# Patient Record
Sex: FEMALE | Race: WHITE | NOT HISPANIC OR LATINO | Employment: OTHER | URBAN - METROPOLITAN AREA
[De-identification: names, ages, dates, MRNs, and addresses within clinical notes are randomized per-mention and may not be internally consistent; named-entity substitution may affect disease eponyms.]

---

## 2017-08-24 ENCOUNTER — OFFICE VISIT (OUTPATIENT)
Dept: URGENT CARE | Facility: CLINIC | Age: 57
End: 2017-08-24
Payer: COMMERCIAL

## 2017-08-24 VITALS
HEIGHT: 62 IN | TEMPERATURE: 98 F | HEART RATE: 84 BPM | RESPIRATION RATE: 18 BRPM | SYSTOLIC BLOOD PRESSURE: 120 MMHG | BODY MASS INDEX: 25.76 KG/M2 | OXYGEN SATURATION: 100 % | WEIGHT: 140 LBS | DIASTOLIC BLOOD PRESSURE: 80 MMHG

## 2017-08-24 DIAGNOSIS — R10.31 RLQ ABDOMINAL PAIN: Primary | ICD-10-CM

## 2017-08-24 PROBLEM — R10.9 ABDOMINAL PAIN: Status: ACTIVE | Noted: 2017-08-24

## 2017-08-24 LAB
BILIRUB UR QL STRIP: NEGATIVE
GLUCOSE UR QL STRIP: NEGATIVE
KETONES UR QL STRIP: NEGATIVE
LEUKOCYTE ESTERASE UR QL STRIP: NEGATIVE
PH, POC UA: 6 (ref 5–8)
POC BLOOD, URINE: NEGATIVE
POC NITRATES, URINE: NEGATIVE
PROT UR QL STRIP: NEGATIVE
SP GR UR STRIP: 1.03 (ref 1–1.03)
UROBILINOGEN UR STRIP-ACNC: 8 (ref 0.1–1.1)

## 2017-08-24 PROCEDURE — 81003 URINALYSIS AUTO W/O SCOPE: CPT | Mod: QW,S$GLB,, | Performed by: FAMILY MEDICINE

## 2017-08-24 PROCEDURE — 3008F BODY MASS INDEX DOCD: CPT | Mod: S$GLB,,, | Performed by: FAMILY MEDICINE

## 2017-08-24 PROCEDURE — 99203 OFFICE O/P NEW LOW 30 MIN: CPT | Mod: 25,S$GLB,, | Performed by: FAMILY MEDICINE

## 2017-08-24 RX ORDER — HYDROCODONE BITARTRATE AND ACETAMINOPHEN 5; 325 MG/1; MG/1
1 TABLET ORAL NIGHTLY PRN
Qty: 5 TABLET | Refills: 0 | Status: SHIPPED | OUTPATIENT
Start: 2017-08-24 | End: 2017-08-29

## 2017-08-24 NOTE — PATIENT INSTRUCTIONS
Likely pulled muscle.  Other possible etiology discussed with patient.  Instructed patient to take ibuprofen.  Groin pain should start getting better in 2-3 days.  If pain worsens or fever develops, go to ER of choice.

## 2017-08-24 NOTE — PROGRESS NOTES
"Subjective:       Patient ID: Suly Jaime is a 57 y.o. female.    Vitals:  height is 5' 2" (1.575 m) and weight is 63.5 kg (140 lb). Her temperature is 98 °F (36.7 °C). Her blood pressure is 120/80 and her pulse is 84. Her respiration is 18 and oxygen saturation is 100%.     Chief Complaint: Abdominal Pain    Abdominal Pain   The current episode started in the past 7 days. The onset quality is gradual. The problem occurs constantly. The problem has been unchanged. The pain is located in the RLQ. The pain is at a severity of 8/10. The pain is moderate. The quality of the pain is sharp. The abdominal pain radiates to the back. Pertinent negatives include no constipation, diarrhea, dysuria, fever, hematochezia, melena, nausea or vomiting. Nothing aggravates the pain. The pain is relieved by nothing.     Review of Systems   Constitution: Negative for chills and fever.   Cardiovascular: Negative for chest pain.   Respiratory: Negative for shortness of breath.    Musculoskeletal: Negative for back pain.   Gastrointestinal: Positive for abdominal pain. Negative for constipation, diarrhea, hematochezia, melena, nausea and vomiting.   Genitourinary: Negative for dysuria.       Objective:      Physical Exam   Constitutional: She is oriented to person, place, and time. She appears well-developed and well-nourished.   HENT:   Head: Normocephalic and atraumatic.   Right Ear: External ear normal.   Left Ear: External ear normal.   Nose: Nose normal.   Mouth/Throat: Mucous membranes are normal.   Eyes: Conjunctivae and lids are normal.   Neck: Trachea normal and full passive range of motion without pain. Neck supple.   Cardiovascular: Normal rate, regular rhythm and normal heart sounds.    Pulmonary/Chest: Effort normal and breath sounds normal. No respiratory distress.   Abdominal: Soft. Normal appearance and bowel sounds are normal. She exhibits no distension, no abdominal bruit, no pulsatile midline mass and no mass. There is " no tenderness.   Musculoskeletal: Normal range of motion. She exhibits no edema.        Legs:  Right inguinal area: no scar, no rash, slight direct tenderness, no rebound tenderness, no palpable hernia. Pain upon right leg extension.   Neurological: She is alert and oriented to person, place, and time. She has normal strength.   Skin: Skin is warm, dry and intact. She is not diaphoretic. No pallor.   Psychiatric: She has a normal mood and affect. Her speech is normal and behavior is normal. Judgment and thought content normal. Cognition and memory are normal.   Nursing note and vitals reviewed.      Assessment:       1. RLQ abdominal pain        Plan:         RLQ abdominal pain  -     POCT Urinalysis, Dipstick, Automated, W/O Scope    Other orders  -     hydrocodone-acetaminophen 5-325mg (NORCO) 5-325 mg per tablet; Take 1 tablet by mouth nightly as needed for Pain.  Dispense: 5 tablet; Refill: 0      Follow up with PCP/Specialist if not any better as discussed.   To ER of CHOICE for any worsening of symptoms.  Patient/Guardian voiced understanding and agreement.

## 2017-08-24 NOTE — LETTER
August 24, 2017      [facility] [address]  [place] [address]  Phone: [phone]  Fax: [phone]       Patient: Suly Jaime   YOB: 1960  Date of Visit: 08/24/2017    To Whom It May Concern:    Brianna Jaime  was at [facility] on 08/24/2017. She may return to work/school on 08/25/2017 with no restrictions. If you have any questions or concerns, or if I can be of further assistance, please do not hesitate to contact me.    Sincerely,    Shruthi Hinson MA

## 2017-08-28 ENCOUNTER — TELEPHONE (OUTPATIENT)
Dept: URGENT CARE | Facility: CLINIC | Age: 57
End: 2017-08-28

## 2017-08-28 NOTE — TELEPHONE ENCOUNTER
COURTESY CALL WENT UNANSWERED. A MESSAGE WAS LEFT FOR CALL BACK.     Onesimo Smiley, RT   1:19 PM 8/28/2017

## 2018-11-05 ENCOUNTER — OFFICE VISIT (OUTPATIENT)
Dept: INTERNAL MEDICINE | Facility: CLINIC | Age: 58
End: 2018-11-05
Payer: COMMERCIAL

## 2018-11-05 ENCOUNTER — HOSPITAL ENCOUNTER (OUTPATIENT)
Dept: RADIOLOGY | Facility: HOSPITAL | Age: 58
Discharge: HOME | End: 2018-11-05
Attending: INTERNAL MEDICINE
Payer: COMMERCIAL

## 2018-11-05 VITALS
WEIGHT: 149.06 LBS | BODY MASS INDEX: 27.43 KG/M2 | SYSTOLIC BLOOD PRESSURE: 138 MMHG | HEART RATE: 102 BPM | OXYGEN SATURATION: 98 % | HEIGHT: 62 IN | DIASTOLIC BLOOD PRESSURE: 92 MMHG

## 2018-11-05 DIAGNOSIS — R05.3 CHRONIC COUGH: Primary | ICD-10-CM

## 2018-11-05 DIAGNOSIS — Z72.0 TOBACCO USE: ICD-10-CM

## 2018-11-05 DIAGNOSIS — R03.0 ELEVATED BLOOD PRESSURE READING: ICD-10-CM

## 2018-11-05 DIAGNOSIS — J32.9 CHRONIC SINUSITIS, UNSPECIFIED LOCATION: ICD-10-CM

## 2018-11-05 DIAGNOSIS — R05.3 CHRONIC COUGH: ICD-10-CM

## 2018-11-05 DIAGNOSIS — Z76.89 ENCOUNTER TO ESTABLISH CARE: ICD-10-CM

## 2018-11-05 PROCEDURE — 99999 PR PBB SHADOW E&M-EST. PATIENT-LVL V: CPT | Mod: PBBFAC,,, | Performed by: INTERNAL MEDICINE

## 2018-11-05 PROCEDURE — 99203 OFFICE O/P NEW LOW 30 MIN: CPT | Mod: S$GLB,,, | Performed by: INTERNAL MEDICINE

## 2018-11-05 PROCEDURE — 71046 X-RAY EXAM CHEST 2 VIEWS: CPT | Mod: TC,FY,PO

## 2018-11-05 PROCEDURE — 3008F BODY MASS INDEX DOCD: CPT | Mod: CPTII,S$GLB,, | Performed by: INTERNAL MEDICINE

## 2018-11-05 PROCEDURE — 71046 X-RAY EXAM CHEST 2 VIEWS: CPT | Mod: 26,,, | Performed by: RADIOLOGY

## 2018-11-05 RX ORDER — IBUPROFEN 200 MG
1 TABLET ORAL DAILY
Qty: 21 PATCH | Refills: 1 | Status: SHIPPED | OUTPATIENT
Start: 2018-11-05 | End: 2019-07-03

## 2018-11-05 RX ORDER — FLUTICASONE PROPIONATE 50 MCG
2 SPRAY, SUSPENSION (ML) NASAL DAILY
Qty: 16 G | Refills: 1 | Status: SHIPPED | OUTPATIENT
Start: 2018-11-05 | End: 2018-12-05

## 2018-11-05 RX ORDER — MONTELUKAST SODIUM 10 MG/1
10 TABLET ORAL NIGHTLY
Qty: 30 TABLET | Refills: 1 | Status: SHIPPED | OUTPATIENT
Start: 2018-11-05 | End: 2018-12-07 | Stop reason: SDUPTHER

## 2018-11-05 RX ORDER — BUPROPION HYDROCHLORIDE 150 MG/1
TABLET ORAL
Qty: 60 TABLET | Refills: 1 | Status: SHIPPED | OUTPATIENT
Start: 2018-11-05 | End: 2021-11-02

## 2018-11-05 RX ORDER — AMOXICILLIN AND CLAVULANATE POTASSIUM 875; 125 MG/1; MG/1
1 TABLET, FILM COATED ORAL EVERY 12 HOURS
Qty: 14 TABLET | Refills: 0 | Status: SHIPPED | OUTPATIENT
Start: 2018-11-05 | End: 2018-11-12

## 2018-11-05 NOTE — PATIENT INSTRUCTIONS
Montelukast oral tablets  What is this medicine?  MONTELUKAST (mon viraj LOO kast) is used to prevent and treat the symptoms of asthma. It is also used to treat allergies. Do not use for an acute asthma attack.  How should I use this medicine?  This medicine should be given by mouth. Follow the directions on the prescription label. Take this medicine at the same time every day. You may take this medicine with or without meals. Do not chew the tablets. Do not stop taking your medicine unless your doctor tells you to.  Talk to your pediatrician regarding the use of this medicine in children. Special care may be needed. While this drug may be prescribed for children as young as 15 years of age for selected conditions, precautions do apply.  What side effects may I notice from receiving this medicine?  Side effects that you should report to your doctor or health care professional as soon as possible:  · allergic reactions like skin rash or hives, or swelling of the face, lips, or tongue  · breathing problems  · confusion  · dark urine  · fever or infection  · flu-like symptoms  · hallucinations  · painful lumps under the skin  · pain, tingling, numbness in the hands or feet  · sinus pain or swelling  · suicidal thoughts or other mood changes  · trouble sleeping  · unusual bleeding or bruising  · yellowing of the eyes or skin  Side effects that usually do not require medical attention (report to your doctor or health care professional if they continue or are bothersome):  · cough  · dizziness  · drowsiness  · headache  · nightmares  · stomach upset  · stuffy nose  What may interact with this medicine?  · anti-infectives like rifampin and rifabutin  · medicines for diabetes like rosiglitazone and repaglinide  · medicines for seizures like phenytoin, phenobarbital, and carbamazepine  · paclitaxel  What if I miss a dose?  If you miss a dose, take it as soon as you can. If it is almost time for your next dose, take only that  dose. Do not take double or extra doses.  Where should I keep my medicine?  Keep out of the reach of children.  Store at room temperature between 15 and 30 degrees C (59 and 86 degrees F). Protect from light and moisture. Keep this medicine in the original bottle. Throw away any unused medicine after the expiration date.  What should I tell my health care provider before I take this medicine?  They need to know if you have any of these conditions:  · liver disease  · an unusual or allergic reaction to montelukast, other medicines, foods, dyes, or preservatives  · pregnant or trying to get pregnant  · breast-feeding  What should I watch for while using this medicine?  Visit your doctor or health care professional for regular checks on your progress. Tell your doctor or health care professional if your allergy or asthma symptoms do not improve. Take your medicine even when you do not have symptoms. Do not stop taking any of your medicine(s) unless your doctor tells you to.  If you have asthma, talk to your doctor about what to do in an acute asthma attack. Always have your inhaled rescue medicine for asthma attacks with you.  Patients and their families should watch for new or worsening thoughts of suicide or depression. Also watch for sudden changes in feelings such as feeling anxious, agitated, panicky, irritable, hostile, aggressive, impulsive, severely restless, overly excited and hyperactive, or not being able to sleep. Any worsening of mood or thoughts of suicide or dying should be reported to your health care professional right away.  NOTE:This sheet is a summary. It may not cover all possible information. If you have questions about this medicine, talk to your doctor, pharmacist, or health care provider. Copyright© 2017 [employer]        Fluticasone nasal spray  What is this medicine?  FLUTICASONE (floo TIK a sone) is a corticosteroid. This medicine is used to treat the symptoms of allergies like sneezing,  itchy red eyes, and itchy, runny, or stuffy nose.  How should I use this medicine?  This medicine is for use in the nose. Follow the directions on your product or prescription label. This medicine works best if used at regular intervals. Do not use more often than directed. Make sure that you are using your nasal spray correctly. After 6 months of daily use without a prescription, talk to your doctor or health care professional before using it for a longer time. Ask your doctor or health care professional if you have any questions.  Talk to your pediatrician regarding the use of this medicine in children. Special care may be needed. This medicine has been used in children as young as 2 years. After two months of daily use without a prescription in a child, talk to your pediatrician before using it for a longer time.  What side effects may I notice from receiving this medicine?  Side effects that you should report to your doctor or health care professional as soon as possible:  · allergic reactions like skin rash, itching or hives, swelling of the face, lips, or tongue  · changes in vision  · flu-like symptoms  · white patches or sores in the mouth or nose  Side effects that usually do not require medical attention (report to your doctor or health care professional if they continue or are bothersome):  · burning or irritation inside the nose or throat  · cough  · headache  · nosebleed  · unusual taste or smell  What may interact with this medicine?  · ketoconazole  · metyrapone  · some medicines for HIV  · vaccines  What if I miss a dose?  If you miss a dose, use it as soon as you remember. If it is almost time for your next dose, use only that dose and continue with your regular schedule. Do not use double or extra doses.  Where should I keep my medicine?  Keep out of the reach of children.  Store at room temperature between 15 and 30 degrees C (59 and 86 degrees F). Throw away any unused medicine after the  expiration date.  What should I tell my health care provider before I take this medicine?  They need to know if you have any of these conditions:  · infection, like tuberculosis, herpes, or fungal infection  · recent surgery on nose or sinuses  · taking corticosteroid by mouth  · an unusual or allergic reaction to fluticasone, steroids, other medicines, foods, dyes, or preservatives  · pregnant or trying to get pregnant  · breast-feeding  What should I watch for while using this medicine?  Visit your doctor or health care professional for regular checks on your progress. Some symptoms may improve within 12 hours after starting use. Check with your doctor or health care professional if there is no improvement in your condition after 3 weeks of use.  Do not come in contact with people who have chickenpox or the measles while you are taking this medicine. If you do, call your doctor right away.  NOTE:This sheet is a summary. It may not cover all possible information. If you have questions about this medicine, talk to your doctor, pharmacist, or health care provider. Copyright© 2017 [employer]        Amoxicillin; Clavulanic Acid chewable tablets  What is this medicine?  AMOXICILLIN; CLAVULANIC ACID (a mox i JALEN in; PAULINE heath ic AS id) is a penicillin antibiotic. It is used to treat certain kinds of bacterial infections. It It will not work for colds, flu, or other viral infections.  How should I use this medicine?  Take this medicine by mouth. Chew it completely before swallowing. Follow the directions on the prescription label. Take this medicine at the start of a meal or snack. Take your medicine at regular intervals. Do not take your medicine more often than directed. Take all of your medicine as directed even if you think you are better. Do not skip doses or stop your medicine early.  Talk to your pediatrician regarding the use of this medicine in children. While this drug may be prescribed for selected  conditions, precautions do apply.  What side effects may I notice from receiving this medicine?  Side effects that you should report to your doctor or health care professional as soon as possible:  · allergic reactions like skin rash, itching or hives, swelling of the face, lips, or tongue  · breathing problems  · dark urine  · fever or chills, sore throat  · redness, blistering, peeling or loosening of the skin, including inside the mouth  · seizures  · trouble passing urine or change in the amount of urine  · unusual bleeding, bruising  · unusually weak or tired  · white patches or sores in the mouth or throat  Side effects that usually do not require medical attention (report to your doctor or health care professional if they continue or are bothersome):  · diarrhea  · dizziness  · headache  · nausea, vomiting  · stomach upset  · vaginal or anal irritation  What may interact with this medicine?  · allopurinol  · anticoagulants  · birth control pills  · methotrexate  · probenecid  What if I miss a dose?  If you miss a dose, take it as soon as you can. If it is almost time for your next dose, take only that dose. Do not take double or extra doses.  Where should I keep my medicine?  Keep out of the reach of children.  Store at room temperature below 25 degrees C (77 degrees F). Keep container tightly closed. Throw away any unused medicine after the expiration date.  What should I tell my health care provider before I take this medicine?  They need to know if you have any of these conditions:  · bowel disease, like colitis  · kidney disease  · liver disease  · mononucleosis  · phenylketonuria  · an unusual or allergic reaction to amoxicillin, penicillin, cephalosporin, other antibiotics, clavulanic acid, other medicines, foods, dyes, or preservatives  · pregnant or trying to get pregnant  · breast-feeding  What should I watch for while using this medicine?  Tell your doctor or health care professional if your  symptoms do not improve.  Do not treat diarrhea with over the counter products. Contact your doctor if you have diarrhea that lasts more than 2 days or if it is severe and watery.  If you have diabetes, you may get a false-positive result for sugar in your urine. Check with your doctor or health care professional.  Birth control pills may not work properly while you are taking this medicine. Talk to your doctor about using an extra method of birth control.  NOTE:This sheet is a summary. It may not cover all possible information. If you have questions about this medicine, talk to your doctor, pharmacist, or health care provider. Copyright© 2017 [employer] Standard

## 2018-11-05 NOTE — PROGRESS NOTES
Subjective:       Patient ID: Suly Jaime is a 58 y.o. female.    Chief Complaint: [facility]    HPI Mrs. Jaime is a 58-year-old female with no significant past medical history who presents to establish [facility] with a chief complaint of chronic cough.  Patient states she has had a constant cough for 8 years.  She has had a chest x-ray for follow-up of chronic cough. Cough is constant. This was performed probably about 5 years ago.  She is coughing up clear mucus.  She has had nasal or sinus x-rays which were negative. She has never had CT sinuses or seen an ENT doctor.  She has associated symptoms of nasal drip, runny nose, shortness of breath. Denies any ear pain, wheezing. She has recently had enlarged LNs in the neck, no fevers. No longer has LAD. She has never been prescribed inhalers.  She has been smoking for about 15 years in total.  During that time, she has smoked less than 1 pack per day.  She is ready to quit smoking.    Review of Systems   HENT: Positive for congestion, postnasal drip and rhinorrhea. Negative for ear pain.    Respiratory: Positive for cough and shortness of breath. Negative for wheezing.    Cardiovascular: Negative for chest pain.       Objective:      Physical Exam   Constitutional: She is oriented to person, place, and time. She appears well-developed and well-nourished. No distress.   HENT:   Head: Normocephalic and atraumatic.   Right Ear: External ear normal.   Left Ear: External ear normal.   Nose: Mucosal edema and rhinorrhea present.   Mouth/Throat: Oropharynx is clear and moist. No oropharyngeal exudate.   Eyes: Conjunctivae and EOM are normal.   Neck: Neck supple. No tracheal deviation present. No thyromegaly present.   Cardiovascular: Normal rate, regular rhythm, normal heart sounds and intact distal pulses. Exam reveals no gallop and no friction rub.   No murmur heard.  Pulmonary/Chest: Effort normal and breath sounds normal. No stridor. No respiratory distress. She has no  wheezes. She has no rales.   Lymphadenopathy:     She has no cervical adenopathy.   Neurological: She is alert and oriented to person, place, and time.   Skin: Skin is warm and dry. She is not diaphoretic.   Psychiatric: She has a normal mood and affect. Her behavior is normal. Judgment and thought content normal.   Vitals reviewed.      Assessment:       1. Chronic cough    2. Chronic sinusitis, unspecified location    3. Encounter to establish care    4. Tobacco use    5. Elevated blood pressure reading        Plan:     1.  Chronic cough  Likely due to chronic sinusitis  Chest x-ray being obtained due to duration of cough    2.  Chronic sinusitis  Obtaining CT of the sinuses  Referral placed to ENT  Singulair 10 mg p.o. daily  Use Flonase nasal spray daily  Augmentin 875/125 mg p.o. b.i.d. x7 days    3.  Tobacco use  Counseled on the importance of cessation  Wellbutrin and Nicoderm patches prescribed  Referral placed to smoking cessation    4.  Elevated blood pressure reading  No prior blood pressure readings for comparison  Will recheck on return visit in 1 month.  If still elevated at that time, will start antihypertensive medication.    Return to clinic in 1 month

## 2018-11-06 ENCOUNTER — TELEPHONE (OUTPATIENT)
Dept: INTERNAL MEDICINE | Facility: CLINIC | Age: 58
End: 2018-11-06

## 2018-11-06 NOTE — TELEPHONE ENCOUNTER
Called and spoke w/ patient. Per Dr. Lopez, chest x-ray came back normal. Patient verbally understood.        ----- Message from Irene Lopez MD sent at 11/6/2018 12:39 PM CST -----  Please call the patient regarding her normal chest x-ray result.

## 2018-11-12 ENCOUNTER — TELEPHONE (OUTPATIENT)
Dept: OTOLARYNGOLOGY | Facility: CLINIC | Age: 58
End: 2018-11-12

## 2018-12-07 DIAGNOSIS — R05.3 CHRONIC COUGH: ICD-10-CM

## 2018-12-07 DIAGNOSIS — J32.9 CHRONIC SINUSITIS, UNSPECIFIED LOCATION: ICD-10-CM

## 2018-12-07 RX ORDER — MONTELUKAST SODIUM 10 MG/1
TABLET ORAL
Qty: 30 TABLET | Refills: 1 | Status: SHIPPED | OUTPATIENT
Start: 2018-12-07 | End: 2021-09-30 | Stop reason: SDUPTHER

## 2019-01-15 ENCOUNTER — OFFICE VISIT (OUTPATIENT)
Dept: OTOLARYNGOLOGY | Facility: CLINIC | Age: 59
End: 2019-01-15
Payer: COMMERCIAL

## 2019-01-15 VITALS
BODY MASS INDEX: 27.52 KG/M2 | SYSTOLIC BLOOD PRESSURE: 122 MMHG | WEIGHT: 149.56 LBS | HEIGHT: 62 IN | DIASTOLIC BLOOD PRESSURE: 80 MMHG | HEART RATE: 104 BPM

## 2019-01-15 DIAGNOSIS — J32.8 OTHER CHRONIC SINUSITIS: Primary | ICD-10-CM

## 2019-01-15 DIAGNOSIS — J39.2 OROPHARYNGEAL LESION: ICD-10-CM

## 2019-01-15 DIAGNOSIS — K21.9 LARYNGOPHARYNGEAL REFLUX (LPR): ICD-10-CM

## 2019-01-15 PROCEDURE — 99243 OFF/OP CNSLTJ NEW/EST LOW 30: CPT | Mod: 25,S$GLB,, | Performed by: OTOLARYNGOLOGY

## 2019-01-15 PROCEDURE — 31575 PR LARYNGOSCOPY, FLEXIBLE; DIAGNOSTIC: ICD-10-PCS | Mod: S$GLB,,, | Performed by: OTOLARYNGOLOGY

## 2019-01-15 PROCEDURE — 31575 DIAGNOSTIC LARYNGOSCOPY: CPT | Mod: S$GLB,,, | Performed by: OTOLARYNGOLOGY

## 2019-01-15 PROCEDURE — 99243 PR OFFICE CONSULTATION,LEVEL III: ICD-10-PCS | Mod: 25,S$GLB,, | Performed by: OTOLARYNGOLOGY

## 2019-01-15 PROCEDURE — 99999 PR PBB SHADOW E&M-EST. PATIENT-LVL III: ICD-10-PCS | Mod: PBBFAC,,, | Performed by: OTOLARYNGOLOGY

## 2019-01-15 PROCEDURE — 99999 PR PBB SHADOW E&M-EST. PATIENT-LVL III: CPT | Mod: PBBFAC,,, | Performed by: OTOLARYNGOLOGY

## 2019-01-15 RX ORDER — AMOXICILLIN AND CLAVULANATE POTASSIUM 875; 125 MG/1; MG/1
1 TABLET, FILM COATED ORAL 2 TIMES DAILY
Qty: 42 TABLET | Refills: 0 | Status: SHIPPED | OUTPATIENT
Start: 2019-01-15 | End: 2019-02-05

## 2019-01-15 RX ORDER — PANTOPRAZOLE SODIUM 40 MG/1
40 TABLET, DELAYED RELEASE ORAL DAILY
Qty: 30 TABLET | Refills: 11 | Status: SHIPPED | OUTPATIENT
Start: 2019-01-15 | End: 2019-06-27 | Stop reason: SDUPTHER

## 2019-01-15 RX ORDER — LEVOCETIRIZINE DIHYDROCHLORIDE 5 MG/1
5 TABLET, FILM COATED ORAL NIGHTLY
Qty: 30 TABLET | Refills: 11 | Status: SHIPPED | OUTPATIENT
Start: 2019-01-15 | End: 2020-01-28 | Stop reason: SDUPTHER

## 2019-01-15 NOTE — PROGRESS NOTES
Chief Complaint   Patient presents with    Cough     chronic cough, referred by Dr Lopez    Nasal Congestion     post nasal drip   .     HPI:Suly Jaime is a 59 y.o. female who has been referred by Dr. Irene Lopez for a several year history of chronic cough.  She relays that it is mostly dry but occasionally productive.  She feels this is gradually worsening. She notes that she has had nasal congestion and post-nasal drip. She notes the cough is worse at night. She  6 month history of hoarseness. Her voice is not progressively worsening over this time. There are pitch breaks or cracks. There is not vocal fatigue. She denies dysphagia, odynophagia, throat pain, and otalgia.  There is no hemoptysis or hematemesis. She is breathing well. She is currently taking Singulair with limited benefit. She is using Flonase with a very slight relief.     She denies throat clearing and cough. She admits to heartburn and reflux.        No past medical history on file.  Social History     Socioeconomic History    Marital status:      Spouse name: Not on file    Number of children: Not on file    Years of education: Not on file    Highest education level: Not on file   Social Needs    Financial resource strain: Not on file    Food insecurity - worry: Not on file    Food insecurity - inability: Not on file    Transportation needs - medical: Not on file    Transportation needs - non-medical: Not on file   Occupational History    Not on file   Tobacco Use    Smoking status: Current Every Day Smoker    Smokeless tobacco: Never Used   Substance and Sexual Activity    Alcohol use: No    Drug use: No    Sexual activity: Not on file   Other Topics Concern    Not on file   Social History Narrative    Not on file     Past Surgical History:   Procedure Laterality Date    HYSTERECTOMY  2003     Family History   Problem Relation Age of Onset    Diabetes Mother     Diabetes Maternal Grandmother     Heart  disease Maternal Grandmother            Review of Systems  General: negative for chills, fever or weight loss  Psychological: negative for mood changes or depression  Ophthalmic: negative for blurry vision, photophobia or eye pain  ENT: see HPI  Respiratory: no cough, shortness of breath, or wheezing  Cardiovascular: no chest pain or dyspnea on exertion  Gastrointestinal: no abdominal pain, change in bowel habits, or black/ bloody stools  Musculoskeletal: negative for gait disturbance or muscular weakness  Neurological: no syncope or seizures; no ataxia  Dermatological: negative for puritis,  rash and jaundice  Hematologic/lymphatic: no easy bruising, no new lumps or bumps      Physical Exam:    Vitals:    01/15/19 1531   BP: 122/80   Pulse: 104       Constitutional: Well appearing / communicating without difficutly.  NAD.  Eyes: EOM I Bilaterally  Head/Face: Normocephalic.  Negative paranasal sinus pressure/tenderness.  Salivary glands WNL.  House Brackmann I Bilaterally.    Right Ear: Auricle normal appearance. External Auditory Canal within normal limits no lesions or masses,TM w/o masses/lesions/perforations. TM mobility noted.   Left Ear: Auricle normal appearance. External Auditory Canal within normal limits no lesions or masses,TM w/o masses/lesions/perforations. TM mobility noted.  Nose: No gross nasal septal deviation. Inferior Turbinates 3+ bilaterally. No septal perforation. No masses/lesions. External nasal skin appears normal without masses/lesions.  Oral Cavity: Gingiva/lips within normal limits.  Dentition/gingiva healthy appearing. Mucus membranes moist. Floor of mouth soft, no masses palpated. Oral Tongue mobile. Hard Palate appears normal.    Oropharynx: Base of tongue appears normal. No masses/lesions noted. Tonsillar fossa/pharyngeal wall without lesions. Posterior oropharynx WNL.  Soft palate without masses. Midline uvula.   Neck/Lymphatic: No LAD I-VI bilaterally.  No thyromegaly.  No masses  noted on exam.    Mirror laryngoscopy/nasopharyngoscopy: Active gag reflex.  Unable to perform.    Neuro/Psychiatric: AOx3.  Normal mood and affect.   Cardiovascular: Normal carotid pulses bilaterally, no increasing jugular venous distention noted at cervical region bilaterally.    Respiratory: Normal respiratory effort, no stridor, no retractions noted.    See separate procedure note for FFL.    Chest xray 11/18/2018: no acute abnormality      Assessment:    ICD-10-CM ICD-9-CM    1. Other chronic sinusitis J32.8 473.8 CT Medtronic Sinuses without   2. Laryngopharyngeal reflux (LPR) K21.9 478.79    3. Oropharyngeal lesion J39.2 478.20      The primary encounter diagnosis was Other chronic sinusitis. Diagnoses of Laryngopharyngeal reflux (LPR) and Oropharyngeal lesion were also pertinent to this visit.      Plan:  Orders Placed This Encounter   Procedures    CT Medtronic Sinuses without       Continue Flonase, Singulair. Add Xyzal. Start Augmentin 875mg PO BID for 21 days. Obtain post-treatment CT scan of the sinuses to assess for resolution.      I recommended that the patient start taking protonix 40mg PO BID.* and provided a prescription. I also recommended that the patient refrain from eating within 3 hours of going to bed at night and that the patient place a 2 x 4 underneath the head board of the bed to elevate the head of bed very subtly and optimize the impact of gravity on the potential reflux.  I recommended that the patient avoid alcohol, caffeine, tobacco, tomato sauce, spicy foods, fried food, and chocolate.     Will need excisional biopsy of papillomatous lesion on the posterior wall of oropharynx.       Follow up in 6 weeks to reassess progress with treatment regimen.    Thank you kindly for allowing me to participate in the patient's care.        Deidre Calderon MD

## 2019-01-15 NOTE — LETTER
January 19, 2019      Irene Lopez MD  [address] [facility]  [place] [address]           [facility] Otorhinolaryngology  [address] [place] [address], [address]  [place] [address]  Phone: [phone]  Fax: [phone]          Patient: Suly Jaime   MR Number: [id number]   YOB: 1960   Date of Visit: 1/15/2019       Dear Dr. Irene Lopez:    Thank you for referring Suly Jaime to me for evaluation. Attached you will find relevant portions of my assessment and plan of care.    If you have questions, please do not hesitate to call me. I look forward to following Suly Jaime along with you.    Sincerely,    Deidre Calderon MD    Enclosure  CC:  No Recipients    If you would like to receive this communication electronically, please contact [email or web address] or [phone] to request more information on [employer] Link access.    For providers and/or their staff who would like to refer a patient to [facility], please contact us through our one-stop-shop provider referral line, [facility], at [phone].    If you feel you have received this communication in error or would no longer like to receive these types of communications, please e-mail [email or web address]

## 2019-01-16 ENCOUNTER — TELEPHONE (OUTPATIENT)
Dept: OTOLARYNGOLOGY | Facility: CLINIC | Age: 59
End: 2019-01-16

## 2019-01-16 DIAGNOSIS — J39.2 OROPHARYNGEAL LESION: Primary | ICD-10-CM

## 2019-01-18 DIAGNOSIS — Z12.39 BREAST CANCER SCREENING: ICD-10-CM

## 2019-01-21 ENCOUNTER — HOSPITAL ENCOUNTER (OUTPATIENT)
Dept: RADIOLOGY | Facility: HOSPITAL | Age: 59
Discharge: HOME | End: 2019-01-21
Attending: OTOLARYNGOLOGY
Payer: COMMERCIAL

## 2019-01-21 DIAGNOSIS — J32.8 OTHER CHRONIC SINUSITIS: ICD-10-CM

## 2019-01-21 PROCEDURE — 70486 CT MEDTRONIC SINUSES WITHOUT: ICD-10-PCS | Mod: 26,,, | Performed by: RADIOLOGY

## 2019-01-21 PROCEDURE — 70486 CT MAXILLOFACIAL W/O DYE: CPT | Mod: TC

## 2019-01-21 PROCEDURE — 70486 CT MAXILLOFACIAL W/O DYE: CPT | Mod: 26,,, | Performed by: RADIOLOGY

## 2019-01-28 NOTE — PROCEDURES
Procedures     Due to indication in patient's history, presentation or risk factors,  a fiber optic exam was performed.    SEPARATE PROCEDURE NOTE:    ANESTHESIA:  Topical xylocaine with joaquina-synephrine    FINDINGS: Papillomatous lesion on the posterior wall of the oropharynx.  Moderate inaterarytenoid erythema and edema    PROCEDURE:  After verbal consent was obtained, the flexible scope was passed through the patient's nasal cavity without difficulty.  The nasopharynx (adenoid pad) and eustachian tube orifices were first visualized and were found to be normal, without masses or irregularity.  The posterior pharyngeal wall shows papillomatous leison. The base of tongue was then examined and no mass or irregular tissue was seen.  The scope was then advanced to the larynx, and the epiglottis, valleculae, and piriform sinuses were normal, without masses or mucosal irregularity.  The false vocal folds and true vocal folds were then examined and were found to have normal mobility (full abduction and adduction) and no masses or mucosal irregularity was seen.  The interartyenoid area had moderate  edema and erythema consistent with reflux.

## 2019-01-29 ENCOUNTER — OFFICE VISIT (OUTPATIENT)
Dept: OTOLARYNGOLOGY | Facility: CLINIC | Age: 59
End: 2019-01-29
Payer: COMMERCIAL

## 2019-01-29 ENCOUNTER — HOSPITAL ENCOUNTER (OUTPATIENT)
Dept: PREADMISSION TESTING | Facility: HOSPITAL | Age: 59
Discharge: HOME | End: 2019-01-29
Attending: OTOLARYNGOLOGY
Payer: COMMERCIAL

## 2019-01-29 ENCOUNTER — ANESTHESIA EVENT (OUTPATIENT)
Dept: SURGERY | Facility: HOSPITAL | Age: 59
End: 2019-01-29
Payer: COMMERCIAL

## 2019-01-29 ENCOUNTER — CLINICAL SUPPORT (OUTPATIENT)
Dept: LAB | Facility: HOSPITAL | Age: 59
End: 2019-01-29
Attending: OTOLARYNGOLOGY
Payer: COMMERCIAL

## 2019-01-29 VITALS
DIASTOLIC BLOOD PRESSURE: 91 MMHG | HEIGHT: 62 IN | HEART RATE: 123 BPM | BODY MASS INDEX: 27.51 KG/M2 | WEIGHT: 149.5 LBS | SYSTOLIC BLOOD PRESSURE: 131 MMHG

## 2019-01-29 DIAGNOSIS — K21.9 LARYNGOPHARYNGEAL REFLUX (LPR): ICD-10-CM

## 2019-01-29 DIAGNOSIS — Z01.818 PREOP EXAMINATION: Primary | ICD-10-CM

## 2019-01-29 DIAGNOSIS — J31.0 CHRONIC RHINITIS: ICD-10-CM

## 2019-01-29 DIAGNOSIS — J39.2 OROPHARYNGEAL LESION: Primary | ICD-10-CM

## 2019-01-29 DIAGNOSIS — Z01.818 PREOP EXAMINATION: ICD-10-CM

## 2019-01-29 PROCEDURE — 99999 PR PBB SHADOW E&M-EST. PATIENT-LVL III: ICD-10-PCS | Mod: PBBFAC,,, | Performed by: OTOLARYNGOLOGY

## 2019-01-29 PROCEDURE — 93010 EKG 12-LEAD: ICD-10-PCS | Mod: ,,, | Performed by: STUDENT IN AN ORGANIZED HEALTH CARE EDUCATION/TRAINING PROGRAM

## 2019-01-29 PROCEDURE — 99214 OFFICE O/P EST MOD 30 MIN: CPT | Mod: 25,S$GLB,, | Performed by: OTOLARYNGOLOGY

## 2019-01-29 PROCEDURE — 3008F PR BODY MASS INDEX (BMI) DOCUMENTED: ICD-10-PCS | Mod: CPTII,S$GLB,, | Performed by: OTOLARYNGOLOGY

## 2019-01-29 PROCEDURE — 3008F BODY MASS INDEX DOCD: CPT | Mod: CPTII,S$GLB,, | Performed by: OTOLARYNGOLOGY

## 2019-01-29 PROCEDURE — 99999 PR PBB SHADOW E&M-EST. PATIENT-LVL III: CPT | Mod: PBBFAC,,, | Performed by: OTOLARYNGOLOGY

## 2019-01-29 PROCEDURE — 99214 PR OFFICE/OUTPT VISIT, EST, LEVL IV, 30-39 MIN: ICD-10-PCS | Mod: 25,S$GLB,, | Performed by: OTOLARYNGOLOGY

## 2019-01-29 PROCEDURE — 93005 ELECTROCARDIOGRAM TRACING: CPT

## 2019-01-29 PROCEDURE — 93010 ELECTROCARDIOGRAM REPORT: CPT | Mod: ,,, | Performed by: STUDENT IN AN ORGANIZED HEALTH CARE EDUCATION/TRAINING PROGRAM

## 2019-01-29 RX ORDER — LIDOCAINE HYDROCHLORIDE 10 MG/ML
1 INJECTION, SOLUTION EPIDURAL; INFILTRATION; INTRACAUDAL; PERINEURAL ONCE
Status: CANCELLED | OUTPATIENT
Start: 2019-01-29 | End: 2019-01-29

## 2019-01-29 NOTE — DISCHARGE INSTRUCTIONS
Your surgery is scheduled for 2/18/19.    Please report to Outpatient Surgery Intake Office on the 2nd FLOOR at 5:30a.m.          INSTRUCTIONS IMPORTANT!!!  ¨ Do not eat or drink after 12 midnight-including water. OK to brush teeth, no   gum, candy or mints!    ¨ Take only these medicines with a small swallow of water-morning of surgery: Pantoprazole (Protonix)          ____  Do not wear makeup, including mascara.  ____  No powder, lotions or creams to surgical area.  ____  Please remove all jewelry, including piercings and leave at home.  ____  No money or valuables needed. Please leave at home.  ____  Please bring any documents given by your doctor.  ____  If going home the same day, arrange for a ride home. You will not be able to             drive if Anesthesia was used.  ____  Wear loose fitting clothing. Allow for dressings, bandages.  ____  Stop Aspirin, Ibuprofen, Motrin and Aleve at least 3-5 days before surgery, unless otherwise instructed by your doctor, or the nurse.   You MAY use Tylenol/acetaminophen until day of surgery.  ____  If you take diabetic medication, do not take am of surgery unless instructed by Doctor.  ____  Call MD for temperature above 101 degrees or any other signs of infection such as Urinary (bladder) infection, Upper respiratory infection, skin boils, etc.  ____ Stop taking any Fish Oil supplement or any Vitamins that contain Vitamin E at least 5 days prior to surgery.  ____ Do Not wear your contact lenses the day of your procedure.  You may wear your glasses.      ____Do not shave for 3 days prior to surgery.  ____ Practice Good hand washing before, during, and after procedure.      I have read or had read and explained to me, and understand the above information.  Additional comments or instructions:  For additional questions call [phone]        Direct Laryngoscopy  Direct laryngoscopy is a procedure to look at the vocal cords. A laryngoscope is a rigid, hollow tube with a light  attached. Using this tool, your healthcare provider can look behind your tongue and down your throat to your vocal cords. A tissue sample (biopsy) can be taken for study in a lab. Or a growth can be removed. Your healthcare provider can tell you more about your procedure depending on why its being done. This sheet gives you general information about what to expect.    Getting ready for the procedure  Prepare for the surgery as you have been instructed. Be sure to tell your healthcare provider about all medicines you take. This includes over-the-counter medicines. It also includes herbs and other supplements. You may need to stop taking some or all of them before surgery. Your healthcare provider will let you know. Also follow any directions youre given for not eating or drinking before surgery.  The day of the procedure  The procedure takes 30 to 60 minutes. You will likely go home the same day.  Before the procedure  Here is what to expect before the procedure begins:  · An IV line is put into a vein in your arm or hand. This line delivers fluids and medicines.  · You will be given medicine (anesthesia) to keep you pain free during surgery. This may be general anesthesia, which puts you in a state like deep sleep. Or you may have sedation, which makes you relaxed and drowsy. Local anesthesia may also be injected or sprayed into your throat to numb it.  During the procedure  Here is what to expect during the procedure:  · You will lie on your back on a table. The scope is put into your mouth and passed down into the throat.  · Your healthcare provider examines the larynx and surrounding areas with the scope. If needed, a biopsy is done using small tools put through the scope.  · If a growth is found, tools can be put through the scope to remove it.  After the procedure  You will be taken to a room to wake up from the anesthesia. At first, your throat may be sore and scratchy. Your voice may be hoarse, making  talking difficult. And it may be hard to swallow. You will receive medicine to control pain. When you are released to go home, have an adult family member or friend ready to drive you.  Recovering at home  Once home, follow any instructions you have been given. Be sure to:  · Take pain medicine as directed.  · Drink plenty of water as soon as you can swallow normally.  · Use throat lozenges as advised by your healthcare provider to help ease throat soreness.  · Rest your voice as instructed by your healthcare provider.  When to call your healthcare provider  After you get home, call the healthcare provider if you have any of the following:  · Chest pain or trouble breathing (call 911)  · Fever of 100.4°F (38°C) or higher, or as directed by your healthcare provider  · Problems swallowing that prevent you from eating or drinking  · Pain that does not go away even after taking pain medicine  · Severe nausea or vomiting  · Bloody vomit  · Cough that brings up blood   Follow-up  Within a few weeks, you will see your healthcare provider for a follow-up visit. During this visit, your provider will discuss the results of the procedure. Depending on what was found, you may need further evaluation and treatment.  Risks and possible complications   The risks of this procedure include:  · Bleeding  · Infection  · Gagging  · Vomiting  · Cuts in the mouth or throat  · Injury to the teeth  · Vocal cord injury  · Breathing problems  · Risks of anesthesia   Date Last Reviewed: 6/11/2015  © 4452-8822 [employer]. [address] [place] [address], [place] [address]. All rights reserved. This information is not intended as a substitute for professional medical care. Always follow your healthcare professional's instructions.

## 2019-01-29 NOTE — H&P (VIEW-ONLY)
Chief Complaint   Patient presents with    Follow-up     discuss surgery   .     HPI:Suly Jaime is a 59 y.o. female who has been referred by Dr. Irene Lopez for a several year history of chronic cough.  She relays that it is mostly dry but occasionally productive.  She feels this is gradually worsening. She notes that she has had nasal congestion and post-nasal drip. She notes the cough is worse at night. She  6 month history of hoarseness. Her voice is not progressively worsening over this time. There are pitch breaks or cracks. There is not vocal fatigue. She denies dysphagia, odynophagia, throat pain, and otalgia.  There is no hemoptysis or hematemesis. She is breathing well. She is currently taking Singulair with limited benefit. She is using Flonase with a very slight relief.     She denies throat clearing and cough. She admits to heartburn and reflux.    Interval HPI 1/29/2019:  Mrs. Storey follows up today for LPR and chronic rhinitis.  She reports that her symptoms remain about the same as compared to last visit.  She feels that cough may be slightly improved. She still notes nasal congestion and post-nasal drip. She is also here today to discuss CT sinus results and to further discuss surgery to biopsy lesion of the oropharynx.     No past medical history on file.  Social History     Socioeconomic History    Marital status:      Spouse name: Not on file    Number of children: Not on file    Years of education: Not on file    Highest education level: Not on file   Social Needs    Financial resource strain: Not on file    Food insecurity - worry: Not on file    Food insecurity - inability: Not on file    Transportation needs - medical: Not on file    Transportation needs - non-medical: Not on file   Occupational History    Not on file   Tobacco Use    Smoking status: Current Every Day Smoker     Packs/day: 0.25    Smokeless tobacco: Never Used   Substance and Sexual Activity     Alcohol use: No    Drug use: No    Sexual activity: Not on file   Other Topics Concern    Not on file   Social History Narrative    Not on file     Past Surgical History:   Procedure Laterality Date    HYSTERECTOMY  2003    KNEE ARTHROSCOPY       Family History   Problem Relation Age of Onset    Diabetes Mother     Diabetes Maternal Grandmother     Heart disease Maternal Grandmother            Review of Systems  General: negative for chills, fever or weight loss  Psychological: negative for mood changes or depression  Ophthalmic: negative for blurry vision, photophobia or eye pain  ENT: see HPI  Respiratory: no cough, shortness of breath, or wheezing  Cardiovascular: no chest pain or dyspnea on exertion  Gastrointestinal: no abdominal pain, change in bowel habits, or black/ bloody stools  Musculoskeletal: negative for gait disturbance or muscular weakness  Neurological: no syncope or seizures; no ataxia  Dermatological: negative for puritis,  rash and jaundice  Hematologic/lymphatic: no easy bruising, no new lumps or bumps      Physical Exam:    Vitals:    01/29/19 1509   BP: (!) 131/91   Pulse: (!) 123       Constitutional: Well appearing / communicating without difficutly.  NAD.  Eyes: EOM I Bilaterally  Head/Face: Normocephalic.  Negative paranasal sinus pressure/tenderness.  Salivary glands WNL.  House Brackmann I Bilaterally.    Right Ear: Auricle normal appearance. External Auditory Canal within normal limits no lesions or masses,TM w/o masses/lesions/perforations. TM mobility noted.   Left Ear: Auricle normal appearance. External Auditory Canal within normal limits no lesions or masses,TM w/o masses/lesions/perforations. TM mobility noted.  Nose: No gross nasal septal deviation. Inferior Turbinates 3+ bilaterally. No septal perforation. No masses/lesions. External nasal skin appears normal without masses/lesions.  Oral Cavity: Gingiva/lips within normal limits.  Dentition/gingiva healthy appearing.  Mucus membranes moist. Floor of mouth soft, no masses palpated. Oral Tongue mobile. Hard Palate appears normal.    Oropharynx: Base of tongue appears normal. No masses/lesions noted. Tonsillar fossa/pharyngeal wall without lesions. Posterior oropharynx WNL.  Soft palate without masses. Midline uvula.   Neck/Lymphatic: No LAD I-VI bilaterally.  No thyromegaly.  No masses noted on exam.    Mirror laryngoscopy/nasopharyngoscopy: Active gag reflex.  Unable to perform.    Neuro/Psychiatric: AOx3.  Normal mood and affect.   Cardiovascular: Normal carotid pulses bilaterally, no increasing jugular venous distention noted at cervical region bilaterally.    Respiratory: Normal respiratory effort, no stridor, no retractions noted.      Chest xray 11/18/2018: no acute abnormality    CT sinus 1/21/2019: No evidence of opacification of the paranasal sinuses. Bilateral OMC are patent.     Assessment:    ICD-10-CM ICD-9-CM    1. Oropharyngeal lesion J39.2 478.20    2. Chronic rhinitis J31.0 472.0    3. Laryngopharyngeal reflux (LPR) K21.9 478.79      The primary encounter diagnosis was Oropharyngeal lesion. Diagnoses of Chronic rhinitis and Laryngopharyngeal reflux (LPR) were also pertinent to this visit.      Plan:  No orders of the defined types were placed in this encounter.    Continue Xyzal and Singulair.  Continue Protonix 40mg PO daily.     Excisional biopsy of papillomatous lesion on the posterior wall of oropharynx is scheduled.  The patient was counseled toward the risks/benefits and alternatives to the procedure.  She was given informed consent form to sign after all questions were answered to her satisfaction.     This visit was 25 minutes in duration, with over 50% of the time spent in direct face-to-face counseling regarding the issues outlined above    Deidre Calderon MD

## 2019-01-29 NOTE — PROGRESS NOTES
Chief Complaint   Patient presents with    Follow-up     discuss surgery   .     HPI:Suly Jaiem is a 59 y.o. female who has been referred by Dr. Irene Lopez for a several year history of chronic cough.  She relays that it is mostly dry but occasionally productive.  She feels this is gradually worsening. She notes that she has had nasal congestion and post-nasal drip. She notes the cough is worse at night. She  6 month history of hoarseness. Her voice is not progressively worsening over this time. There are pitch breaks or cracks. There is not vocal fatigue. She denies dysphagia, odynophagia, throat pain, and otalgia.  There is no hemoptysis or hematemesis. She is breathing well. She is currently taking Singulair with limited benefit. She is using Flonase with a very slight relief.     She denies throat clearing and cough. She admits to heartburn and reflux.    Interval HPI 1/29/2019:  Mrs. Storey follows up today for LPR and chronic rhinitis.  She reports that her symptoms remain about the same as compared to last visit.  She feels that cough may be slightly improved. She still notes nasal congestion and post-nasal drip. She is also here today to discuss CT sinus results and to further discuss surgery to biopsy lesion of the oropharynx.     No past medical history on file.  Social History     Socioeconomic History    Marital status:      Spouse name: Not on file    Number of children: Not on file    Years of education: Not on file    Highest education level: Not on file   Social Needs    Financial resource strain: Not on file    Food insecurity - worry: Not on file    Food insecurity - inability: Not on file    Transportation needs - medical: Not on file    Transportation needs - non-medical: Not on file   Occupational History    Not on file   Tobacco Use    Smoking status: Current Every Day Smoker     Packs/day: 0.25    Smokeless tobacco: Never Used   Substance and Sexual Activity     Alcohol use: No    Drug use: No    Sexual activity: Not on file   Other Topics Concern    Not on file   Social History Narrative    Not on file     Past Surgical History:   Procedure Laterality Date    HYSTERECTOMY  2003    KNEE ARTHROSCOPY       Family History   Problem Relation Age of Onset    Diabetes Mother     Diabetes Maternal Grandmother     Heart disease Maternal Grandmother            Review of Systems  General: negative for chills, fever or weight loss  Psychological: negative for mood changes or depression  Ophthalmic: negative for blurry vision, photophobia or eye pain  ENT: see HPI  Respiratory: no cough, shortness of breath, or wheezing  Cardiovascular: no chest pain or dyspnea on exertion  Gastrointestinal: no abdominal pain, change in bowel habits, or black/ bloody stools  Musculoskeletal: negative for gait disturbance or muscular weakness  Neurological: no syncope or seizures; no ataxia  Dermatological: negative for puritis,  rash and jaundice  Hematologic/lymphatic: no easy bruising, no new lumps or bumps      Physical Exam:    Vitals:    01/29/19 1509   BP: (!) 131/91   Pulse: (!) 123       Constitutional: Well appearing / communicating without difficutly.  NAD.  Eyes: EOM I Bilaterally  Head/Face: Normocephalic.  Negative paranasal sinus pressure/tenderness.  Salivary glands WNL.  House Brackmann I Bilaterally.    Right Ear: Auricle normal appearance. External Auditory Canal within normal limits no lesions or masses,TM w/o masses/lesions/perforations. TM mobility noted.   Left Ear: Auricle normal appearance. External Auditory Canal within normal limits no lesions or masses,TM w/o masses/lesions/perforations. TM mobility noted.  Nose: No gross nasal septal deviation. Inferior Turbinates 3+ bilaterally. No septal perforation. No masses/lesions. External nasal skin appears normal without masses/lesions.  Oral Cavity: Gingiva/lips within normal limits.  Dentition/gingiva healthy appearing.  Mucus membranes moist. Floor of mouth soft, no masses palpated. Oral Tongue mobile. Hard Palate appears normal.    Oropharynx: Base of tongue appears normal. No masses/lesions noted. Tonsillar fossa/pharyngeal wall without lesions. Posterior oropharynx WNL.  Soft palate without masses. Midline uvula.   Neck/Lymphatic: No LAD I-VI bilaterally.  No thyromegaly.  No masses noted on exam.    Mirror laryngoscopy/nasopharyngoscopy: Active gag reflex.  Unable to perform.    Neuro/Psychiatric: AOx3.  Normal mood and affect.   Cardiovascular: Normal carotid pulses bilaterally, no increasing jugular venous distention noted at cervical region bilaterally.    Respiratory: Normal respiratory effort, no stridor, no retractions noted.      Chest xray 11/18/2018: no acute abnormality    CT sinus 1/21/2019: No evidence of opacification of the paranasal sinuses. Bilateral OMC are patent.     Assessment:    ICD-10-CM ICD-9-CM    1. Oropharyngeal lesion J39.2 478.20    2. Chronic rhinitis J31.0 472.0    3. Laryngopharyngeal reflux (LPR) K21.9 478.79      The primary encounter diagnosis was Oropharyngeal lesion. Diagnoses of Chronic rhinitis and Laryngopharyngeal reflux (LPR) were also pertinent to this visit.      Plan:  No orders of the defined types were placed in this encounter.    Continue Xyzal and Singulair.  Continue Protonix 40mg PO daily.     Excisional biopsy of papillomatous lesion on the posterior wall of oropharynx is scheduled.  The patient was counseled toward the risks/benefits and alternatives to the procedure.  She was given informed consent form to sign after all questions were answered to her satisfaction.     This visit was 25 minutes in duration, with over 50% of the time spent in direct face-to-face counseling regarding the issues outlined above    Deidre Calderon MD

## 2019-01-29 NOTE — ANESTHESIA PREPROCEDURE EVALUATION
01/29/2019  Suly Jaime is a 59 y.o., female scheduled for laryngoscopy with biopsy on 2/18/19.    No past medical history on file.     Past Surgical History:   Procedure Laterality Date    HYSTERECTOMY  2003       Anesthesia Evaluation    I have reviewed the Patient Summary Reports.    I have reviewed the Nursing Notes.   I have reviewed the Medications.     Review of Systems  Anesthesia Hx:  No problems with previous Anesthesia    Social:  Smoker, No Alcohol Use    Hematology/Oncology:  Hematology Normal        Cardiovascular:  Cardiovascular Normal Exercise tolerance: good   Denies Angina.        Pulmonary:  Pulmonary Normal    Renal/:  Renal/ Normal     Hepatic/GI:  Hepatic/GI Normal Denies Liver Disease.    Neurological:  Neurology Normal Denies Seizures.    Endocrine:  Endocrine Normal        Physical Exam  General:  Well nourished    Airway/Jaw/Neck:  Airway Findings: Mouth Opening: Small, but > 3cm Tongue: Large  General Airway Assessment: Adult  Mallampati: IV      Dental:  Dental Findings: Upper Dentures   Chest/Lungs:  Chest/Lungs Findings: Clear to auscultation, Normal Respiratory Rate     Heart/Vascular:  Heart Findings: Rate: Normal  Rhythm: Regular Rhythm  Sounds: Normal  Heart murmur: negative       Mental Status:  Mental Status Findings:  Cooperative, Alert and Oriented         Anesthesia Plan  Type of Anesthesia, risks & benefits discussed:  Anesthesia Type:  general  Patient's Preference:   Intra-op Monitoring Plan:   Intra-op Monitoring Plan Comments:   Post Op Pain Control Plan:   Post Op Pain Control Plan Comments:   Induction:   IV  Beta Blocker:  Patient is not currently on a Beta-Blocker (No further documentation required).       Informed Consent: Patient understands risks and agrees with Anesthesia plan.  Questions answered.   ASA Score: 3     Day of Surgery Review of History &  Physical:        Anesthesia Plan Notes: Anesthesia consent to be signed prior to procedure on 2/18/19          Ready For Surgery From Anesthesia Perspective.

## 2019-02-08 DIAGNOSIS — Z12.11 COLON CANCER SCREENING: ICD-10-CM

## 2019-02-08 DIAGNOSIS — Z11.59 NEED FOR HEPATITIS C SCREENING TEST: ICD-10-CM

## 2019-02-13 ENCOUNTER — TELEPHONE (OUTPATIENT)
Dept: OTOLARYNGOLOGY | Facility: CLINIC | Age: 59
End: 2019-02-13

## 2019-02-13 NOTE — TELEPHONE ENCOUNTER
----- Message from Amirah Casillas sent at 2/13/2019  8:06 AM CST -----  Contact: [phone]/self  Patient is requesting a callback about her FMLA paperwork. Please advise.

## 2019-02-14 ENCOUNTER — TELEPHONE (OUTPATIENT)
Dept: OTOLARYNGOLOGY | Facility: CLINIC | Age: 59
End: 2019-02-14

## 2019-02-14 NOTE — TELEPHONE ENCOUNTER
Spoke with patient she was notified we did receive the paper work from [facility] and will fill form out today and have Dr Sorensen sign. Will fax over when complete. Patient verbalized understanding

## 2019-02-14 NOTE — TELEPHONE ENCOUNTER
----- Message from Frank Juares sent at 2/14/2019  1:12 PM CST -----  Contact: [phone]/self  Patient would like to know if you received her FMLA paperwork   Please call and advise

## 2019-02-18 ENCOUNTER — HOSPITAL ENCOUNTER (OUTPATIENT)
Facility: HOSPITAL | Age: 59
Discharge: HOME | End: 2019-02-18
Attending: OTOLARYNGOLOGY | Admitting: OTOLARYNGOLOGY
Payer: COMMERCIAL

## 2019-02-18 ENCOUNTER — ANESTHESIA (OUTPATIENT)
Dept: SURGERY | Facility: HOSPITAL | Age: 59
End: 2019-02-18
Payer: COMMERCIAL

## 2019-02-18 VITALS
HEIGHT: 62 IN | TEMPERATURE: 98 F | RESPIRATION RATE: 18 BRPM | BODY MASS INDEX: 26.68 KG/M2 | DIASTOLIC BLOOD PRESSURE: 73 MMHG | HEART RATE: 97 BPM | OXYGEN SATURATION: 94 % | WEIGHT: 145 LBS | SYSTOLIC BLOOD PRESSURE: 126 MMHG

## 2019-02-18 DIAGNOSIS — J39.2 OROPHARYNGEAL LESION: Primary | ICD-10-CM

## 2019-02-18 PROCEDURE — 37000009 HC ANESTHESIA EA ADD 15 MINS: Performed by: OTOLARYNGOLOGY

## 2019-02-18 PROCEDURE — 25000003 PHARM REV CODE 250: Performed by: OTOLARYNGOLOGY

## 2019-02-18 PROCEDURE — 63600175 PHARM REV CODE 636 W HCPCS: Performed by: NURSE ANESTHETIST, CERTIFIED REGISTERED

## 2019-02-18 PROCEDURE — 88305 TISSUE EXAM BY PATHOLOGIST: CPT | Mod: 26,,, | Performed by: PATHOLOGY

## 2019-02-18 PROCEDURE — 37000008 HC ANESTHESIA 1ST 15 MINUTES: Performed by: OTOLARYNGOLOGY

## 2019-02-18 PROCEDURE — 88364 INSITU HYBRIDIZATION (FISH): CPT | Performed by: PATHOLOGY

## 2019-02-18 PROCEDURE — 31536 PR LARYNGOSCOPY,DIRCT,OP SCOPE,BIOPSY: ICD-10-PCS | Mod: ,,, | Performed by: OTOLARYNGOLOGY

## 2019-02-18 PROCEDURE — 25000003 PHARM REV CODE 250: Performed by: NURSE ANESTHETIST, CERTIFIED REGISTERED

## 2019-02-18 PROCEDURE — 31536 LARYNGOSCOPY W/BX & OP SCOPE: CPT | Mod: ,,, | Performed by: OTOLARYNGOLOGY

## 2019-02-18 PROCEDURE — 36000708 HC OR TIME LEV III 1ST 15 MIN: Performed by: OTOLARYNGOLOGY

## 2019-02-18 PROCEDURE — 88305 TISSUE EXAM BY PATHOLOGIST: CPT | Performed by: PATHOLOGY

## 2019-02-18 PROCEDURE — 63600175 PHARM REV CODE 636 W HCPCS

## 2019-02-18 PROCEDURE — 25000242 PHARM REV CODE 250 ALT 637 W/ HCPCS

## 2019-02-18 PROCEDURE — 71000033 HC RECOVERY, INTIAL HOUR: Performed by: OTOLARYNGOLOGY

## 2019-02-18 PROCEDURE — 71000016 HC POSTOP RECOV ADDL HR: Performed by: OTOLARYNGOLOGY

## 2019-02-18 PROCEDURE — 94640 AIRWAY INHALATION TREATMENT: CPT

## 2019-02-18 PROCEDURE — 88305 TISSUE SPECIMEN TO PATHOLOGY - SURGERY: ICD-10-PCS | Mod: 26,,, | Performed by: PATHOLOGY

## 2019-02-18 PROCEDURE — 71000015 HC POSTOP RECOV 1ST HR: Performed by: OTOLARYNGOLOGY

## 2019-02-18 PROCEDURE — 36000709 HC OR TIME LEV III EA ADD 15 MIN: Performed by: OTOLARYNGOLOGY

## 2019-02-18 RX ORDER — MEPERIDINE HYDROCHLORIDE 50 MG/ML
12.5 INJECTION INTRAMUSCULAR; INTRAVENOUS; SUBCUTANEOUS ONCE AS NEEDED
Status: DISCONTINUED | OUTPATIENT
Start: 2019-02-18 | End: 2019-02-18 | Stop reason: HOSPADM

## 2019-02-18 RX ORDER — ONDANSETRON HYDROCHLORIDE 2 MG/ML
INJECTION, SOLUTION INTRAMUSCULAR; INTRAVENOUS
Status: DISCONTINUED | OUTPATIENT
Start: 2019-02-18 | End: 2019-02-18

## 2019-02-18 RX ORDER — HYDROCODONE BITARTRATE AND ACETAMINOPHEN 5; 325 MG/1; MG/1
1 TABLET ORAL EVERY 4 HOURS PRN
Status: DISCONTINUED | OUTPATIENT
Start: 2019-02-18 | End: 2019-02-18 | Stop reason: HOSPADM

## 2019-02-18 RX ORDER — PHENYLEPHRINE HYDROCHLORIDE 10 MG/ML
INJECTION INTRAVENOUS
Status: DISCONTINUED | OUTPATIENT
Start: 2019-02-18 | End: 2019-02-18

## 2019-02-18 RX ORDER — HYDROCODONE BITARTRATE AND ACETAMINOPHEN 7.5; 325 MG/1; MG/1
1 TABLET ORAL EVERY 6 HOURS PRN
Qty: 15 TABLET | Refills: 0 | Status: SHIPPED | OUTPATIENT
Start: 2019-02-18 | End: 2019-02-22

## 2019-02-18 RX ORDER — HYDROMORPHONE HYDROCHLORIDE 2 MG/ML
0.5 INJECTION, SOLUTION INTRAMUSCULAR; INTRAVENOUS; SUBCUTANEOUS EVERY 5 MIN PRN
Status: ACTIVE | OUTPATIENT
Start: 2019-02-18 | End: 2019-02-18

## 2019-02-18 RX ORDER — PROMETHAZINE HYDROCHLORIDE 25 MG/ML
6.25 INJECTION, SOLUTION INTRAMUSCULAR; INTRAVENOUS ONCE
Status: COMPLETED | OUTPATIENT
Start: 2019-02-18 | End: 2019-02-18

## 2019-02-18 RX ORDER — GLYCOPYRROLATE 0.2 MG/ML
INJECTION INTRAMUSCULAR; INTRAVENOUS
Status: DISCONTINUED | OUTPATIENT
Start: 2019-02-18 | End: 2019-02-18

## 2019-02-18 RX ORDER — PROPOFOL 10 MG/ML
VIAL (ML) INTRAVENOUS
Status: DISCONTINUED | OUTPATIENT
Start: 2019-02-18 | End: 2019-02-18

## 2019-02-18 RX ORDER — FENTANYL CITRATE 50 UG/ML
INJECTION, SOLUTION INTRAMUSCULAR; INTRAVENOUS
Status: DISCONTINUED | OUTPATIENT
Start: 2019-02-18 | End: 2019-02-18

## 2019-02-18 RX ORDER — PROMETHAZINE HYDROCHLORIDE 25 MG/ML
INJECTION, SOLUTION INTRAMUSCULAR; INTRAVENOUS
Status: COMPLETED
Start: 2019-02-18 | End: 2019-02-18

## 2019-02-18 RX ORDER — ALBUTEROL SULFATE 2.5 MG/.5ML
2.5 SOLUTION RESPIRATORY (INHALATION) ONCE
Status: COMPLETED | OUTPATIENT
Start: 2019-02-18 | End: 2019-02-18

## 2019-02-18 RX ORDER — ROCURONIUM BROMIDE 10 MG/ML
INJECTION, SOLUTION INTRAVENOUS
Status: DISCONTINUED | OUTPATIENT
Start: 2019-02-18 | End: 2019-02-18

## 2019-02-18 RX ORDER — ALBUTEROL SULFATE 2.5 MG/.5ML
SOLUTION RESPIRATORY (INHALATION)
Status: COMPLETED
Start: 2019-02-18 | End: 2019-02-18

## 2019-02-18 RX ORDER — MIDAZOLAM HYDROCHLORIDE 1 MG/ML
INJECTION INTRAMUSCULAR; INTRAVENOUS
Status: DISCONTINUED | OUTPATIENT
Start: 2019-02-18 | End: 2019-02-18

## 2019-02-18 RX ORDER — LIDOCAINE HYDROCHLORIDE 10 MG/ML
1 INJECTION, SOLUTION EPIDURAL; INFILTRATION; INTRACAUDAL; PERINEURAL ONCE
Status: DISCONTINUED | OUTPATIENT
Start: 2019-02-18 | End: 2019-02-18 | Stop reason: HOSPADM

## 2019-02-18 RX ORDER — LIDOCAINE HYDROCHLORIDE 10 MG/ML
1 INJECTION, SOLUTION EPIDURAL; INFILTRATION; INTRACAUDAL; PERINEURAL ONCE
Status: DISCONTINUED | OUTPATIENT
Start: 2019-02-18 | End: 2022-12-12

## 2019-02-18 RX ORDER — AMOXICILLIN 875 MG/1
875 TABLET, FILM COATED ORAL 2 TIMES DAILY
Qty: 14 TABLET | Refills: 0 | Status: SHIPPED | OUTPATIENT
Start: 2019-02-18 | End: 2019-02-25

## 2019-02-18 RX ORDER — SODIUM CHLORIDE 0.9 % (FLUSH) 0.9 %
5 SYRINGE (ML) INJECTION
Status: DISCONTINUED | OUTPATIENT
Start: 2019-02-18 | End: 2022-12-12

## 2019-02-18 RX ORDER — ACETAMINOPHEN 10 MG/ML
INJECTION, SOLUTION INTRAVENOUS
Status: DISCONTINUED | OUTPATIENT
Start: 2019-02-18 | End: 2019-02-18

## 2019-02-18 RX ORDER — ONDANSETRON 2 MG/ML
4 INJECTION INTRAMUSCULAR; INTRAVENOUS DAILY PRN
Status: DISCONTINUED | OUTPATIENT
Start: 2019-02-18 | End: 2019-02-18 | Stop reason: HOSPADM

## 2019-02-18 RX ORDER — SUCCINYLCHOLINE CHLORIDE 20 MG/ML
INJECTION INTRAMUSCULAR; INTRAVENOUS
Status: DISCONTINUED | OUTPATIENT
Start: 2019-02-18 | End: 2019-02-18

## 2019-02-18 RX ORDER — LIDOCAINE HCL/PF 100 MG/5ML
SYRINGE (ML) INTRAVENOUS
Status: DISCONTINUED | OUTPATIENT
Start: 2019-02-18 | End: 2019-02-18

## 2019-02-18 RX ORDER — SODIUM CHLORIDE, SODIUM LACTATE, POTASSIUM CHLORIDE, CALCIUM CHLORIDE 600; 310; 30; 20 MG/100ML; MG/100ML; MG/100ML; MG/100ML
INJECTION, SOLUTION INTRAVENOUS CONTINUOUS
Status: DISCONTINUED | OUTPATIENT
Start: 2019-02-18 | End: 2019-02-18 | Stop reason: HOSPADM

## 2019-02-18 RX ADMIN — METHYLPREDNISOLONE SODIUM SUCCINATE 40 MG: 40 INJECTION, POWDER, FOR SOLUTION INTRAMUSCULAR; INTRAVENOUS at 11:02

## 2019-02-18 RX ADMIN — PHENYLEPHRINE HYDROCHLORIDE 100 MCG: 10 INJECTION INTRAVENOUS at 11:02

## 2019-02-18 RX ADMIN — SODIUM CHLORIDE, SODIUM LACTATE, POTASSIUM CHLORIDE, AND CALCIUM CHLORIDE: .6; .31; .03; .02 INJECTION, SOLUTION INTRAVENOUS at 08:02

## 2019-02-18 RX ADMIN — ALBUTEROL SULFATE 2.5 MG: 2.5 SOLUTION RESPIRATORY (INHALATION) at 12:02

## 2019-02-18 RX ADMIN — PROMETHAZINE HYDROCHLORIDE 6.25 MG: 25 INJECTION, SOLUTION INTRAMUSCULAR; INTRAVENOUS at 12:02

## 2019-02-18 RX ADMIN — GLYCOPYRROLATE 0.2 MG: 0.2 INJECTION, SOLUTION INTRAMUSCULAR; INTRAVENOUS at 11:02

## 2019-02-18 RX ADMIN — SUCCINYLCHOLINE CHLORIDE 100 MG: 20 INJECTION, SOLUTION INTRAMUSCULAR; INTRAVENOUS at 11:02

## 2019-02-18 RX ADMIN — FENTANYL CITRATE 100 MCG: 50 INJECTION, SOLUTION INTRAMUSCULAR; INTRAVENOUS at 11:02

## 2019-02-18 RX ADMIN — ROCURONIUM BROMIDE 5 MG: 10 INJECTION, SOLUTION INTRAVENOUS at 11:02

## 2019-02-18 RX ADMIN — ACETAMINOPHEN 1000 MG: 10 INJECTION, SOLUTION INTRAVENOUS at 11:02

## 2019-02-18 RX ADMIN — ONDANSETRON 8 MG: 2 INJECTION, SOLUTION INTRAMUSCULAR; INTRAVENOUS at 11:02

## 2019-02-18 RX ADMIN — LIDOCAINE HYDROCHLORIDE 70 MG: 20 INJECTION, SOLUTION INTRAVENOUS at 11:02

## 2019-02-18 RX ADMIN — MIDAZOLAM HYDROCHLORIDE 2 MG: 1 INJECTION, SOLUTION INTRAMUSCULAR; INTRAVENOUS at 10:02

## 2019-02-18 RX ADMIN — PROMETHAZINE HYDROCHLORIDE 6.25 MG: 25 INJECTION INTRAMUSCULAR; INTRAVENOUS at 12:02

## 2019-02-18 RX ADMIN — PROPOFOL 150 MG: 10 INJECTION, EMULSION INTRAVENOUS at 11:02

## 2019-02-18 NOTE — INTERVAL H&P NOTE
The patient has been examined and the H&P has been reviewed:    I concur with the findings and no changes have occurred since H&P was written.    Anesthesia/Surgery risks, benefits and alternative options discussed and understood by patient/family.          Active Hospital Problems    Diagnosis  POA    Oropharyngeal lesion [J39.2]  Yes      Resolved Hospital Problems   No resolved problems to display.

## 2019-02-18 NOTE — OP NOTE
Operative Note       Surgery Date: 2/18/2019     Surgeon: Deidre Kendrick MD    Pre-op Diagnosis:  Oropharyngeal lesion    Post-op Diagnosis: same    Assistants:  None    Implants:  None    Anesthesia: GETA    Technical Procedures Used: Direct microsuspension laryngoscopy with biopsy    Findings: left posterior pharyngeal wall lesion just superior to the level of the left pyriform sinus appearing as an exophytic, fleshy, 4mm lesion. Clinically appearing as papilloma    Complications: No    Estimated Blood Loss: * No values recorded between 2/18/2019 11:24 AM and 2/18/2019 11:50 AM *           Specimens (From admission, onward)    Start     Ordered    02/18/19 1154  Specimen to Pathology - Surgery  Once     Start Status   02/18/19 1154 Collected (02/18/19 1154)       02/18/19 1154    02/18/19 1151  Specimen to Pathology - Surgery  Once     Start Status   02/18/19 1151 Collected (02/18/19 1151)       02/18/19 1150        Procedure in Detail:      The patient was placed supine after induction of a general anesthetic.  The eyes were protected.  A gauze was placed on the upper ginigiva as the patient was edentulous.  The laryngoscope was placed into the patients mouth.  The oropharynx, supraglottis, glottis and hypopharynx were inspected.  The telescope or microscope was used to assist in visualization.   The patient was placed into suspension.  The lesion was localized to  left posterior pharyngeal wall lesion just superior to the level of the left pyriform sinus appearing as an exophytic, fleshy, 4mm lesion. Clinically appearing as papilloma. BIopsy was taken with cups forceps.     Neosynephrine soaked neuro patties were placed into the throat until there was no further bleeding.  The area was inspected and found to be hemostatic.  The laryngoscope and tooth guard were removed.               Disposition: PACU - hemodynamically stable.           Condition: Good    Attestation:  I performed the procedure.

## 2019-02-18 NOTE — DISCHARGE INSTRUCTIONS
Direct Laryngoscopy  Direct laryngoscopy is a procedure to look at the vocal cords. A laryngoscope is a rigid, hollow tube with a light attached. Using this tool, your healthcare provider can look behind your tongue and down your throat to your vocal cords. A tissue sample (biopsy) can be taken for study in a lab. Or a growth can be removed. Your healthcare provider can tell you more about your procedure depending on why its being done. This sheet gives you general information about what to expect.    Getting ready for the procedure  Prepare for the surgery as you have been instructed. Be sure to tell your healthcare provider about all medicines you take. This includes over-the-counter medicines. It also includes herbs and other supplements. You may need to stop taking some or all of them before surgery. Your healthcare provider will let you know. Also follow any directions youre given for not eating or drinking before surgery.  The day of the procedure  The procedure takes 30 to 60 minutes. You will likely go home the same day.  Before the procedure  Here is what to expect before the procedure begins:  · An IV line is put into a vein in your arm or hand. This line delivers fluids and medicines.  · You will be given medicine (anesthesia) to keep you pain free during surgery. This may be general anesthesia, which puts you in a state like deep sleep. Or you may have sedation, which makes you relaxed and drowsy. Local anesthesia may also be injected or sprayed into your throat to numb it.  During the procedure  Here is what to expect during the procedure:  · You will lie on your back on a table. The scope is put into your mouth and passed down into the throat.  · Your healthcare provider examines the larynx and surrounding areas with the scope. If needed, a biopsy is done using small tools put through the scope.  · If a growth is found, tools can be put through the scope to remove it.  After the procedure  You will  be taken to a room to wake up from the anesthesia. At first, your throat may be sore and scratchy. Your voice may be hoarse, making talking difficult. And it may be hard to swallow. You will receive medicine to control pain. When you are released to go home, have an adult family member or friend ready to drive you.  Recovering at home  Once home, follow any instructions you have been given. Be sure to:  · Take pain medicine as directed.  · Drink plenty of water as soon as you can swallow normally.  · Use throat lozenges as advised by your healthcare provider to help ease throat soreness.  · Rest your voice as instructed by your healthcare provider.  When to call your healthcare provider  After you get home, call the healthcare provider if you have any of the following:  · Chest pain or trouble breathing (call 911)  · Fever of 100.4°F (38°C) or higher, or as directed by your healthcare provider  · Problems swallowing that prevent you from eating or drinking  · Pain that does not go away even after taking pain medicine  · Severe nausea or vomiting  · Bloody vomit  · Cough that brings up blood   Follow-up  Within a few weeks, you will see your healthcare provider for a follow-up visit. During this visit, your provider will discuss the results of the procedure. Depending on what was found, you may need further evaluation and treatment.  Risks and possible complications   The risks of this procedure include:  · Bleeding  · Infection  · Gagging  · Vomiting  · Cuts in the mouth or throat  · Injury to the teeth  · Vocal cord injury  · Breathing problems  · Risks of anesthesia   Date Last Reviewed: 6/11/2015  © 4825-9657 [employer]. [address] [place] [address], [place] [address]. All rights reserved. This information is not intended as a substitute for professional medical care. Always follow your healthcare professional's instructions.          ANESTHESIA  -For the first 24 hours after surgery:  Do not drive, use  heavy equipment, make important decisions, or drink alcohol  -It is normal to feel sleepy for several hours.  Rest until you are more awake.  -Have someone stay with you, if needed.  They can watch for problems and help keep you safe.  -Some possible post anesthesia side effects include: nausea and vomiting, sore throat and hoarseness, sleepiness, and dizziness.    PAIN  -If you have pain after surgery, pain medicine will help you feel better.  Take it as directed, before pain becomes severe.  Most pain relievers taken by mouth need at least 20-30 minutes to start working.  -Do not drive or drink alcohol while taking pain medicine.  -Pain medication can upset your stomach.  Taking them with a little food may help.  -Other ways to help control pain: elevation, ice, and relaxation  -Call your surgeon if still having unmanageable pain an hour after taking pain medicine.  -Pain medicine can cause constipation.  Taking an over-the counter stool softener while on prescription pain medicine and drinking plenty of fluids can prevent this side effect.  -Call your surgeon if you have severe side effects like: breathing problems, trouble waking up, dizziness, confusion, or severe constipation.    NAUSEA  -Some people have nausea after surgery.  This is often because of anesthesia, pain, pain medicine, or the stress of surgery.  -Do not push yourself to eat.  Start off with clear liquids and soup.  Slowly move to solid foods.  Don't eat fatty, rich, spicy foods at first.  Eat smaller amounts.  -If you develop persistent nausea and vomiting please notify your surgeon immediately.    BLEEDING  -Different types of surgery require different types of care and dressing changes.  It is important to follow all instructions and advice from your surgeon.  Change dressing as directed.  Call your surgeon for any concerns regarding postop bleeding.    SIGNS OF INFECTION  -Signs of infection include: fever, swelling, drainage, and  redness  -Notify your surgeon if you have a fever of 100.4 F (38.0 C) or higher.  -Notify your surgeon if you notice redness, swelling, increased pain, pus, or a foul smell at the incision site.

## 2019-02-18 NOTE — ANESTHESIA POSTPROCEDURE EVALUATION
"Anesthesia Post Evaluation    Patient: Suly Jaime    Procedure(s) Performed: Procedure(s) (LRB):  DIRECT MICRO LARYNGOSCOPY WITH BIOPSY (N/A)    Final Anesthesia Type: general  Patient location during evaluation: PACU  Patient participation: Yes- Able to Participate  Level of consciousness: awake and alert  Post-procedure vital signs: reviewed and stable  Pain management: adequate  Airway patency: patent  PONV status at discharge: No PONV  Anesthetic complications: no      Cardiovascular status: blood pressure returned to baseline  Respiratory status: unassisted  Hydration status: euvolemic  Follow-up not needed.        Visit Vitals  /62   Pulse 106   Temp 37.1 °C (98.8 °F) (Skin)   Resp 19   Ht 5' 2" (1.575 m)   Wt 65.8 kg (145 lb)   SpO2 (!) 92%   Breastfeeding? No   BMI 26.52 kg/m²       Pain/Zeke Score: Zeke Score: 10 (2/18/2019 12:43 PM)        "

## 2019-02-18 NOTE — PLAN OF CARE
Dr. Hernandez at bedside.  Patient will remove upper partial denture before going to OR.  Friend at bedside.  No distress.

## 2019-02-18 NOTE — PLAN OF CARE
Pt meets OPS discharge criteria, has been up in room and dressed without diff, no problems noted, Denies complaints. VSS. Dr. Kendrick vs. Discharge instructions given

## 2019-02-18 NOTE — PLAN OF CARE
rec'd pt to OPS from PACU, pt awake, drowsy, responds appropriately, denies pain, no cough. Given clear liquids. Family at bedside VSS

## 2019-02-18 NOTE — DISCHARGE SUMMARY
Discharge Note    SUMMARY     Admit Date: 2/18/2019    Discharge Date and Time: 2/18/2019    Attending Physician: Deidre Calderon,*     Discharge Provider: Deidre Calderon    Final Diagnosis: Post-Op Diagnosis Codes:     * Oropharyngeal lesion [J39.2]    Disposition: Home or Self Care    Follow Up/Patient Instructions: Dr. Kendrick 1 week    Medications:  Reconciled Home Medications:   Current Discharge Medication List      START taking these medications    Details   amoxicillin (AMOXIL) 875 MG tablet Take 1 tablet (875 mg total) by mouth 2 (two) times daily. for 7 days  Qty: 14 tablet, Refills: 0      HYDROcodone-acetaminophen (NORCO) 7.5-325 mg per tablet Take 1 tablet by mouth every 6 (six) hours as needed for Pain.  Qty: 15 tablet, Refills: 0         CONTINUE these medications which have NOT CHANGED    Details   buPROPion (WELLBUTRIN XL) 150 MG TB24 tablet Begin therapy while still smoking and set target quit date within 2 weeks.  Continue treatment for up to 12 weeks.  Take 1 tablet once daily for 3 days in the morning.  Then increase to 1 tablet twice daily.  Qty: 60 tablet, Refills: 1    Associated Diagnoses: Tobacco use      montelukast (SINGULAIR) 10 mg tablet TAKE 1 TABLET BY MOUTH EVERY DAY IN THE EVENING  Qty: 30 tablet, Refills: 1    Associated Diagnoses: Chronic cough; Chronic sinusitis, unspecified location      pantoprazole (PROTONIX) 40 MG tablet Take 1 tablet (40 mg total) by mouth once daily.  Qty: 30 tablet, Refills: 11      levocetirizine (XYZAL) 5 MG tablet Take 1 tablet (5 mg total) by mouth every evening.  Qty: 30 tablet, Refills: 11      nicotine (NICODERM CQ) 14 mg/24 hr Place 1 patch onto the skin once daily.  Qty: 21 patch, Refills: 1    Associated Diagnoses: Tobacco use           Discharge Procedure Orders   Diet general     Call MD for:  difficulty breathing, headache or visual disturbances     Call MD for:  redness, tenderness, or signs of infection (pain, swelling,  redness, odor or green/yellow discharge around incision site)     No dressing needed

## 2019-02-18 NOTE — TRANSFER OF CARE
"Anesthesia Transfer of Care Note    Patient: Suly Jaime    Procedure(s) Performed: Procedure(s) (LRB):  DIRECT MICRO LARYNGOSCOPY WITH BIOPSY (N/A)    Patient location: PACU    Anesthesia Type: general    Transport from OR: Transported from OR on 6-10 L/min O2 by face mask with adequate spontaneous ventilation    Post pain: adequate analgesia    Post assessment: no apparent anesthetic complications    Post vital signs: stable    Level of consciousness: awake and oriented    Nausea/Vomiting: no nausea/vomiting    Complications: none    Transfer of care protocol was followed      Last vitals:   Visit Vitals  /85   Pulse 96   Temp 36.8 °C (98.2 °F) (Oral)   Resp 18   Ht 5' 2" (1.575 m)   Wt 65.8 kg (145 lb)   SpO2 96%   Breastfeeding? No   BMI 26.52 kg/m²     "

## 2019-02-25 ENCOUNTER — HOSPITAL ENCOUNTER (OUTPATIENT)
Dept: RADIOLOGY | Facility: HOSPITAL | Age: 59
Discharge: HOME | End: 2019-02-25
Attending: INTERNAL MEDICINE
Payer: COMMERCIAL

## 2019-02-25 DIAGNOSIS — N63.0 BREAST LUMP IN FEMALE: ICD-10-CM

## 2019-02-25 DIAGNOSIS — Z12.39 BREAST CANCER SCREENING: ICD-10-CM

## 2019-02-25 PROCEDURE — 77066 DX MAMMO INCL CAD BI: CPT | Mod: 26,,, | Performed by: RADIOLOGY

## 2019-02-25 PROCEDURE — 76642 ULTRASOUND BREAST LIMITED: CPT | Mod: 26,RT,, | Performed by: RADIOLOGY

## 2019-02-25 PROCEDURE — 77066 MAMMO DIGITAL DIAGNOSTIC BILAT WITH TOMOSYNTHESIS_CAD: ICD-10-PCS | Mod: 26,,, | Performed by: RADIOLOGY

## 2019-02-25 PROCEDURE — 76642 US BREAST RIGHT LIMITED: ICD-10-PCS | Mod: 26,RT,, | Performed by: RADIOLOGY

## 2019-02-25 PROCEDURE — 76642 ULTRASOUND BREAST LIMITED: CPT | Mod: TC,RT

## 2019-02-25 PROCEDURE — 77062 BREAST TOMOSYNTHESIS BI: CPT | Mod: TC

## 2019-02-25 PROCEDURE — 77062 BREAST TOMOSYNTHESIS BI: CPT | Mod: 26,,, | Performed by: RADIOLOGY

## 2019-02-25 PROCEDURE — 77062 MAMMO DIGITAL DIAGNOSTIC BILAT WITH TOMOSYNTHESIS_CAD: ICD-10-PCS | Mod: 26,,, | Performed by: RADIOLOGY

## 2019-02-27 ENCOUNTER — OFFICE VISIT (OUTPATIENT)
Dept: OTOLARYNGOLOGY | Facility: CLINIC | Age: 59
End: 2019-02-27
Payer: COMMERCIAL

## 2019-02-27 VITALS
HEART RATE: 116 BPM | WEIGHT: 152 LBS | TEMPERATURE: 97 F | BODY MASS INDEX: 27.8 KG/M2 | SYSTOLIC BLOOD PRESSURE: 138 MMHG | DIASTOLIC BLOOD PRESSURE: 91 MMHG

## 2019-02-27 DIAGNOSIS — Z72.0 TOBACCO ABUSE: ICD-10-CM

## 2019-02-27 DIAGNOSIS — J39.2 OROPHARYNGEAL LESION: Primary | ICD-10-CM

## 2019-02-27 PROCEDURE — 31575 PR LARYNGOSCOPY, FLEXIBLE; DIAGNOSTIC: ICD-10-PCS | Mod: S$GLB,,, | Performed by: OTOLARYNGOLOGY

## 2019-02-27 PROCEDURE — 99213 OFFICE O/P EST LOW 20 MIN: CPT | Mod: 25,S$GLB,, | Performed by: OTOLARYNGOLOGY

## 2019-02-27 PROCEDURE — 99999 PR PBB SHADOW E&M-EST. PATIENT-LVL III: CPT | Mod: PBBFAC,,, | Performed by: OTOLARYNGOLOGY

## 2019-02-27 PROCEDURE — 3008F BODY MASS INDEX DOCD: CPT | Mod: CPTII,S$GLB,, | Performed by: OTOLARYNGOLOGY

## 2019-02-27 PROCEDURE — 3008F PR BODY MASS INDEX (BMI) DOCUMENTED: ICD-10-PCS | Mod: CPTII,S$GLB,, | Performed by: OTOLARYNGOLOGY

## 2019-02-27 PROCEDURE — 31575 DIAGNOSTIC LARYNGOSCOPY: CPT | Mod: S$GLB,,, | Performed by: OTOLARYNGOLOGY

## 2019-02-27 PROCEDURE — 99999 PR PBB SHADOW E&M-EST. PATIENT-LVL III: ICD-10-PCS | Mod: PBBFAC,,, | Performed by: OTOLARYNGOLOGY

## 2019-02-27 PROCEDURE — 99213 PR OFFICE/OUTPT VISIT, EST, LEVL III, 20-29 MIN: ICD-10-PCS | Mod: 25,S$GLB,, | Performed by: OTOLARYNGOLOGY

## 2019-02-27 RX ORDER — VARENICLINE TARTRATE 0.5 (11)-1
KIT ORAL
Qty: 1 PACKAGE | Refills: 0 | Status: SHIPPED | OUTPATIENT
Start: 2019-02-27 | End: 2019-07-03

## 2019-02-27 NOTE — PROCEDURES
Procedures     Due to indication in patient's history, presentation or risk factors,  a fiber optic exam was performed.    SEPARATE PROCEDURE NOTE:    ANESTHESIA:  Topical xylocaine with joaquina-synephrine    FINDINGS:  Biopsy site well healed      PROCEDURE:  After verbal consent was obtained, the flexible scope was passed through the patient's nasal cavity without difficulty.  The nasopharynx (adenoid pad) and eustachian tube orifices were first visualized and were found to be normal, without masses or irregularity.  The posterior pharyngeal wall and base of tongue were then examined and no mass or irregular tissue was seen.  Biopsy site well healed. The scope was then advanced to the larynx, and the epiglottis, valleculae, and piriform sinuses were normal, without masses or mucosal irregularity.  The false vocal folds and true vocal folds were then examined and were found to have normal mobility (full abduction and adduction) and no masses or mucosal irregularity was seen.  The arytenoids and the interarytenoid area were normal. The patient tolerated the procedure well without complications.

## 2019-02-27 NOTE — PROGRESS NOTES
Chief Complaint   Patient presents with    Post-op Evaluation     oropharyngeal lesion   .     HPI:Suly Jaime is a 59 y.o. female who has been referred by Dr. Irene Lopez for a several year history of chronic cough.  She relays that it is mostly dry but occasionally productive.  She feels this is gradually worsening. She notes that she has had nasal congestion and post-nasal drip. She notes the cough is worse at night. She  6 month history of hoarseness. Her voice is not progressively worsening over this time. There are pitch breaks or cracks. There is not vocal fatigue. She denies dysphagia, odynophagia, throat pain, and otalgia.  There is no hemoptysis or hematemesis. She is breathing well. She is currently taking Singulair with limited benefit. She is using Flonase with a very slight relief.     She denies throat clearing and cough. She admits to heartburn and reflux.    Interval HPI 2/27/2019:   Follow up biopsy of oropharyngeal lesion.  Mrs. Jaime reports that she is doing well. She notes minimal throat pain.  She notes decreased cough since biopsy. She is still having intermittent dry cough.  She has been able to refrain from cigarette smoking over the last 1.5 weeks. She feels the Nicoderm patches are helping her.       History reviewed. No pertinent past medical history.  Social History     Socioeconomic History    Marital status:      Spouse name: Not on file    Number of children: Not on file    Years of education: Not on file    Highest education level: Not on file   Social Needs    Financial resource strain: Not on file    Food insecurity - worry: Not on file    Food insecurity - inability: Not on file    Transportation needs - medical: Not on file    Transportation needs - non-medical: Not on file   Occupational History    Not on file   Tobacco Use    Smoking status: Current Every Day Smoker     Packs/day: 0.50    Smokeless tobacco: Never Used   Substance and Sexual Activity     Alcohol use: No    Drug use: No    Sexual activity: Not on file   Other Topics Concern    Not on file   Social History Narrative    Not on file     Past Surgical History:   Procedure Laterality Date    DIRECT MICRO LARYNGOSCOPY WITH BIOPSY N/A 2/18/2019    Performed [facility] at [facility]    HYSTERECTOMY  2003    full    KNEE ARTHROSCOPY      OOPHORECTOMY       Family History   Problem Relation Age of Onset    Diabetes Mother     Diabetes Maternal Grandmother     Heart disease Maternal Grandmother            Review of Systems  General: negative for chills, fever or weight loss  Psychological: negative for mood changes or depression  Ophthalmic: negative for blurry vision, photophobia or eye pain  ENT: see HPI  Respiratory: no cough, shortness of breath, or wheezing  Cardiovascular: no chest pain or dyspnea on exertion  Gastrointestinal: no abdominal pain, change in bowel habits, or black/ bloody stools  Musculoskeletal: negative for gait disturbance or muscular weakness  Neurological: no syncope or seizures; no ataxia  Dermatological: negative for puritis,  rash and jaundice  Hematologic/lymphatic: no easy bruising, no new lumps or bumps      Physical Exam:    Vitals:    02/27/19 1121   BP: (!) 138/91   Pulse: (!) 116   Temp: 97.1 °F (36.2 °C)       Constitutional: Well appearing / communicating without difficutly.  NAD.  Eyes: EOM I Bilaterally  Head/Face: Normocephalic.  Negative paranasal sinus pressure/tenderness.  Salivary glands WNL.  House Brackmann I Bilaterally.    Right Ear: Auricle normal appearance. External Auditory Canal within normal limits no lesions or masses,TM w/o masses/lesions/perforations. TM mobility noted.   Left Ear: Auricle normal appearance. External Auditory Canal within normal limits no lesions or masses,TM w/o masses/lesions/perforations. TM mobility noted.  Nose: No gross nasal septal deviation. Inferior Turbinates 3+ bilaterally. No septal  perforation. No masses/lesions. External nasal skin appears normal without masses/lesions.  Oral Cavity: Gingiva/lips within normal limits.  Dentition/gingiva healthy appearing. Mucus membranes moist. Floor of mouth soft, no masses palpated. Oral Tongue mobile. Hard Palate appears normal.    Oropharynx: Base of tongue appears normal. No masses/lesions noted. Tonsillar fossa/pharyngeal wall without lesions. Posterior oropharynx WNL.  Soft palate without masses. Midline uvula.   Neck/Lymphatic: No LAD I-VI bilaterally.  No thyromegaly.  No masses noted on exam.    Mirror laryngoscopy/nasopharyngoscopy: Active gag reflex.  Unable to perform.    Neuro/Psychiatric: AOx3.  Normal mood and affect.   Cardiovascular: Normal carotid pulses bilaterally, no increasing jugular venous distention noted at cervical region bilaterally.    Respiratory: Normal respiratory effort, no stridor, no retractions noted.      Chest xray 11/18/2018: no acute abnormality    CT sinus 1/21/2019: No evidence of opacification of the paranasal sinuses. Bilateral OMC are patent.     Assessment:    ICD-10-CM ICD-9-CM    1. Oropharyngeal lesion J39.2 478.20    2. Tobacco abuse Z72.0 305.1 Ambulatory referral to Smoking Cessation Program     The primary encounter diagnosis was Oropharyngeal lesion. A diagnosis of Tobacco abuse was also pertinent to this visit.      Plan:  Orders Placed This Encounter   Procedures    Ambulatory referral to Smoking Cessation Program     Continue Xyzal and Singulair.  Continue Protonix 40mg PO daily.     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 HPV testing is pending. We discussed that these lesions can be recurrent in the aerodigestive tract and there is an association between HPV and oropharyngeal head and neck cancer. I have recommended that we continue to monitor for any recurrent papillomas in the future.    Patient is interested in smoking cessation.  I have given her a prescription for Chantix and  referred to the tobacco cessation program.       Deidre Calderon MD

## 2019-03-07 ENCOUNTER — TELEPHONE (OUTPATIENT)
Dept: OTOLARYNGOLOGY | Facility: CLINIC | Age: 59
End: 2019-03-07

## 2019-03-07 NOTE — TELEPHONE ENCOUNTER
----- Message from Arelis Reyes sent at 3/7/2019  3:06 PM CST -----  Contact: self / [phone]  Needs a return to work form for February 28 th , 2019. Please advise       [phone] Fax #

## 2019-03-07 NOTE — TELEPHONE ENCOUNTER
Spoke with patient she states her HR department lost her note to return to work on 2/28. Patient states she will either come by and  letter or will call back with the fax number to her office.

## 2019-03-08 ENCOUNTER — TELEPHONE (OUTPATIENT)
Dept: OTOLARYNGOLOGY | Facility: CLINIC | Age: 59
End: 2019-03-08

## 2019-03-08 NOTE — TELEPHONE ENCOUNTER
----- Message from Shannan Rojas sent at 3/8/2019 10:27 AM CST -----  Contact: Self [phone]  Patient is calling to talk to Marya in regards to her doctors excuse note faxed to  [phone].

## 2019-03-26 ENCOUNTER — TELEPHONE (OUTPATIENT)
Dept: SMOKING CESSATION | Facility: CLINIC | Age: 59
End: 2019-03-26

## 2019-03-26 ENCOUNTER — HOSPITAL ENCOUNTER (EMERGENCY)
Facility: HOSPITAL | Age: 59
Discharge: HOME | End: 2019-03-26
Attending: EMERGENCY MEDICINE
Payer: COMMERCIAL

## 2019-03-26 VITALS
BODY MASS INDEX: 27.6 KG/M2 | SYSTOLIC BLOOD PRESSURE: 137 MMHG | DIASTOLIC BLOOD PRESSURE: 77 MMHG | WEIGHT: 150 LBS | HEIGHT: 62 IN | OXYGEN SATURATION: 97 % | RESPIRATION RATE: 18 BRPM | TEMPERATURE: 98 F | HEART RATE: 96 BPM

## 2019-03-26 DIAGNOSIS — J06.9 VIRAL URI WITH COUGH: Primary | ICD-10-CM

## 2019-03-26 DIAGNOSIS — R07.9 CHEST PAIN, UNSPECIFIED TYPE: ICD-10-CM

## 2019-03-26 LAB
ALBUMIN SERPL BCP-MCNC: 3.7 G/DL (ref 3.5–5.2)
ALP SERPL-CCNC: 119 U/L (ref 55–135)
ALT SERPL W/O P-5'-P-CCNC: 22 U/L (ref 10–44)
ANION GAP SERPL CALC-SCNC: 9 MMOL/L (ref 8–16)
AST SERPL-CCNC: 11 U/L (ref 10–40)
BASOPHILS # BLD AUTO: 0.02 K/UL (ref 0–0.2)
BASOPHILS NFR BLD: 0.1 % (ref 0–1.9)
BILIRUB SERPL-MCNC: 0.3 MG/DL (ref 0.1–1)
BILIRUB UR QL STRIP: NEGATIVE
BNP SERPL-MCNC: 36 PG/ML (ref 0–99)
BUN SERPL-MCNC: 14 MG/DL (ref 6–20)
CALCIUM SERPL-MCNC: 9.3 MG/DL (ref 8.7–10.5)
CHLORIDE SERPL-SCNC: 106 MMOL/L (ref 95–110)
CLARITY UR: CLEAR
CO2 SERPL-SCNC: 24 MMOL/L (ref 23–29)
COLOR UR: YELLOW
CREAT SERPL-MCNC: 0.8 MG/DL (ref 0.5–1.4)
D DIMER PPP IA.FEU-MCNC: 0.28 MG/L FEU
DIFFERENTIAL METHOD: ABNORMAL
EOSINOPHIL # BLD AUTO: 0.1 K/UL (ref 0–0.5)
EOSINOPHIL NFR BLD: 0.6 % (ref 0–8)
ERYTHROCYTE [DISTWIDTH] IN BLOOD BY AUTOMATED COUNT: 12.7 % (ref 11.5–14.5)
EST. GFR  (AFRICAN AMERICAN): >60 ML/MIN/1.73 M^2
EST. GFR  (NON AFRICAN AMERICAN): >60 ML/MIN/1.73 M^2
GLUCOSE SERPL-MCNC: 99 MG/DL (ref 70–110)
GLUCOSE UR QL STRIP: NEGATIVE
HCT VFR BLD AUTO: 39.5 % (ref 37–48.5)
HGB BLD-MCNC: 12.8 G/DL (ref 12–16)
HGB UR QL STRIP: NEGATIVE
INFLUENZA A, MOLECULAR: NEGATIVE
INFLUENZA B, MOLECULAR: NEGATIVE
KETONES UR QL STRIP: NEGATIVE
LEUKOCYTE ESTERASE UR QL STRIP: NEGATIVE
LYMPHOCYTES # BLD AUTO: 2.3 K/UL (ref 1–4.8)
LYMPHOCYTES NFR BLD: 15.9 % (ref 18–48)
MCH RBC QN AUTO: 28.5 PG (ref 27–31)
MCHC RBC AUTO-ENTMCNC: 32.4 G/DL (ref 32–36)
MCV RBC AUTO: 88 FL (ref 82–98)
MONOCYTES # BLD AUTO: 1.3 K/UL (ref 0.3–1)
MONOCYTES NFR BLD: 9.4 % (ref 4–15)
NEUTROPHILS # BLD AUTO: 10.4 K/UL (ref 1.8–7.7)
NEUTROPHILS NFR BLD: 73.7 % (ref 38–73)
NITRITE UR QL STRIP: NEGATIVE
PH UR STRIP: 5 [PH] (ref 5–8)
PLATELET # BLD AUTO: 333 K/UL (ref 150–350)
PMV BLD AUTO: 10.2 FL (ref 9.2–12.9)
POTASSIUM SERPL-SCNC: 4 MMOL/L (ref 3.5–5.1)
PROT SERPL-MCNC: 6.8 G/DL (ref 6–8.4)
PROT UR QL STRIP: NEGATIVE
RBC # BLD AUTO: 4.49 M/UL (ref 4–5.4)
SODIUM SERPL-SCNC: 139 MMOL/L (ref 136–145)
SP GR UR STRIP: >=1.03 (ref 1–1.03)
SPECIMEN SOURCE: NORMAL
TROPONIN I SERPL DL<=0.01 NG/ML-MCNC: <0.006 NG/ML (ref 0–0.03)
URN SPEC COLLECT METH UR: ABNORMAL
UROBILINOGEN UR STRIP-ACNC: NEGATIVE EU/DL
WBC # BLD AUTO: 14.12 K/UL (ref 3.9–12.7)

## 2019-03-26 PROCEDURE — 96360 HYDRATION IV INFUSION INIT: CPT

## 2019-03-26 PROCEDURE — 80053 COMPREHEN METABOLIC PANEL: CPT

## 2019-03-26 PROCEDURE — 84484 ASSAY OF TROPONIN QUANT: CPT

## 2019-03-26 PROCEDURE — 87502 INFLUENZA DNA AMP PROBE: CPT

## 2019-03-26 PROCEDURE — 85379 FIBRIN DEGRADATION QUANT: CPT

## 2019-03-26 PROCEDURE — 25000003 PHARM REV CODE 250: Performed by: EMERGENCY MEDICINE

## 2019-03-26 PROCEDURE — 93010 EKG 12-LEAD: ICD-10-PCS | Mod: ,,, | Performed by: STUDENT IN AN ORGANIZED HEALTH CARE EDUCATION/TRAINING PROGRAM

## 2019-03-26 PROCEDURE — 93005 ELECTROCARDIOGRAM TRACING: CPT

## 2019-03-26 PROCEDURE — 85025 COMPLETE CBC W/AUTO DIFF WBC: CPT

## 2019-03-26 PROCEDURE — 99285 EMERGENCY DEPT VISIT HI MDM: CPT | Mod: 25

## 2019-03-26 PROCEDURE — 93010 ELECTROCARDIOGRAM REPORT: CPT | Mod: ,,, | Performed by: STUDENT IN AN ORGANIZED HEALTH CARE EDUCATION/TRAINING PROGRAM

## 2019-03-26 PROCEDURE — 81003 URINALYSIS AUTO W/O SCOPE: CPT

## 2019-03-26 PROCEDURE — 83880 ASSAY OF NATRIURETIC PEPTIDE: CPT

## 2019-03-26 RX ORDER — KETOROLAC TROMETHAMINE 10 MG/1
10 TABLET, FILM COATED ORAL EVERY 6 HOURS PRN
Qty: 12 TABLET | Refills: 0 | Status: SHIPPED | OUTPATIENT
Start: 2019-03-26 | End: 2019-03-29

## 2019-03-26 RX ORDER — AZITHROMYCIN 250 MG/1
250 TABLET, FILM COATED ORAL DAILY
Qty: 6 TABLET | Refills: 0 | Status: SHIPPED | OUTPATIENT
Start: 2019-03-26 | End: 2019-06-27

## 2019-03-26 RX ORDER — BENZONATATE 100 MG/1
100 CAPSULE ORAL 3 TIMES DAILY PRN
Qty: 20 CAPSULE | Refills: 0 | Status: SHIPPED | OUTPATIENT
Start: 2019-03-26 | End: 2019-03-31

## 2019-03-26 RX ORDER — FLUTICASONE PROPIONATE 50 MCG
1 SPRAY, SUSPENSION (ML) NASAL 2 TIMES DAILY PRN
Qty: 15 G | Refills: 0 | Status: SHIPPED | OUTPATIENT
Start: 2019-03-26 | End: 2020-01-28 | Stop reason: SDUPTHER

## 2019-03-26 RX ORDER — SODIUM CHLORIDE 9 MG/ML
1000 INJECTION, SOLUTION INTRAVENOUS
Status: COMPLETED | OUTPATIENT
Start: 2019-03-26 | End: 2019-03-26

## 2019-03-26 RX ADMIN — SODIUM CHLORIDE 1000 ML: 0.9 INJECTION, SOLUTION INTRAVENOUS at 05:03

## 2019-03-26 NOTE — ED PROVIDER NOTES
Encounter Date: 3/26/2019    SCRIBE #1 NOTE: I, Evens Chou, am scribing for, and in the presence of,  Dr. Solis. I have scribed the entire note.       History     Chief Complaint   Patient presents with    Chest Pain     59 year old female presents to ed cc of mid sternal chest pain with radiation to bilateral sides that began lastnight patient states sob x 1 week      Time seen by provider: 3:21 PM    This is a 59 y.o WF with insignificant pmhx who presents with complaint of chest pain with onset x last night. The patient describes the pain as mid-sternal sharp chest pain radiating to her bilateral torso worsening upon inspiration and laying supine. The patient rates the pain as a 7/10 at its worst. She states she has had flu like symptoms since last week including shortness of breath, subjective fever, and chills. The patient denies nausea, vomiting, abdominal pain, hx of blood clots, HTN, or diabetes, or any other medical problems.  She has taken Nyquil cold and flu to treat her symptoms with little alleviation of symptoms. The patient no longer has menstrual cycles.        Review of patient's allergies indicates:  No Known Allergies  History reviewed. No pertinent past medical history.  Past Surgical History:   Procedure Laterality Date    DIRECT MICRO LARYNGOSCOPY WITH BIOPSY N/A 2/18/2019    Performed by [facility] at [facility] OR    HYSTERECTOMY  2003    full    KNEE ARTHROSCOPY      OOPHORECTOMY       Family History   Problem Relation Age of Onset    Diabetes Mother     Diabetes Maternal Grandmother     Heart disease Maternal Grandmother      Social History     Tobacco Use    Smoking status: Current Every Day Smoker     Packs/day: 0.50    Smokeless tobacco: Never Used   Substance Use Topics    Alcohol use: No    Drug use: No     Review of Systems   Constitutional: Positive for chills and fever (subjective; pt afebrile on arrival).   HENT: Negative for facial swelling and sore  throat.    Eyes: Negative for pain and redness.   Respiratory: Positive for cough (non-productive) and shortness of breath.    Cardiovascular: Positive for chest pain.   Gastrointestinal: Negative for abdominal pain, diarrhea, nausea and vomiting.   Genitourinary: Negative for dysuria, hematuria, vaginal bleeding, vaginal discharge and vaginal pain.   Musculoskeletal: Negative for back pain and neck pain.   Skin: Negative for rash.   Neurological: Negative for seizures and facial asymmetry.   Psychiatric/Behavioral: Negative for agitation and confusion.       Physical Exam     Initial Vitals   BP Pulse Resp Temp SpO2   03/26/19 1509 03/26/19 1507 03/26/19 1507 03/26/19 1507 03/26/19 1507   138/78 (!) 113 (!) 21 98 °F (36.7 °C) 96 %      MAP       --                Physical Exam    Nursing note and vitals reviewed.  Constitutional: She appears well-developed and well-nourished. She is not diaphoretic. No distress.   HENT:   Head: Normocephalic and atraumatic.   Right Ear: External ear normal.   Left Ear: External ear normal.   Mouth/Throat: Oropharynx is clear and moist.   Eyes: Conjunctivae and EOM are normal. Pupils are equal, round, and reactive to light. No scleral icterus.   Neck: Normal range of motion. Neck supple.   Cardiovascular: Regular rhythm and normal heart sounds.   Tachycardic   Pulmonary/Chest: Breath sounds normal. No respiratory distress.   Lungs clear to auscultation bilaterally.    Abdominal: Soft. Bowel sounds are normal. She exhibits no distension. There is no tenderness.   Abdomen is soft/NT/ND; +BS in all four quad; no CVA tenderness bilaterally   Musculoskeletal: Normal range of motion. She exhibits no edema or tenderness.   Neurological: She is alert and oriented to person, place, and time. She has normal strength.   Skin: Skin is warm and dry. Capillary refill takes less than 2 seconds.   Psychiatric: She has a normal mood and affect.         ED Course   Procedures  Labs Reviewed   CBC W/  AUTO DIFFERENTIAL - Abnormal; Notable for the following components:       Result Value    WBC 14.12 (*)     Gran # (ANC) 10.4 (*)     Mono # 1.3 (*)     Gran% 73.7 (*)     Lymph% 15.9 (*)     All other components within normal limits   URINALYSIS - Abnormal; Notable for the following components:    Specific Gravity, UA >=1.030 (*)     All other components within normal limits   INFLUENZA A & B BY MOLECULAR   COMPREHENSIVE METABOLIC PANEL   TROPONIN I   B-TYPE NATRIURETIC PEPTIDE   D DIMER, QUANTITATIVE     EKG Readings: (Independently Interpreted)   Sinus tachycardia with PVC's  No ST elevations or depressions  No STEMI  Rate: 111 bpm       Imaging Results          X-Ray Chest PA And Lateral (Final result)  Result time 03/26/19 16:13:08    Final result by Rajesh Cowart MD (03/26/19 16:13:08)                 Impression:      Stable chest.  No active process.      Electronically signed by: Rajesh Cowart MD  Date:    03/26/2019  Time:    16:13             Narrative:    EXAMINATION:  XR CHEST PA AND LATERAL    CLINICAL HISTORY:  Chest pain, unspecified    TECHNIQUE:  PA and lateral views of the chest were performed.    COMPARISON:  11/05/2018    FINDINGS:  Heart normal.  Lungs clear.                                 Medical Decision Making:   Clinical Tests:   Lab Tests: Ordered and Reviewed  Radiological Study: Ordered and Reviewed  Medical Tests: Ordered and Reviewed  ED Management:    - pt tachycardic on arrival, RR approx 21; pt complaining of flu-like symptoms approx 1 week with associated CP  - CBC with mild elevation of WBC (14K); H/H stable   - CMP WNL; no electrolyte abnormalities; renal function WNL; LFTs WNL  - Troponin WNL  - D-dimer WNL  - BNP WNL   - Influenza A/B antigen negative   - CXR negative for acute cardiopulmonary process per my interpretation and per final radiology read  - Pt administered IVF with improvement of tachycardia; pt offered pain medication for chest pain, but refused  -  Will treat for probable viral syndrome as OP with symptomatic care; pt given rx for PO Azithromycin to take if symptoms fail to improve in next 3 days; chest pain likely 2/2 to coughing; pt with low risk factors for pain of cardiac etiology; cardiac workup negative; D-dimer negative   - Will give pt rx for benzonatate, nasal steroids and PO toradol (Patient denies any history or current GI bleeding, renal disease, or current use of blood thinners)  - On recheck of pt's VS, tachycardia has resolved, RR WNL; pt remained afebrile   - Pt in agreement with plan of care as outlined above  - No further intervention is indicated at this time after having taken into account the patient's history, physical exam findings, and empirical and objective data obtained during the patient's emergency department workup.   - The patient is at low risk for an emergent medical condition at this time, and I am of the belief that that it is safe to discharge the patient from the emergency department.   - The patient is instructed to follow up as outpatient as indicated on the discharge paperwork.    - I have discussed the specifics of the workup with the patient and the patient has verbalized understanding of the details of the workup, the diagnosis, the treatment plan, and the need for outpatient follow-up.    - Although the patient has no emergent etiology today this does not preclude the development of an emergent condition so, in addition, I have advised the patient that they can return to the ED and/or activate EMS at any time with worsening of their symptoms, change of their symptoms, or with any other medical complaint.    - The patient remained comfortable and stable during their visit in the ED.    - Discharge and follow-up instructions discussed with the patient who expressed understanding and willingness to comply with my recommendations.  - Results of all emergency department tests  discussed thoroughly with patient; all  patient questions answered; pt in agreement with plan  - Pt instructed to follow up with PCP in one week for recheck of today's complaints  - Pt given strict emergency department return precautions for any new or worsening of symptoms  - Pt discharged from the emergency department in stable condition, in no acute distress          Additional MDM:   PERC Rule:   Age is greater than or equal to 50 = 1.0  Heart Rate is greater than or equal to 100 = 0.0  SaO2 on room air < 95% = 0.0  Unilateral leg swelling = 0.0  Hemoptysis = 0.0  Recent surgery or trauma = 0.0  Prior PE or DVT =  0.0  Hormone use = 0.00  PERC Score = 1    Well's Criteria Score:  -Clinical symptoms of DVT (leg swelling, pain with palpation) = 0.0  -Other diagnosis less likely than pulmonary embolism =            0.0  -Heart Rate >100 =   1.5  -Immobilization (= or > than 3 days) or surgery in the previous 4 weeks = 0.0  -Previous DVT/PE = 0.0  -Hemoptysis =          0.0  -Malignancy =           0.0  Well's Probability Score =    1.5      Heart Score:    History:          Slightly suspicious.  ECG:             Normal  Age:               45-65 years  Risk factors: 1-2 risk factors  Troponin:       Less than or equal to normal limit  Final Score: 2                       Clinical Impression:     1. Viral URI with cough    2. Chest pain, unspecified type        Disposition:   Disposition: Discharged  Condition: Stable    I, Tony Solis,  personally performed the services described in this documentation. All medical record entries made by the scribe were at my direction and in my presence.  I have reviewed the chart and agree that the record reflects my personal performance and is accurate and complete. Tony Solis M.D. 8:13 PM03/26/2019                 Tony Solis MD  03/26/19 2014

## 2019-03-26 NOTE — TELEPHONE ENCOUNTER
Spoke to patient and rescheduled for 3/9/19. She has already quit but needs some additional support.

## 2019-03-27 NOTE — DISCHARGE INSTRUCTIONS
Take all medications as prescribed.    Follow up with your primary care physician in the next seven days for a recheck of today's complaints.    Return to the emergency department for any new or worsening of symptoms.

## 2019-04-22 ENCOUNTER — TELEPHONE (OUTPATIENT)
Dept: SMOKING CESSATION | Facility: CLINIC | Age: 59
End: 2019-04-22

## 2019-06-27 ENCOUNTER — OFFICE VISIT (OUTPATIENT)
Dept: OTOLARYNGOLOGY | Facility: CLINIC | Age: 59
End: 2019-06-27
Payer: COMMERCIAL

## 2019-06-27 VITALS
BODY MASS INDEX: 28.9 KG/M2 | SYSTOLIC BLOOD PRESSURE: 134 MMHG | DIASTOLIC BLOOD PRESSURE: 90 MMHG | TEMPERATURE: 98 F | HEART RATE: 102 BPM | HEIGHT: 62 IN | WEIGHT: 157.06 LBS

## 2019-06-27 DIAGNOSIS — K21.9 LARYNGOPHARYNGEAL REFLUX (LPR): ICD-10-CM

## 2019-06-27 DIAGNOSIS — J31.0 CHRONIC RHINITIS: ICD-10-CM

## 2019-06-27 DIAGNOSIS — J39.2 OROPHARYNGEAL LESION: Primary | ICD-10-CM

## 2019-06-27 PROCEDURE — 99999 PR PBB SHADOW E&M-EST. PATIENT-LVL III: ICD-10-PCS | Mod: PBBFAC,,, | Performed by: OTOLARYNGOLOGY

## 2019-06-27 PROCEDURE — 31575 DIAGNOSTIC LARYNGOSCOPY: CPT | Mod: S$GLB,,, | Performed by: OTOLARYNGOLOGY

## 2019-06-27 PROCEDURE — 31575 PR LARYNGOSCOPY, FLEXIBLE; DIAGNOSTIC: ICD-10-PCS | Mod: S$GLB,,, | Performed by: OTOLARYNGOLOGY

## 2019-06-27 PROCEDURE — 99999 PR PBB SHADOW E&M-EST. PATIENT-LVL III: CPT | Mod: PBBFAC,,, | Performed by: OTOLARYNGOLOGY

## 2019-06-27 PROCEDURE — 3008F PR BODY MASS INDEX (BMI) DOCUMENTED: ICD-10-PCS | Mod: CPTII,S$GLB,, | Performed by: OTOLARYNGOLOGY

## 2019-06-27 PROCEDURE — 99213 OFFICE O/P EST LOW 20 MIN: CPT | Mod: 25,S$GLB,, | Performed by: OTOLARYNGOLOGY

## 2019-06-27 PROCEDURE — 99213 PR OFFICE/OUTPT VISIT, EST, LEVL III, 20-29 MIN: ICD-10-PCS | Mod: 25,S$GLB,, | Performed by: OTOLARYNGOLOGY

## 2019-06-27 PROCEDURE — 3008F BODY MASS INDEX DOCD: CPT | Mod: CPTII,S$GLB,, | Performed by: OTOLARYNGOLOGY

## 2019-06-27 RX ORDER — ALBUTEROL SULFATE 90 UG/1
AEROSOL, METERED RESPIRATORY (INHALATION)
COMMUNITY
End: 2019-07-08

## 2019-06-27 RX ORDER — PANTOPRAZOLE SODIUM 40 MG/1
40 TABLET, DELAYED RELEASE ORAL DAILY
Qty: 30 TABLET | Refills: 11 | Status: SHIPPED | OUTPATIENT
Start: 2019-06-27 | End: 2019-08-27 | Stop reason: SDUPTHER

## 2019-06-27 RX ORDER — PHENTERMINE HYDROCHLORIDE 37.5 MG/1
18.75 TABLET ORAL 2 TIMES DAILY
Refills: 0 | COMMUNITY
Start: 2019-05-31 | End: 2019-07-08

## 2019-06-27 NOTE — PATIENT INSTRUCTIONS
Tips to Control Acid Reflux    To control acid reflux, youll need to make some basic diet and lifestyle changes. The simple steps outlined below may be all youll need to ease discomfort.  Watch what you eat  · Avoid fatty foods and spicy foods.  · Eat fewer acidic foods, such as citrus and tomato-based foods. These can increase symptoms.  · Limit drinking alcohol, caffeine, and fizzy beverages. All increase acid reflux.  · Try limiting chocolate, peppermint, and spearmint. These can worsen acid reflux in some people.  Watch when you eat  · Avoid lying down for 3 hours after eating.  · Do not snack before going to bed.  Raise your head  Raising your head and upper body by 4 to 6 inches helps limit reflux when youre lying down. Put blocks under the head of your bed frame to raise it.  Other changes  · Lose weight, if you need to  · Dont exercise near bedtime  · Avoid tight-fitting clothes  · Limit aspirin and ibuprofen  · Stop smoking   Date Last Reviewed: 7/1/2016  © 3787-1226 [employer]. [address] [place] [address], [place] [address]. All rights reserved. This information is not intended as a substitute for professional medical care. Always follow your healthcare professional's instructions.

## 2019-06-27 NOTE — PROGRESS NOTES
Chief Complaint   Patient presents with    Follow-up     Lesion Patient states no pain.    Cough   .     HPI:Suly Jaime is a 59 y.o. female who has been referred by Dr. Irene Lopez for a several year history of chronic cough.  She relays that it is mostly dry but occasionally productive.  She feels this is gradually worsening. She notes that she has had nasal congestion and post-nasal drip. She notes the cough is worse at night. She  6 month history of hoarseness. Her voice is not progressively worsening over this time. There are pitch breaks or cracks. There is not vocal fatigue. She denies dysphagia, odynophagia, throat pain, and otalgia.  There is no hemoptysis or hematemesis. She is breathing well. She is currently taking Singulair with limited benefit. She is using Flonase with a very slight relief.     She denies throat clearing and cough. She admits to heartburn and reflux.    Interval HPI 06/27/2019:   Follow up today for surveillance after biopsy of oropharyngeal lesion revealed papilloma. Mrs. Jaime reports that she is doing well. She denies throat pain. She is still having intermittent dry cough.  She has been able to quit smoking now for 5 months using Nicoderm patches.      No past medical history on file.  Social History     Socioeconomic History    Marital status:      Spouse name: Not on file    Number of children: Not on file    Years of education: Not on file    Highest education level: Not on file   Occupational History    Not on file   Social Needs    Financial resource strain: Not on file    Food insecurity:     Worry: Not on file     Inability: Not on file    Transportation needs:     Medical: Not on file     Non-medical: Not on file   Tobacco Use    Smoking status: Current Every Day Smoker     Packs/day: 0.50    Smokeless tobacco: Never Used   Substance and Sexual Activity    Alcohol use: No    Drug use: No    Sexual activity: Not on file   Lifestyle    Physical  activity:     Days per week: Not on file     Minutes per session: Not on file    Stress: Not on file   Relationships    Social connections:     Talks on phone: Not on file     Gets together: Not on file     Attends Mosque service: Not on file     Active member of club or organization: Not on file     Attends meetings of clubs or organizations: Not on file     Relationship status: Not on file   Other Topics Concern    Not on file   Social History Narrative    Not on file     Past Surgical History:   Procedure Laterality Date    DIRECT MICRO LARYNGOSCOPY WITH BIOPSY N/A 2/18/2019    [facility] at [facility]    HYSTERECTOMY  2003    full    KNEE ARTHROSCOPY      OOPHORECTOMY       Family History   Problem Relation Age of Onset    Diabetes Mother     Diabetes Maternal Grandmother     Heart disease Maternal Grandmother            Review of Systems  General: negative for chills, fever or weight loss  Psychological: negative for mood changes or depression  Ophthalmic: negative for blurry vision, photophobia or eye pain  ENT: see HPI  Respiratory: no cough, shortness of breath, or wheezing  Cardiovascular: no chest pain or dyspnea on exertion  Gastrointestinal: no abdominal pain, change in bowel habits, or black/ bloody stools  Musculoskeletal: negative for gait disturbance or muscular weakness  Neurological: no syncope or seizures; no ataxia  Dermatological: negative for puritis,  rash and jaundice  Hematologic/lymphatic: no easy bruising, no new lumps or bumps      Physical Exam:    Vitals:    06/27/19 1344   BP: (!) 134/90   Pulse: 102   Temp: 97.5 °F (36.4 °C)       Constitutional: Well appearing / communicating without difficutly.  NAD.  Eyes: EOM I Bilaterally  Head/Face: Normocephalic.  Negative paranasal sinus pressure/tenderness.  Salivary glands WNL.  House Brackmann I Bilaterally.    Right Ear: Auricle normal appearance. External Auditory Canal within normal limits no  lesions or masses,TM w/o masses/lesions/perforations. TM mobility noted.   Left Ear: Auricle normal appearance. External Auditory Canal within normal limits no lesions or masses,TM w/o masses/lesions/perforations. TM mobility noted.  Nose: No gross nasal septal deviation. Inferior Turbinates 3+ bilaterally. No septal perforation. No masses/lesions. External nasal skin appears normal without masses/lesions.  Oral Cavity: Gingiva/lips within normal limits.  Dentition/gingiva healthy appearing. Mucus membranes moist. Floor of mouth soft, no masses palpated. Oral Tongue mobile. Hard Palate appears normal.    Oropharynx: Base of tongue appears normal. No masses/lesions noted. Tonsillar fossa/pharyngeal wall without lesions. Posterior oropharynx WNL.  Soft palate without masses. Midline uvula.   Neck/Lymphatic: No LAD I-VI bilaterally.  No thyromegaly.  No masses noted on exam.    Mirror laryngoscopy/nasopharyngoscopy: Active gag reflex.  Unable to perform.    Neuro/Psychiatric: AOx3.  Normal mood and affect.   Cardiovascular: Normal carotid pulses bilaterally, no increasing jugular venous distention noted at cervical region bilaterally.    Respiratory: Normal respiratory effort, no stridor, no retractions noted.    See separate procedure note for flexible fiberoptic laryngoscopy.    Assessment:    ICD-10-CM ICD-9-CM    1. Oropharyngeal lesion J39.2 478.20    2. Chronic rhinitis J31.0 472.0    3. Laryngopharyngeal reflux (LPR) K21.9 478.79      The primary encounter diagnosis was Oropharyngeal lesion. Diagnoses of Chronic rhinitis and Laryngopharyngeal reflux (LPR) were also pertinent to this visit.      Plan:  No orders of the defined types were placed in this encounter.    Continue Xyzal and Singulair.  Continue Protonix 40mg PO daily.     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 have recommended that we continue to monitor for any recurrent papillomas in the future.       Deidre Calderon,  MD

## 2019-06-27 NOTE — PROCEDURES
Procedures       Due to indication in patient's history, presentation or risk factors,  a fiber optic exam was performed.    SEPARATE PROCEDURE NOTE:    ANESTHESIA:  Topical xylocaine with joaquina-synephrine    FINDINGS:  No evidence of recurrent papilloma      PROCEDURE:  After verbal consent was obtained, the flexible scope was passed through the patient's nasal cavity without difficulty.  The nasopharynx (adenoid pad) and eustachian tube orifices were first visualized and were found to be normal, without masses or irregularity.  The posterior pharyngeal wall and base of tongue were then examined and no mass or irregular tissue was seen.  Biopsy site well healed. The scope was then advanced to the larynx, and the epiglottis, valleculae, and piriform sinuses were normal, without masses or mucosal irregularity.  The false vocal folds and true vocal folds were then examined and were found to have normal mobility (full abduction and adduction) and no masses or mucosal irregularity was seen.  The arytenoids and the interarytenoid area were normal. The patient tolerated the procedure well without complications.

## 2019-07-03 ENCOUNTER — LAB VISIT (OUTPATIENT)
Dept: LAB | Facility: HOSPITAL | Age: 59
End: 2019-07-03
Attending: FAMILY MEDICINE
Payer: COMMERCIAL

## 2019-07-03 ENCOUNTER — OFFICE VISIT (OUTPATIENT)
Dept: FAMILY MEDICINE | Facility: CLINIC | Age: 59
End: 2019-07-03
Payer: COMMERCIAL

## 2019-07-03 VITALS
HEART RATE: 89 BPM | DIASTOLIC BLOOD PRESSURE: 88 MMHG | OXYGEN SATURATION: 97 % | WEIGHT: 155 LBS | TEMPERATURE: 98 F | SYSTOLIC BLOOD PRESSURE: 130 MMHG | HEIGHT: 62 IN | BODY MASS INDEX: 28.52 KG/M2

## 2019-07-03 DIAGNOSIS — R42 DIZZY SPELLS: ICD-10-CM

## 2019-07-03 DIAGNOSIS — R53.83 FATIGUE, UNSPECIFIED TYPE: ICD-10-CM

## 2019-07-03 DIAGNOSIS — R42 DIZZINESS: ICD-10-CM

## 2019-07-03 DIAGNOSIS — R29.818 POSITIVE ROMBERG TEST: ICD-10-CM

## 2019-07-03 DIAGNOSIS — R42 DIZZY SPELLS: Primary | ICD-10-CM

## 2019-07-03 LAB
ALBUMIN SERPL BCP-MCNC: 4.1 G/DL (ref 3.5–5.2)
ALP SERPL-CCNC: 111 U/L (ref 55–135)
ALT SERPL W/O P-5'-P-CCNC: 32 U/L (ref 10–44)
ANION GAP SERPL CALC-SCNC: 11 MMOL/L (ref 8–16)
AST SERPL-CCNC: 14 U/L (ref 10–40)
BASOPHILS # BLD AUTO: 0.04 K/UL (ref 0–0.2)
BASOPHILS NFR BLD: 0.4 % (ref 0–1.9)
BILIRUB SERPL-MCNC: 0.2 MG/DL (ref 0.1–1)
BUN SERPL-MCNC: 13 MG/DL (ref 6–20)
CALCIUM SERPL-MCNC: 10 MG/DL (ref 8.7–10.5)
CHLORIDE SERPL-SCNC: 105 MMOL/L (ref 95–110)
CO2 SERPL-SCNC: 25 MMOL/L (ref 23–29)
CREAT SERPL-MCNC: 0.8 MG/DL (ref 0.5–1.4)
DIFFERENTIAL METHOD: ABNORMAL
EOSINOPHIL # BLD AUTO: 0.2 K/UL (ref 0–0.5)
EOSINOPHIL NFR BLD: 1.6 % (ref 0–8)
ERYTHROCYTE [DISTWIDTH] IN BLOOD BY AUTOMATED COUNT: 12.8 % (ref 11.5–14.5)
EST. GFR  (AFRICAN AMERICAN): >60 ML/MIN/1.73 M^2
EST. GFR  (NON AFRICAN AMERICAN): >60 ML/MIN/1.73 M^2
GLUCOSE SERPL-MCNC: 80 MG/DL (ref 70–110)
HCT VFR BLD AUTO: 42.9 % (ref 37–48.5)
HGB BLD-MCNC: 13.7 G/DL (ref 12–16)
IMM GRANULOCYTES # BLD AUTO: 0.03 K/UL (ref 0–0.04)
IMM GRANULOCYTES NFR BLD AUTO: 0.3 % (ref 0–0.5)
LYMPHOCYTES # BLD AUTO: 2.5 K/UL (ref 1–4.8)
LYMPHOCYTES NFR BLD: 25.7 % (ref 18–48)
MCH RBC QN AUTO: 28.5 PG (ref 27–31)
MCHC RBC AUTO-ENTMCNC: 31.9 G/DL (ref 32–36)
MCV RBC AUTO: 89 FL (ref 82–98)
MONOCYTES # BLD AUTO: 0.8 K/UL (ref 0.3–1)
MONOCYTES NFR BLD: 7.8 % (ref 4–15)
NEUTROPHILS # BLD AUTO: 6.2 K/UL (ref 1.8–7.7)
NEUTROPHILS NFR BLD: 64.2 % (ref 38–73)
NRBC BLD-RTO: 0 /100 WBC
PLATELET # BLD AUTO: 301 K/UL (ref 150–350)
PMV BLD AUTO: 11.4 FL (ref 9.2–12.9)
POTASSIUM SERPL-SCNC: 4.4 MMOL/L (ref 3.5–5.1)
PROT SERPL-MCNC: 7.5 G/DL (ref 6–8.4)
RBC # BLD AUTO: 4.81 M/UL (ref 4–5.4)
SODIUM SERPL-SCNC: 141 MMOL/L (ref 136–145)
TSH SERPL DL<=0.005 MIU/L-ACNC: 1.12 UIU/ML (ref 0.4–4)
WBC # BLD AUTO: 9.59 K/UL (ref 3.9–12.7)

## 2019-07-03 PROCEDURE — 85025 COMPLETE CBC W/AUTO DIFF WBC: CPT

## 2019-07-03 PROCEDURE — 99214 OFFICE O/P EST MOD 30 MIN: CPT | Mod: S$GLB,,, | Performed by: FAMILY MEDICINE

## 2019-07-03 PROCEDURE — 99214 PR OFFICE/OUTPT VISIT, EST, LEVL IV, 30-39 MIN: ICD-10-PCS | Mod: S$GLB,,, | Performed by: FAMILY MEDICINE

## 2019-07-03 PROCEDURE — 99999 PR PBB SHADOW E&M-EST. PATIENT-LVL IV: ICD-10-PCS | Mod: PBBFAC,,, | Performed by: FAMILY MEDICINE

## 2019-07-03 PROCEDURE — 3008F BODY MASS INDEX DOCD: CPT | Mod: CPTII,S$GLB,, | Performed by: FAMILY MEDICINE

## 2019-07-03 PROCEDURE — 3008F PR BODY MASS INDEX (BMI) DOCUMENTED: ICD-10-PCS | Mod: CPTII,S$GLB,, | Performed by: FAMILY MEDICINE

## 2019-07-03 PROCEDURE — 84443 ASSAY THYROID STIM HORMONE: CPT

## 2019-07-03 PROCEDURE — 99999 PR PBB SHADOW E&M-EST. PATIENT-LVL IV: CPT | Mod: PBBFAC,,, | Performed by: FAMILY MEDICINE

## 2019-07-03 PROCEDURE — 80053 COMPREHEN METABOLIC PANEL: CPT

## 2019-07-03 PROCEDURE — 36415 COLL VENOUS BLD VENIPUNCTURE: CPT | Mod: PO

## 2019-07-03 NOTE — PATIENT INSTRUCTIONS
Will evaluate fatigue with CBC/CMP/TSH today  Vertigo is likely secondary to BPPV, however it is a little unclear  Due to symptoms of speech changes and positive romberg test, will evaluate with MRI  If symptoms change or worsen, RTC   PT referral for BPPV  F/U with PCP    Benign Paroxysmal Positional Vertigo     Your health care provider may move your head in certain ways to treat your BPPV.     Benign paroxysmal positional vertigo (BPPV) is a problem with the inner ear. The inner ear contains the vestibular system. This system is what helps you keep your balance. BPPV causes a feeling of spinning. It is a common problem of the vestibular system.  Understanding the vestibular system  The vestibular system of the ear is made up of very tiny parts. They include the utricle, saccule, and semicircular canals. The utricle is a tiny organ that contains calcium crystals. In some people, the crystals can move into the semicircular canals. When this happens, the system no longer works as it should. This causes BPPV. Benign means it is not life-threatening. Paroxysmal means it happens suddenly. Positional means that it happens when you move your head. Vertigo is a feeling of spinning.  What causes BPPV?  Causes include injury to your head or neck. Other problems with the vestibular system may cause BPPV. In many people, the cause of BPPV is not known.  Symptoms of BPPV  You many have repeated feelings of spinning (vertigo). The vertigo usually lasts less than 1 minute. Some movements, suchas rolling over in bed, can bring on vertigo.  Diagnosing BPPV  Your primary health care provider may diagnose and treat your BPPV. Or you may see an ear, nose, and throat doctor (otolaryngologist). In some cases, you may see a nervous system doctor (neurologist).  The health care provider will ask about your symptoms and your medical history. He or she will examine you. You may have hearing and balance tests. As part of the exam, your  health care provider may have you move your head and body in certain ways. If you have BPPV, the movements can bring on vertigo. Your provider will also look for abnormal movements of your eyes. You may have other tests to check your vestibular or nervous systems.  Treatment for BPPV  Your health care provider may try to move the calcium crystals. This is done by having you move your head and neck in certain ways. This treatment is safe and often works well. You may also be told to do these movements at home. You may still have vertigo for a few weeks. Your health care provider will recheck your symptoms, usually in about a month. Special physical therapy may also be part of treatment. In rare cases surgery may be needed for BPPV that does not go away.     When to call the health care provider  Call your health care provider right away if you have any of these:  · Symptoms that do not go away with treatment  · Symptoms that get worse  · New symptoms   Date Last Reviewed: 3/19/2015  © 2699-8061 [employer], [employer]. [address] [place] [address], [place] [address]. All rights reserved. This information is not intended as a substitute for professional medical care. Always follow your healthcare professional's instructions.

## 2019-07-03 NOTE — PROGRESS NOTES
Subjective:       Patient ID: Suly Jaime is a 59 y.o. female.    Chief Complaint: Dizziness (3 weeks) and Fatigue (3 weeks)    Suly is a 59 y.o. female who presents today for an urgent care appointment with dizziness and fatigue. She is normally followed by Dr. Lopez. Her symptoms have been ongoing x 2 weeks. She has has intermittent dizziness over this period of time. She had 3 episodes overnight. The first one was minutes and the second one was about 5 minutes and the third one was a few minutes. These all resolved spontaneously. She was in bed. No history of head trauma. She has a history of sinus issues; she sees ENT. She has ringing in her ears that started this morning. She feels like the room is spinning and off balance. Mostly associated with turning her head. She had slurred speech last night. This lasted for a few minutes. This was having during her dizzy spell.     She has been tired 2-3 weeks. Nothing has changed at home. No change in stress at work.     Dizziness:   Chronicity:  New  Onset:  1 to 4 weeks ago  Progression since onset:  Waxing and waning  Severity:  Severe  Duration:  5 minutes   Associated symptoms: ear congestion, tinnitus, nausea and slurred speech.no ear pain, no fever, no headaches, no vomiting, no diaphoresis, no aural fullness, no weakness, no visual disturbances, no light-headedness, no syncope, no palpitations, no numbness in extremities and no chest pain.  Aggravated by:  Position changes  Treatments tried:  Nothing    Review of Systems   Constitutional: Negative for diaphoresis and fever.   HENT: Positive for tinnitus. Negative for ear pain.    Cardiovascular: Negative for chest pain, palpitations and syncope.   Gastrointestinal: Positive for nausea. Negative for vomiting.   Neurological: Positive for dizziness. Negative for weakness, light-headedness and headaches.         Objective:     Vitals:    07/03/19 1316   BP: 130/88   Pulse: 89   Temp: 97.7 °F (36.5 °C)         Physical Exam   Constitutional: She is oriented to person, place, and time. She appears well-developed and well-nourished.   HENT:   Head: Normocephalic and atraumatic.   Nose: Nose normal.   Mouth/Throat: Oropharynx is clear and moist.   TM: normal BL  Minimal to no nasal congestion   Cardiovascular: Normal rate and regular rhythm.   Pulmonary/Chest: Effort normal and breath sounds normal. No stridor. No respiratory distress.   Neurological: She is alert and oriented to person, place, and time. No cranial nerve deficit or sensory deficit. She exhibits normal muscle tone.   Finger to nose intact  Heel to shin intact  Strength and sensation grossly intract BL UE/LE  CN 2-12 grossly intact  PERRLA  Rapid alternating movement intact  Patellar reflex present and equal BL    POSITIVE Romberg Sign  Gabriela hallpike was negative BL   Skin: Skin is warm.   Psychiatric: She has a normal mood and affect. Her behavior is normal. Judgment and thought content normal.   Nursing note and vitals reviewed.      Assessment:       1. Dizzy spells    2. Fatigue, unspecified type    3. Positive Romberg test    4. Dizziness        Plan:       Will evaluate fatigue with CBC/CMP/TSH today  Vertigo is likely secondary to BPPV, however it is a little unclear  Due to symptoms of speech changes and positive romberg test, will evaluate with MRI  If symptoms change or worsen, RTC   PT referral for BPPV  F/U with PCP      Dizzy spells  -     CBC auto differential; Future; Expected date: 07/03/2019  -     Comprehensive metabolic panel; Future; Expected date: 07/03/2019  -     TSH; Future; Expected date: 07/03/2019  -     MRI Brain W WO Contrast; Future; Expected date: 07/03/2019  -     MRI IAC/Temporal Bones W W/O Contrast; Future; Expected date: 07/03/2019  -     Ambulatory Referral to Physical/Occupational Therapy    Fatigue, unspecified type  -     CBC auto differential; Future; Expected date: 07/03/2019  -     Comprehensive metabolic panel;  Future; Expected date: 07/03/2019  -     TSH; Future; Expected date: 07/03/2019  -     MRI Brain W WO Contrast; Future; Expected date: 07/03/2019  -     MRI IAC/Temporal Bones W W/O Contrast; Future; Expected date: 07/03/2019    Positive Romberg test  -     MRI Brain W WO Contrast; Future; Expected date: 07/03/2019  -     MRI IAC/Temporal Bones W W/O Contrast; Future; Expected date: 07/03/2019    Dizziness  -     MRI Brain W WO Contrast; Future; Expected date: 07/03/2019  -     MRI IAC/Temporal Bones W W/O Contrast; Future; Expected date: 07/03/2019  -     Ambulatory Referral to Physical/Occupational Therapy        Warning signs discussed, patient to call with any further issues or worsening of symptoms.

## 2019-07-08 ENCOUNTER — OFFICE VISIT (OUTPATIENT)
Dept: URGENT CARE | Facility: CLINIC | Age: 59
End: 2019-07-08
Payer: COMMERCIAL

## 2019-07-08 ENCOUNTER — TELEPHONE (OUTPATIENT)
Dept: INTERNAL MEDICINE | Facility: CLINIC | Age: 59
End: 2019-07-08

## 2019-07-08 VITALS
TEMPERATURE: 98 F | SYSTOLIC BLOOD PRESSURE: 138 MMHG | BODY MASS INDEX: 28.34 KG/M2 | OXYGEN SATURATION: 97 % | RESPIRATION RATE: 18 BRPM | HEIGHT: 62 IN | WEIGHT: 154 LBS | HEART RATE: 110 BPM | DIASTOLIC BLOOD PRESSURE: 80 MMHG

## 2019-07-08 DIAGNOSIS — L03.116 CELLULITIS OF LEFT THIGH: Primary | ICD-10-CM
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
## 2019-07-08 PROCEDURE — 3008F PR BODY MASS INDEX (BMI) DOCUMENTED: ICD-10-PCS | Mod: CPTII,S$GLB,, | Performed by: PHYSICIAN ASSISTANT

## 2019-07-08 PROCEDURE — 3008F BODY MASS INDEX DOCD: CPT | Mod: CPTII,S$GLB,, | Performed by: PHYSICIAN ASSISTANT

## 2019-07-08 PROCEDURE — 99214 OFFICE O/P EST MOD 30 MIN: CPT | Mod: S$GLB,,, | Performed by: PHYSICIAN ASSISTANT

## 2019-07-08 PROCEDURE — 99214 PR OFFICE/OUTPT VISIT, EST, LEVL IV, 30-39 MIN: ICD-10-PCS | Mod: S$GLB,,, | Performed by: PHYSICIAN ASSISTANT

## 2019-07-08 RX ORDER — CLINDAMYCIN PHOSPHATE 150 MG/ML
600 INJECTION, SOLUTION INTRAVENOUS
Status: COMPLETED | OUTPATIENT
Start: 2019-07-08 | End: 2019-07-08

## 2019-07-08 RX ORDER — SULFAMETHOXAZOLE AND TRIMETHOPRIM 800; 160 MG/1; MG/1
1 TABLET ORAL 2 TIMES DAILY
Qty: 14 TABLET | Refills: 0 | Status: SHIPPED | OUTPATIENT
Start: 2019-07-08 | End: 2019-07-15

## 2019-07-08 RX ORDER — MUPIROCIN 20 MG/G
OINTMENT TOPICAL
Qty: 22 G | Refills: 0 | Status: SHIPPED | OUTPATIENT
Start: 2019-07-08 | End: 2020-09-09 | Stop reason: CLARIF

## 2019-07-08 RX ADMIN — CLINDAMYCIN PHOSPHATE 600 MG: 150 INJECTION, SOLUTION INTRAVENOUS at 12:07

## 2019-07-08 NOTE — PROGRESS NOTES
"Subjective:       Patient ID: Suly Jaime is a 59 y.o. female.    Vitals:  height is 5' 2" (1.575 m) and weight is 69.9 kg (154 lb). Her temperature is 98.3 °F (36.8 °C). Her blood pressure is 138/80 and her pulse is 110. Her respiration is 18 and oxygen saturation is 97%.     Chief Complaint: Insect Bite (left leg)    Pt got bit by something but doesn't know what it was on Saturday.     Insect Bite   This is a new problem. The current episode started in the past 7 days. The problem occurs constantly. The problem has been gradually worsening. Associated symptoms include a rash. Pertinent negatives include no arthralgias, chest pain, chills, congestion, coughing, fatigue, fever, headaches, joint swelling, myalgias, nausea, sore throat, vertigo, vomiting or weakness. The symptoms are aggravated by walking. She has tried acetaminophen for the symptoms. The treatment provided no relief.       Constitution: Negative for chills, fatigue and fever.   HENT: Negative for congestion and sore throat.    Neck: Negative for painful lymph nodes.   Cardiovascular: Negative for chest pain and leg swelling.   Eyes: Negative for double vision and blurred vision.   Respiratory: Negative for cough and shortness of breath.    Gastrointestinal: Negative for nausea, vomiting and diarrhea.   Genitourinary: Negative for dysuria, frequency, urgency and history of kidney stones.   Musculoskeletal: Negative for joint pain, joint swelling, muscle cramps and muscle ache.   Skin: Positive for color change, rash and erythema. Negative for pale and bruising.   Allergic/Immunologic: Negative for seasonal allergies.   Neurological: Negative for dizziness, history of vertigo, light-headedness, passing out and headaches.   Hematologic/Lymphatic: Negative for swollen lymph nodes.   Psychiatric/Behavioral: Negative for nervous/anxious, sleep disturbance and depression. The patient is not nervous/anxious.        Objective:      Physical Exam "   Constitutional: She is oriented to person, place, and time. She appears well-developed and well-nourished.   HENT:   Head: Normocephalic and atraumatic. Head is without abrasion, without contusion and without laceration.   Right Ear: External ear normal.   Left Ear: External ear normal.   Nose: Nose normal.   Mouth/Throat: Oropharynx is clear and moist.   Eyes: Pupils are equal, round, and reactive to light. Conjunctivae, EOM and lids are normal.   Neck: Trachea normal, full passive range of motion without pain and phonation normal. Neck supple.   Cardiovascular: Normal rate, regular rhythm and normal heart sounds.   Pulmonary/Chest: Effort normal and breath sounds normal. No stridor. No respiratory distress.   Musculoskeletal: Normal range of motion.   Neurological: She is alert and oriented to person, place, and time.   Skin: Skin is warm, dry and intact. Capillary refill takes less than 2 seconds. Lesion noted. No abrasion, no bruising, no burn, no ecchymosis, no laceration and no rash noted. There is erythema.        Psychiatric: She has a normal mood and affect. Her speech is normal and behavior is normal. Judgment and thought content normal. Cognition and memory are normal.   Nursing note and vitals reviewed.      Assessment:       1. Cellulitis of left thigh    2. Bug bite with infection, initial encounter        Plan:         Cellulitis of left thigh  -     mupirocin (BACTROBAN) 2 % ointment; Apply to affected area 3 times daily  Dispense: 22 g; Refill: 0  -     sulfamethoxazole-trimethoprim 800-160mg (BACTRIM DS) 800-160 mg Tab; Take 1 tablet by mouth 2 (two) times daily. for 7 days  Dispense: 14 tablet; Refill: 0  -     clindamycin injection 600 mg    Bug bite with infection, initial encounter          Discharge Instructions for Cellulitis  You have been diagnosed with cellulitis. This is an infection in the deepest layer of the skin. In some cases, the infection also affects the muscle. Cellulitis is  caused by bacteria. The bacteria can enter the body through broken skin. This can happen with a cut, scratch, animal bite, or an insect bite that has been scratched. You may have been treated in the hospital with antibiotics and fluids. You will likely be given a prescription for antibiotics to take at home. This sheet will help you take care of yourself at home.  Home care  When you are home:  · Take the prescribed antibiotic medicine you are given as directed until it is gone. Take it even if you feel better. It treats the infection and stops it from returning. Not taking all the medicine can make future infections hard to treat.  · Keep the infected area clean.  · When possible, raise the infected area above the level of your heart. This helps keep swelling down.  · Talk with your healthcare provider if you are in pain. Ask what kind of over-the-counter medicine you can take for pain.  · Apply clean bandages as advised.  · Take your temperature once a day for a week.  · Wash your hands often to prevent spreading the infection.  In the future, wash your hands before and after you touch cuts, scratches, or bandages. This will help prevent infection.   When to call your healthcare provider  Call your healthcare provider immediately if you have any of the following:  · Difficulty or pain when moving the joints above or below the infected area  · Discharge or pus draining from the area  · Fever of 100.4°F (38°C) or higher, or as directed by your healthcare provider  · Pain that gets worse in or around the infected   · Redness that gets worse in or around the infected area, particularly if the area of redness expands to a wider area  · Shaking chills  · Swelling of the infected area  · Vomiting   Date Last Reviewed: 8/1/2016  © 2274-1265 [employer]. [address] [place] [address]. All rights reserved. This information is not intended as a substitute for professional medical care. Always follow  your healthcare professional's instructions.        Infected Insect Bite or Sting    When an insect stings you, it injects venom. When an insect bites you, it does not. Stings and bites may cause a local reaction. Or they may cause a reaction that affects your whole body. Bites and stings may become infected. Signs of infection include redness, warmth, pain, drainage of pus, and swelling. Infections will need treatment with antibiotics and should get better over the next 10 days.  Home care  The following will help you care for your bite or sting at home:  · If a stinger is still in your skin, it will need to be removed. Don't use tweezers. Gently scrape the stinger from the side with a firm object such as the side of a credit card. This will loosen it and remove it from your skin.  · If itching is a problem, applying ice packs to the sting area will help.  · Wash the area with soap and water at least 3 times a day. Apply a topical antibiotic cream or ointment.  · You can use an over-the counter antihistamine unless your doctor has given you a prescription antihistamine. You may use antihistamines to reduce itching if large areas of the skin are involved. Use lower doses during the daytime and higher doses at bedtime since the drug may make you sleepy. Don't use an antihistamine if you have glaucoma or if you are a man with trouble urinating due to an enlarged prostate. Some antihistamines cause less drowsiness and are a good choice for daytime use.  · If oral antibiotics have been prescribed, be sure to take them as directed until they are all finished.  · You may use over-the-counter pain medicine to control pain, unless another pain medicine was prescribed. Talk with your doctor before using acetaminophen or ibuprofen if you have chronic liver or kidney disease. Also talk with your doctor if you have ever had a stomach ulcer or gastrointestinal bleeding.  Follow-up care  Follow up with your healthcare provider,  or as advised if you don't get better over the next 2 days or if your symptoms get worse.  Call 911  Call 911 if any of these occur:  · Swelling of the face, eyelids, mouth, throat, or tongue  · Difficulty swallowing or breathing  When to seek medical advice  Call your healthcare provider right away if any of these occur:  · Spreading areas of redness or swelling  · Fever of 100.4°F (38°C) or higher, or as directed by your healthcare provider  · Increased local pain  · Headache, fever, chills, muscle or joint aching, or vomiting,  · New rash  Date Last Reviewed: 10/1/2016  © 4212-2949 [employer]. [address] [place] [address], [place] [address]. All rights reserved. This information is not intended as a substitute for professional medical care. Always follow your healthcare professional's instructions.      Please follow up with your Primary care provider within 2-5 days if your signs and symptoms have not resolved or worsen.     If your condition worsens or fails to improve we recommend that you receive another evaluation at the emergency room immediately or contact your primary medical clinic to discuss your concerns.   You must understand that you have received an Urgent Care treatment only and that you may be released before all of your medical problems are known or treated. You, the patient, will arrange for follow up care as instructed.     RED FLAGS/WARNING SYMPTOMS DISCUSSED WITH PATIENT THAT WOULD WARRANT EMERGENT MEDICAL ATTENTION. PATIENT VERBALIZED UNDERSTANDING.

## 2019-07-08 NOTE — TELEPHONE ENCOUNTER
I called and I left patient a detailed VM of normal labs. No anemia, kidney issues or thyroid issues. I advised patient to return my phone call with any questions. Please advise.

## 2019-07-08 NOTE — PATIENT INSTRUCTIONS
Discharge Instructions for Cellulitis  You have been diagnosed with cellulitis. This is an infection in the deepest layer of the skin. In some cases, the infection also affects the muscle. Cellulitis is caused by bacteria. The bacteria can enter the body through broken skin. This can happen with a cut, scratch, animal bite, or an insect bite that has been scratched. You may have been treated in the hospital with antibiotics and fluids. You will likely be given a prescription for antibiotics to take at home. This sheet will help you take care of yourself at home.  Home care  When you are home:  · Take the prescribed antibiotic medicine you are given as directed until it is gone. Take it even if you feel better. It treats the infection and stops it from returning. Not taking all the medicine can make future infections hard to treat.  · Keep the infected area clean.  · When possible, raise the infected area above the level of your heart. This helps keep swelling down.  · Talk with your healthcare provider if you are in pain. Ask what kind of over-the-counter medicine you can take for pain.  · Apply clean bandages as advised.  · Take your temperature once a day for a week.  · Wash your hands often to prevent spreading the infection.  In the future, wash your hands before and after you touch cuts, scratches, or bandages. This will help prevent infection.   When to call your healthcare provider  Call your healthcare provider immediately if you have any of the following:  · Difficulty or pain when moving the joints above or below the infected area  · Discharge or pus draining from the area  · Fever of 100.4°F (38°C) or higher, or as directed by your healthcare provider  · Pain that gets worse in or around the infected   · Redness that gets worse in or around the infected area, particularly if the area of redness expands to a wider area  · Shaking chills  · Swelling of the infected area  · Vomiting   Date Last Reviewed:  8/1/2016  © 6492-4118 "[employer] [address] [place] [address], [place] [address]. All rights reserved. This information is not intended as a substitute for professional medical care. Always follow your healthcare professional's instructions.        Infected Insect Bite or Sting    When an insect stings you, it injects venom. When an insect bites you, it does not. Stings and bites may cause a local reaction. Or they may cause a reaction that affects your whole body. Bites and stings may become infected. Signs of infection include redness, warmth, pain, drainage of pus, and swelling. Infections will need treatment with antibiotics and should get better over the next 10 days.  Home care  The following will help you care for your bite or sting at home:  · If a stinger is still in your skin, it will need to be removed. Don't use tweezers. Gently scrape the stinger from the side with a firm object such as the side of a credit card. This will loosen it and remove it from your skin.  · If itching is a problem, applying ice packs to the sting area will help.  · Wash the area with soap and water at least 3 times a day. Apply a topical antibiotic cream or ointment.  · You can use an over-the counter antihistamine unless your doctor has given you a prescription antihistamine. You may use antihistamines to reduce itching if large areas of the skin are involved. Use lower doses during the daytime and higher doses at bedtime since the drug may make you sleepy. Don't use an antihistamine if you have glaucoma or if you are a man with trouble urinating due to an enlarged prostate. Some antihistamines cause less drowsiness and are a good choice for daytime use.  · If oral antibiotics have been prescribed, be sure to take them as directed until they are all finished.  · You may use over-the-counter pain medicine to control pain, unless another pain medicine was prescribed. Talk with your doctor before using acetaminophen or  ibuprofen if you have chronic liver or kidney disease. Also talk with your doctor if you have ever had a stomach ulcer or gastrointestinal bleeding.  Follow-up care  Follow up with your healthcare provider, or as advised if you don't get better over the next 2 days or if your symptoms get worse.  Call 911  Call 911 if any of these occur:  · Swelling of the face, eyelids, mouth, throat, or tongue  · Difficulty swallowing or breathing  When to seek medical advice  Call your healthcare provider right away if any of these occur:  · Spreading areas of redness or swelling  · Fever of 100.4°F (38°C) or higher, or as directed by your healthcare provider  · Increased local pain  · Headache, fever, chills, muscle or joint aching, or vomiting,  · New rash  Date Last Reviewed: 10/1/2016  © 1068-0339 [employer]. [address] [place] [address]. All rights reserved. This information is not intended as a substitute for professional medical care. Always follow your healthcare professional's instructions.      Please follow up with your Primary care provider within 2-5 days if your signs and symptoms have not resolved or worsen.     If your condition worsens or fails to improve we recommend that you receive another evaluation at the emergency room immediately or contact your primary medical clinic to discuss your concerns.   You must understand that you have received an Urgent Care treatment only and that you may be released before all of your medical problems are known or treated. You, the patient, will arrange for follow up care as instructed.     RED FLAGS/WARNING SYMPTOMS DISCUSSED WITH PATIENT THAT WOULD WARRANT EMERGENT MEDICAL ATTENTION. PATIENT VERBALIZED UNDERSTANDING.

## 2019-07-17 ENCOUNTER — TELEPHONE (OUTPATIENT)
Dept: ADMINISTRATIVE | Facility: OTHER | Age: 59
End: 2019-07-17

## 2019-07-25 ENCOUNTER — LAB VISIT (OUTPATIENT)
Dept: LAB | Facility: HOSPITAL | Age: 59
End: 2019-07-25
Attending: INTERNAL MEDICINE
Payer: COMMERCIAL

## 2019-07-25 ENCOUNTER — OFFICE VISIT (OUTPATIENT)
Dept: INTERNAL MEDICINE | Facility: CLINIC | Age: 59
End: 2019-07-25
Payer: COMMERCIAL

## 2019-07-25 VITALS
TEMPERATURE: 99 F | HEART RATE: 91 BPM | WEIGHT: 160.5 LBS | HEIGHT: 62 IN | BODY MASS INDEX: 29.53 KG/M2 | SYSTOLIC BLOOD PRESSURE: 122 MMHG | OXYGEN SATURATION: 94 % | DIASTOLIC BLOOD PRESSURE: 64 MMHG

## 2019-07-25 DIAGNOSIS — Z13.220 ENCOUNTER FOR LIPID SCREENING FOR CARDIOVASCULAR DISEASE: ICD-10-CM

## 2019-07-25 DIAGNOSIS — R03.0 ELEVATED BLOOD PRESSURE READING: ICD-10-CM

## 2019-07-25 DIAGNOSIS — R53.83 FATIGUE, UNSPECIFIED TYPE: ICD-10-CM

## 2019-07-25 DIAGNOSIS — Z23 NEED FOR ZOSTER VACCINATION: ICD-10-CM

## 2019-07-25 DIAGNOSIS — Z12.11 COLON CANCER SCREENING: ICD-10-CM

## 2019-07-25 DIAGNOSIS — Z00.00 ANNUAL PHYSICAL EXAM: ICD-10-CM

## 2019-07-25 DIAGNOSIS — Z72.0 TOBACCO USE: ICD-10-CM

## 2019-07-25 DIAGNOSIS — Z13.6 ENCOUNTER FOR LIPID SCREENING FOR CARDIOVASCULAR DISEASE: ICD-10-CM

## 2019-07-25 DIAGNOSIS — R42 DIZZINESS: ICD-10-CM

## 2019-07-25 DIAGNOSIS — Z23 NEED FOR TETANUS BOOSTER: ICD-10-CM

## 2019-07-25 LAB
CHOLEST SERPL-MCNC: 255 MG/DL (ref 120–199)
CHOLEST/HDLC SERPL: 5.2 {RATIO} (ref 2–5)
HDLC SERPL-MCNC: 49 MG/DL (ref 40–75)
HDLC SERPL: 19.2 % (ref 20–50)
LDLC SERPL CALC-MCNC: ABNORMAL MG/DL (ref 63–159)
NONHDLC SERPL-MCNC: 206 MG/DL
TRIGL SERPL-MCNC: 478 MG/DL (ref 30–150)

## 2019-07-25 PROCEDURE — 3008F BODY MASS INDEX DOCD: CPT | Mod: CPTII,S$GLB,, | Performed by: INTERNAL MEDICINE

## 2019-07-25 PROCEDURE — 90471 TD VACCINE GREATER THAN OR EQUAL TO 7YO PRESERVATIVE FREE IM: ICD-10-PCS | Mod: S$GLB,,, | Performed by: INTERNAL MEDICINE

## 2019-07-25 PROCEDURE — 80061 LIPID PANEL: CPT

## 2019-07-25 PROCEDURE — 36415 COLL VENOUS BLD VENIPUNCTURE: CPT | Mod: PO

## 2019-07-25 PROCEDURE — 99214 OFFICE O/P EST MOD 30 MIN: CPT | Mod: 25,S$GLB,, | Performed by: INTERNAL MEDICINE

## 2019-07-25 PROCEDURE — 99999 PR PBB SHADOW E&M-EST. PATIENT-LVL III: CPT | Mod: PBBFAC,,, | Performed by: INTERNAL MEDICINE

## 2019-07-25 PROCEDURE — 90714 TD VACC NO PRESV 7 YRS+ IM: CPT | Mod: S$GLB,,, | Performed by: INTERNAL MEDICINE

## 2019-07-25 PROCEDURE — 99999 PR PBB SHADOW E&M-EST. PATIENT-LVL III: ICD-10-PCS | Mod: PBBFAC,,, | Performed by: INTERNAL MEDICINE

## 2019-07-25 PROCEDURE — 90471 IMMUNIZATION ADMIN: CPT | Mod: S$GLB,,, | Performed by: INTERNAL MEDICINE

## 2019-07-25 PROCEDURE — 99214 PR OFFICE/OUTPT VISIT, EST, LEVL IV, 30-39 MIN: ICD-10-PCS | Mod: 25,S$GLB,, | Performed by: INTERNAL MEDICINE

## 2019-07-25 PROCEDURE — 82607 VITAMIN B-12: CPT

## 2019-07-25 PROCEDURE — 90714 TD VACCINE GREATER THAN OR EQUAL TO 7YO PRESERVATIVE FREE IM: ICD-10-PCS | Mod: S$GLB,,, | Performed by: INTERNAL MEDICINE

## 2019-07-25 PROCEDURE — 3008F PR BODY MASS INDEX (BMI) DOCUMENTED: ICD-10-PCS | Mod: CPTII,S$GLB,, | Performed by: INTERNAL MEDICINE
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
## 2019-07-26 LAB — VIT B12 SERPL-MCNC: 356 PG/ML (ref 210–950)

## 2019-08-27 RX ORDER — PANTOPRAZOLE SODIUM 40 MG/1
40 TABLET, DELAYED RELEASE ORAL DAILY
Qty: 30 TABLET | Refills: 11 | Status: SHIPPED | OUTPATIENT
Start: 2019-08-27 | End: 2020-01-28 | Stop reason: SDUPTHER

## 2019-10-28 ENCOUNTER — PATIENT OUTREACH (OUTPATIENT)
Dept: ADMINISTRATIVE | Facility: OTHER | Age: 59
End: 2019-10-28

## 2019-11-29 ENCOUNTER — TELEPHONE (OUTPATIENT)
Dept: FAMILY MEDICINE | Facility: CLINIC | Age: 59
End: 2019-11-29

## 2019-11-29 NOTE — TELEPHONE ENCOUNTER
Left voicemail asking patient to contact [facility] between 8am - 5pm at [phone] in regards to vaccine recently received.

## 2019-12-06 ENCOUNTER — TELEPHONE (OUTPATIENT)
Dept: INTERNAL MEDICINE | Facility: CLINIC | Age: 59
End: 2019-12-06

## 2019-12-06 NOTE — TELEPHONE ENCOUNTER
I attempt to call patient to rescheduled appointment, no answer. I left VM for patient to call office to reschedule appointment. Please advise.

## 2019-12-10 ENCOUNTER — TELEPHONE (OUTPATIENT)
Dept: FAMILY MEDICINE | Facility: CLINIC | Age: 59
End: 2019-12-10

## 2020-01-04 ENCOUNTER — DOCUMENTATION ONLY (OUTPATIENT)
Dept: INTERNAL MEDICINE | Facility: CLINIC | Age: 60
End: 2020-01-04

## 2020-01-24 ENCOUNTER — PATIENT OUTREACH (OUTPATIENT)
Dept: ADMINISTRATIVE | Facility: OTHER | Age: 60
End: 2020-01-24

## 2020-01-28 ENCOUNTER — OFFICE VISIT (OUTPATIENT)
Dept: OTOLARYNGOLOGY | Facility: CLINIC | Age: 60
End: 2020-01-28
Payer: COMMERCIAL

## 2020-01-28 VITALS
SYSTOLIC BLOOD PRESSURE: 150 MMHG | HEART RATE: 103 BPM | HEIGHT: 62 IN | TEMPERATURE: 98 F | BODY MASS INDEX: 29.33 KG/M2 | DIASTOLIC BLOOD PRESSURE: 91 MMHG | WEIGHT: 159.38 LBS

## 2020-01-28 DIAGNOSIS — R05.9 COUGH: ICD-10-CM

## 2020-01-28 DIAGNOSIS — J39.2 OROPHARYNGEAL LESION: ICD-10-CM

## 2020-01-28 DIAGNOSIS — K21.9 LARYNGOPHARYNGEAL REFLUX (LPR): Primary | ICD-10-CM

## 2020-01-28 DIAGNOSIS — J31.0 CHRONIC RHINITIS: ICD-10-CM

## 2020-01-28 PROCEDURE — 99999 PR PBB SHADOW E&M-EST. PATIENT-LVL III: CPT | Mod: PBBFAC,,, | Performed by: OTOLARYNGOLOGY

## 2020-01-28 PROCEDURE — 99999 PR PBB SHADOW E&M-EST. PATIENT-LVL III: ICD-10-PCS | Mod: PBBFAC,,, | Performed by: OTOLARYNGOLOGY

## 2020-01-28 PROCEDURE — 3008F PR BODY MASS INDEX (BMI) DOCUMENTED: ICD-10-PCS | Mod: CPTII,S$GLB,, | Performed by: OTOLARYNGOLOGY

## 2020-01-28 PROCEDURE — 3008F BODY MASS INDEX DOCD: CPT | Mod: CPTII,S$GLB,, | Performed by: OTOLARYNGOLOGY

## 2020-01-28 PROCEDURE — 31575 PR LARYNGOSCOPY, FLEXIBLE; DIAGNOSTIC: ICD-10-PCS | Mod: S$GLB,,, | Performed by: OTOLARYNGOLOGY

## 2020-01-28 PROCEDURE — 99214 OFFICE O/P EST MOD 30 MIN: CPT | Mod: 25,S$GLB,, | Performed by: OTOLARYNGOLOGY

## 2020-01-28 PROCEDURE — 99214 PR OFFICE/OUTPT VISIT, EST, LEVL IV, 30-39 MIN: ICD-10-PCS | Mod: 25,S$GLB,, | Performed by: OTOLARYNGOLOGY

## 2020-01-28 PROCEDURE — 31575 DIAGNOSTIC LARYNGOSCOPY: CPT | Mod: S$GLB,,, | Performed by: OTOLARYNGOLOGY

## 2020-01-28 RX ORDER — LEVOCETIRIZINE DIHYDROCHLORIDE 5 MG/1
5 TABLET, FILM COATED ORAL NIGHTLY
Qty: 30 TABLET | Refills: 11 | Status: SHIPPED | OUTPATIENT
Start: 2020-01-28 | End: 2021-09-30 | Stop reason: SDUPTHER

## 2020-01-28 RX ORDER — FLUTICASONE PROPIONATE 50 MCG
1 SPRAY, SUSPENSION (ML) NASAL 2 TIMES DAILY PRN
Qty: 15 G | Refills: 0 | OUTPATIENT
Start: 2020-01-28 | End: 2020-05-29

## 2020-01-28 RX ORDER — PANTOPRAZOLE SODIUM 40 MG/1
40 TABLET, DELAYED RELEASE ORAL DAILY
Qty: 30 TABLET | Refills: 11 | Status: ON HOLD | OUTPATIENT
Start: 2020-01-28 | End: 2022-06-04

## 2020-01-28 NOTE — PROGRESS NOTES
Chief Complaint   Patient presents with    Follow-up    Cough     dry   .     HPI:Suly Jaime is a 60 y.o. female who has been referred by Dr. Irene Lopez for a several year history of chronic cough.  She relays that it is mostly dry but occasionally productive.  She feels this is gradually worsening. She notes that she has had nasal congestion and post-nasal drip. She notes the cough is worse at night. She  6 month history of hoarseness. Her voice is not progressively worsening over this time. There are pitch breaks or cracks. There is not vocal fatigue. She denies dysphagia, odynophagia, throat pain, and otalgia.  There is no hemoptysis or hematemesis. She is breathing well. She is currently taking Singulair with limited benefit. She is using Flonase with a very slight relief.     She denies throat clearing and cough. She admits to heartburn and reflux.    Interval HPI 06/27/2019:   Follow up today for surveillance after biopsy of oropharyngeal lesion revealed papilloma. Mrs. Jaime reports that she is doing well. She denies throat pain. She is still having intermittent dry cough.  She has been able to quit smoking now for 5 months using Nicoderm patches.    Interval HPI 01/28/2020:  Mrs. Jaime follows up today for surveillance given history of or pharyngeal papilloma.  She relates that she has been doing relatively well.  She notes that she has been able to continue to refrain from tobacco use.  She notes it has been approximately 11 months since she quit smoking.  She does still report and intermittent dry cough which has been present for over 6 months. She describes as coughing fits.  She states when this occurs she feels that she has difficulty catching her breath at the time.  She denies nasal congestion, postnasal drip, facial pain or pressure, change in voice, or otalgia.  At her previous visit approximately 6 months ago I recommended that she continue Flonase, Xyzal, and Protonix 40 mg daily.   She relays that she has been taking xyzal regularly but  that she has not been taking the other medicines on a regular basis during this time period.       No past medical history on file.  Social History     Socioeconomic History    Marital status:      Spouse name: Not on file    Number of children: Not on file    Years of education: Not on file    Highest education level: Not on file   Occupational History    Not on file   Social Needs    Financial resource strain: Not on file    Food insecurity:     Worry: Not on file     Inability: Not on file    Transportation needs:     Medical: Not on file     Non-medical: Not on file   Tobacco Use    Smoking status: Former Smoker     Packs/day: 0.50    Smokeless tobacco: Never Used   Substance and Sexual Activity    Alcohol use: No    Drug use: No    Sexual activity: Not on file   Lifestyle    Physical activity:     Days per week: Not on file     Minutes per session: Not on file    Stress: Not on file   Relationships    Social connections:     Talks on phone: Not on file     Gets together: Not on file     Attends Pentecostalism service: Not on file     Active member of club or organization: Not on file     Attends meetings of clubs or organizations: Not on file     Relationship status: Not on file   Other Topics Concern    Not on file   Social History Narrative    Not on file     Past Surgical History:   Procedure Laterality Date    HYSTERECTOMY  2003    full    KNEE ARTHROSCOPY      LARYNGOSCOPY N/A 2/18/2019    Procedure: DIRECT MICRO LARYNGOSCOPY WITH BIOPSY;  Surgeon: Deidre Calderon MD;  Location: [facility];  Service: ENT;  Laterality: N/A;  video    OOPHORECTOMY       Family History   Problem Relation Age of Onset    Diabetes Mother     Diabetes Maternal Grandmother     Heart disease Maternal Grandmother            Review of Systems  General: negative for chills, fever or weight loss  Psychological: negative for mood changes or  depression  Ophthalmic: negative for blurry vision, photophobia or eye pain  ENT: see HPI  Respiratory: no cough, shortness of breath, or wheezing  Cardiovascular: no chest pain or dyspnea on exertion  Gastrointestinal: no abdominal pain, change in bowel habits, or black/ bloody stools  Musculoskeletal: negative for gait disturbance or muscular weakness  Neurological: no syncope or seizures; no ataxia  Dermatological: negative for puritis,  rash and jaundice  Hematologic/lymphatic: no easy bruising, no new lumps or bumps      Physical Exam:    Vitals:    01/28/20 1555   BP: (!) 150/91   Pulse: 103   Temp: 98.2 °F (36.8 °C)       Constitutional: Well appearing / communicating without difficutly.  NAD.  Eyes: EOM I Bilaterally  Head/Face: Normocephalic.  Negative paranasal sinus pressure/tenderness.  Salivary glands WNL.  House Brackmann I Bilaterally.    Right Ear: Auricle normal appearance. External Auditory Canal within normal limits no lesions or masses,TM w/o masses/lesions/perforations. TM mobility noted.   Left Ear: Auricle normal appearance. External Auditory Canal within normal limits no lesions or masses,TM w/o masses/lesions/perforations. TM mobility noted.  Nose: No gross nasal septal deviation. Inferior Turbinates 3+ bilaterally. No septal perforation. No masses/lesions. External nasal skin appears normal without masses/lesions.  Oral Cavity: Gingiva/lips within normal limits.  Dentition/gingiva healthy appearing. Mucus membranes moist. Floor of mouth soft, no masses palpated. Oral Tongue mobile. Hard Palate appears normal.    Oropharynx: Base of tongue appears normal. No masses/lesions noted. Tonsillar fossa/pharyngeal wall without lesions. Posterior oropharynx WNL.  Soft palate without masses. Midline uvula.   Neck/Lymphatic: No LAD I-VI bilaterally.  No thyromegaly.  No masses noted on exam.    Mirror laryngoscopy/nasopharyngoscopy: Active gag reflex.  Unable to perform.    Neuro/Psychiatric: AOx3.   Normal mood and affect.   Cardiovascular: Normal carotid pulses bilaterally, no increasing jugular venous distention noted at cervical region bilaterally.    Respiratory: Normal respiratory effort, no stridor, no retractions noted.    See separate procedure note for flexible fiberoptic laryngoscopy.    Assessment:    ICD-10-CM ICD-9-CM    1. Laryngopharyngeal reflux (LPR) K21.9 478.79 pantoprazole (PROTONIX) 40 MG tablet   2. Chronic rhinitis J31.0 472.0 fluticasone propionate (FLONASE) 50 mcg/actuation nasal spray      levocetirizine (XYZAL) 5 MG tablet   3. Oropharyngeal lesion J39.2 478.20    4. Cough R05 786.2      The primary encounter diagnosis was Laryngopharyngeal reflux (LPR). Diagnoses of Chronic rhinitis, Oropharyngeal lesion, and Cough were also pertinent to this visit.      Plan:  No orders of the defined types were placed in this encounter.    Continue Xyzal and Singulair.  Continue Protonix 40mg PO daily.   Continue reflux precautions.   Will reach out to her internist regarding the chronic cough given her history of smoking- ? COPD or pulmonary etiology.     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 have recommended that we continue to monitor for any recurrent papillomas in the future.     Deidre Calderon MD

## 2020-01-29 ENCOUNTER — TELEPHONE (OUTPATIENT)
Dept: OTOLARYNGOLOGY | Facility: CLINIC | Age: 60
End: 2020-01-29

## 2020-01-29 NOTE — TELEPHONE ENCOUNTER
----- Message from Deidre Calderon MD sent at 1/29/2020  2:43 PM CST -----  Great Thanks! Marya is cc'd on this message to set her up.     Marya,   Can you reach out to Mrs. Jaime and schedule an appointment with Dr. Lopez?   Thanks!    ----- Message -----  From: Irene Lopez MD  Sent: 1/29/2020   2:19 PM CST  To: Deidre Calderon MD    I didn't realize she was still coughing. I saw her in November 2018 for chronic cough and referred her to you. I saw her again in July 2019 and she never mentioned cough. She has also been lost to follow up with me.....  But yes, it sounds like she needs repeat CXR and PFTs to start. I am happy to do this if she wants to come back to me    Thanks    ----- Message -----  From: Deidre Calderon MD  Sent: 1/28/2020   5:09 PM CST  To: Irene Lopez MD    Hi Dr. Lopez,  This patient has had a cough for greater than 6 months and has a prior smoking history(quit 11 months ago). Wondering if you thought PFTs or other testing is indicated given her history? I was wondering about possible mild COPD etc. Let me know you thoughts.  Thanks,  Deidre

## 2020-01-29 NOTE — TELEPHONE ENCOUNTER
Attempted to call patient to schedule appt with Dr Lopez. No answer. Left voicemail to return my call

## 2020-03-17 ENCOUNTER — TELEPHONE (OUTPATIENT)
Dept: INTERNAL MEDICINE | Facility: CLINIC | Age: 60
End: 2020-03-17

## 2020-03-17 NOTE — TELEPHONE ENCOUNTER
----- Message from Ivis Boston sent at 3/17/2020 11:11 AM CDT -----  Pt called to stated she is having a burning pain behind shoulder around under arm area with a few bumps. Pt thinks it may be shingles. Pt asked for a call back.      Pt contact # [phone].      Thanks

## 2020-03-18 ENCOUNTER — HOSPITAL ENCOUNTER (OUTPATIENT)
Dept: RADIOLOGY | Facility: HOSPITAL | Age: 60
Discharge: HOME | End: 2020-03-18
Attending: INTERNAL MEDICINE
Payer: COMMERCIAL

## 2020-03-18 ENCOUNTER — OFFICE VISIT (OUTPATIENT)
Dept: INTERNAL MEDICINE | Facility: CLINIC | Age: 60
End: 2020-03-18
Payer: COMMERCIAL

## 2020-03-18 VITALS
TEMPERATURE: 98 F | DIASTOLIC BLOOD PRESSURE: 82 MMHG | HEART RATE: 114 BPM | SYSTOLIC BLOOD PRESSURE: 122 MMHG | OXYGEN SATURATION: 97 % | HEIGHT: 62 IN | WEIGHT: 160.06 LBS | BODY MASS INDEX: 29.45 KG/M2

## 2020-03-18 DIAGNOSIS — R05.3 CHRONIC COUGH: ICD-10-CM

## 2020-03-18 DIAGNOSIS — B02.9 HERPES ZOSTER WITHOUT COMPLICATION: Primary | ICD-10-CM

## 2020-03-18 PROCEDURE — 3008F PR BODY MASS INDEX (BMI) DOCUMENTED: ICD-10-PCS | Mod: CPTII,S$GLB,, | Performed by: INTERNAL MEDICINE

## 2020-03-18 PROCEDURE — 99214 PR OFFICE/OUTPT VISIT, EST, LEVL IV, 30-39 MIN: ICD-10-PCS | Mod: S$GLB,,, | Performed by: INTERNAL MEDICINE

## 2020-03-18 PROCEDURE — 99999 PR PBB SHADOW E&M-EST. PATIENT-LVL IV: CPT | Mod: PBBFAC,,, | Performed by: INTERNAL MEDICINE

## 2020-03-18 PROCEDURE — 71046 X-RAY EXAM CHEST 2 VIEWS: CPT | Mod: TC,FY,PO

## 2020-03-18 PROCEDURE — 99214 OFFICE O/P EST MOD 30 MIN: CPT | Mod: S$GLB,,, | Performed by: INTERNAL MEDICINE

## 2020-03-18 PROCEDURE — 3008F BODY MASS INDEX DOCD: CPT | Mod: CPTII,S$GLB,, | Performed by: INTERNAL MEDICINE

## 2020-03-18 PROCEDURE — 71046 X-RAY EXAM CHEST 2 VIEWS: CPT | Mod: 26,,, | Performed by: RADIOLOGY

## 2020-03-18 PROCEDURE — 71046 XR CHEST PA AND LATERAL: ICD-10-PCS | Mod: 26,,, | Performed by: RADIOLOGY

## 2020-03-18 PROCEDURE — 99999 PR PBB SHADOW E&M-EST. PATIENT-LVL IV: ICD-10-PCS | Mod: PBBFAC,,, | Performed by: INTERNAL MEDICINE

## 2020-03-18 RX ORDER — GABAPENTIN 300 MG/1
300 CAPSULE ORAL 3 TIMES DAILY PRN
Qty: 90 CAPSULE | Refills: 0 | Status: SHIPPED | OUTPATIENT
Start: 2020-03-18 | End: 2022-05-30

## 2020-03-18 RX ORDER — PREDNISONE 20 MG/1
TABLET ORAL
Qty: 20 TABLET | Refills: 0 | Status: SHIPPED | OUTPATIENT
Start: 2020-03-18 | End: 2020-07-29

## 2020-03-18 RX ORDER — VALACYCLOVIR HYDROCHLORIDE 1 G/1
1000 TABLET, FILM COATED ORAL 3 TIMES DAILY
Qty: 21 TABLET | Refills: 0 | Status: ON HOLD | OUTPATIENT
Start: 2020-03-18 | End: 2022-06-04

## 2020-03-18 NOTE — PROGRESS NOTES
Patient ID: Suly Jaime is a 60 y.o. female.    Chief Complaint: No chief complaint on file.    CHRISTI Tubbs is a 60 y.o. female who presents today with a chief complaint of possible shingles.  Onset was 4-5 days ago.  She describes this as a pain.  Pain is located in the left upper back, left shoulder, and left side chest areas.  It started in the left upper back and then moved to the shoulder and left-sided chest.  The pain is described as burning in character.  It has worsened since it 1st began.  It is constant in duration.  At its worst, her pain is 10/10 in severity.  Currently, nothing is making her pain better.  Of note, patient had a prior shingles outbreak on her left side and states that this feels similar to that outbreak.    Patient continues to cough.  She followed up with ENT Dr. Kendrick who diagnosed her with having reflux.  She is taking a PPI medication and allergy medications, but with no relief of her chronic cough.    Review of Systems   Respiratory: Positive for cough.    Musculoskeletal:        Pain, left side back, left shoulder and left chest    All other systems reviewed and are negative.        Objective:     Vitals:    03/18/20 1036   BP: 122/82   Pulse: (!) 114   Temp: 97.7 °F (36.5 °C)        Physical Exam   Constitutional: She is oriented to person, place, and time. She appears well-developed and well-nourished. No distress.   HENT:   Head: Normocephalic and atraumatic.   Right Ear: External ear normal.   Left Ear: External ear normal.   Nose: Nose normal.   Eyes: Conjunctivae and EOM are normal. Right eye exhibits no discharge. Left eye exhibits no discharge. No scleral icterus.   Cardiovascular: Normal rate, regular rhythm, normal heart sounds and intact distal pulses. Exam reveals no gallop and no friction rub.   No murmur heard.  Pulmonary/Chest: Effort normal and breath sounds normal. No stridor. No respiratory distress. She has no wheezes. She has no rales.   Neurological: She is  alert and oriented to person, place, and time.   Skin: Skin is warm and dry. She is not diaphoretic.        Area of pain as highlighted in the diagram. Mild erythema noted. No visible edema, ecchymosis, or deformity noted.  Patient has worsening of pain with palpation of the area.   Psychiatric: She has a normal mood and affect. Her behavior is normal. Judgment and thought content normal.   Vitals reviewed.      Assessment:       1. Herpes zoster without complication    2. Chronic cough        Plan:     Likely atypical zoster without rash, treatment as below.     Herpes zoster without complication  -     valACYclovir (VALTREX) 1000 MG tablet; Take 1 tablet (1,000 mg total) by mouth 3 (three) times daily. for 7 days  Dispense: 21 tablet; Refill: 0  -     gabapentin (NEURONTIN) 300 MG capsule; Take 1 capsule (300 mg total) by mouth 3 (three) times daily as needed (pain).  Dispense: 90 capsule; Refill: 0  -     predniSONE (DELTASONE) 20 MG tablet; Take 3 pills daily for 3 days, then 2 pills daily for 3 days, then 1 pill daily for 3 days, then 1/2 pill daily for 4 days.  Dispense: 20 tablet; Refill: 0    Chronic cough  -     X-Ray Chest PA And Lateral; Future; Expected date: 03/18/2020  -     Complete PFT w/ bronchodilator; Future  -     Ambulatory referral/consult to Pulmonology; Future; Expected date: 03/25/2020      RTC PRN      Warning signs discussed, patient to call with any further issues or worsening of symptoms.       Parts of the above note were dictated using a voice dictation software. Please excuse any grammatical or typographical errors.

## 2020-04-22 ENCOUNTER — PATIENT OUTREACH (OUTPATIENT)
Dept: ADMINISTRATIVE | Facility: OTHER | Age: 60
End: 2020-04-22

## 2020-05-25 DIAGNOSIS — R05.9 COUGH: Primary | ICD-10-CM

## 2020-05-27 ENCOUNTER — CLINICAL SUPPORT (OUTPATIENT)
Dept: URGENT CARE | Facility: CLINIC | Age: 60
End: 2020-05-27
Payer: COMMERCIAL

## 2020-05-27 VITALS — TEMPERATURE: 98 F | HEART RATE: 123 BPM | OXYGEN SATURATION: 97 %

## 2020-05-27 DIAGNOSIS — R05.9 COUGH: ICD-10-CM

## 2020-05-27 PROCEDURE — U0003 INFECTIOUS AGENT DETECTION BY NUCLEIC ACID (DNA OR RNA); SEVERE ACUTE RESPIRATORY SYNDROME CORONAVIRUS 2 (SARS-COV-2) (CORONAVIRUS DISEASE [COVID-19]), AMPLIFIED PROBE TECHNIQUE, MAKING USE OF HIGH THROUGHPUT TECHNOLOGIES AS DESCRIBED BY CMS-2020-01-R: HCPCS

## 2020-05-28 ENCOUNTER — PATIENT OUTREACH (OUTPATIENT)
Dept: ADMINISTRATIVE | Facility: OTHER | Age: 60
End: 2020-05-28

## 2020-05-28 LAB — SARS-COV-2 RNA RESP QL NAA+PROBE: NOT DETECTED

## 2020-05-29 ENCOUNTER — HOSPITAL ENCOUNTER (OUTPATIENT)
Dept: PULMONOLOGY | Facility: CLINIC | Age: 60
Discharge: HOME | End: 2020-05-29
Payer: COMMERCIAL

## 2020-05-29 ENCOUNTER — OFFICE VISIT (OUTPATIENT)
Dept: PULMONOLOGY | Facility: CLINIC | Age: 60
End: 2020-05-29
Payer: COMMERCIAL

## 2020-05-29 VITALS
SYSTOLIC BLOOD PRESSURE: 126 MMHG | BODY MASS INDEX: 28.71 KG/M2 | WEIGHT: 156 LBS | HEART RATE: 110 BPM | OXYGEN SATURATION: 98 % | HEIGHT: 62 IN | DIASTOLIC BLOOD PRESSURE: 82 MMHG

## 2020-05-29 DIAGNOSIS — R09.82 POST-NASAL DRIP: ICD-10-CM

## 2020-05-29 DIAGNOSIS — R05.3 CHRONIC COUGH: ICD-10-CM

## 2020-05-29 DIAGNOSIS — J39.2 OROPHARYNGEAL LESION: Primary | ICD-10-CM

## 2020-05-29 DIAGNOSIS — R05.3 COUGH, PERSISTENT: ICD-10-CM

## 2020-05-29 DIAGNOSIS — D72.18 EOSINOPHILIC BRONCHITIS: ICD-10-CM

## 2020-05-29 DIAGNOSIS — J40 EOSINOPHILIC BRONCHITIS: ICD-10-CM

## 2020-05-29 DIAGNOSIS — J98.4 RESTRICTIVE LUNG DISEASE: ICD-10-CM

## 2020-05-29 PROCEDURE — 94727 PR PULM FUNCTION TEST BY GAS: ICD-10-PCS | Mod: S$GLB,,, | Performed by: INTERNAL MEDICINE

## 2020-05-29 PROCEDURE — 94060 EVALUATION OF WHEEZING: CPT | Mod: S$GLB,,, | Performed by: INTERNAL MEDICINE

## 2020-05-29 PROCEDURE — 3008F BODY MASS INDEX DOCD: CPT | Mod: CPTII,S$GLB,, | Performed by: INTERNAL MEDICINE

## 2020-05-29 PROCEDURE — 94727 GAS DIL/WSHOT DETER LNG VOL: CPT | Mod: S$GLB,,, | Performed by: INTERNAL MEDICINE

## 2020-05-29 PROCEDURE — 99999 PR PBB SHADOW E&M-EST. PATIENT-LVL III: ICD-10-PCS | Mod: PBBFAC,,, | Performed by: INTERNAL MEDICINE

## 2020-05-29 PROCEDURE — 94729 PR C02/MEMBANE DIFFUSE CAPACITY: ICD-10-PCS | Mod: S$GLB,,, | Performed by: INTERNAL MEDICINE

## 2020-05-29 PROCEDURE — 99204 PR OFFICE/OUTPT VISIT, NEW, LEVL IV, 45-59 MIN: ICD-10-PCS | Mod: S$GLB,,, | Performed by: INTERNAL MEDICINE

## 2020-05-29 PROCEDURE — 94060 PR EVAL OF BRONCHOSPASM: ICD-10-PCS | Mod: S$GLB,,, | Performed by: INTERNAL MEDICINE

## 2020-05-29 PROCEDURE — 99204 OFFICE O/P NEW MOD 45 MIN: CPT | Mod: S$GLB,,, | Performed by: INTERNAL MEDICINE

## 2020-05-29 PROCEDURE — 94729 DIFFUSING CAPACITY: CPT | Mod: S$GLB,,, | Performed by: INTERNAL MEDICINE

## 2020-05-29 PROCEDURE — 3008F PR BODY MASS INDEX (BMI) DOCUMENTED: ICD-10-PCS | Mod: CPTII,S$GLB,, | Performed by: INTERNAL MEDICINE

## 2020-05-29 PROCEDURE — 99999 PR PBB SHADOW E&M-EST. PATIENT-LVL III: CPT | Mod: PBBFAC,,, | Performed by: INTERNAL MEDICINE

## 2020-05-29 RX ORDER — FLUTICASONE PROPIONATE 220 UG/1
1 AEROSOL, METERED RESPIRATORY (INHALATION) 2 TIMES DAILY
Qty: 12 G | Refills: 0 | Status: SHIPPED | OUTPATIENT
Start: 2020-05-29 | End: 2020-06-03

## 2020-05-29 RX ORDER — AZELASTINE HYDROCHLORIDE, FLUTICASONE PROPIONATE 137; 50 UG/1; UG/1
1 SPRAY, METERED NASAL 2 TIMES DAILY
Qty: 23 G | Refills: 3 | Status: SHIPPED | OUTPATIENT
Start: 2020-05-29 | End: 2020-09-21

## 2020-05-29 RX ORDER — LATANOPROST 50 UG/ML
SOLUTION/ DROPS OPHTHALMIC
COMMUNITY
Start: 2020-05-15

## 2020-05-29 NOTE — ASSESSMENT & PLAN NOTE
Despite treatment with flonase, xyzal, and singulair, the patient reports continue symptoms of PND.  · Plan to continue singulair and xyzal.  · D/C flonase  · Start dymista bid

## 2020-05-29 NOTE — ASSESSMENT & PLAN NOTE
2/10/2020       RE: Olga Vargsa  [address] [facility] [address]     Dear Colleague,    Thank you for referring your patient, Olga Vargas, to the [facility] at [facility]. Please see a copy of my visit note below.    CC: kidney stone.    HPI: It is a pleasure to see Ms. Olga Vargas, a 55 year old female seen today in the urology clinic in ER follow up for evaluation of the above. This was first detected on CT in the ED on 2/6/2020 after the patient presented complaining of acute renal colic symptoms. The stone measured 2mm and is located in the right UVJ with associated hydronephrosis. UA in the ED was negative for infection. BMP revealed Cr and Ca to be normal. With pain under good control, the patient was discharged with a prescription for tamsulosin and instructions to follow up with urology.    Currently, the patient reports passage of stone and brings this with her today. Denies gross hematuria, dysuria, fevers, chills, N/V. No prior history of kidney stones.    RECENT IMAGING:  EXAM: CT ABDOMEN PELVIS W CONTRAST  LOCATION: [facility]  DATE/TIME: 2/6/2020 6:09 AM     INDICATION: Right flank pain.     COMPARISON: None.     TECHNIQUE: CT scan of the abdomen and pelvis was performed following injection of IV contrast. Multiplanar reformats were obtained. Dose reduction techniques were used.     CONTRAST: 60 mL Isovue-370.     FINDINGS:   LOWER CHEST: Normal.     HEPATOBILIARY: 1.8 cm enhancing lesion at the dome the liver and a subcentimeter enhancing lesion in the inferior right hepatic lobe are probably benign hemangioma though incompletely characterized.     PANCREAS: Normal.     SPLEEN: Normal.     ADRENAL GLANDS: Normal.     KIDNEYS/BLADDER: Moderate right hydronephrosis due to a 2 mm stone in the distal ureter at the UVJ. Delayed right nephrogram. Low-attenuation subcentimeter renal lesion(s).  Mild restriction on PFTs, but in the setting of cough, will look to r/o ILD.    · CT Chest, non-con, high resolution.   These are compatible with small benign cysts and no specific imaging evaluation   or follow-up is recommended.     BOWEL: Within normal limits. No bowel obstruction.     LYMPH NODES: Normal.     VASCULATURE: Unremarkable.     PELVIC ORGANS: Normal.     OTHER FINDINGS: No significant ascites.     MUSCULOSKELETAL: Normal.                                                                      IMPRESSION:  1. Moderate right hydronephrosis due to a 2 mm UVJ stone.   2. 2 small liver liver lesions most likely benign hemangiomas. This could be confirmed with outpatient MRI       Past Medical History:   Diagnosis Date     Dysmenorrhea     controlled with OCP     Endometrial cells on cervical Pap smear inconsistent w/LMP 3/2016    EMB with Dr. Almaguer 3/17/16     Lung nodule     calcified right lung nodule     Mixed hyperlipidemia      Palpitations     negative Holter and echocardiogram      Perennial allergic rhinitis     worse spring, late summer     Supervision of other normal pregnancy      - vaginal/ D&E 20 week pregnancy loss - Down's       Past Surgical History:   Procedure Laterality Date     C APPENDECTOMY       C CT ANGIOGRAPHY CORONARY  2006    no coronary artery calcifications, calcified hilar lymph node     ENDOMETRIAL SAMPLING (BIOPSY)  3/17/16    endo cells found on pap-LMP was 2016     HC US ABDOMEN COMPLETE  2004    normal     HC US ABDOMEN LIMITED  10/2008    normal RUQ ultrasound     HC US PELVIC NON-OB, COMPLETE  10/2008    0.8 cm intramural fibroid within the posterior left uterine body     SURGICAL HISTORY OF -       D&E for 20 week pregnancy loss - Down's syndrome       Current Outpatient Medications   Medication Sig Dispense Refill     atorvastatin (LIPITOR) 40 MG tablet Take 1 tablet (40 mg) by mouth At Bedtime . Due for annual physical exam 90 tablet 0     ibuprofen (ADVIL/MOTRIN) 600 MG tablet Take 1 tablet (600 mg) by mouth every 6 hours as needed for  moderate pain (Patient not taking: Reported on 2/10/2020) 30 tablet 0     mometasone (NASONEX) 50 MCG/ACT nasal spray USE 2 SPRAYS INTO EACH NOSTRIL ONCE DAILY (Patient not taking: Reported on 2/10/2020) 51 g 3     oxyCODONE (ROXICODONE) 5 MG tablet Take 1-2 tablets (5-10 mg) by mouth every 6 hours as needed for severe pain (Patient not taking: Reported on 2/10/2020) 20 tablet 0     tamsulosin (FLOMAX) 0.4 MG capsule Take 1 capsule (0.4 mg) by mouth daily for 10 doses (Patient not taking: Reported on 2/10/2020) 10 capsule 0       Allergies   Allergen Reactions     Morphine Other (See Comments)     Fainted       FAMILY HISTORY: There is no family h/o  malignancy.  There is no family h/o urolithiasis.     Social History     Socioeconomic History     Marital status:      Spouse name: Julio     Number of children: 2     Years of education: 16     Highest education level: Not on file   Occupational History     Occupation: homemaker     Employer: HOME     Occupation:      Employer: SELF     Occupation: volunteering in school/swim team   Social Needs     Financial resource strain: Not on file     Food insecurity:     Worry: Not on file     Inability: Not on file     Transportation needs:     Medical: Not on file     Non-medical: Not on file   Tobacco Use     Smoking status: Never Smoker     Smokeless tobacco: Never Used   Substance and Sexual Activity     Alcohol use: Yes     Alcohol/week: 0.0 standard drinks     Comment: 3-4 per week     Drug use: No     Sexual activity: Yes     Partners: Male     Birth control/protection: Male Surgical     Comment: same partner since 1992,  3 partners lifelong   Lifestyle     Physical activity:     Days per week: Not on file     Minutes per session: Not on file     Stress: Not on file   Relationships     Social connections:     Talks on phone: Not on file     Gets together: Not on file     Attends Tenriism service: Not on file     Active member of club or  "organization: Not on file     Attends meetings of clubs or organizations: Not on file     Relationship status: Not on file     Intimate partner violence:     Fear of current or ex partner: Not on file     Emotionally abused: Not on file     Physically abused: Not on file     Forced sexual activity: Not on file   Other Topics Concern      Service No     Blood Transfusions No     Caffeine Concern No     Comment: none     Occupational Exposure No     Hobby Hazards No     Sleep Concern No     Comment: 7 hrs     Stress Concern No     Comment: low     Weight Concern No     Special Diet No     Comment: milk, cheese, 2-3 servings dairy per day     Back Care Yes     Comment: neck sx improved     Exercise Yes     Comment: bar class/weights 60 min 2-3 days weekly     Bike Helmet Yes     Seat Belt Yes     Self-Exams Yes     Parent/sibling w/ CABG, MI or angioplasty before 65F 55M? Not Asked   Social History Narrative    Lives with .  Children ages 15 and 10.  Has a dog. Volunteers at school.       ROS: A comprehensive 14 point ROS was obtained and otherwise negative except for that outlined above in the HPI.    GENERAL PHYSICAL EXAM:   /80 (BP Location: Left arm, Patient Position: Sitting, Cuff Size: Adult Regular)   Pulse 70   Ht 1.6 m (5' 3\")   Wt 54.4 kg (120 lb)   SpO2 98%   BMI 21.26 kg/m     PSYCH: NAD  EYES: EOMI  MOUTH: MMM  NECK: Supple, no notable adenopathy  RESP: Unlabored breathing  CARDIAC: No LE edema  SKIN: Warm, no rashes  ABD: soft, Nontender  NEURO: AAO x3          ASSESSMENT AND PLAN: 55 year old female with a passed stone.   Standard recommendations on kidney stone prevention were provided.  Stone analysis.   RTO PEG Baca PA-C  [facility]    20 minutes were spent with the patient today, > 50% in counseling and coordination of care of kidney stone.      Again, thank you for allowing me to participate in the care of your patient.      Sincerely,    Steffany Baca, " TRISTA, TRISTA

## 2020-05-29 NOTE — ASSESSMENT & PLAN NOTE
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      · Starting flovent 1 inhalation bid  · Full explanation of inhaler technique using demo inhaler performed during the visit.  · Patient instructed to rinse/gargle after each use.

## 2020-05-29 NOTE — PROGRESS NOTES
Subjective:       Patient ID: Suly Jaime is a 60 y.o. female.    Chief Complaint: Cough    HPI:   Suly Jaime is a 60 y.o. female who presents with cough.   Patient reports that she had a cough with hoarseness for 7 years.  Work up revealed a papilloma.  Removed by ENT in 2019.  Cough improved initially, but has returned since January.  She is following with ENT regularly.    Dry cough.   No night time prediliction.  Cough can be worse with exertion.  Worse when eating.     Patient takes xzyal at night for control of allergies.   Blows her nose frequently during the day.   She has never had allergy testing.      Past smoker: quit in 2018.  1/2 ppd x 15 years   No family history of lung disease  Accounting in a clerical setting.   No history of asthma as a child.       Review of Systems   Constitutional: Negative for fever, chills and activity change.   HENT: Positive for postnasal drip and rhinorrhea. Negative for sinus pressure and congestion.    Respiratory: Positive for cough and dyspnea on extertion (states she can't walk too far; but walks a mile on most evenings.). Negative for hemoptysis, sputum production, shortness of breath and wheezing.    Cardiovascular: Negative for chest pain and leg swelling.   Genitourinary: Negative for difficulty urinating.   Endocrine: Negative for cold intolerance and heat intolerance.    Musculoskeletal: Negative for arthralgias, joint swelling and myalgias.   Gastrointestinal: Negative for nausea, vomiting and abdominal pain.   Neurological: Negative for headaches.   Hematological: Negative for adenopathy. No excessive bruising.   Psychiatric/Behavioral: Negative for confusion and sleep disturbance.         Social History     Tobacco Use    Smoking status: Former Smoker     Packs/day: 0.50    Smokeless tobacco: Never Used   Substance Use Topics    Alcohol use: No       Review of patient's allergies indicates:  No Known Allergies  No past medical history on file.  Past  Surgical History:   Procedure Laterality Date    HYSTERECTOMY  2003    full    KNEE ARTHROSCOPY      LARYNGOSCOPY N/A 2/18/2019    Procedure: DIRECT MICRO LARYNGOSCOPY WITH BIOPSY;  Surgeon: Deidre Calderon MD;  Location: [facility];  Service: ENT;  Laterality: N/A;  video    OOPHORECTOMY       Current Outpatient Medications on File Prior to Visit   Medication Sig    buPROPion (WELLBUTRIN XL) 150 MG TB24 tablet Begin therapy while still smoking and set target quit date within 2 weeks.  Continue treatment for up to 12 weeks.  Take 1 tablet once daily for 3 days in the morning.  Then increase to 1 tablet twice daily. (Patient not taking: Reported on 3/18/2020)    fluticasone propionate (FLONASE) 50 mcg/actuation nasal spray 1 spray (50 mcg total) by Each Nostril route 2 (two) times daily as needed.    gabapentin (NEURONTIN) 300 MG capsule Take 1 capsule (300 mg total) by mouth 3 (three) times daily as needed (pain).    levocetirizine (XYZAL) 5 MG tablet Take 1 tablet (5 mg total) by mouth every evening.    montelukast (SINGULAIR) 10 mg tablet TAKE 1 TABLET BY MOUTH EVERY DAY IN THE EVENING (Patient not taking: Reported on 1/28/2020)    mupirocin (BACTROBAN) 2 % ointment Apply to affected area 3 times daily (Patient not taking: Reported on 1/28/2020)    pantoprazole (PROTONIX) 40 MG tablet Take 1 tablet (40 mg total) by mouth once daily.    predniSONE (DELTASONE) 20 MG tablet Take 3 pills daily for 3 days, then 2 pills daily for 3 days, then 1 pill daily for 3 days, then 1/2 pill daily for 4 days.    valACYclovir (VALTREX) 1000 MG tablet Take 1 tablet (1,000 mg total) by mouth 3 (three) times daily. for 7 days     Current Facility-Administered Medications on File Prior to Visit   Medication    lidocaine (PF) 10 mg/ml (1%) injection 10 mg    sodium chloride 0.9% flush 5 mL       Objective:      Vitals:    05/29/20 1147   BP: 126/82   Pulse: 110     O2 Sat is 98% on room air    Physical Exam    Constitutional: She is oriented to person, place, and time. She appears well-developed and well-nourished.   HENT:   Head: Normocephalic.   Neck: Normal range of motion. Neck supple. No tracheal deviation present.   Cardiovascular: Normal rate and regular rhythm.   No murmur heard.  Pulmonary/Chest: Normal expansion, effort normal and breath sounds normal. She has no wheezes. She has no rales.   Abdominal: Soft. Bowel sounds are normal. She exhibits no distension. There is no tenderness.   Musculoskeletal: Normal range of motion. She exhibits no edema.   Neurological: She is alert and oriented to person, place, and time.   Skin: Skin is warm and dry.   Vitals reviewed.    Personal Diagnostic Review  Chest xray: 3/18/20: no evidence of pulmonary disease    PFTs: 5/29/20: no evidence of obstruction, no BDR, mild restriction, mildly reduced DLCO      Assessment:     Orders Placed This Encounter   Procedures    CT Chest Without Contrast     High resolution CT Chest please     Standing Status:   Future     Standing Expiration Date:   5/29/2021     Order Specific Question:   May the Radiologist modify the order per protocol to meet the clinical needs of the patient?     Answer:   Yes     1. Oropharyngeal lesion    2. Chronic cough    3. Cough, persistent    4. Eosinophilic bronchitis    5. Post-nasal drip    6. Restrictive lung disease        Plan:         Problem List Items Addressed This Visit        ENT    Oropharyngeal lesion - Primary    Current Assessment & Plan     Patient instructed to follow with ENT regularly to insure no recurrence of laryngeal papilloma.         Post-nasal drip    Current Assessment & Plan     Despite treatment with flonase, xyzal, and singulair, the patient reports continue symptoms of PND.  · Plan to continue singulair and xyzal.  · D/C flonase  · Start dymista bid            Pulmonary    Chronic cough    Current Assessment & Plan     Etiology is unclear.  The patient does not appear to  be on any offending medications. PFTs are not consistent with asthma.  The patient has been treated for GERD with no change in her cough.  She has been treated for PND, but her symptoms have not subsided fully.  Concerned for possible non-asthmatic eosinophilic bronchitis.         Eosinophilic bronchitis    Current Assessment & Plan     Will do a trial of inhaled corticosteroids as empiric treatment for possible non-asthmatic eosinophilic bronchitis resulting in cough.      · Starting flovent 1 inhalation bid  · Full explanation of inhaler technique using demo inhaler performed during the visit.  · Patient instructed to rinse/gargle after each use.           Restrictive lung disease    Current Assessment & Plan     Mild restriction on PFTs, but in the setting of cough, will look to r/o ILD.    · CT Chest, non-con, high resolution.           Other Visit Diagnoses     Cough, persistent        Relevant Orders    CT Chest Without Contrast

## 2020-05-29 NOTE — ASSESSMENT & PLAN NOTE
Etiology is unclear.  The patient does not appear to be on any offending medications. PFTs are not consistent with asthma.  The patient has been treated for GERD with no change in her cough.  She has been treated for PND, but her symptoms have not subsided fully.  Concerned for possible non-asthmatic eosinophilic bronchitis.

## 2020-06-02 ENCOUNTER — HOSPITAL ENCOUNTER (OUTPATIENT)
Dept: RADIOLOGY | Facility: HOSPITAL | Age: 60
Discharge: HOME | End: 2020-06-02
Attending: INTERNAL MEDICINE
Payer: COMMERCIAL

## 2020-06-02 DIAGNOSIS — R05.3 COUGH, PERSISTENT: ICD-10-CM

## 2020-06-02 PROCEDURE — 71250 CT CHEST WITHOUT CONTRAST: ICD-10-PCS | Mod: 26,,, | Performed by: RADIOLOGY

## 2020-06-02 PROCEDURE — 71250 CT THORAX DX C-: CPT | Mod: 26,,, | Performed by: RADIOLOGY

## 2020-06-02 PROCEDURE — 71250 CT THORAX DX C-: CPT | Mod: TC

## 2020-06-03 ENCOUNTER — TELEPHONE (OUTPATIENT)
Dept: CRITICAL CARE MEDICINE | Facility: HOSPITAL | Age: 60
End: 2020-06-03

## 2020-06-03 DIAGNOSIS — R91.1 LUNG NODULE: ICD-10-CM

## 2020-06-03 NOTE — TELEPHONE ENCOUNTER
Called patient to discuss the results of her CT Chest (2 5mm nodules present).  Plan for repeat scan in 12 months.    Patient could not afford the flovent inhaler.  Will rx arnuity instead and see if insurance will cover that brand.    Patient confirmed understanding of the above.

## 2020-06-08 ENCOUNTER — TELEPHONE (OUTPATIENT)
Dept: INTERNAL MEDICINE | Facility: CLINIC | Age: 60
End: 2020-06-08

## 2020-07-07 NOTE — TELEPHONE ENCOUNTER
----- Message from Irene Lopez MD sent at 6/7/2020  3:28 PM CDT -----  Pulmonary function test is normal. Please continue to follow up with pulmonary. Mail her a copy of results. Thanks     [facility] called to [facility] due to a abnormal WBC count of 24,000. Pt hx of surgical procedures.

## 2020-07-17 ENCOUNTER — TELEPHONE (OUTPATIENT)
Dept: OTOLARYNGOLOGY | Facility: CLINIC | Age: 60
End: 2020-07-17

## 2020-07-17 DIAGNOSIS — Z01.812 PRE-PROCEDURE LAB EXAM: ICD-10-CM

## 2020-07-21 ENCOUNTER — TELEPHONE (OUTPATIENT)
Dept: OTOLARYNGOLOGY | Facility: CLINIC | Age: 60
End: 2020-07-21

## 2020-07-21 ENCOUNTER — PATIENT OUTREACH (OUTPATIENT)
Dept: ADMINISTRATIVE | Facility: OTHER | Age: 60
End: 2020-07-21

## 2020-07-21 NOTE — PROGRESS NOTES
Requested updates within Care Everywhere.  Patient's chart was reviewed for overdue [facility] topics.  Immunizations reconciled.    Orders placed:  Tasked appts:  Labs Linked:

## 2020-07-23 ENCOUNTER — TELEPHONE (OUTPATIENT)
Dept: OTOLARYNGOLOGY | Facility: CLINIC | Age: 60
End: 2020-07-23

## 2020-07-23 NOTE — TELEPHONE ENCOUNTER
----- Message from Shannan Rojas sent at 7/23/2020  9:53 AM CDT -----  Regarding: call back  Contact: [phone]  Patient Returning Your Phone Call

## 2020-07-23 NOTE — TELEPHONE ENCOUNTER
Spoke with patient he/she was notified will need covid test 72hrs prior to their appointment with Dr Kendrick. patient states will go to [facility] on 7/26 . Notified patient if failure to perform this test will need to reschedule appointment with Dr Kendrick. Patient verbalized understanding

## 2020-07-26 ENCOUNTER — CLINICAL SUPPORT (OUTPATIENT)
Dept: URGENT CARE | Facility: CLINIC | Age: 60
End: 2020-07-26
Payer: COMMERCIAL

## 2020-07-26 VITALS — TEMPERATURE: 96 F

## 2020-07-26 DIAGNOSIS — Z01.812 PRE-PROCEDURE LAB EXAM: ICD-10-CM

## 2020-07-26 PROCEDURE — U0003 INFECTIOUS AGENT DETECTION BY NUCLEIC ACID (DNA OR RNA); SEVERE ACUTE RESPIRATORY SYNDROME CORONAVIRUS 2 (SARS-COV-2) (CORONAVIRUS DISEASE [COVID-19]), AMPLIFIED PROBE TECHNIQUE, MAKING USE OF HIGH THROUGHPUT TECHNOLOGIES AS DESCRIBED BY CMS-2020-01-R: HCPCS

## 2020-07-27 LAB — SARS-COV-2 RNA RESP QL NAA+PROBE: NOT DETECTED

## 2020-07-29 ENCOUNTER — OFFICE VISIT (OUTPATIENT)
Dept: OTOLARYNGOLOGY | Facility: CLINIC | Age: 60
End: 2020-07-29
Payer: COMMERCIAL

## 2020-07-29 ENCOUNTER — TELEPHONE (OUTPATIENT)
Dept: OTOLARYNGOLOGY | Facility: CLINIC | Age: 60
End: 2020-07-29

## 2020-07-29 VITALS
DIASTOLIC BLOOD PRESSURE: 96 MMHG | BODY MASS INDEX: 27.68 KG/M2 | SYSTOLIC BLOOD PRESSURE: 140 MMHG | HEART RATE: 103 BPM | HEIGHT: 62 IN | WEIGHT: 150.44 LBS

## 2020-07-29 DIAGNOSIS — J31.0 CHRONIC RHINITIS: ICD-10-CM

## 2020-07-29 DIAGNOSIS — K21.9 LARYNGOPHARYNGEAL REFLUX (LPR): ICD-10-CM

## 2020-07-29 DIAGNOSIS — J39.2 OROPHARYNGEAL LESION: Primary | ICD-10-CM

## 2020-07-29 DIAGNOSIS — Z01.812 PRE-PROCEDURE LAB EXAM: ICD-10-CM

## 2020-07-29 PROCEDURE — 31575 PR LARYNGOSCOPY, FLEXIBLE; DIAGNOSTIC: ICD-10-PCS | Mod: S$GLB,,, | Performed by: OTOLARYNGOLOGY

## 2020-07-29 PROCEDURE — 31575 DIAGNOSTIC LARYNGOSCOPY: CPT | Mod: S$GLB,,, | Performed by: OTOLARYNGOLOGY

## 2020-07-29 PROCEDURE — 99213 PR OFFICE/OUTPT VISIT, EST, LEVL III, 20-29 MIN: ICD-10-PCS | Mod: 25,S$GLB,, | Performed by: OTOLARYNGOLOGY

## 2020-07-29 PROCEDURE — 99999 PR PBB SHADOW E&M-EST. PATIENT-LVL IV: ICD-10-PCS | Mod: PBBFAC,,, | Performed by: OTOLARYNGOLOGY

## 2020-07-29 PROCEDURE — 3008F PR BODY MASS INDEX (BMI) DOCUMENTED: ICD-10-PCS | Mod: CPTII,S$GLB,, | Performed by: OTOLARYNGOLOGY

## 2020-07-29 PROCEDURE — 99213 OFFICE O/P EST LOW 20 MIN: CPT | Mod: 25,S$GLB,, | Performed by: OTOLARYNGOLOGY

## 2020-07-29 PROCEDURE — 3008F BODY MASS INDEX DOCD: CPT | Mod: CPTII,S$GLB,, | Performed by: OTOLARYNGOLOGY

## 2020-07-29 PROCEDURE — 99999 PR PBB SHADOW E&M-EST. PATIENT-LVL IV: CPT | Mod: PBBFAC,,, | Performed by: OTOLARYNGOLOGY

## 2020-07-29 RX ORDER — METHYLPREDNISOLONE 4 MG/1
TABLET ORAL
Qty: 1 PACKAGE | Refills: 0 | Status: SHIPPED | OUTPATIENT
Start: 2020-07-29 | End: 2020-09-09 | Stop reason: CLARIF

## 2020-07-29 RX ORDER — PHENTERMINE HYDROCHLORIDE 37.5 MG/1
18.75 TABLET ORAL 2 TIMES DAILY
COMMUNITY
Start: 2020-07-21 | End: 2020-09-09 | Stop reason: CLARIF

## 2020-07-29 NOTE — PROGRESS NOTES
Chief Complaint   Patient presents with    Follow-up    Cough     slight improvement   .     HPI:Suly Jaime is a 60 y.o. female who has been referred by Dr. Irene Lopez for a several year history of chronic cough.  She relays that it is mostly dry but occasionally productive.  She feels this is gradually worsening. She notes that she has had nasal congestion and post-nasal drip. She notes the cough is worse at night. She  6 month history of hoarseness. Her voice is not progressively worsening over this time. There are pitch breaks or cracks. There is not vocal fatigue. She denies dysphagia, odynophagia, throat pain, and otalgia.  There is no hemoptysis or hematemesis. She is breathing well. She is currently taking Singulair with limited benefit. She is using Flonase with a very slight relief.     She denies throat clearing and cough. She admits to heartburn and reflux.    Interval HPI 06/27/2019:   Follow up today for surveillance after biopsy of oropharyngeal lesion revealed papilloma. Mrs. Jaime reports that she is doing well. She denies throat pain. She is still having intermittent dry cough.  She has been able to quit smoking now for 5 months using Nicoderm patches.    Interval HPI 01/28/2020:  Mrs. Jaime follows up today for surveillance given history of or pharyngeal papilloma.  She relates that she has been doing relatively well.  She notes that she has been able to continue to refrain from tobacco use.  She notes it has been approximately 11 months since she quit smoking.  She does still report and intermittent dry cough which has been present for over 6 months. She describes as coughing fits.  She states when this occurs she feels that she has difficulty catching her breath at the time.  She denies nasal congestion, postnasal drip, facial pain or pressure, change in voice, or otalgia.  At her previous visit approximately 6 months ago I recommended that she continue Flonase, Xyzal, and Protonix  40 mg daily.  She relays that she has been taking xyzal regularly but  that she has not been taking the other medicines on a regular basis during this time period.     Interval HPI 7/29/2020:  Follow up visit- last visit was 6 months ago. Notes overall she is doing well. Report that she is still having cough; mostly at night. She reports that her voice is at baseline; seldom hoarseness.  She has been on Protonix as prescribed. She reports that has been taking her xyzal at night and finds this helpful. She is concerned whether she has any recurrent papilloma lesions in her throat.    History reviewed. No pertinent past medical history.  Social History     Socioeconomic History    Marital status:      Spouse name: Not on file    Number of children: Not on file    Years of education: Not on file    Highest education level: Not on file   Occupational History    Not on file   Social Needs    Financial resource strain: Not on file    Food insecurity     Worry: Not on file     Inability: Not on file    Transportation needs     Medical: Not on file     Non-medical: Not on file   Tobacco Use    Smoking status: Former Smoker     Packs/day: 0.50    Smokeless tobacco: Never Used   Substance and Sexual Activity    Alcohol use: No    Drug use: No    Sexual activity: Not on file   Lifestyle    Physical activity     Days per week: Not on file     Minutes per session: Not on file    Stress: Not on file   Relationships    Social connections     Talks on phone: Not on file     Gets together: Not on file     Attends Restorationist service: Not on file     Active member of club or organization: Not on file     Attends meetings of clubs or organizations: Not on file     Relationship status: Not on file   Other Topics Concern    Not on file   Social History Narrative    Not on file     Past Surgical History:   Procedure Laterality Date    HYSTERECTOMY  2003    full    KNEE ARTHROSCOPY      LARYNGOSCOPY N/A 2/18/2019     Procedure: DIRECT MICRO LARYNGOSCOPY WITH BIOPSY;  Surgeon: Deidre Calderon MD;  [facility] OR;  Service: ENT;  Laterality: N/A;  video    OOPHORECTOMY       Family History   Problem Relation Age of Onset    Diabetes Mother     Diabetes Maternal Grandmother     Heart disease Maternal Grandmother            Review of Systems  General: negative for chills, fever or weight loss  Psychological: negative for mood changes or depression  Ophthalmic: negative for blurry vision, photophobia or eye pain  ENT: see HPI  Respiratory: no cough, shortness of breath, or wheezing  Cardiovascular: no chest pain or dyspnea on exertion  Gastrointestinal: no abdominal pain, change in bowel habits, or black/ bloody stools  Musculoskeletal: negative for gait disturbance or muscular weakness  Neurological: no syncope or seizures; no ataxia  Dermatological: negative for puritis,  rash and jaundice  Hematologic/lymphatic: no easy bruising, no new lumps or bumps      Physical Exam:    Vitals:    07/29/20 1432   BP: (!) 140/96   Pulse: 103       Constitutional: Well appearing / communicating without difficutly.  NAD.  Eyes: EOM I Bilaterally  Head/Face: Normocephalic.  Negative paranasal sinus pressure/tenderness.  Salivary glands WNL.  House Brackmann I Bilaterally.    Right Ear: Auricle normal appearance. External Auditory Canal within normal limits no lesions or masses,TM w/o masses/lesions/perforations. TM mobility noted.   Left Ear: Auricle normal appearance. External Auditory Canal within normal limits no lesions or masses,TM w/o masses/lesions/perforations. TM mobility noted.  Nose: No gross nasal septal deviation. Inferior Turbinates 3+ bilaterally. No septal perforation. No masses/lesions. External nasal skin appears normal without masses/lesions.  Oral Cavity: Gingiva/lips within normal limits.  Dentition/gingiva healthy appearing. Mucus membranes moist. Floor of mouth soft, no masses palpated. Oral Tongue mobile.  Hard Palate appears normal.    Oropharynx: Base of tongue appears normal. No masses/lesions noted. Tonsillar fossa/pharyngeal wall without lesions. Posterior oropharynx WNL.  Soft palate without masses. Midline uvula.   Neck/Lymphatic: No LAD I-VI bilaterally.  No thyromegaly.  No masses noted on exam.    Mirror laryngoscopy/nasopharyngoscopy: Active gag reflex.  Unable to perform.    Neuro/Psychiatric: AOx3.  Normal mood and affect.   Cardiovascular: Normal carotid pulses bilaterally, no increasing jugular venous distention noted at cervical region bilaterally.    Respiratory: Normal respiratory effort, no stridor, no retractions noted.    See separate procedure note for flexible fiberoptic laryngoscopy.    Assessment:    ICD-10-CM ICD-9-CM    1. Oropharyngeal lesion  J39.2 478.20    2. Laryngopharyngeal reflux (LPR)  K21.9 478.79    3. Chronic rhinitis  J31.0 472.0      The primary encounter diagnosis was Oropharyngeal lesion. Diagnoses of Laryngopharyngeal reflux (LPR) and Chronic rhinitis were also pertinent to this visit.      Plan:  No orders of the defined types were placed in this encounter.    Continue Xyzal and Singulair.  Continue Protonix 40mg PO daily.   Continue reflux precautions.     Biopsy results were consistent with benign papilloma. have recommended that we continue to monitor for any recurrent papillomas in the future. There was a small area of concern on her FFL today- ?cobblestoning versus early recurrent papilloma. Will recheck in 6-8 weeks.     Deidre Calderon MD

## 2020-08-29 ENCOUNTER — LAB VISIT (OUTPATIENT)
Dept: URGENT CARE | Facility: CLINIC | Age: 60
End: 2020-08-29
Payer: COMMERCIAL

## 2020-08-29 VITALS — TEMPERATURE: 98 F

## 2020-08-29 DIAGNOSIS — Z01.812 PRE-PROCEDURE LAB EXAM: ICD-10-CM

## 2020-08-29 PROCEDURE — U0003 INFECTIOUS AGENT DETECTION BY NUCLEIC ACID (DNA OR RNA); SEVERE ACUTE RESPIRATORY SYNDROME CORONAVIRUS 2 (SARS-COV-2) (CORONAVIRUS DISEASE [COVID-19]), AMPLIFIED PROBE TECHNIQUE, MAKING USE OF HIGH THROUGHPUT TECHNOLOGIES AS DESCRIBED BY CMS-2020-01-R: HCPCS

## 2020-08-30 LAB — SARS-COV-2 RNA RESP QL NAA+PROBE: NOT DETECTED

## 2020-08-31 ENCOUNTER — PATIENT OUTREACH (OUTPATIENT)
Dept: ADMINISTRATIVE | Facility: OTHER | Age: 60
End: 2020-08-31

## 2020-08-31 NOTE — PROGRESS NOTES
Health Maintenance Due   Topic Date Due    Hepatitis C Screening  1960    HIV Screening  01/10/1975    Pneumococcal Vaccine (Medium Risk) (1 of 1 - PPSV23) 01/10/1979    Shingles Vaccine (1 of 2) 01/10/2010    Colorectal Cancer Screening  01/10/2010     Updates were requested from care everywhere.  Chart was reviewed for overdue Proactive [facility] Encounters ([facility]) topics (CRS, Breast Cancer Screening, Eye exam)  Health Maintenance has been updated.  LINKS immunization registry triggered.  Immunizations were reconciled.

## 2020-09-01 ENCOUNTER — LAB VISIT (OUTPATIENT)
Dept: LAB | Facility: HOSPITAL | Age: 60
End: 2020-09-01
Attending: OTOLARYNGOLOGY
Payer: COMMERCIAL

## 2020-09-01 ENCOUNTER — OFFICE VISIT (OUTPATIENT)
Dept: OTOLARYNGOLOGY | Facility: CLINIC | Age: 60
End: 2020-09-01
Payer: COMMERCIAL

## 2020-09-01 VITALS
HEART RATE: 87 BPM | SYSTOLIC BLOOD PRESSURE: 138 MMHG | DIASTOLIC BLOOD PRESSURE: 95 MMHG | BODY MASS INDEX: 28.17 KG/M2 | WEIGHT: 154 LBS

## 2020-09-01 DIAGNOSIS — J30.89 NON-SEASONAL ALLERGIC RHINITIS, UNSPECIFIED TRIGGER: ICD-10-CM

## 2020-09-01 DIAGNOSIS — J39.2 OROPHARYNGEAL LESION: ICD-10-CM

## 2020-09-01 DIAGNOSIS — J30.89 NON-SEASONAL ALLERGIC RHINITIS, UNSPECIFIED TRIGGER: Primary | ICD-10-CM

## 2020-09-01 DIAGNOSIS — K21.9 LARYNGOPHARYNGEAL REFLUX (LPR): ICD-10-CM

## 2020-09-01 DIAGNOSIS — R05.3 CHRONIC COUGH: ICD-10-CM

## 2020-09-01 PROCEDURE — 99214 OFFICE O/P EST MOD 30 MIN: CPT | Mod: 25,S$GLB,, | Performed by: OTOLARYNGOLOGY

## 2020-09-01 PROCEDURE — 31575 PR LARYNGOSCOPY, FLEXIBLE; DIAGNOSTIC: ICD-10-PCS | Mod: S$GLB,,, | Performed by: OTOLARYNGOLOGY

## 2020-09-01 PROCEDURE — 99999 PR PBB SHADOW E&M-EST. PATIENT-LVL III: ICD-10-PCS | Mod: PBBFAC,,, | Performed by: OTOLARYNGOLOGY

## 2020-09-01 PROCEDURE — 31575 DIAGNOSTIC LARYNGOSCOPY: CPT | Mod: S$GLB,,, | Performed by: OTOLARYNGOLOGY

## 2020-09-01 PROCEDURE — 99999 PR PBB SHADOW E&M-EST. PATIENT-LVL III: CPT | Mod: PBBFAC,,, | Performed by: OTOLARYNGOLOGY

## 2020-09-01 PROCEDURE — 86003 ALLG SPEC IGE CRUDE XTRC EA: CPT | Mod: 59

## 2020-09-01 PROCEDURE — 86003 ALLG SPEC IGE CRUDE XTRC EA: CPT

## 2020-09-01 PROCEDURE — 36415 COLL VENOUS BLD VENIPUNCTURE: CPT

## 2020-09-01 PROCEDURE — 99214 PR OFFICE/OUTPT VISIT, EST, LEVL IV, 30-39 MIN: ICD-10-PCS | Mod: 25,S$GLB,, | Performed by: OTOLARYNGOLOGY

## 2020-09-01 RX ORDER — IPRATROPIUM BROMIDE 21 UG/1
2 SPRAY, METERED NASAL NIGHTLY
Qty: 30 ML | Refills: 0 | Status: SHIPPED | OUTPATIENT
Start: 2020-09-01 | End: 2021-09-30

## 2020-09-01 NOTE — H&P (VIEW-ONLY)
Chief Complaint   Patient presents with    Follow-up    Cough     no improvement   .     HPI:Suly Jaime is a 60 y.o. female who has been referred by Dr. Irene Lopez for a several year history of chronic cough.  She relays that it is mostly dry but occasionally productive.  She feels this is gradually worsening. She notes that she has had nasal congestion and post-nasal drip. She notes the cough is worse at night. She  6 month history of hoarseness. Her voice is not progressively worsening over this time. There are pitch breaks or cracks. There is not vocal fatigue. She denies dysphagia, odynophagia, throat pain, and otalgia.  There is no hemoptysis or hematemesis. She is breathing well. She is currently taking Singulair with limited benefit. She is using Flonase with a very slight relief.     She denies throat clearing and cough. She admits to heartburn and reflux.    Interval HPI 06/27/2019:   Follow up today for surveillance after biopsy of oropharyngeal lesion revealed papilloma. Mrs. Jaime reports that she is doing well. She denies throat pain. She is still having intermittent dry cough.  She has been able to quit smoking now for 5 months using Nicoderm patches.    Interval HPI 01/28/2020:  Mrs. Jaime follows up today for surveillance given history of or pharyngeal papilloma.  She relates that she has been doing relatively well.  She notes that she has been able to continue to refrain from tobacco use.  She notes it has been approximately 11 months since she quit smoking.  She does still report and intermittent dry cough which has been present for over 6 months. She describes as coughing fits.  She states when this occurs she feels that she has difficulty catching her breath at the time.  She denies nasal congestion, postnasal drip, facial pain or pressure, change in voice, or otalgia.  At her previous visit approximately 6 months ago I recommended that she continue Flonase, Xyzal, and Protonix 40 mg  daily.  She relays that she has been taking xyzal regularly but  that she has not been taking the other medicines on a regular basis during this time period.     Interval HPI 7/29/2020:  Follow up visit- last visit was 6 months ago. Notes overall she is doing well. Report that she is still having cough; mostly at night. She reports that her voice is at baseline; seldom hoarseness.  She has been on Protonix as prescribed. She reports that has been taking her xyzal at night and finds this helpful. She is concerned whether she has any recurrent papilloma lesions in her throat.    Interval HPI 9/1/2020:  Mrs. Jaime follows up today for surveillance of oropharyngeal papilloma and for chronic cough.  She reports still with chronic dry cough.  States it is worse at night.  She denies hoarseness, throat pain, dysphagia.  Taking Protonix as prescribed.  Also taking Dymista without relief.  She saw pulmonology in May and was prescribed an inhaled corticosteroid but she did not get it or the alternative medication due to financial constraints/insurance coverage concerns.     History reviewed. No pertinent past medical history.  Social History     Socioeconomic History    Marital status:      Spouse name: Not on file    Number of children: Not on file    Years of education: Not on file    Highest education level: Not on file   Occupational History    Not on file   Social Needs    Financial resource strain: Not on file    Food insecurity     Worry: Not on file     Inability: Not on file    Transportation needs     Medical: Not on file     Non-medical: Not on file   Tobacco Use    Smoking status: Former Smoker     Packs/day: 0.50    Smokeless tobacco: Never Used   Substance and Sexual Activity    Alcohol use: No    Drug use: No    Sexual activity: Not on file   Lifestyle    Physical activity     Days per week: Not on file     Minutes per session: Not on file    Stress: Not on file   Relationships    Social  connections     Talks on phone: Not on file     Gets together: Not on file     Attends Buddhism service: Not on file     Active member of club or organization: Not on file     Attends meetings of clubs or organizations: Not on file     Relationship status: Not on file   Other Topics Concern    Not on file   Social History Narrative    Not on file     Past Surgical History:   Procedure Laterality Date    HYSTERECTOMY  2003    full    KNEE ARTHROSCOPY      LARYNGOSCOPY N/A 2/18/2019    Procedure: DIRECT MICRO LARYNGOSCOPY WITH BIOPSY;  Surgeon: Deidre Calderon MD;  Location: [facility];  Service: ENT;  Laterality: N/A;  video    OOPHORECTOMY       Family History   Problem Relation Age of Onset    Diabetes Mother     Diabetes Maternal Grandmother     Heart disease Maternal Grandmother            Review of Systems  General: negative for chills, fever or weight loss  Psychological: negative for mood changes or depression  Ophthalmic: negative for blurry vision, photophobia or eye pain  ENT: see HPI  Respiratory: no cough, shortness of breath, or wheezing  Cardiovascular: no chest pain or dyspnea on exertion  Gastrointestinal: no abdominal pain, change in bowel habits, or black/ bloody stools  Musculoskeletal: negative for gait disturbance or muscular weakness  Neurological: no syncope or seizures; no ataxia  Dermatological: negative for puritis,  rash and jaundice  Hematologic/lymphatic: no easy bruising, no new lumps or bumps      Physical Exam:    Vitals:    09/01/20 1137   BP: (!) 138/95   Pulse: 87       Constitutional: Well appearing / communicating without difficutly.  NAD.  Eyes: EOM I Bilaterally  Head/Face: Normocephalic.  Negative paranasal sinus pressure/tenderness.  Salivary glands WNL.  House Brackmann I Bilaterally.    Right Ear: Auricle normal appearance. External Auditory Canal within normal limits no lesions or masses,TM w/o masses/lesions/perforations. TM mobility noted.   Left Ear:  Auricle normal appearance. External Auditory Canal within normal limits no lesions or masses,TM w/o masses/lesions/perforations. TM mobility noted.  Nose: No gross nasal septal deviation. Inferior Turbinates 3+ bilaterally. No septal perforation. No masses/lesions. External nasal skin appears normal without masses/lesions.  Oral Cavity: Gingiva/lips within normal limits.  Dentition/gingiva healthy appearing. Mucus membranes moist. Floor of mouth soft, no masses palpated. Oral Tongue mobile. Hard Palate appears normal.    Oropharynx: Base of tongue appears normal. No masses/lesions noted. Tonsillar fossa/pharyngeal wall without lesions. Posterior oropharynx WNL.  Soft palate without masses. Midline uvula.   Neck/Lymphatic: No LAD I-VI bilaterally.  No thyromegaly.  No masses noted on exam.    Mirror laryngoscopy/nasopharyngoscopy: Active gag reflex.  Unable to perform.    Neuro/Psychiatric: AOx3.  Normal mood and affect.   Cardiovascular: Normal carotid pulses bilaterally, no increasing jugular venous distention noted at cervical region bilaterally.    Respiratory: Normal respiratory effort, no stridor, no retractions noted.    See separate procedure note for flexible fiberoptic laryngoscopy.    Assessment:    ICD-10-CM ICD-9-CM    1. Non-seasonal allergic rhinitis, unspecified trigger  J30.89 477.8 Aspergillus fumagatus IgE      Bermuda grass IgE      Cat epithelium IgE      Cockroach, American IgE      [place], bald IgE      D. farinae IgE      D. pteronyssinus IgE      Dog dander IgE      Yung grass IgE      Oak, white IgE      Ragweed, short, common IgE      [place] IgE      Allergen, Elm [place]      Allergen, Meadow Grass ([facility])      Allergen, Mucor Racemosus      Allergen, Pecan Hickory IgE      Allergen-Alternaria Alternata      Allergen-[place]   2. Oropharyngeal lesion  J39.2 478.20    3. Laryngopharyngeal reflux (LPR)  K21.9 478.79    4. Chronic cough  R05 786.2      The primary encounter diagnosis  was Non-seasonal allergic rhinitis, unspecified trigger. Diagnoses of Oropharyngeal lesion, Laryngopharyngeal reflux (LPR), and Chronic cough were also pertinent to this visit.      Plan:  Orders Placed This Encounter   Procedures    Aspergillus fumagatus IgE    Bermuda grass IgE    Cat epithelium IgE    Cockroach, American IgE    [place], bald IgE    D. farinae IgE    D. pteronyssinus IgE    Dog dander IgE    Yung grass IgE    Oak, white IgE    Ragweed, short, common IgE    [place] IgE    Allergen, Elm [place]    Allergen, Meadow Grass ([place])    Allergen, Mucor Racemosus    Allergen, Pecan Hickory IgE    Allergen-Alternaria Alternata    Allergen-[place]     Start atrovent at bedtime  Continue Xyzal and Singulair.  Continue Protonix 40mg PO daily.   Continue reflux precautions.   Obtain immunoCAP testing to evaluate for potential allergic triggers for her cough. 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 Will plan to take patient to the OR for direct laryngoscopy with biopsy of the lesion.  Informed consent obtained.     Deidre Calderon MD

## 2020-09-01 NOTE — PROGRESS NOTES
Chief Complaint   Patient presents with    Follow-up    Cough     no improvement   .     HPI:Suly Jaime is a 60 y.o. female who has been referred by Dr. Irene Lopez for a several year history of chronic cough.  She relays that it is mostly dry but occasionally productive.  She feels this is gradually worsening. She notes that she has had nasal congestion and post-nasal drip. She notes the cough is worse at night. She  6 month history of hoarseness. Her voice is not progressively worsening over this time. There are pitch breaks or cracks. There is not vocal fatigue. She denies dysphagia, odynophagia, throat pain, and otalgia.  There is no hemoptysis or hematemesis. She is breathing well. She is currently taking Singulair with limited benefit. She is using Flonase with a very slight relief.     She denies throat clearing and cough. She admits to heartburn and reflux.    Interval HPI 06/27/2019:   Follow up today for surveillance after biopsy of oropharyngeal lesion revealed papilloma. Mrs. Jaime reports that she is doing well. She denies throat pain. She is still having intermittent dry cough.  She has been able to quit smoking now for 5 months using Nicoderm patches.    Interval HPI 01/28/2020:  Mrs. Jaime follows up today for surveillance given history of or pharyngeal papilloma.  She relates that she has been doing relatively well.  She notes that she has been able to continue to refrain from tobacco use.  She notes it has been approximately 11 months since she quit smoking.  She does still report and intermittent dry cough which has been present for over 6 months. She describes as coughing fits.  She states when this occurs she feels that she has difficulty catching her breath at the time.  She denies nasal congestion, postnasal drip, facial pain or pressure, change in voice, or otalgia.  At her previous visit approximately 6 months ago I recommended that she continue Flonase, Xyzal, and Protonix 40 mg  daily.  She relays that she has been taking xyzal regularly but  that she has not been taking the other medicines on a regular basis during this time period.     Interval HPI 7/29/2020:  Follow up visit- last visit was 6 months ago. Notes overall she is doing well. Report that she is still having cough; mostly at night. She reports that her voice is at baseline; seldom hoarseness.  She has been on Protonix as prescribed. She reports that has been taking her xyzal at night and finds this helpful. She is concerned whether she has any recurrent papilloma lesions in her throat.    Interval HPI 9/1/2020:  Mrs. Jaime follows up today for surveillance of oropharyngeal papilloma and for chronic cough.  She reports still with chronic dry cough.  States it is worse at night.  She denies hoarseness, throat pain, dysphagia.  Taking Protonix as prescribed.  Also taking Dymista without relief.  She saw pulmonology in May and was prescribed an inhaled corticosteroid but she did not get it or the alternative medication due to financial constraints/insurance coverage concerns.     History reviewed. No pertinent past medical history.  Social History     Socioeconomic History    Marital status:      Spouse name: Not on file    Number of children: Not on file    Years of education: Not on file    Highest education level: Not on file   Occupational History    Not on file   Social Needs    Financial resource strain: Not on file    Food insecurity     Worry: Not on file     Inability: Not on file    Transportation needs     Medical: Not on file     Non-medical: Not on file   Tobacco Use    Smoking status: Former Smoker     Packs/day: 0.50    Smokeless tobacco: Never Used   Substance and Sexual Activity    Alcohol use: No    Drug use: No    Sexual activity: Not on file   Lifestyle    Physical activity     Days per week: Not on file     Minutes per session: Not on file    Stress: Not on file   Relationships    Social  connections     Talks on phone: Not on file     Gets together: Not on file     Attends Anabaptism service: Not on file     Active member of club or organization: Not on file     Attends meetings of clubs or organizations: Not on file     Relationship status: Not on file   Other Topics Concern    Not on file   Social History Narrative    Not on file     Past Surgical History:   Procedure Laterality Date    HYSTERECTOMY  2003    full    KNEE ARTHROSCOPY      LARYNGOSCOPY N/A 2/18/2019    Procedure: DIRECT MICRO LARYNGOSCOPY WITH BIOPSY;  Surgeon: Deidre Calderon MD;  Location: [facility];  Service: ENT;  Laterality: N/A;  video    OOPHORECTOMY       Family History   Problem Relation Age of Onset    Diabetes Mother     Diabetes Maternal Grandmother     Heart disease Maternal Grandmother            Review of Systems  General: negative for chills, fever or weight loss  Psychological: negative for mood changes or depression  Ophthalmic: negative for blurry vision, photophobia or eye pain  ENT: see HPI  Respiratory: no cough, shortness of breath, or wheezing  Cardiovascular: no chest pain or dyspnea on exertion  Gastrointestinal: no abdominal pain, change in bowel habits, or black/ bloody stools  Musculoskeletal: negative for gait disturbance or muscular weakness  Neurological: no syncope or seizures; no ataxia  Dermatological: negative for puritis,  rash and jaundice  Hematologic/lymphatic: no easy bruising, no new lumps or bumps      Physical Exam:    Vitals:    09/01/20 1137   BP: (!) 138/95   Pulse: 87       Constitutional: Well appearing / communicating without difficutly.  NAD.  Eyes: EOM I Bilaterally  Head/Face: Normocephalic.  Negative paranasal sinus pressure/tenderness.  Salivary glands WNL.  House Brackmann I Bilaterally.    Right Ear: Auricle normal appearance. External Auditory Canal within normal limits no lesions or masses,TM w/o masses/lesions/perforations. TM mobility noted.   Left Ear:  Auricle normal appearance. External Auditory Canal within normal limits no lesions or masses,TM w/o masses/lesions/perforations. TM mobility noted.  Nose: No gross nasal septal deviation. Inferior Turbinates 3+ bilaterally. No septal perforation. No masses/lesions. External nasal skin appears normal without masses/lesions.  Oral Cavity: Gingiva/lips within normal limits.  Dentition/gingiva healthy appearing. Mucus membranes moist. Floor of mouth soft, no masses palpated. Oral Tongue mobile. Hard Palate appears normal.    Oropharynx: Base of tongue appears normal. No masses/lesions noted. Tonsillar fossa/pharyngeal wall without lesions. Posterior oropharynx WNL.  Soft palate without masses. Midline uvula.   Neck/Lymphatic: No LAD I-VI bilaterally.  No thyromegaly.  No masses noted on exam.    Mirror laryngoscopy/nasopharyngoscopy: Active gag reflex.  Unable to perform.    Neuro/Psychiatric: AOx3.  Normal mood and affect.   Cardiovascular: Normal carotid pulses bilaterally, no increasing jugular venous distention noted at cervical region bilaterally.    Respiratory: Normal respiratory effort, no stridor, no retractions noted.    See separate procedure note for flexible fiberoptic laryngoscopy.    Assessment:    ICD-10-CM ICD-9-CM    1. Non-seasonal allergic rhinitis, unspecified trigger  J30.89 477.8 Aspergillus fumagatus IgE      Berm[place] grass IgE      Cat epithelium IgE      Cockroach, American IgE      [place], bald IgE      D. farinae IgE      D. pteronyssinus IgE      Dog dander IgE      Yung grass IgE      Oak, white IgE      Ragweed, short, common IgE      [place] IgE      Allergen, Elm [place]      Allergen, Meadow Grass ([facility])      Allergen, Mucor Racemosus      Allergen, Pecan [place]ickory IgE      Allergen-Alternaria Alternata      Allergen-[place]   2. Oropharyngeal lesion  J39.2 478.20    3. Laryngopharyngeal reflux (LPR)  K21.9 478.79    4. Chronic cough  R05 786.2      The primary encounter diagnosis  was Non-seasonal allergic rhinitis, unspecified trigger. Diagnoses of Oropharyngeal lesion, Laryngopharyngeal reflux (LPR), and Chronic cough were also pertinent to this visit.      Plan:  Orders Placed This Encounter   Procedures    Aspergillus fumagatus IgE    Bermuda grass IgE    Cat epithelium IgE    Cockroach, American IgE    [place], bald IgE    D. farinae IgE    D. pteronyssinus IgE    Dog dander IgE    Yung grass IgE    Oak, white IgE    Ragweed, short, common IgE    [place] IgE    Allergen, Elm [place]    Allergen, [place] ([place])    Allergen, Mucor Racemosus    Allergen, Pecan Hickory IgE    Allergen-Alternaria Alternata    Allergen-[place]     Start atrovent at bedtime  Continue Xyzal and Singulair.  Continue Protonix 40mg PO daily.   Continue reflux precautions.   Obtain immunoCAP testing to evaluate for potential allergic triggers for her cough. 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 Will plan to take patient to the OR for direct laryngoscopy with biopsy of the lesion.  Informed consent obtained.     Deidre Calderon MD

## 2020-09-02 ENCOUNTER — TELEPHONE (OUTPATIENT)
Dept: OTOLARYNGOLOGY | Facility: CLINIC | Age: 60
End: 2020-09-02

## 2020-09-02 DIAGNOSIS — Z01.812 PRE-PROCEDURE LAB EXAM: ICD-10-CM

## 2020-09-02 NOTE — TELEPHONE ENCOUNTER
----- Message from Taylor Cain sent at 9/2/2020 11:56 AM CDT -----  Regarding: Need to get a surgery scheduled  Contact: Suly  Type:  Needs Medical Advice    Who Called: Suly  Would the patient rather a call back or a response via [facility]? Callback  Best Call Back Number: [phone]  Additional Information: Pt is requesting a callback from office need to get a surgery scheduled

## 2020-09-02 NOTE — TELEPHONE ENCOUNTER
Spoke with patient discussed scheduling surgery on 9/10. Also she was notified post op appt made on 9/15 and covid test prior to surgery on 9/7. Patient was notified of both date and times of appointments.

## 2020-09-04 ENCOUNTER — TELEPHONE (OUTPATIENT)
Dept: OTOLARYNGOLOGY | Facility: CLINIC | Age: 60
End: 2020-09-04

## 2020-09-04 DIAGNOSIS — J39.2 OROPHARYNGEAL LESION: Primary | ICD-10-CM

## 2020-09-04 LAB
A ALTERNATA IGE QN: <0.1 KU/L
A FUMIGATUS IGE QN: <0.1 KU/L
BALD CYPRESS IGE QN: <0.1 KU/L
BERMUDA GRASS IGE QN: <0.1 KU/L
CAT DANDER IGE QN: 0.24 KU/L
CEDAR IGE QN: <0.1 KU/L
COMMON RAGWEED IGE QN: <0.1 KU/L
D FARINAE IGE QN: 4.01 KU/L
D PTERONYSS IGE QN: 4.26 KU/L
DEPRECATED A ALTERNATA IGE RAST QL: NORMAL
DEPRECATED A FUMIGATUS IGE RAST QL: NORMAL
DEPRECATED BALD CYPRESS IGE RAST QL: NORMAL
DEPRECATED BERMUDA GRASS IGE RAST QL: NORMAL
DEPRECATED CAT DANDER IGE RAST QL: ABNORMAL
DEPRECATED CEDAR IGE RAST QL: NORMAL
DEPRECATED COMMON RAGWEED IGE RAST QL: NORMAL
DEPRECATED D FARINAE IGE RAST QL: ABNORMAL
DEPRECATED D PTERONYSS IGE RAST QL: ABNORMAL
DEPRECATED DOG DANDER IGE RAST QL: NORMAL
DEPRECATED JOHNSON GRASS IGE RAST QL: NORMAL
DEPRECATED KENT BLUE GRASS IGE RAST QL: NORMAL
DEPRECATED M RACEMOSUS IGE RAST QL: NORMAL
DEPRECATED PECAN/HICK TREE IGE RAST QL: NORMAL
DEPRECATED ROACH IGE RAST QL: NORMAL
DEPRECATED TIMOTHY IGE RAST QL: NORMAL
DEPRECATED WHITE OAK IGE RAST QL: NORMAL
DOG DANDER IGE QN: <0.1 KU/L
ELM CEDAR CLASS: NORMAL
ELM CEDAR, IGE: <0.1 KU/L
JOHNSON GRASS IGE QN: <0.1 KU/L
KENT BLUE GRASS IGE QN: <0.1 KU/L
M RACEMOSUS IGE QN: <0.1 KU/L
PECAN/HICK TREE IGE QN: <0.1 KU/L
ROACH IGE QN: <0.1 KU/L
TIMOTHY IGE QN: <0.1 KU/L
WHITE OAK IGE QN: <0.1 KU/L

## 2020-09-07 ENCOUNTER — LAB VISIT (OUTPATIENT)
Dept: URGENT CARE | Facility: CLINIC | Age: 60
End: 2020-09-07
Payer: COMMERCIAL

## 2020-09-07 VITALS — TEMPERATURE: 97 F

## 2020-09-07 DIAGNOSIS — Z01.812 PRE-PROCEDURE LAB EXAM: ICD-10-CM

## 2020-09-07 PROCEDURE — U0003 INFECTIOUS AGENT DETECTION BY NUCLEIC ACID (DNA OR RNA); SEVERE ACUTE RESPIRATORY SYNDROME CORONAVIRUS 2 (SARS-COV-2) (CORONAVIRUS DISEASE [COVID-19]), AMPLIFIED PROBE TECHNIQUE, MAKING USE OF HIGH THROUGHPUT TECHNOLOGIES AS DESCRIBED BY CMS-2020-01-R: HCPCS

## 2020-09-08 ENCOUNTER — TELEPHONE (OUTPATIENT)
Dept: SURGERY | Facility: HOSPITAL | Age: 60
End: 2020-09-08

## 2020-09-08 LAB — SARS-COV-2 RNA RESP QL NAA+PROBE: NOT DETECTED

## 2020-09-09 ENCOUNTER — HOSPITAL ENCOUNTER (OUTPATIENT)
Dept: PREADMISSION TESTING | Facility: HOSPITAL | Age: 60
Discharge: HOME | End: 2020-09-09
Attending: OTOLARYNGOLOGY
Payer: COMMERCIAL

## 2020-09-09 ENCOUNTER — ANESTHESIA EVENT (OUTPATIENT)
Dept: SURGERY | Facility: HOSPITAL | Age: 60
End: 2020-09-09
Payer: COMMERCIAL

## 2020-09-09 VITALS
DIASTOLIC BLOOD PRESSURE: 78 MMHG | OXYGEN SATURATION: 98 % | SYSTOLIC BLOOD PRESSURE: 141 MMHG | BODY MASS INDEX: 27.97 KG/M2 | HEART RATE: 81 BPM | HEIGHT: 62 IN | WEIGHT: 152 LBS | RESPIRATION RATE: 16 BRPM

## 2020-09-09 RX ORDER — SODIUM CHLORIDE, SODIUM LACTATE, POTASSIUM CHLORIDE, CALCIUM CHLORIDE 600; 310; 30; 20 MG/100ML; MG/100ML; MG/100ML; MG/100ML
INJECTION, SOLUTION INTRAVENOUS CONTINUOUS
Status: CANCELLED | OUTPATIENT
Start: 2020-09-09

## 2020-09-09 NOTE — ANESTHESIA PREPROCEDURE EVALUATION
09/09/2020  Suly Jaime is a 60 y.o., female scheduled for direct microlaryngoscopy with biopsy on 9/10/2020.    History reviewed. No pertinent past medical history.  Past Surgical History:   Procedure Laterality Date    HYSTERECTOMY  2003    full    KNEE ARTHROSCOPY      LARYNGOSCOPY N/A 2/18/2019    Procedure: DIRECT MICRO LARYNGOSCOPY WITH BIOPSY;  Surgeon: Deidre Calderon MD;  Location: [facility];  Service: ENT;  Laterality: N/A;  video    OOPHORECTOMY         Anesthesia Evaluation    I have reviewed the Patient Summary Reports.    I have reviewed the Nursing Notes.    I have reviewed the Medications.     Review of Systems  Anesthesia Hx:  No problems with previous Anesthesia  Denies Family Hx of Anesthesia complications.    Social:  Former Smoker, No Alcohol Use    Hematology/Oncology:  Hematology Normal        EENT/Dental:   Oropharyngeal lesion   Cardiovascular:  Cardiovascular Normal Exercise tolerance: good   Denies Angina.        Pulmonary:  Pulmonary Normal    Renal/:  Renal/ Normal     Hepatic/GI:  Hepatic/GI Normal    Neurological:  Neurology Normal    Endocrine:  Endocrine Normal        Physical Exam  General:  Well nourished    Airway/Jaw/Neck:  Airway Findings: Mouth Opening: Small, but > 3cm Tongue: Normal  General Airway Assessment: Adult  Mallampati: III  TM Distance: Normal, at least 6 cm       Chest/Lungs:  Chest/Lungs Findings: Clear to auscultation, Normal Respiratory Rate     Heart/Vascular:  Heart Findings: Rate: Normal  Rhythm: Regular Rhythm  Sounds: Normal        Mental Status:  Mental Status Findings:  Cooperative, Alert and Oriented         Anesthesia Plan  Type of Anesthesia, risks & benefits discussed:  Anesthesia Type:  general  Patient's Preference:   Intra-op Monitoring Plan: standard ASA monitors  Intra-op Monitoring Plan Comments:   Post Op Pain Control  Plan: multimodal analgesia and per primary service following discharge from PACU  Post Op Pain Control Plan Comments:   Induction:   IV  Beta Blocker:         Informed Consent: Patient understands risks and agrees with Anesthesia plan.  Questions answered.   ASA Score: 2     Day of Surgery Review of History & Physical:        Anesthesia Plan Notes: Anesthesia consent to be signed prior to procedure on 9/10/2020          Ready For Surgery From Anesthesia Perspective.

## 2020-09-09 NOTE — DISCHARGE INSTRUCTIONS
Your surgery is scheduled for 9/10/20.    Please report to Front Lobby on the 1st Floor at 5:45 a.m.    THIS TIME IS SUBJECT TO CHANGE.  YOU WILL RECEIVE A PHONE CALL THE DAY BEFORE SURGERY BY 3:30 PM TO CONFIRM YOUR TIME OF ARRIVAL.  IF YOU HAVE NOT RECEIVED A PHONE CALL BY 3:30 PM THE DAY BEFORE YOUR SURGERY PLEASE CALL [phone]     INSTRUCTIONS IMPORTANT!!!  ¨ Do not eat or drink after 12 midnight-including water. OK to brush teeth, no   gum, candy or mints!          ____  Do not wear makeup, including mascara.  ____  No powder, lotions or creams to surgical area.  ____  Please remove all jewelry, including piercings and leave at home.  ____  No money or valuables needed. Please leave at home.  ____  Please bring any documents given by your doctor.  ____  If going home the same day, arrange for a ride home. You will not be able to             drive if Anesthesia was used.  ____  Wear loose fitting clothing. Allow for dressings, bandages.  ____  Stop Aspirin, Ibuprofen, Motrin and Aleve at least 3-5 days before surgery, unless otherwise instructed by your doctor, or the nurse.   You MAY use Tylenol/acetaminophen until day of surgery.  ____  If you take diabetic medication, do not take am of surgery unless instructed by Doctor.  ____  Call MD for temperature above 101 degrees or any other signs of infection such as Urinary (bladder) infection, Upper respiratory infection, skin boils, etc.  ____ Stop taking any Fish Oil supplement or any Vitamins that contain Vitamin E at least 5 days prior to surgery.  ____ Do Not wear your contact lenses the day of your procedure.  You may wear your glasses.      ____Do not shave surgical site for 3 days prior to surgery.  ____ Practice Good hand washing before, during, and after procedure.      I have read or had read and explained to me, and understand the above information.  Additional comments or instructions:  For additional questions call [phone]      ANESTHESIA SIDE  EFFECTS  -For the first 24 hours after surgery:  Do not drive, use heavy equipment, make important decisions, or drink alcohol  -It is normal to feel sleepy for several hours.  Rest until you are more awake.  -Have someone stay with you, if needed.  They can watch for problems and help keep you safe.  -Some possible post anesthesia side effects include: nausea and vomiting, sore throat and hoarseness, sleepiness, and dizziness.          Direct Laryngoscopy      Direct laryngoscopy is a procedure to look at the vocal cords. A laryngoscope is a rigid, hollow tube with a light attached. Using this tool, your healthcare provider can look behind your tongue and down your throat to your vocal cords. A tissue sample (biopsy) can be taken for study in a lab. Or a growth can be removed. Your healthcare provider can tell you more about your procedure depending on why its being done. This sheet gives you general information about what to expect.    Getting ready for the procedure  Prepare for the surgery as you have been instructed. Be sure to tell your healthcare provider about all medicines you take. This includes over-the-counter medicines. It also includes herbs and other supplements. You may need to stop taking some or all of them before surgery. Your healthcare provider will let you know. Also follow any directions youre given for not eating or drinking before surgery.  The day of the procedure  The procedure takes 30 to 60 minutes. You will likely go home the same day.  Before the procedure  Here is what to expect before the procedure begins:  · An IV line is put into a vein in your arm or hand. This line delivers fluids and medicines.  · You will be given medicine (anesthesia) to keep you pain free during surgery. This may be general anesthesia, which puts you in a state like deep sleep. Or you may have sedation, which makes you relaxed and drowsy. Local anesthesia may also be injected or sprayed into your throat to  numb it.  During the procedure  Here is what to expect during the procedure:  · You will lie on your back on a table. The scope is put into your mouth and passed down into the throat.  · Your healthcare provider examines the larynx and surrounding areas with the scope. If needed, a biopsy is done using small tools put through the scope.  · If a growth is found, tools can be put through the scope to remove it.  After the procedure  You will be taken to a room to wake up from the anesthesia. At first, your throat may be sore and scratchy. Your voice may be hoarse, making talking difficult. And it may be hard to swallow. You will receive medicine to control pain. When you are released to go home, have an adult family member or friend ready to drive you.  Recovering at home  Once home, follow any instructions you have been given. Be sure to:  · Take pain medicine as directed.  · Drink plenty of water as soon as you can swallow normally.  · Use throat lozenges as advised by your healthcare provider to help ease throat soreness.  · Rest your voice as instructed by your healthcare provider.  When to call your healthcare provider  After you get home, call the healthcare provider if you have any of the following:  · Chest pain or trouble breathing (call 911)  · Fever of 100.4°F (38°C) or higher, or as directed by your healthcare provider  · Problems swallowing that prevent you from eating or drinking  · Pain that does not go away even after taking pain medicine  · Severe nausea or vomiting  · Bloody vomit  · Cough that brings up blood   Follow-up  Within a few weeks, you will see your healthcare provider for a follow-up visit. During this visit, your provider will discuss the results of the procedure. Depending on what was found, you may need further evaluation and treatment.  Risks and possible complications   The risks of this procedure include:  · Bleeding  · Infection  · Gagging  · Vomiting  · Cuts in the mouth or  throat  · Injury to the teeth  · Vocal cord injury  · Breathing problems  · Risks of anesthesia   Date Last Reviewed: 6/11/2015  © 7906-1402 [employer] [address] [place] [address], [place] [address]. All rights reserved. This information is not intended as a substitute for professional medical care. Always follow your healthcare professional's instructions.

## 2020-09-10 ENCOUNTER — TELEPHONE (OUTPATIENT)
Dept: OTOLARYNGOLOGY | Facility: CLINIC | Age: 60
End: 2020-09-10

## 2020-09-10 ENCOUNTER — ANESTHESIA (OUTPATIENT)
Dept: SURGERY | Facility: HOSPITAL | Age: 60
End: 2020-09-10
Payer: COMMERCIAL

## 2020-09-10 ENCOUNTER — HOSPITAL ENCOUNTER (OUTPATIENT)
Facility: HOSPITAL | Age: 60
Discharge: HOME | End: 2020-09-10
Attending: OTOLARYNGOLOGY | Admitting: OTOLARYNGOLOGY
Payer: COMMERCIAL

## 2020-09-10 VITALS
HEART RATE: 92 BPM | OXYGEN SATURATION: 96 % | RESPIRATION RATE: 16 BRPM | TEMPERATURE: 98 F | WEIGHT: 152 LBS | BODY MASS INDEX: 27.97 KG/M2 | HEIGHT: 62 IN | DIASTOLIC BLOOD PRESSURE: 80 MMHG | SYSTOLIC BLOOD PRESSURE: 135 MMHG

## 2020-09-10 DIAGNOSIS — J39.2 OROPHARYNGEAL LESION: Primary | ICD-10-CM

## 2020-09-10 DIAGNOSIS — Z01.812 PRE-PROCEDURE LAB EXAM: ICD-10-CM

## 2020-09-10 PROCEDURE — 31536 LARYNGOSCOPY W/BX & OP SCOPE: CPT | Mod: ,,, | Performed by: OTOLARYNGOLOGY

## 2020-09-10 PROCEDURE — 88305 TISSUE EXAM BY PATHOLOGIST: ICD-10-PCS | Mod: 26,,, | Performed by: PATHOLOGY

## 2020-09-10 PROCEDURE — 63600175 PHARM REV CODE 636 W HCPCS: Performed by: NURSE ANESTHETIST, CERTIFIED REGISTERED

## 2020-09-10 PROCEDURE — 88312 SPECIAL STAINS GROUP 1: CPT | Mod: 26,,, | Performed by: PATHOLOGY

## 2020-09-10 PROCEDURE — 71000016 HC POSTOP RECOV ADDL HR: Performed by: OTOLARYNGOLOGY

## 2020-09-10 PROCEDURE — 37000008 HC ANESTHESIA 1ST 15 MINUTES: Performed by: OTOLARYNGOLOGY

## 2020-09-10 PROCEDURE — 63600175 PHARM REV CODE 636 W HCPCS: Performed by: NURSE PRACTITIONER

## 2020-09-10 PROCEDURE — 36000708 HC OR TIME LEV III 1ST 15 MIN: Performed by: OTOLARYNGOLOGY

## 2020-09-10 PROCEDURE — 71000033 HC RECOVERY, INTIAL HOUR: Performed by: OTOLARYNGOLOGY

## 2020-09-10 PROCEDURE — 88305 TISSUE EXAM BY PATHOLOGIST: CPT | Mod: 26,,, | Performed by: PATHOLOGY

## 2020-09-10 PROCEDURE — 31536 PR LARYNGOSCOPY,DIRCT,OP SCOPE,BIOPSY: ICD-10-PCS | Mod: ,,, | Performed by: OTOLARYNGOLOGY

## 2020-09-10 PROCEDURE — 25000003 PHARM REV CODE 250: Performed by: OTOLARYNGOLOGY

## 2020-09-10 PROCEDURE — 88312 PR  SPECIAL STAINS,GROUP I: ICD-10-PCS | Mod: 26,,, | Performed by: PATHOLOGY

## 2020-09-10 PROCEDURE — 36000709 HC OR TIME LEV III EA ADD 15 MIN: Performed by: OTOLARYNGOLOGY

## 2020-09-10 PROCEDURE — 88312 SPECIAL STAINS GROUP 1: CPT | Performed by: PATHOLOGY

## 2020-09-10 PROCEDURE — 71000015 HC POSTOP RECOV 1ST HR: Performed by: OTOLARYNGOLOGY

## 2020-09-10 PROCEDURE — 88305 TISSUE EXAM BY PATHOLOGIST: CPT | Performed by: PATHOLOGY

## 2020-09-10 PROCEDURE — 37000009 HC ANESTHESIA EA ADD 15 MINS: Performed by: OTOLARYNGOLOGY

## 2020-09-10 PROCEDURE — 71000039 HC RECOVERY, EACH ADD'L HOUR: Performed by: OTOLARYNGOLOGY

## 2020-09-10 PROCEDURE — 63600175 PHARM REV CODE 636 W HCPCS: Performed by: ANESTHESIOLOGY

## 2020-09-10 PROCEDURE — 25000003 PHARM REV CODE 250: Performed by: NURSE ANESTHETIST, CERTIFIED REGISTERED

## 2020-09-10 RX ORDER — LIDOCAINE HYDROCHLORIDE 10 MG/ML
1 INJECTION, SOLUTION EPIDURAL; INFILTRATION; INTRACAUDAL; PERINEURAL ONCE
Status: DISCONTINUED | OUTPATIENT
Start: 2020-09-10 | End: 2020-09-10 | Stop reason: HOSPADM

## 2020-09-10 RX ORDER — ONDANSETRON 2 MG/ML
INJECTION INTRAMUSCULAR; INTRAVENOUS
Status: DISCONTINUED | OUTPATIENT
Start: 2020-09-10 | End: 2020-09-10

## 2020-09-10 RX ORDER — HYDROMORPHONE HYDROCHLORIDE 2 MG/ML
0.5 INJECTION, SOLUTION INTRAMUSCULAR; INTRAVENOUS; SUBCUTANEOUS EVERY 5 MIN PRN
Status: DISCONTINUED | OUTPATIENT
Start: 2020-09-10 | End: 2020-09-10 | Stop reason: HOSPADM

## 2020-09-10 RX ORDER — HYDROCODONE BITARTRATE AND ACETAMINOPHEN 5; 325 MG/1; MG/1
1 TABLET ORAL EVERY 4 HOURS PRN
Status: DISCONTINUED | OUTPATIENT
Start: 2020-09-10 | End: 2020-09-10 | Stop reason: HOSPADM

## 2020-09-10 RX ORDER — ONDANSETRON 2 MG/ML
4 INJECTION INTRAMUSCULAR; INTRAVENOUS DAILY PRN
Status: DISCONTINUED | OUTPATIENT
Start: 2020-09-10 | End: 2020-09-10 | Stop reason: HOSPADM

## 2020-09-10 RX ORDER — SODIUM CHLORIDE, SODIUM LACTATE, POTASSIUM CHLORIDE, CALCIUM CHLORIDE 600; 310; 30; 20 MG/100ML; MG/100ML; MG/100ML; MG/100ML
INJECTION, SOLUTION INTRAVENOUS CONTINUOUS
Status: DISCONTINUED | OUTPATIENT
Start: 2020-09-10 | End: 2020-09-10 | Stop reason: HOSPADM

## 2020-09-10 RX ORDER — OXYMETAZOLINE HCL 0.05 %
SPRAY, NON-AEROSOL (ML) NASAL
Status: DISCONTINUED | OUTPATIENT
Start: 2020-09-10 | End: 2020-09-10 | Stop reason: HOSPADM

## 2020-09-10 RX ORDER — FENTANYL CITRATE 50 UG/ML
INJECTION, SOLUTION INTRAMUSCULAR; INTRAVENOUS
Status: DISCONTINUED | OUTPATIENT
Start: 2020-09-10 | End: 2020-09-10

## 2020-09-10 RX ORDER — NEOSTIGMINE METHYLSULFATE 1 MG/ML
INJECTION, SOLUTION INTRAVENOUS
Status: DISCONTINUED | OUTPATIENT
Start: 2020-09-10 | End: 2020-09-10

## 2020-09-10 RX ORDER — SUCCINYLCHOLINE CHLORIDE 20 MG/ML
INJECTION INTRAMUSCULAR; INTRAVENOUS
Status: DISCONTINUED | OUTPATIENT
Start: 2020-09-10 | End: 2020-09-10

## 2020-09-10 RX ORDER — DIPHENHYDRAMINE HYDROCHLORIDE 50 MG/ML
12.5 INJECTION INTRAMUSCULAR; INTRAVENOUS EVERY 6 HOURS PRN
Status: DISCONTINUED | OUTPATIENT
Start: 2020-09-10 | End: 2020-09-10 | Stop reason: HOSPADM

## 2020-09-10 RX ORDER — LIDOCAINE HYDROCHLORIDE 20 MG/ML
INJECTION INTRAVENOUS
Status: DISCONTINUED | OUTPATIENT
Start: 2020-09-10 | End: 2020-09-10

## 2020-09-10 RX ORDER — SODIUM CHLORIDE 0.9 % (FLUSH) 0.9 %
10 SYRINGE (ML) INJECTION
Status: DISCONTINUED | OUTPATIENT
Start: 2020-09-10 | End: 2020-09-10 | Stop reason: HOSPADM

## 2020-09-10 RX ORDER — IBUPROFEN 600 MG/1
600 TABLET ORAL 3 TIMES DAILY
Qty: 10 TABLET | Refills: 0 | Status: ON HOLD
Start: 2020-09-10 | End: 2022-05-30 | Stop reason: HOSPADM

## 2020-09-10 RX ORDER — SODIUM CHLORIDE 0.9 % (FLUSH) 0.9 %
3 SYRINGE (ML) INJECTION
Status: DISCONTINUED | OUTPATIENT
Start: 2020-09-10 | End: 2020-09-10 | Stop reason: HOSPADM

## 2020-09-10 RX ORDER — ONDANSETRON 8 MG/1
8 TABLET, ORALLY DISINTEGRATING ORAL ONCE
Status: DISCONTINUED | OUTPATIENT
Start: 2020-09-10 | End: 2020-09-10 | Stop reason: HOSPADM

## 2020-09-10 RX ORDER — DEXAMETHASONE SODIUM PHOSPHATE 4 MG/ML
INJECTION, SOLUTION INTRA-ARTICULAR; INTRALESIONAL; INTRAMUSCULAR; INTRAVENOUS; SOFT TISSUE
Status: DISCONTINUED | OUTPATIENT
Start: 2020-09-10 | End: 2020-09-10

## 2020-09-10 RX ORDER — ROCURONIUM BROMIDE 10 MG/ML
INJECTION, SOLUTION INTRAVENOUS
Status: DISCONTINUED | OUTPATIENT
Start: 2020-09-10 | End: 2020-09-10

## 2020-09-10 RX ORDER — PHENYLEPHRINE HYDROCHLORIDE 10 MG/ML
INJECTION INTRAVENOUS
Status: DISCONTINUED | OUTPATIENT
Start: 2020-09-10 | End: 2020-09-10

## 2020-09-10 RX ORDER — MIDAZOLAM HYDROCHLORIDE 1 MG/ML
INJECTION, SOLUTION INTRAMUSCULAR; INTRAVENOUS
Status: DISCONTINUED | OUTPATIENT
Start: 2020-09-10 | End: 2020-09-10

## 2020-09-10 RX ADMIN — NEOSTIGMINE METHYLSULFATE 2 MG: 1 INJECTION INTRAVENOUS at 08:09

## 2020-09-10 RX ADMIN — PHENYLEPHRINE HYDROCHLORIDE 100 MCG: 10 INJECTION INTRAVENOUS at 08:09

## 2020-09-10 RX ADMIN — DEXAMETHASONE SODIUM PHOSPHATE 12 MG: 4 INJECTION, SOLUTION INTRA-ARTICULAR; INTRALESIONAL; INTRAMUSCULAR; INTRAVENOUS; SOFT TISSUE at 08:09

## 2020-09-10 RX ADMIN — SUCCINYLCHOLINE CHLORIDE 120 MG: 20 INJECTION, SOLUTION INTRAMUSCULAR; INTRAVENOUS at 08:09

## 2020-09-10 RX ADMIN — HYDROCODONE BITARTRATE AND ACETAMINOPHEN 1 TABLET: 5; 325 TABLET ORAL at 10:09

## 2020-09-10 RX ADMIN — LIDOCAINE HYDROCHLORIDE 80 MG: 20 INJECTION, SOLUTION INTRAVENOUS at 08:09

## 2020-09-10 RX ADMIN — HYDROMORPHONE HYDROCHLORIDE 0.5 MG: 2 INJECTION, SOLUTION INTRAMUSCULAR; INTRAVENOUS; SUBCUTANEOUS at 10:09

## 2020-09-10 RX ADMIN — MIDAZOLAM 2 MG: 1 INJECTION INTRAMUSCULAR; INTRAVENOUS at 08:09

## 2020-09-10 RX ADMIN — SODIUM CHLORIDE, SODIUM LACTATE, POTASSIUM CHLORIDE, AND CALCIUM CHLORIDE: .6; .31; .03; .02 INJECTION, SOLUTION INTRAVENOUS at 08:09

## 2020-09-10 RX ADMIN — ROCURONIUM BROMIDE 10 MG: 10 INJECTION, SOLUTION INTRAVENOUS at 08:09

## 2020-09-10 RX ADMIN — FENTANYL CITRATE 100 MCG: 50 INJECTION, SOLUTION INTRAMUSCULAR; INTRAVENOUS at 08:09

## 2020-09-10 RX ADMIN — ONDANSETRON 8 MG: 2 INJECTION, SOLUTION INTRAMUSCULAR; INTRAVENOUS at 08:09

## 2020-09-10 RX ADMIN — ROCURONIUM BROMIDE 5 MG: 10 INJECTION, SOLUTION INTRAVENOUS at 08:09

## 2020-09-10 RX ADMIN — HYDROMORPHONE HYDROCHLORIDE 0.5 MG: 2 INJECTION, SOLUTION INTRAMUSCULAR; INTRAVENOUS; SUBCUTANEOUS at 09:09

## 2020-09-10 RX ADMIN — GLYCOPYRROLATE 0.2 MG: 0.2 INJECTION, SOLUTION INTRAMUSCULAR; INTRAVITREAL at 08:09

## 2020-09-10 NOTE — OP NOTE
Operative Note       Surgery Date: 9/10/2020    Surgeon: Deidre Kendrick MD    Pre-op Diagnosis:  Oropharyngeal lesion    Post-op Diagnosis: 1. Oropharyngeal lesion(retropalatal) lesion 2. Left palatopharyngeal arch lesion     Assistants:  None    Implants:  None    Anesthesia: GETA    Technical Procedures Used: Direct microsuspension laryngoscopy with biopsy    Findings:  Oropharyngeal lesion at the retropalatal level appearing as an exophytic, fleshy, 5mm lesion. Clinically appearing as papilloma. A second lesion was noted on the left palatopharyngeal arch. Similar in appearance approximately 3mm in size. Clinically suspicious for papilloma.     Complications: No    Estimated Blood Loss: 0.5 ml    Procedure in Detail:      The patient was placed supine after induction of a general anesthetic.  The eyes were protected.  A gauze was placed on the upper ginigiva as the patient was edentulous.  The laryngoscope was placed into the patients mouth.  The oropharynx, supraglottis, glottis and hypopharynx were inspected.  The telescope or  was used to assist in visualization.   The patient was placed into suspension.  A red rubber catheter was used to retract the soft palat to to visualize the retropalatal lesion. The lesion was localized to posterior pharyngeal wall lesion just right of midline at the level of the soft palate. BIopsy was taken with cups forceps. Incidentally a second lesion was noted along the left palatopharyngeal arch. Biopsy was taken with cups forceps.     Neosynephrine soaked neuro patties were placed into the throat until there was no further bleeding.  The gold laser on a setting of 15 W was used to cauterize the tissue at the base of the biopsy sites. The area was inspected and found to be hemostatic.  The laryngoscope and tooth guard were removed.               Disposition: PACU - hemodynamically stable.           Condition: Good    Attestation:  I performed the procedure.

## 2020-09-10 NOTE — ANESTHESIA POSTPROCEDURE EVALUATION
Anesthesia Post Evaluation    Patient: Suly Jaime    Procedure(s) Performed: Procedure(s) (LRB):  DIRECT MICROLARYNGOSCOPY, SUSPENSION WITH BIOPSY (N/A)    Final Anesthesia Type: general    Patient location during evaluation: PACU  Patient participation: Yes- Able to Participate  Level of consciousness: awake and alert, oriented and awake  Post-procedure vital signs: reviewed and stable  Pain management: adequate  Airway patency: patent    PONV status at discharge: No PONV  Anesthetic complications: no      Cardiovascular status: blood pressure returned to baseline  Respiratory status: unassisted and room air  Hydration status: euvolemic  Follow-up not needed.          Vitals Value Taken Time   /83 09/10/20 1025   Temp 36.5 °C (97.7 °F) 09/10/20 1025   Pulse 66 09/10/20 1025   Resp 16 09/10/20 1025   SpO2 96 % 09/10/20 1025         Event Time   Out of Recovery 10:22:10         Pain/Zeke Score: Pain Rating Prior to Med Admin: 6 (9/10/2020 10:12 AM)  Pain Rating Post Med Admin: 4 (9/10/2020 10:25 AM)  Zeke Score: 10 (9/10/2020 10:25 AM)

## 2020-09-10 NOTE — INTERVAL H&P NOTE
The patient has been examined and the H&P has been reviewed:    I concur with the findings and no changes have occurred since H&P was written.   Facility-Administered Medications as of 9/10/2020   Medication Dose Route Frequency Provider Last Rate Last Dose    lactated ringers infusion   Intravenous Continuous Gladis Richmond NP        lidocaine (PF) 10 mg/ml (1%) injection 10 mg  1 mL Intradermal Once Deidre Calderon MD        lidocaine (PF) 10 mg/ml (1%) injection 10 mg  1 mL Intradermal Once Deidre Calderon MD        sodium chloride 0.9% flush 10 mL  10 mL Intravenous PRN Deidre Calderon MD        sodium chloride 0.9% flush 5 mL  5 mL Intravenous PRN Deidre Calderon MD         Outpatient Medications as of 9/10/2020   Medication Sig Dispense Refill    azelastine-fluticasone (DYMISTA) 137-50 mcg/spray Spry nassal spray 1 spray by Each Nostril route 2 (two) times daily. 23 g 3    buPROPion (WELLBUTRIN XL) 150 MG TB24 tablet Begin therapy while still smoking and set target quit date within 2 weeks.  Continue treatment for up to 12 weeks.  Take 1 tablet once daily for 3 days in the morning.  Then increase to 1 tablet twice daily. 60 tablet 1    gabapentin (NEURONTIN) 300 MG capsule Take 1 capsule (300 mg total) by mouth 3 (three) times daily as needed (pain). 90 capsule 0    ipratropium (ATROVENT) 0.03 % nasal spray 2 sprays by Nasal route every evening. 30 mL 0    latanoprost 0.005 % ophthalmic solution INSTILL 1 DROP INTO BOTH EYES AT BEDTIME AS DIRECTED      levocetirizine (XYZAL) 5 MG tablet Take 1 tablet (5 mg total) by mouth every evening. 30 tablet 11    montelukast (SINGULAIR) 10 mg tablet TAKE 1 TABLET BY MOUTH EVERY DAY IN THE EVENING 30 tablet 1    pantoprazole (PROTONIX) 40 MG tablet Take 1 tablet (40 mg total) by mouth once daily. 30 tablet 11         Surgery risks, benefits and alternative options discussed and understood by patient/family.          Active  Hospital Problems    Diagnosis  POA    Oropharyngeal lesion [J39.2]  Yes      Resolved Hospital Problems   No resolved problems to display.

## 2020-09-10 NOTE — TRANSFER OF CARE
"Anesthesia Transfer of Care Note    Patient: Suly Jaime    Procedure(s) Performed: Procedure(s) (LRB):  DIRECT MICROLARYNGOSCOPY, SUSPENSION WITH BIOPSY (N/A)    Patient location: PACU    Anesthesia Type: general    Transport from OR: Transported from OR on room air with adequate spontaneous ventilation    Post pain: adequate analgesia    Post assessment: no apparent anesthetic complications    Post vital signs: stable    Level of consciousness: alert, awake and oriented    Nausea/Vomiting: no nausea/vomiting    Complications: none    Transfer of care protocol was followed      Last vitals:   Visit Vitals  /71   Pulse 68   Temp 36.1 °C (97 °F) (Skin)   Resp (!) 21   Ht 5' 2" (1.575 m)   Wt 68.9 kg (152 lb)   SpO2 98%   Breastfeeding No   BMI 27.80 kg/m²     "

## 2020-09-10 NOTE — ANESTHESIA PROCEDURE NOTES
Intubation  Performed by: Betty Xiong CRNA  Authorized by: Nehemiah Chapa MD     Intubation:     Induction:  Intravenous    Intubated:  Postinduction    Mask Ventilation:  N/a    Attempts:  1    Attempted By:  Student ([facility])    Method of Intubation:  Video laryngoscopy    Laryngoscope size: MAC 3 John.    Laryngeal View Grade: Grade I - full view of chords      Difficult Airway Encountered?: No      Complications:  None    Airway Device:  Oral endotracheal tube    Airway Device Size:  7.0    Style/Cuff Inflation:  Cuffed (inflated to minimal occlusive pressure)    Tube secured:  21    Secured at:  The lips    Placement Verified By:  Capnometry    Complicating Factors:  None    Findings Post-Intubation:  BS equal bilateral

## 2020-09-10 NOTE — PLAN OF CARE
Patient has met PACU discharge criteria, VSS, pain well controlled. Family updated by phone. Released from PACU by Dr. Chapa

## 2020-09-10 NOTE — DISCHARGE SUMMARY
Discharge Note    SUMMARY     Admit Date: 9/10/2020    Discharge Date and Time:  9/10/2020    Attending Physician: Deidre Calderon,*     Discharge Provider: Deidre Calderon    Final Diagnosis: Post-Op Diagnosis Codes:     * Oropharyngeal lesion [J39.2]    Disposition: Home or Self Care    Follow Up/Patient Instructions: Dr. Kendrick 7-10 days    Medications:  Reconciled Home Medications:   Current Discharge Medication List      START taking these medications    Details   ibuprofen (ADVIL,MOTRIN) 600 MG tablet Take 1 tablet (600 mg total) by mouth 3 (three) times daily.  Qty: 10 tablet, Refills: 0         CONTINUE these medications which have NOT CHANGED    Details   azelastine-fluticasone (DYMISTA) 137-50 mcg/spray Spry nassal spray 1 spray by Each Nostril route 2 (two) times daily.  Qty: 23 g, Refills: 3      buPROPion (WELLBUTRIN XL) 150 MG TB24 tablet Begin therapy while still smoking and set target quit date within 2 weeks.  Continue treatment for up to 12 weeks.  Take 1 tablet once daily for 3 days in the morning.  Then increase to 1 tablet twice daily.  Qty: 60 tablet, Refills: 1    Associated Diagnoses: Tobacco use      gabapentin (NEURONTIN) 300 MG capsule Take 1 capsule (300 mg total) by mouth 3 (three) times daily as needed (pain).  Qty: 90 capsule, Refills: 0    Associated Diagnoses: Herpes zoster without complication      ipratropium (ATROVENT) 0.03 % nasal spray 2 sprays by Nasal route every evening.  Qty: 30 mL, Refills: 0      latanoprost 0.005 % ophthalmic solution INSTILL 1 DROP INTO BOTH EYES AT BEDTIME AS DIRECTED      levocetirizine (XYZAL) 5 MG tablet Take 1 tablet (5 mg total) by mouth every evening.  Qty: 30 tablet, Refills: 11    Comments: Allergic rhinitis  Associated Diagnoses: Chronic rhinitis      montelukast (SINGULAIR) 10 mg tablet TAKE 1 TABLET BY MOUTH EVERY DAY IN THE EVENING  Qty: 30 tablet, Refills: 1    Associated Diagnoses: Chronic cough; Chronic sinusitis,  unspecified location      pantoprazole (PROTONIX) 40 MG tablet Take 1 tablet (40 mg total) by mouth once daily.  Qty: 30 tablet, Refills: 11    Comments: Laryngopharyngeal reflux  Associated Diagnoses: Laryngopharyngeal reflux (LPR)      valACYclovir (VALTREX) 1000 MG tablet Take 1 tablet (1,000 mg total) by mouth 3 (three) times daily. for 7 days  Qty: 21 tablet, Refills: 0    Associated Diagnoses: Herpes zoster without complication           Discharge Procedure Orders   Diet general     Call MD for:  temperature >100.4     Call MD for:  persistent nausea and vomiting     Call MD for:  severe uncontrolled pain     Call MD for:  difficulty breathing, headache or visual disturbances     No dressing needed     Follow-up Information     Follow up In 10 days.

## 2020-09-10 NOTE — DISCHARGE INSTRUCTIONS
ANESTHESIA  -For the first 24 hours after surgery:  Do not drive, use heavy equipment, make important decisions, or drink alcohol  -It is normal to feel sleepy for several hours.  Rest until you are more awake.  -Have someone stay with you, if needed.  They can watch for problems and help keep you safe.  -Some possible post anesthesia side effects include: nausea and vomiting, sore throat and hoarseness, sleepiness, and dizziness.    PAIN  -If you have pain after surgery, pain medicine will help you feel better.  Take it as directed, before pain becomes severe.  Most pain relievers taken by mouth need at least 20-30 minutes to start working.  -Do not drive or drink alcohol while taking pain medicine.  -Pain medication can upset your stomach.  Taking them with a little food may help.  -Other ways to help control pain: elevation, ice, and relaxation  -Call your surgeon if still having unmanageable pain an hour after taking pain medicine.  -Pain medicine can cause constipation.  Taking an over-the counter stool softener while on prescription pain medicine and drinking plenty of fluids can prevent this side effect.  -Call your surgeon if you have severe side effects like: breathing problems, trouble waking up, dizziness, confusion, or severe constipation.    NAUSEA  -Some people have nausea after surgery.  This is often because of anesthesia, pain, pain medicine, or the stress of surgery.  -Do not push yourself to eat.  Start off with clear liquids and soup.  Slowly move to solid foods.  Don't eat fatty, rich, spicy foods at first.  Eat smaller amounts.  -If you develop persistent nausea and vomiting please notify your surgeon immediately.    BLEEDING  -Different types of surgery require different types of care and dressing changes.  It is important to follow all instructions and advice from your surgeon.  Change dressing as directed.  Call your surgeon for any concerns regarding postop bleeding.    SIGNS OF  INFECTION  -Signs of infection include: fever, swelling, drainage, and redness  -Notify your surgeon if you have a fever of 100.4 F (38.0 C) or higher.  -Notify your surgeon if you notice redness, swelling, increased pain, pus, or a foul smell at the incision site.

## 2020-09-11 ENCOUNTER — TELEPHONE (OUTPATIENT)
Dept: OTOLARYNGOLOGY | Facility: CLINIC | Age: 60
End: 2020-09-11

## 2020-09-11 DIAGNOSIS — Z12.11 SPECIAL SCREENING FOR MALIGNANT NEOPLASM OF COLON: Primary | ICD-10-CM

## 2020-09-11 NOTE — TELEPHONE ENCOUNTER
----- Message from Sisi Zapata sent at 9/11/2020 11:46 AM CDT -----  Contact: [phone]  Type:  Patient Returning Call    Who Called:PT  Who Left Message for Patient: Marya  Does the patient know what this is regarding?:appt  Would the patient rather a call back or a response via [facility]? Call back  Best Call Back Number:[phone]

## 2020-09-11 NOTE — TELEPHONE ENCOUNTER
2nd attempt to contact patient. No answer. Left voicemail to return my call regarding covid test scheduled for today 9/11.

## 2020-09-11 NOTE — TELEPHONE ENCOUNTER
Spoke with patient she states due to her grand baby being born she would like to reschedule appointment to 9/21. Scheduled covid test on 9/18 at [facility]. She was notified of both date and times of appointments

## 2020-09-15 LAB
FINAL PATHOLOGIC DIAGNOSIS: NORMAL
GROSS: NORMAL

## 2020-09-17 ENCOUNTER — PATIENT MESSAGE (OUTPATIENT)
Dept: ADMINISTRATIVE | Facility: HOSPITAL | Age: 60
End: 2020-09-17

## 2020-09-17 DIAGNOSIS — Z12.11 SCREENING FOR MALIGNANT NEOPLASM OF COLON: ICD-10-CM

## 2020-09-17 DIAGNOSIS — Z01.812 PRE-PROCEDURE LAB EXAM: Primary | ICD-10-CM

## 2020-09-17 RX ORDER — SODIUM, POTASSIUM,MAG SULFATES 17.5-3.13G
1 SOLUTION, RECONSTITUTED, ORAL ORAL DAILY
Qty: 1 KIT | Refills: 0 | Status: SHIPPED | OUTPATIENT
Start: 2020-09-17 | End: 2020-09-19

## 2020-09-17 NOTE — TELEPHONE ENCOUNTER
Endoscopy Scheduling Questionnaire:     1. Have you been admitted overnight to the hospital in the past 3 months? NO  2. Have you had a cardiac stent placed in the past 12 months? NO  3. Have you had a stroke or heart attack in the past 6 months? NO  4. Have you had any chest pain in the past 3 months? NO      If so, have you been evaluated by your PCP or Cardiologist?   5. Do you take any blood thinners? NO  6. Have you been diagnosed with Diverticulitis within the past 3 months? NO  7. Are you on dialysis or have Kidney Disease?NO   8. Are you diabetic?  Do you have an insulin pump?   9. Do you have any other health issues that you feel might limit your ability to safely have the procedure and/or sedation? NO  10. Is the patient over 79 yo? NO     If yes, has the patient been seen by their PCP or GI in the last 6 months?   11. Family History of Colon Cancer? NO         If yes, relationship/age?  12. Previous Colonoscopy Procedure?NO         If yes, when/where  13. History of Colon Cancer?NO  14.  History of Colon Polyps?NO  15. Have you had a FIT Test in the last year?NO    COVID scheduled for 10/11/2020 at [facility]        -I have reviewed the patient's medications and allergies.        -I have verified the pharmacy information. The prep being used is Suprep. The patient's prep instructions were sent by patient portal

## 2020-09-18 ENCOUNTER — LAB VISIT (OUTPATIENT)
Dept: URGENT CARE | Facility: CLINIC | Age: 60
End: 2020-09-18
Payer: COMMERCIAL

## 2020-09-18 VITALS — TEMPERATURE: 98 F

## 2020-09-18 DIAGNOSIS — Z01.812 PRE-PROCEDURE LAB EXAM: ICD-10-CM

## 2020-09-18 PROCEDURE — U0003 INFECTIOUS AGENT DETECTION BY NUCLEIC ACID (DNA OR RNA); SEVERE ACUTE RESPIRATORY SYNDROME CORONAVIRUS 2 (SARS-COV-2) (CORONAVIRUS DISEASE [COVID-19]), AMPLIFIED PROBE TECHNIQUE, MAKING USE OF HIGH THROUGHPUT TECHNOLOGIES AS DESCRIBED BY CMS-2020-01-R: HCPCS

## 2020-09-19 LAB — SARS-COV-2 RNA RESP QL NAA+PROBE: NOT DETECTED

## 2020-09-21 ENCOUNTER — OFFICE VISIT (OUTPATIENT)
Dept: OTOLARYNGOLOGY | Facility: CLINIC | Age: 60
End: 2020-09-21
Payer: COMMERCIAL

## 2020-09-21 VITALS
HEIGHT: 62 IN | WEIGHT: 154.88 LBS | BODY MASS INDEX: 28.5 KG/M2 | HEART RATE: 88 BPM | DIASTOLIC BLOOD PRESSURE: 93 MMHG | SYSTOLIC BLOOD PRESSURE: 150 MMHG

## 2020-09-21 DIAGNOSIS — J39.2 OROPHARYNGEAL LESION: Primary | ICD-10-CM

## 2020-09-21 DIAGNOSIS — J30.89 NON-SEASONAL ALLERGIC RHINITIS, UNSPECIFIED TRIGGER: ICD-10-CM

## 2020-09-21 DIAGNOSIS — R05.3 CHRONIC COUGH: ICD-10-CM

## 2020-09-21 DIAGNOSIS — K21.9 LARYNGOPHARYNGEAL REFLUX (LPR): ICD-10-CM

## 2020-09-21 PROCEDURE — 99213 OFFICE O/P EST LOW 20 MIN: CPT | Mod: 25,S$GLB,, | Performed by: OTOLARYNGOLOGY

## 2020-09-21 PROCEDURE — 31575 DIAGNOSTIC LARYNGOSCOPY: CPT | Mod: S$GLB,,, | Performed by: OTOLARYNGOLOGY

## 2020-09-21 PROCEDURE — 99999 PR PBB SHADOW E&M-EST. PATIENT-LVL IV: CPT | Mod: PBBFAC,,, | Performed by: OTOLARYNGOLOGY

## 2020-09-21 PROCEDURE — 31575 PR LARYNGOSCOPY, FLEXIBLE; DIAGNOSTIC: ICD-10-PCS | Mod: S$GLB,,, | Performed by: OTOLARYNGOLOGY

## 2020-09-21 PROCEDURE — 99213 PR OFFICE/OUTPT VISIT, EST, LEVL III, 20-29 MIN: ICD-10-PCS | Mod: 25,S$GLB,, | Performed by: OTOLARYNGOLOGY

## 2020-09-21 PROCEDURE — 99999 PR PBB SHADOW E&M-EST. PATIENT-LVL IV: ICD-10-PCS | Mod: PBBFAC,,, | Performed by: OTOLARYNGOLOGY

## 2020-09-21 RX ORDER — BENZOCAINE .13; .15; .5; 2 G/100G; G/100G; G/100G; G/100G
1 GEL ORAL DAILY
Qty: 8.43 ML | Refills: 6 | Status: SHIPPED | OUTPATIENT
Start: 2020-09-21 | End: 2021-09-30 | Stop reason: SDUPTHER

## 2020-09-21 NOTE — PROGRESS NOTES
Chief Complaint   Patient presents with    Post-op Evaluation     coughing more since procedure   .     HPI:Suly Jaime is a 60 y.o. female who has been referred by Dr. Irene Lopez for a several year history of chronic cough.  She relays that it is mostly dry but occasionally productive.  She feels this is gradually worsening. She notes that she has had nasal congestion and post-nasal drip. She notes the cough is worse at night. She  6 month history of hoarseness. Her voice is not progressively worsening over this time. There are pitch breaks or cracks. There is not vocal fatigue. She denies dysphagia, odynophagia, throat pain, and otalgia.  There is no hemoptysis or hematemesis. She is breathing well. She is currently taking Singulair with limited benefit. She is using Flonase with a very slight relief.     She denies throat clearing and cough. She admits to heartburn and reflux.    Interval HPOI 9/21/2020:  Follow up s/p DL with biopsy of oropharyngeal lesion.  Patient reports that she has done well post-operatively.  Denies any throat pain, hoarseness.  Reports continued cough that has been chronic.      No past medical history on file.  Social History     Socioeconomic History    Marital status:      Spouse name: Not on file    Number of children: Not on file    Years of education: Not on file    Highest education level: Not on file   Occupational History    Not on file   Social Needs    Financial resource strain: Not on file    Food insecurity     Worry: Not on file     Inability: Not on file    Transportation needs     Medical: Not on file     Non-medical: Not on file   Tobacco Use    Smoking status: Former Smoker     Packs/day: 0.50    Smokeless tobacco: Never Used   Substance and Sexual Activity    Alcohol use: No    Drug use: No    Sexual activity: Not on file   Lifestyle    Physical activity     Days per week: Not on file     Minutes per session: Not on file    Stress: Not on  file   Relationships    Social connections     Talks on phone: Not on file     Gets together: Not on file     Attends Congregational service: Not on file     Active member of club or organization: Not on file     Attends meetings of clubs or organizations: Not on file     Relationship status: Not on file   Other Topics Concern    Not on file   Social History Narrative    Not on file     Past Surgical History:   Procedure Laterality Date    HYSTERECTOMY  2003    full    KNEE ARTHROSCOPY      LARYNGOSCOPY N/A 2/18/2019    Procedure: DIRECT MICRO LARYNGOSCOPY WITH BIOPSY;  Surgeon: Deidre Calderon MD;  Location: [facility];  Service: ENT;  Laterality: N/A;  video    OOPHORECTOMY       Family History   Problem Relation Age of Onset    Diabetes Mother     Diabetes Maternal Grandmother     Heart disease Maternal Grandmother            Review of Systems  General: negative for chills, fever or weight loss  Psychological: negative for mood changes or depression  Ophthalmic: negative for blurry vision, photophobia or eye pain  ENT: see HPI  Respiratory: no cough, shortness of breath, or wheezing  Cardiovascular: no chest pain or dyspnea on exertion  Gastrointestinal: no abdominal pain, change in bowel habits, or black/ bloody stools  Musculoskeletal: negative for gait disturbance or muscular weakness  Neurological: no syncope or seizures; no ataxia  Dermatological: negative for puritis,  rash and jaundice  Hematologic/lymphatic: no easy bruising, no new lumps or bumps      Physical Exam:    Vitals:    09/21/20 1104   BP: (!) 150/93   Pulse: 88       Constitutional: Well appearing / communicating without difficutly.  NAD.  Eyes: EOM I Bilaterally  Head/Face: Normocephalic.  Negative paranasal sinus pressure/tenderness.  Salivary glands WNL.  House Brackmann I Bilaterally.    Right Ear: Auricle normal appearance. External Auditory Canal within normal limits no lesions or masses,TM w/o masses/lesions/perforations. TM  mobility noted.   Left Ear: Auricle normal appearance. External Auditory Canal within normal limits no lesions or masses,TM w/o masses/lesions/perforations. TM mobility noted.  Nose: No gross nasal septal deviation. Inferior Turbinates 3+ bilaterally. No septal perforation. No masses/lesions. External nasal skin appears normal without masses/lesions.  Oral Cavity: Gingiva/lips within normal limits.  Dentition/gingiva healthy appearing. Mucus membranes moist. Floor of mouth soft, no masses palpated. Oral Tongue mobile. Hard Palate appears normal.    Oropharynx: Base of tongue appears normal. No masses/lesions noted. Tonsillar fossa/pharyngeal wall without lesions. Posterior oropharynx WNL.  Soft palate without masses. Midline uvula.   Neck/Lymphatic: No LAD I-VI bilaterally.  No thyromegaly.  No masses noted on exam.    Mirror laryngoscopy/nasopharyngoscopy: Active gag reflex.  Unable to perform.    Neuro/Psychiatric: AOx3.  Normal mood and affect.   Cardiovascular: Normal carotid pulses bilaterally, no increasing jugular venous distention noted at cervical region bilaterally.    Respiratory: Normal respiratory effort, no stridor, no retractions noted.    See separate procedure note for flexible fiberoptic laryngoscopy.      Diagnostic studies reviewed:   ImmunoCAP testing 9/1/2020: Class 3- d. Farinae, d. Pteronyssinus, class I(barely out of normal range)- cockroach    Pathology 9/10/2020:  1. Oropharynx, right retropalatal, lesion, biopsy:   - Benign squamous papilloma   2. Oropharynx, left palatopharyngeal arch, lesion, biopsy:   - Benign squamous papilloma with acute inflammation   - Special stain for fungal organisms (GMS) is negative with appropriate   control     Assessment:    ICD-10-CM ICD-9-CM    1. Oropharyngeal lesion  J39.2 478.20    2. Non-seasonal allergic rhinitis, unspecified trigger  J30.89 477.8 Ambulatory referral/consult to Allergy   3. Laryngopharyngeal reflux (LPR)  K21.9 478.79    4. Chronic  cough  R05 786.2      The primary encounter diagnosis was Oropharyngeal lesion. Diagnoses of Non-seasonal allergic rhinitis, unspecified trigger, Laryngopharyngeal reflux (LPR), and Chronic cough were also pertinent to this visit.      Plan:  Orders Placed This Encounter   Procedures    Ambulatory referral/consult to Allergy     Start Rhinocort; discontinue Atrovent.   Continue Xyzal and Singulair.  Referral for allergy evaluation.   Continue Protonix 40mg PO daily.   Continue reflux precautions.   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  Benign pathology. Will continue to monitor.   Follow up in 4-6 months.    Deidre Calderon MD

## 2020-09-21 NOTE — PATIENT INSTRUCTIONS
Controlling Allergens: Dust Mites  Constant exposure to allergens means constant allergy symptoms. Thats why controlling or avoiding the allergens that cause your symptoms is an important part of your treatment. If you are allergic to dust mites, the tips below can help to lessen your exposure to dust mites.     Wash all bedding in hot water.   Dust mite allergy  Dust mites are a common cause of nasal allergies. These mites are tiny organisms that live in bedding, upholstered furniture, and carpet. They live in warm, humid conditions. House-dust mites are almost impossible to get rid of. But you can keep them under control.  Make changes to your home  Some furniture, like sofas and chairs, hold dust mites. To lessen the problem:  · Choose nonfabric upholstery, like leather or vinyl.  · Replace horizontal blinds with pull-down shades or vertical blinds.  · Use washable curtains instead of heavy drapes.  · Have as little carpeting as possible.  · Cover your mattress, box spring, and pillows in allergy-proof casings.  Housecleaning  Here are some tips:  · Wash sheets, blankets, and mattress pads every 1 to 2 weeks in hot water (at least 130°F).  · Remove stuffed animals and other things that collect dust, such as wall hangings, knickknacks, and books--especially in the bedroom.  · Dust your home every week with a damp cloth. Vacuum once a week. Use HEPA (high efficiency particulate air) filters or double-ply bags in the vacuum . Or, use a vacuum designed to lessen allergens.  · If someone else cant dust and vacuum for you, wearing a filter mask may help.  Reduce indoor humidity  Dust mites need moist air to live. Use a dehumidifier to reduce air moisture. Dont use humidifiers, or vaporizers.  Talk with your healthcare provider about other ways to reduce dust in your home. Ask about medicines that can help with your allergy symptoms.  Date Last Reviewed: 9/1/2016  © 5803-0184 [employer] [address]  [place] [address]. All rights reserved. This information is not intended as a substitute for professional medical care. Always follow your healthcare professional's instructions.

## 2020-09-22 ENCOUNTER — TELEPHONE (OUTPATIENT)
Dept: GASTROENTEROLOGY | Facility: CLINIC | Age: 60
End: 2020-09-22

## 2020-10-16 ENCOUNTER — TELEPHONE (OUTPATIENT)
Dept: ALLERGY | Facility: CLINIC | Age: 60
End: 2020-10-16

## 2020-10-16 NOTE — TELEPHONE ENCOUNTER
Left message and sent [employer] message.       ----- Message from Ava Ludwig sent at 10/16/2020  1:00 PM CDT -----  Regarding: allergy referral  Contact: preservice  The referral to Allergy is denied because it is an exclusion on the patient's short term limited benefit plan. Any treatment or diagnosis related to Allergy are excluded. I spoke with Maribel  with Insurance Benefit Administration.at [phone].  Please contact the patient with treatment options.

## 2021-03-09 ENCOUNTER — TELEPHONE (OUTPATIENT)
Dept: OTOLARYNGOLOGY | Facility: CLINIC | Age: 61
End: 2021-03-09

## 2021-04-05 ENCOUNTER — PATIENT MESSAGE (OUTPATIENT)
Dept: ADMINISTRATIVE | Facility: HOSPITAL | Age: 61
End: 2021-04-05

## 2021-05-04 ENCOUNTER — PATIENT MESSAGE (OUTPATIENT)
Dept: RESEARCH | Facility: HOSPITAL | Age: 61
End: 2021-05-04

## 2021-06-10 NOTE — PROCEDURES
Procedures       Due to indication in patient's history, presentation or risk factors,  a fiber optic exam was performed.    SEPARATE PROCEDURE NOTE:    ANESTHESIA:  Topical xylocaine with joaquina-synephrine    FINDINGS:  Papillomatous lesion inferior nasopharynx/superior oropharynx      PROCEDURE:  After verbal consent was obtained, the flexible scope was passed through the patient's nasal cavity without difficulty.  The nasopharynx (adenoid pad) and eustachian tube orifices were first visualized.   Small area of concern in the in the inferior nasopharynx; papillomatous lesion.   The posterior pharyngeal wall and base of tongue were then examined and no mass or irregular tissue was seen.  Biopsy site well healed. The scope was then advanced to the larynx, and the epiglottis, valleculae, and piriform sinuses were normal, without masses or mucosal irregularity.  The false vocal folds and true vocal folds were then examined and were found to have normal mobility (full abduction and adduction) and no masses or mucosal irregularity was seen.  The arytenoids and the interarytenoid area were normal. The patient tolerated the procedure well without complications.     
No

## 2021-07-06 ENCOUNTER — PATIENT MESSAGE (OUTPATIENT)
Dept: ADMINISTRATIVE | Facility: HOSPITAL | Age: 61
End: 2021-07-06

## 2021-09-30 ENCOUNTER — OFFICE VISIT (OUTPATIENT)
Dept: INTERNAL MEDICINE | Facility: CLINIC | Age: 61
End: 2021-09-30
Payer: COMMERCIAL

## 2021-09-30 VITALS
DIASTOLIC BLOOD PRESSURE: 76 MMHG | OXYGEN SATURATION: 97 % | HEART RATE: 105 BPM | SYSTOLIC BLOOD PRESSURE: 122 MMHG | WEIGHT: 153 LBS | BODY MASS INDEX: 28.16 KG/M2 | HEIGHT: 62 IN

## 2021-09-30 DIAGNOSIS — J31.0 CHRONIC RHINITIS: ICD-10-CM

## 2021-09-30 DIAGNOSIS — I44.7 LEFT BUNDLE BRANCH BLOCK: ICD-10-CM

## 2021-09-30 DIAGNOSIS — R05.3 CHRONIC COUGH: ICD-10-CM

## 2021-09-30 DIAGNOSIS — R06.09 DOE (DYSPNEA ON EXERTION): ICD-10-CM

## 2021-09-30 PROCEDURE — 99999 PR PBB SHADOW E&M-EST. PATIENT-LVL III: CPT | Mod: PBBFAC,,, | Performed by: INTERNAL MEDICINE

## 2021-09-30 PROCEDURE — 93005 ELECTROCARDIOGRAM TRACING: CPT | Mod: S$GLB,,, | Performed by: INTERNAL MEDICINE

## 2021-09-30 PROCEDURE — 93010 ELECTROCARDIOGRAM REPORT: CPT | Mod: S$GLB,,, | Performed by: INTERNAL MEDICINE

## 2021-09-30 PROCEDURE — 99214 PR OFFICE/OUTPT VISIT, EST, LEVL IV, 30-39 MIN: ICD-10-PCS | Mod: S$GLB,,, | Performed by: INTERNAL MEDICINE

## 2021-09-30 PROCEDURE — 99214 OFFICE O/P EST MOD 30 MIN: CPT | Mod: S$GLB,,, | Performed by: INTERNAL MEDICINE

## 2021-09-30 PROCEDURE — 93005 EKG 12-LEAD: ICD-10-PCS | Mod: S$GLB,,, | Performed by: INTERNAL MEDICINE

## 2021-09-30 PROCEDURE — 99999 PR PBB SHADOW E&M-EST. PATIENT-LVL III: ICD-10-PCS | Mod: PBBFAC,,, | Performed by: INTERNAL MEDICINE

## 2021-09-30 PROCEDURE — 93010 EKG 12-LEAD: ICD-10-PCS | Mod: S$GLB,,, | Performed by: INTERNAL MEDICINE

## 2021-09-30 RX ORDER — BENZOCAINE .13; .15; .5; 2 G/100G; G/100G; G/100G; G/100G
1 GEL ORAL DAILY
Qty: 8.43 ML | Refills: 11 | Status: ON HOLD | OUTPATIENT
Start: 2021-09-30 | End: 2022-06-04

## 2021-09-30 RX ORDER — MONTELUKAST SODIUM 10 MG/1
10 TABLET ORAL NIGHTLY
Qty: 90 TABLET | Refills: 3 | Status: ON HOLD | OUTPATIENT
Start: 2021-09-30 | End: 2022-05-30 | Stop reason: HOSPADM

## 2021-09-30 RX ORDER — LEVOCETIRIZINE DIHYDROCHLORIDE 5 MG/1
5 TABLET, FILM COATED ORAL NIGHTLY
Qty: 90 TABLET | Refills: 3 | Status: SHIPPED | OUTPATIENT
Start: 2021-09-30 | End: 2022-05-30

## 2021-10-04 ENCOUNTER — PATIENT OUTREACH (OUTPATIENT)
Dept: ADMINISTRATIVE | Facility: OTHER | Age: 61
End: 2021-10-04

## 2021-10-04 DIAGNOSIS — Z12.31 ENCOUNTER FOR SCREENING MAMMOGRAM FOR MALIGNANT NEOPLASM OF BREAST: Primary | ICD-10-CM

## 2021-10-05 ENCOUNTER — OFFICE VISIT (OUTPATIENT)
Dept: CARDIOLOGY | Facility: CLINIC | Age: 61
End: 2021-10-05
Payer: COMMERCIAL

## 2021-10-05 VITALS
WEIGHT: 153 LBS | DIASTOLIC BLOOD PRESSURE: 91 MMHG | SYSTOLIC BLOOD PRESSURE: 129 MMHG | BODY MASS INDEX: 27.98 KG/M2 | HEART RATE: 75 BPM

## 2021-10-05 DIAGNOSIS — R29.818 SUSPECTED SLEEP APNEA: ICD-10-CM

## 2021-10-05 DIAGNOSIS — Z13.9 RISK AND FUNCTIONAL ASSESSMENT: ICD-10-CM

## 2021-10-05 DIAGNOSIS — R06.09 DOE (DYSPNEA ON EXERTION): ICD-10-CM

## 2021-10-05 DIAGNOSIS — I44.7 LEFT BUNDLE BRANCH BLOCK: Primary | ICD-10-CM

## 2021-10-05 PROCEDURE — 99244 PR OFFICE CONSULTATION,LEVEL IV: ICD-10-PCS | Mod: S$GLB,,, | Performed by: INTERNAL MEDICINE

## 2021-10-05 PROCEDURE — 99999 PR PBB SHADOW E&M-EST. PATIENT-LVL III: ICD-10-PCS | Mod: PBBFAC,,, | Performed by: INTERNAL MEDICINE

## 2021-10-05 PROCEDURE — 93005 ELECTROCARDIOGRAM TRACING: CPT | Mod: S$GLB,,, | Performed by: INTERNAL MEDICINE

## 2021-10-05 PROCEDURE — 93010 EKG 12-LEAD: ICD-10-PCS | Mod: S$GLB,,, | Performed by: INTERNAL MEDICINE

## 2021-10-05 PROCEDURE — 93005 EKG 12-LEAD: ICD-10-PCS | Mod: S$GLB,,, | Performed by: INTERNAL MEDICINE

## 2021-10-05 PROCEDURE — 93010 ELECTROCARDIOGRAM REPORT: CPT | Mod: S$GLB,,, | Performed by: INTERNAL MEDICINE

## 2021-10-05 PROCEDURE — 99244 OFF/OP CNSLTJ NEW/EST MOD 40: CPT | Mod: S$GLB,,, | Performed by: INTERNAL MEDICINE

## 2021-10-05 PROCEDURE — 99999 PR PBB SHADOW E&M-EST. PATIENT-LVL III: CPT | Mod: PBBFAC,,, | Performed by: INTERNAL MEDICINE

## 2021-10-18 ENCOUNTER — HOSPITAL ENCOUNTER (OUTPATIENT)
Dept: CARDIOLOGY | Facility: HOSPITAL | Age: 61
Discharge: HOME | End: 2021-10-18
Attending: INTERNAL MEDICINE
Payer: COMMERCIAL

## 2021-10-18 VITALS — BODY MASS INDEX: 28.16 KG/M2 | HEIGHT: 62 IN | WEIGHT: 153 LBS

## 2021-10-18 DIAGNOSIS — I44.7 LEFT BUNDLE BRANCH BLOCK: ICD-10-CM

## 2021-10-18 LAB
AORTIC ROOT ANNULUS: 3.23 CM
AV INDEX (PROSTH): 0.6
AV MEAN GRADIENT: 4 MMHG
AV PEAK GRADIENT: 7 MMHG
AV VALVE AREA: 2.07 CM2
AV VELOCITY RATIO: 0.61
BSA FOR ECHO PROCEDURE: 1.74 M2
CV ECHO LV RWT: 0.41 CM
DOP CALC AO PEAK VEL: 1.3 M/S
DOP CALC AO VTI: 25.62 CM
DOP CALC LVOT AREA: 3.5 CM2
DOP CALC LVOT DIAMETER: 2.1 CM
DOP CALC LVOT PEAK VEL: 0.79 M/S
DOP CALC LVOT STROKE VOLUME: 53.1 CM3
DOP CALC MV VTI: 15.74 CM
DOP CALCLVOT PEAK VEL VTI: 15.34 CM
ECHO LV POSTERIOR WALL: 0.99 CM (ref 0.6–1.1)
EJECTION FRACTION: 35 %
FRACTIONAL SHORTENING: 17 % (ref 28–44)
INTERVENTRICULAR SEPTUM: 1 CM (ref 0.6–1.1)
LA MAJOR: 4.89 CM
LA MINOR: 4.57 CM
LA WIDTH: 3.18 CM
LEFT ATRIUM SIZE: 3.83 CM
LEFT ATRIUM VOLUME INDEX MOD: 13.9 ML/M2
LEFT ATRIUM VOLUME INDEX: 28.6 ML/M2
LEFT ATRIUM VOLUME MOD: 23.7 CM3
LEFT ATRIUM VOLUME: 48.91 CM3
LEFT INTERNAL DIMENSION IN SYSTOLE: 4.01 CM (ref 2.1–4)
LEFT VENTRICLE DIASTOLIC VOLUME INDEX: 63.65 ML/M2
LEFT VENTRICLE DIASTOLIC VOLUME: 108.84 ML
LEFT VENTRICLE MASS INDEX: 100 G/M2
LEFT VENTRICLE SYSTOLIC VOLUME INDEX: 41.1 ML/M2
LEFT VENTRICLE SYSTOLIC VOLUME: 70.34 ML
LEFT VENTRICULAR INTERNAL DIMENSION IN DIASTOLE: 4.83 CM (ref 3.5–6)
LEFT VENTRICULAR MASS: 170.77 G
MV MEAN GRADIENT: 1 MMHG
MV PEAK GRADIENT: 6 MMHG
MV VALVE AREA BY CONTINUITY EQUATION: 3.37 CM2
PISA TR MAX VEL: 1.89 M/S
PV PEAK VELOCITY: 0.88 CM/S
RA MAJOR: 3.73 CM
RA PRESSURE: 3 MMHG
RA WIDTH: 3.2 CM
RIGHT VENTRICULAR END-DIASTOLIC DIMENSION: 2.17 CM
RV TISSUE DOPPLER FREE WALL SYSTOLIC VELOCITY 1 (APICAL 4 CHAMBER VIEW): 10.25 CM/S
TR MAX PG: 14 MMHG
TRICUSPID ANNULAR PLANE SYSTOLIC EXCURSION: 1.43 CM
TV REST PULMONARY ARTERY PRESSURE: 17 MMHG

## 2021-10-18 PROCEDURE — 93306 TTE W/DOPPLER COMPLETE: CPT | Mod: 26,,, | Performed by: INTERNAL MEDICINE

## 2021-10-18 PROCEDURE — 93306 TTE W/DOPPLER COMPLETE: CPT

## 2021-10-18 PROCEDURE — 93306 ECHO (CUPID ONLY): ICD-10-PCS | Mod: 26,,, | Performed by: INTERNAL MEDICINE

## 2021-10-19 DIAGNOSIS — R06.00 DYSPNEA, UNSPECIFIED TYPE: ICD-10-CM

## 2021-10-19 RX ORDER — LISINOPRIL 2.5 MG/1
2.5 TABLET ORAL DAILY
Qty: 30 TABLET | Refills: 11 | Status: SHIPPED | OUTPATIENT
Start: 2021-10-19 | End: 2022-03-24 | Stop reason: SDUPTHER

## 2021-10-19 RX ORDER — METOPROLOL SUCCINATE 25 MG/1
25 TABLET, EXTENDED RELEASE ORAL DAILY
Qty: 30 TABLET | Refills: 11 | Status: SHIPPED | OUTPATIENT
Start: 2021-10-19 | End: 2023-03-13 | Stop reason: SDUPTHER

## 2021-11-02 ENCOUNTER — OFFICE VISIT (OUTPATIENT)
Dept: ALLERGY | Facility: CLINIC | Age: 61
End: 2021-11-02
Payer: COMMERCIAL

## 2021-11-02 VITALS — BODY MASS INDEX: 29.01 KG/M2 | HEIGHT: 62 IN | WEIGHT: 157.63 LBS

## 2021-11-02 DIAGNOSIS — R05.3 CHRONIC COUGH: ICD-10-CM

## 2021-11-02 DIAGNOSIS — J31.0 CHRONIC RHINITIS: Primary | ICD-10-CM

## 2021-11-02 DIAGNOSIS — J45.30 MILD PERSISTENT REACTIVE AIRWAY DISEASE WITHOUT COMPLICATION: ICD-10-CM

## 2021-11-02 PROCEDURE — 99244 PR OFFICE CONSULTATION,LEVEL IV: ICD-10-PCS | Mod: S$GLB,,, | Performed by: ALLERGY & IMMUNOLOGY

## 2021-11-02 PROCEDURE — 99999 PR PBB SHADOW E&M-EST. PATIENT-LVL III: CPT | Mod: PBBFAC,,, | Performed by: ALLERGY & IMMUNOLOGY

## 2021-11-02 PROCEDURE — 99244 OFF/OP CNSLTJ NEW/EST MOD 40: CPT | Mod: S$GLB,,, | Performed by: ALLERGY & IMMUNOLOGY

## 2021-11-02 PROCEDURE — 99999 PR PBB SHADOW E&M-EST. PATIENT-LVL III: ICD-10-PCS | Mod: PBBFAC,,, | Performed by: ALLERGY & IMMUNOLOGY

## 2021-11-02 RX ORDER — FLUTICASONE PROPIONATE 110 UG/1
2 AEROSOL, METERED RESPIRATORY (INHALATION) 2 TIMES DAILY
Qty: 12 G | Refills: 12 | Status: SHIPPED | OUTPATIENT
Start: 2021-11-02 | End: 2022-11-02

## 2021-11-15 ENCOUNTER — TELEPHONE (OUTPATIENT)
Dept: ALLERGY | Facility: CLINIC | Age: 61
End: 2021-11-15
Payer: COMMERCIAL

## 2021-11-16 ENCOUNTER — TELEPHONE (OUTPATIENT)
Dept: CARDIOLOGY | Facility: CLINIC | Age: 61
End: 2021-11-16
Payer: COMMERCIAL

## 2021-11-19 ENCOUNTER — TELEPHONE (OUTPATIENT)
Dept: CARDIOLOGY | Facility: CLINIC | Age: 61
End: 2021-11-19
Payer: COMMERCIAL

## 2021-12-02 ENCOUNTER — PATIENT MESSAGE (OUTPATIENT)
Dept: ALLERGY | Facility: CLINIC | Age: 61
End: 2021-12-02
Payer: COMMERCIAL

## 2021-12-16 ENCOUNTER — PATIENT MESSAGE (OUTPATIENT)
Dept: ADMINISTRATIVE | Facility: HOSPITAL | Age: 61
End: 2021-12-16
Payer: COMMERCIAL

## 2021-12-28 ENCOUNTER — PATIENT MESSAGE (OUTPATIENT)
Dept: ALLERGY | Facility: CLINIC | Age: 61
End: 2021-12-28
Payer: COMMERCIAL

## 2021-12-29 ENCOUNTER — PATIENT MESSAGE (OUTPATIENT)
Dept: ALLERGY | Facility: CLINIC | Age: 61
End: 2021-12-29
Payer: COMMERCIAL

## 2021-12-29 DIAGNOSIS — J45.30 MILD PERSISTENT REACTIVE AIRWAY DISEASE WITHOUT COMPLICATION: Primary | ICD-10-CM

## 2022-01-03 ENCOUNTER — HOSPITAL ENCOUNTER (OUTPATIENT)
Dept: RADIOLOGY | Facility: HOSPITAL | Age: 62
Discharge: HOME | End: 2022-01-03
Attending: INTERNAL MEDICINE
Payer: COMMERCIAL

## 2022-01-03 VITALS — DIASTOLIC BLOOD PRESSURE: 77 MMHG | HEART RATE: 73 BPM | SYSTOLIC BLOOD PRESSURE: 127 MMHG | OXYGEN SATURATION: 99 %

## 2022-01-03 DIAGNOSIS — R06.00 DYSPNEA, UNSPECIFIED TYPE: ICD-10-CM

## 2022-01-03 LAB
CREAT SERPL-MCNC: 0.9 MG/DL (ref 0.5–1.4)
SAMPLE: NORMAL

## 2022-01-03 PROCEDURE — 75574 CT ANGIO HRT W/3D IMAGE: CPT | Mod: 26,,, | Performed by: RADIOLOGY

## 2022-01-03 PROCEDURE — 75574 CTA CARDIAC: ICD-10-PCS | Mod: 26,,, | Performed by: RADIOLOGY

## 2022-01-03 PROCEDURE — 75574 CT ANGIO HRT W/3D IMAGE: CPT | Mod: TC

## 2022-01-03 PROCEDURE — 25500020 PHARM REV CODE 255: Performed by: INTERNAL MEDICINE

## 2022-01-03 PROCEDURE — 25000242 PHARM REV CODE 250 ALT 637 W/ HCPCS: Performed by: INTERNAL MEDICINE

## 2022-01-03 PROCEDURE — 25000003 PHARM REV CODE 250: Performed by: INTERNAL MEDICINE

## 2022-01-03 RX ORDER — METOPROLOL TARTRATE 1 MG/ML
5 INJECTION, SOLUTION INTRAVENOUS EVERY 5 MIN PRN
Status: COMPLETED | OUTPATIENT
Start: 2022-01-03 | End: 2022-01-03

## 2022-01-03 RX ORDER — NITROGLYCERIN 0.4 MG/1
0.4 TABLET SUBLINGUAL ONCE
Status: COMPLETED | OUTPATIENT
Start: 2022-01-03 | End: 2022-01-03

## 2022-01-03 RX ORDER — METOPROLOL TARTRATE 50 MG/1
50 TABLET ORAL ONCE
Status: COMPLETED | OUTPATIENT
Start: 2022-01-03 | End: 2022-01-03

## 2022-01-03 RX ADMIN — METOROPROLOL TARTRATE 5 MG: 5 INJECTION, SOLUTION INTRAVENOUS at 10:01

## 2022-01-03 RX ADMIN — IOHEXOL 100 ML: 350 INJECTION, SOLUTION INTRAVENOUS at 11:01

## 2022-01-03 RX ADMIN — NITROGLYCERIN 0.4 MG: 0.4 TABLET, ORALLY DISINTEGRATING SUBLINGUAL at 11:01

## 2022-01-03 RX ADMIN — METOPROLOL TARTRATE 50 MG: 50 TABLET, FILM COATED ORAL at 09:01

## 2022-01-03 NOTE — PROGRESS NOTES
Patient arrived ambulatory, in NAD, two ID verified allergies rev'd, pre-procedure instruction given to patient prior to start of cardiac procedure, discussed and rev'd H&P, Medications,  pre-procedure baseline vitals taken, HR at 84-92b/min, B/P 147/96 answered pt's questions, per pt. last dose of  B/P medication taken at 0645 this morning.Cardiologist Doctor Rosen notified of vital status, ordered to administer Metoprolol Tartrate 50mg/tab PO. Patient to wait for 1 hour before proceeding with CT Scan , pt. Informed, expressed understanding of instructions.

## 2022-01-03 NOTE — PROGRESS NOTES
Patient returned, have taken ordered Metoprolol 50mg/tab PO, CT Scan resumed, will continue to monitor pt's vital status.

## 2022-01-03 NOTE — PROGRESS NOTES
Patient completed cardiac CT procedure, tolerated procedure well, vitals stable, administered Nitro SL 0.4MG x1 dose, Lopressor 5mg IV X 3 over 5 minutes per dose during this cardiac CT exam. No C/O of headache or dizziness noted by patient,  post procedure instructions given, observed ambulation, escorted, ambulatory w/o difficulty, in NAD.

## 2022-01-05 ENCOUNTER — PATIENT OUTREACH (OUTPATIENT)
Dept: ADMINISTRATIVE | Facility: OTHER | Age: 62
End: 2022-01-05
Payer: COMMERCIAL

## 2022-01-05 DIAGNOSIS — Z12.11 ENCOUNTER FOR FIT (FECAL IMMUNOCHEMICAL TEST) SCREENING: Primary | ICD-10-CM

## 2022-01-05 NOTE — PROGRESS NOTES
Health Maintenance Due   Topic Date Due    Hepatitis C Screening  Never done    Pneumococcal Vaccines (Age 0-64) (1 of 2 - PPSV23) Never done    HIV Screening  Never done    Colorectal Cancer Screening  Never done    Shingles Vaccine (1 of 2) Never done    Mammogram  02/25/2020    Influenza Vaccine (1) 09/01/2021    COVID-19 Vaccine (3 - Booster for Pfizer series) 09/24/2021     Updates were requested from care everywhere.  Chart was reviewed for overdue Proactive [facility] Encounters ([facility]) topics (CRS, Breast Cancer Screening, Eye exam)  Health Maintenance has been updated.  LINKS immunization registry triggered.  Immunizations were reconciled.  Order placed for FIT kit.

## 2022-01-06 ENCOUNTER — OFFICE VISIT (OUTPATIENT)
Dept: CARDIOLOGY | Facility: CLINIC | Age: 62
End: 2022-01-06
Payer: COMMERCIAL

## 2022-01-06 ENCOUNTER — TELEPHONE (OUTPATIENT)
Dept: INTERNAL MEDICINE | Facility: CLINIC | Age: 62
End: 2022-01-06
Payer: COMMERCIAL

## 2022-01-06 VITALS
WEIGHT: 156.5 LBS | DIASTOLIC BLOOD PRESSURE: 80 MMHG | HEART RATE: 90 BPM | OXYGEN SATURATION: 98 % | SYSTOLIC BLOOD PRESSURE: 122 MMHG | BODY MASS INDEX: 28.62 KG/M2

## 2022-01-06 DIAGNOSIS — I44.7 LEFT BUNDLE BRANCH BLOCK: ICD-10-CM

## 2022-01-06 DIAGNOSIS — J31.0 CHRONIC RHINITIS: Primary | ICD-10-CM

## 2022-01-06 DIAGNOSIS — I25.10 CORONARY ARTERY DISEASE, UNSPECIFIED VESSEL OR LESION TYPE, UNSPECIFIED WHETHER ANGINA PRESENT, UNSPECIFIED WHETHER NATIVE OR TRANSPLANTED HEART: ICD-10-CM

## 2022-01-06 DIAGNOSIS — E78.5 HYPERLIPIDEMIA, UNSPECIFIED HYPERLIPIDEMIA TYPE: ICD-10-CM

## 2022-01-06 DIAGNOSIS — E78.1 HYPERTRIGLYCERIDEMIA: Primary | ICD-10-CM

## 2022-01-06 DIAGNOSIS — I42.9 CARDIOMYOPATHY, UNSPECIFIED TYPE: ICD-10-CM

## 2022-01-06 DIAGNOSIS — Z01.810 ENCOUNTER FOR PRE-OPERATIVE CARDIOVASCULAR CLEARANCE: ICD-10-CM

## 2022-01-06 PROCEDURE — 99999 PR PBB SHADOW E&M-EST. PATIENT-LVL III: ICD-10-PCS | Mod: PBBFAC,,, | Performed by: INTERNAL MEDICINE

## 2022-01-06 PROCEDURE — 99215 OFFICE O/P EST HI 40 MIN: CPT | Mod: S$GLB,,, | Performed by: INTERNAL MEDICINE

## 2022-01-06 PROCEDURE — 99215 PR OFFICE/OUTPT VISIT, EST, LEVL V, 40-54 MIN: ICD-10-PCS | Mod: S$GLB,,, | Performed by: INTERNAL MEDICINE

## 2022-01-06 PROCEDURE — 99999 PR PBB SHADOW E&M-EST. PATIENT-LVL III: CPT | Mod: PBBFAC,,, | Performed by: INTERNAL MEDICINE

## 2022-01-06 NOTE — TELEPHONE ENCOUNTER
CBC lab linked to lab work scheduled on 1/21 ----- Message from Tati Aparicio sent at 1/6/2022 11:07 AM CST -----  Contact: [phone]/ self  Who Called: pt   Regarding: requesting lab orders for a CBC   Would the patient rather a call back or a response via [facility]? Call back  Best Call Back Number: [phone]  Additional Information:

## 2022-01-06 NOTE — PROGRESS NOTES
Cardiology Clinic note    Subjective:   Patient ID:  Suly Jaime is a 61 y.o. female who presents for LBBB, CMP FUP    HPI:   LBBB: No CP. SALAZAR as noted below- reports improved. No syncope or near syncope    CMP: Reports cough and SALAZAR for 10 years. Improved. No orthopnea, PND, leg swelling.    CAD: No CP.    Pre-Op Cardiac risk assessment  Procedure: Eye lift  RCRI: 1 (CMP)  METs: >4 (2 flights of stairs with no symptoms - no CP or SALAZAR)    SH Tobacco Quit 2015   Maternal grand mother had MI in her 50s    Patient Active Problem List    Diagnosis Date Noted    Eosinophilic bronchitis 05/29/2020    Post-nasal drip 05/29/2020    Restrictive lung disease 05/29/2020    Chronic cough     Oropharyngeal lesion 02/18/2019    Abdominal pain 08/24/2017       Patient's Medications   New Prescriptions    No medications on file   Previous Medications    BECLOMETHASONE (QVAR) 80 MCG/ACTUATION AERO    Inhale 2 puffs into the lungs 2 (two) times a day. Controller    BUDESONIDE (RINOCORT AQUA) 32 MCG/ACTUATION NASAL SPRAY    1 spray (32 mcg total) by Nasal route once daily.    FLUTICASONE PROPIONATE (FLOVENT HFA) 110 MCG/ACTUATION INHALER    Inhale 2 puffs into the lungs 2 (two) times daily. Controller    GABAPENTIN (NEURONTIN) 300 MG CAPSULE    Take 1 capsule (300 mg total) by mouth 3 (three) times daily as needed (pain).    IBUPROFEN (ADVIL,MOTRIN) 600 MG TABLET    Take 1 tablet (600 mg total) by mouth 3 (three) times daily.    LATANOPROST 0.005 % OPHTHALMIC SOLUTION    INSTILL 1 DROP INTO BOTH EYES AT BEDTIME AS DIRECTED    LEVOCETIRIZINE (XYZAL) 5 MG TABLET    Take 1 tablet (5 mg total) by mouth every evening.    LISINOPRIL (PRINIVIL,ZESTRIL) 2.5 MG TABLET    Take 1 tablet (2.5 mg total) by mouth once daily.    METOPROLOL SUCCINATE (TOPROL-XL) 25 MG 24 HR TABLET    Take 1 tablet (25 mg total) by mouth once daily.    MONTELUKAST (SINGULAIR) 10 MG TABLET    Take 1 tablet (10 mg total) by mouth every evening.     PANTOPRAZOLE (PROTONIX) 40 MG TABLET    Take 1 tablet (40 mg total) by mouth once daily.    VALACYCLOVIR (VALTREX) 1000 MG TABLET    Take 1 tablet (1,000 mg total) by mouth 3 (three) times daily. for 7 days   Modified Medications    No medications on file   Discontinued Medications    No medications on file        Review of Systems   Constitutional: Negative for fever.   HENT: Negative for nosebleeds.    Cardiovascular: Negative for chest pain, dyspnea on exertion, irregular heartbeat, leg swelling, near-syncope, orthopnea, palpitations, paroxysmal nocturnal dyspnea and syncope.        As above   Respiratory: Negative for hemoptysis.    Hematologic/Lymphatic: Negative for bleeding problem.   Musculoskeletal: Negative for arthritis.   Gastrointestinal: Negative for hematochezia.   Genitourinary: Negative for hematuria.   Neurological: Negative for seizures.   Allergic/Immunologic: Negative for environmental allergies.         Objective:   Vitals  Vitals:    01/06/22 0839   BP: 122/80   Pulse: 90   SpO2: 98%   Weight: 71 kg (156 lb 8.4 oz)          Physical Exam  Constitutional:       General: She is not in acute distress.     Appearance: She is well-developed. She is not diaphoretic.   Eyes:      Conjunctiva/sclera: Conjunctivae normal.   Neck:      Vascular: No JVD.   Cardiovascular:      Rate and Rhythm: Normal rate and regular rhythm.      Heart sounds: No murmur heard.  No friction rub. No gallop.    Pulmonary:      Effort: Pulmonary effort is normal. No respiratory distress.      Breath sounds: Normal breath sounds.   Abdominal:      Palpations: Abdomen is soft.      Tenderness: There is no abdominal tenderness.   Musculoskeletal:         General: No swelling.      Cervical back: Normal range of motion.   Skin:     General: Skin is warm.   Neurological:      Mental Status: She is alert.   Psychiatric:         Mood and Affect: Mood normal.             Assessment:     1. Hypertriglyceridemia    2. Encounter for  pre-operative cardiovascular clearance    3. Left bundle branch block    4. Cardiomyopathy, unspecified type    5. Coronary artery disease, unspecified vessel or lesion type, unspecified whether angina present, unspecified whether native or transplanted heart    6. Hyperlipidemia, unspecified hyperlipidemia type        Plan:   Suly Jaime is a 61 y.o. female with no significant cardiac history    EKG personally reviewed. My interpretation:  10/5/21: SR 80s, LBBB (noted incomplete on prior EKGs)    Pre-Op Cardiac risk assessment  Procedure: Eye lift  RCRI: 1 (CMP)  METs: >4 (2 flights of stairs with no symptoms - no CP or SALAZAR)  Low cori-operative risk for cardiac events  No contraindications to surgery such as recent MI, decompensated CHF, tachyarrhythmias or severe obstructive valve disease.     LBBB, CMP  - No signs or symptoms of HF  - Echo Oct 2021: LVEF 35%, GHK. Abn septal motion c/w LBBB. DD. Normal RVSF. PASP 17, CVP 3.  - Metoprolol succinate, lisinopril  - Cardiac CTA:  1.  CAD-RADS 1.  2.  Nonobstructive disease (<25% stenosis) within the proximal RCA.  3.  Possible focal coronary artery dissection also in the proximal RCA.  4.  Myocardial bridge involving the mid LAD.  5.  Coronary calcium score 130 (83rd percentile).  6.  Post processed images are available for review in PACS under the heading Recons: HKD.  - Repeat echo after Jan 19    CAD  As noted above  Aspirin, statin discussed - deferred until post surgery    HLD  Lab Results   Component Value Date    LDLCALC Invalid, Trig>400.0 07/25/2019   Statin discussed - deferred until post surgery    High TG  Repeat fasting lipid profile    Continue with current medical plan and lifestyle changes.    Orders Placed This Encounter   Procedures    Lipid Panel     Standing Status:   Future     Standing Expiration Date:   3/7/2023    Echo     Standing Status:   Future     Standing Expiration Date:   1/20/2023     Order Specific Question:   Release to patient      Answer:   Immediate       Follow up as scheduled  Return sooner for concerns or questions. If symptoms persist go to the ED    She expressed verbal understanding and agreed with the plan      Akanksha Asif MD  Interventional Cardiology  [facility]  Phone: [phone]

## 2022-01-11 ENCOUNTER — OFFICE VISIT (OUTPATIENT)
Dept: URGENT CARE | Facility: CLINIC | Age: 62
End: 2022-01-11
Payer: COMMERCIAL

## 2022-01-11 VITALS
DIASTOLIC BLOOD PRESSURE: 82 MMHG | WEIGHT: 158 LBS | HEART RATE: 97 BPM | HEIGHT: 62 IN | SYSTOLIC BLOOD PRESSURE: 149 MMHG | TEMPERATURE: 98 F | OXYGEN SATURATION: 96 % | RESPIRATION RATE: 18 BRPM | BODY MASS INDEX: 29.08 KG/M2

## 2022-01-11 DIAGNOSIS — U07.1 COVID-19 VIRUS DETECTED: ICD-10-CM

## 2022-01-11 DIAGNOSIS — U07.1 COVID-19 VIRUS INFECTION: Primary | ICD-10-CM

## 2022-01-11 DIAGNOSIS — R05.9 COUGH: ICD-10-CM

## 2022-01-11 LAB
CTP QC/QA: YES
SARS-COV-2 RDRP RESP QL NAA+PROBE: POSITIVE

## 2022-01-11 PROCEDURE — 71046 X-RAY EXAM CHEST 2 VIEWS: CPT | Mod: FY,S$GLB,, | Performed by: RADIOLOGY

## 2022-01-11 PROCEDURE — U0002 COVID-19 LAB TEST NON-CDC: HCPCS | Mod: QW,S$GLB,, | Performed by: NURSE PRACTITIONER

## 2022-01-11 PROCEDURE — 71046 XR CHEST PA AND LATERAL: ICD-10-PCS | Mod: FY,S$GLB,, | Performed by: RADIOLOGY

## 2022-01-11 PROCEDURE — 99214 OFFICE O/P EST MOD 30 MIN: CPT | Mod: S$GLB,,, | Performed by: NURSE PRACTITIONER

## 2022-01-11 PROCEDURE — 99214 PR OFFICE/OUTPT VISIT, EST, LEVL IV, 30-39 MIN: ICD-10-PCS | Mod: S$GLB,,, | Performed by: NURSE PRACTITIONER

## 2022-01-11 PROCEDURE — U0002: ICD-10-PCS | Mod: QW,S$GLB,, | Performed by: NURSE PRACTITIONER

## 2022-01-11 NOTE — PROGRESS NOTES
"Subjective:       Patient ID: Suly Jaime is a 62 y.o. female.    Vitals:  height is 5' 2" (1.575 m) and weight is 71.7 kg (158 lb). Her temperature is 98.3 °F (36.8 °C). Her blood pressure is 149/82 (abnormal) and her pulse is 97. Her respiration is 18 and oxygen saturation is 96%.     Chief Complaint: Cough, Fatigue, and Headache    Patient presents to clinic with cough/congestion/head aches/fatigue x 5 days.  States symptoms are improving.  No fever. Nonproductive cough.  No sob.     Cough  This is a new problem. The current episode started in the past 7 days. The problem has been unchanged. The problem occurs constantly. The cough is non-productive. Associated symptoms include headaches, nasal congestion and postnasal drip. Pertinent negatives include no chills, fever or shortness of breath. The symptoms are aggravated by lying down. Treatments tried: Vicks Nite Time Cold Excedrine Migraine. The treatment provided mild relief.   Fatigue  Associated symptoms include congestion, coughing, fatigue and headaches. Pertinent negatives include no chills, diaphoresis or fever.   Headache   Associated symptoms include coughing. Pertinent negatives include no fever.       Constitution: Positive for fatigue. Negative for chills, sweating and fever.   HENT: Positive for congestion and postnasal drip.    Cardiovascular: Negative for sob on exertion.   Respiratory: Positive for cough. Negative for shortness of breath.    Neurological: Positive for headaches.       Objective:      Physical Exam   Constitutional: She is oriented to person, place, and time. She appears well-developed. She is cooperative.  Non-toxic appearance. She does not appear ill. No distress.      Comments:ambulatory   HENT:   Nose: Mucosal edema, rhinorrhea and congestion present. No purulent discharge. Right sinus exhibits no maxillary sinus tenderness and no frontal sinus tenderness. Left sinus exhibits no maxillary sinus tenderness and no frontal sinus " tenderness.   Mouth/Throat: Uvula is midline and mucous membranes are normal. Mucous membranes are moist. No uvula swelling. Posterior oropharyngeal erythema (mild) present. No oropharyngeal exudate. No tonsillar exudate.   Eyes: Conjunctivae are normal.      extraocular movement intact   Pulmonary/Chest: Effort normal. No respiratory distress.   Neurological: She is alert and oriented to person, place, and time.   Skin: Skin is warm, dry and not diaphoretic.   Psychiatric: Her behavior is normal.   Nursing note and vitals reviewed.        Results for orders placed or performed in visit on 01/11/22   POCT COVID-19 Rapid Screening   Result Value Ref Range    POC Rapid COVID Positive (A) Negative     Acceptable Yes          X-Ray Chest PA And Lateral    Result Date: 1/11/2022  EXAMINATION: XR CHEST PA AND LATERAL CLINICAL HISTORY: Cough, unspecified TECHNIQUE: PA and lateral views of the chest were performed. COMPARISON: 03/18/2020 FINDINGS: The heart size appears normal.  There is mild unfolding and atherosclerotic stigmata of the aorta.  No new infiltrates or pleural effusions are seen.  The bones appear unremarkable for the age of the patient, with mild hypertrophic and/or degenerative changes.  No significant interval change since prior exam noted.     No acute pulmonary pathology identified. Electronically signed by: Elvia Estrada Date:    01/11/2022 Time:    11:39    CTA Cardiac    Addendum Date: 1/3/2022    CLINICAL HISTORY: The patient is a 61 year old woman with shortness of breath, LBBB and a new onset cardiomyopathy.  One hundred SCAN PARAMETERS: The patient was imaged on a [employer] 64 slice scanner with submillimeter cuts. Prospective gating was utilized. Tube voltage 100 kV, tube current 50 mA, total DLP (dose-length product) 331 mGy-cm and estimated dose to the patient 4.6 mSv. Quality: Adequate with mild slice misregistration artifact. FINDINGS: Coronary artery calcium score Coronary artery  calcium scoring was performed according to the Agatston protocol. The score is 130 (LM 27, LAD 1, LCx 23, RCA 78) which places the patient in the 83rd percentile for age and gender. Coronary angiography Dominance: Right. LM: The left main coronary artery arises from the left sinus of Valsalva. It divides into the LAD, ramus and LCx arteries. There is mild calcified plaque at the ostium with minimal (<25%) stenosis. LAD: The left anterior descending artery is a moderate size vessel that reaches the apex.  A tiny plaque is present within proximal segment without visible narrowing. There is a myocardial bridge involving the mid segment.  There are 2 diagonal (D) branches. D1 is a relatively small vessel with a high origin and appears normal.  D2 is a larger vessel and is normal. Ramus: The ramus intermedius is a moderate size vessel and is normal. LCx: The left circumflex artery is a relatively large vessel that runs in the AV groove.  It is normal.  The 1st obtuse marginal (OM) branch is a large vessel and is normal. RCA: The right coronary artery arises from the right sinus of Valsalva. It is a relatively large vessel with marginal, posterior descending (PDA) and posterolateral branches.  There is a mild calcified plaque in the proximal segment with minimal (< 25%) stenosis.  There appears to be a focal (8 mm long) dissection also in the proximal segment beyond the nonobstructive plaque. Although I cannot entirely exclude an artifact, the abnormality is visible in multiple phases making an artifact less likely.  The ongoing RCA is absolutely normal.  The PDA is clearly visualized and is normal. Cardiac and great vessel morphology The ascending aorta is normal in size measuring 3.3 x 3.5 cm. The aortic root is normal in size with a sinus-commissure measurement of 2.8 cm and a sinus-sinus measurement of 3.0 cm. The descending thoracic aorta is normal in size.  No atheroma or dissection is visualized within the field of  view. The pulmonary artery is normal in size. The aortic valve appears trileaflet and normal. The left atrial appendage is normal without a filling defect. The interatrial septal shelf is present.  Although not clearly visible I suspect there is a patent foramina ovale (PFO).  The pericardium is normal. Pulmonary venous drainage is normal. CONCLUSION: 1.  CAD-RADS 1. 2.  Nonobstructive disease (<25% stenosis) within the proximal RCA. 3.  Possible focal coronary artery dissection also in the proximal RCA. 4.  Myocardial bridge involving the mid LAD. 5.  Coronary calcium score 130 (83rd percentile). 6.  Post processed images are available for review in PACS under the heading Recons: HKD. Electronically signed by: Ethan Rosen MD Date:    01/03/2022 Time:    18:37    Result Date: 1/3/2022  EXAMINATION: CTA CARDIAC CLINICAL HISTORY: Dyspnea on exertion;Dyspnea, unspecified TECHNIQUE: Technique: The study was performed on a GE 64 slice scanner or [employer] 320 detector scanner.  A dedicated cardiac CT protocol was used with prospective gating.  Please see the nurses notes for information regarding vital signs and medications administered. For noncontrast and contrast enhanced sequences, the data was acquired from above the pulmonary valve through the posterior wall of the left ventricle without and with intravenous contrast medium.  Contrast enhancement was accomplished with 100 cc of Omnipaque 350.  Analysis of the data and image reconstruction was performed. COMPARISON: CT chest 06/02/2020.  Chest radiographs 03/18/2020, 03/26/2019. FINDINGS: Only nonvascular portions of this cardiac CTA will be interpreted.  Cardiovascular portions of this cardiac CTA will be reported by cardiology. Extracardiac findings Pulmonary arteries: Normal diameter. No pulmonary embolism. Thoracic aorta: Normal. Aortic root: normal. Ascending aorta: normal. Aortic arch: Normal. Descending thoracic aorta: Normal Lungs: Stable appearance of  multiple bilateral subcentimeter solid pulmonary nodules (series 306; images 17, 37, 42).  Trace dependent atelectasis bilaterally. Upper abdomen: Normal. Other findings: None.     Cardiovascular portions of the cardiac CTA will be reported by cardiology. Stable bilateral subcentimeter solid pulmonary nodules.  Stable appearance dating as far back as June 2020 suggests benign etiology. Electronically signed by resident: Brian Zarate Date:    01/03/2022 Time:    12:47 Electronically signed by: Elvia Estrada Date:    01/03/2022 Time:    14:20    Assessment:       1. COVID-19 virus infection    2. Cough          Plan:         COVID-19 virus infection  -     X-Ray Chest PA And Lateral; Future; Expected date: 01/11/2022  -     Ambulatory referral/consult to [facility]    Cough  -     POCT COVID-19 Rapid Screening  -     X-Ray Chest PA And Lateral; Future; Expected date: 01/11/2022  -     Ambulatory referral/consult to [facility]         Automated covid risk score: 2

## 2022-01-11 NOTE — PATIENT INSTRUCTIONS
Return to Urgent Care or go to ER if symptoms worsen or fail to improve.  Follow up with PCP as recommended for further management.     Your symptoms should begin to improve by Day 5-7 of the infection-- if symptoms worsen, you develop a new fever, shortness of breath, worsening shortness of breath with activity, chest pain, worsening cough, you must return to Urgent Care or go to the ER.    THE FOLLOWING ARE RECOMMENDED TO HELP YOU MANAGE YOUR SYMPTOMS:    Take dextromethorphan cough syrup OTC (Brand Names: Delsym or Robitussin) according to label instructions for cough.     Take Acetaminophen according to label instructions for fever, throat pain, headache, and body aches    Use warm salt water gargles to ease your throat pain. Warm salt water gargles as needed for sore throat-  1/2 tsp salt to 1 cup warm water, gargle as desired.      Patient Education       COVID-19 Discharge Instructions   About this topic   Coronavirus disease 2019 is also known as COVID-19. It is a viral illness that infects the lungs. It is caused by a virus called SARS-associated coronavirus (SARS-CoV-2).  The signs of COVID-19 most often start a few days after you have been infected. In some people, it takes longer to show signs. Others never show signs of the infection. You may have a cough, fever, shaking chills and it may be hard to breathe. You may be very tired, have muscle aches, a headache or sore throat. Some people have an upset stomach or loose stools. Others lose their sense of smell or taste. You may not have these signs all the time and they may come and go while you are sick.  The virus spreads easily through droplets when you talk, sneeze, or cough. You can pass the virus to others when you are talking close together, singing, hugging, sharing food, or shaking hands. Doctors believe the germs also survive on surfaces like tables, door handles, and telephones. However, this is not a common way that COVID-19 spreads. Doctors  believe you can also spread the infection even if you dont have any symptoms, but they do not know how that happens. This is why getting vaccinated is one of the best ways to keep you healthy and slow the spread of the virus.  Some people have a mild case of COVID-19 and are able to stay at home and away from others until they feel better. Others may need to be in the hospital if they are very sick. Some people with COVID-19 can have some symptoms for weeks or months. People with COVID-19 must isolate themselves. You can start to be around others when your doctor says it is safe to do so.       What care is needed at home?   · Ask your doctor what you need to do when you go home. Make sure you ask questions if you do not understand what the doctor says.  · Drink lots of water, juice, or broth to replace fluids lost from a fever.  · You may use cool mist humidifiers to help ease congestion and coughing.  · Use 2 to 3 pillows to prop yourself up when you lie down to make it easier to breathe and sleep.  · Do not smoke and do not drink beer, wine, and mixed drinks (alcohol).  · To lower the chance of passing the infection to others, get a COVID-19 vaccine after your infection has resolved.  · If you have not been fully vaccinated:  ? Wear a mask over your mouth and nose if you are around others who are not sick. Cloth masks work best if they have more than one layer of fabric.  ? Wash your hands often.  ? Stay home in a separate room, if possible, away from others. Only go out to get medical care.  ? Use a separate bathroom if possible.  ? Do not make food for others.  What follow-up care is needed?   · Your doctor may ask you to make visits to the office to check on your progress. Be sure to keep these visits. Make sure you wear a mask at these visits.  · If you can, tell the staff you have COVID-19 ahead of time so they can take extra care to stop the disease from spreading.  · It may take a few weeks before your  health returns to normal.  What drugs may be needed?   The doctor may order drugs to:  · Help with breathing  · Help with fever  · Help with swelling in your airways and lungs  · Control coughing  · Ease a sore throat  · Help a runny or stuffy nose  Will physical activity be limited?   You may have to limit your physical activity. Talk to your doctor about the right amount of activity for you. If you have been very sick with COVID-19, it can take some time to get your strength back.  Will there be any other care needed?   Doctors do not know how long you can pass the virus on to others after you are sick. This is why it is important to stay in a separate room, if possible, when you are sick. For now, doctors are giving general guidelines for you to follow after you have been sick. Before you go around other people, you should:  · Be fever free for 24 hours without taking any drugs to lower the fever  · Have no symptoms of cough or shortness of breath  · Wait at least 10 days after first having symptoms or your first positive test, and you need to be symptom free as above. Some experts suggest waiting 20 days if you have had a more severe infection.  Talk with your doctor about getting a COVID-19 vaccine.  What problems could happen?   · Fluid loss. This is dehydration.  · Short-term or long-term lung damage  · Heart problems  · Death  When do I need to call the doctor?   · You are having so much trouble breathing that you can only say one or two words at a time.  · You need to sit upright at all times to be able to breathe and/or cannot lie down.  · You are very confused or cannot stay awake.  · Your lips or skin start to turn blue or grey.  · You think you might be having a medical emergency. Some examples of medical emergencies are:  ? Severe chest pain.  ? Not able to speak or move normally.  · You have trouble breathing when talking or sitting still.  · You have new shortness of breath.  · You become weak or  dizzy.  · You have very dark urine or do not pass urine for more than 8 hours.  · You have new or worsening COVID-19 symptoms like:  ? Fever  ? Cough  ? Feeling very tired  ? Shaking chills  ? Headache  ? Trouble swallowing  ? Throwing up  ? Loose stools  ? Reddish purple spots on your fingers or toes  Teach Back: Helping You Understand   The Teach Back Method helps you understand the information we are giving you. After you talk with the staff, tell them in your own words what you learned. This helps to make sure the staff has described each thing clearly. It also helps to explain things that may have been confusing. Before going home, make sure you can do these:  · I can tell you about my condition.  · I can tell you what may help ease my breathing.  · I can tell you what I can do to help avoid passing the infection to others.  · I can tell you what I will do if I have trouble breathing; feel sleepy or confused; or my fingertips, fingernails, skin, or lips are blue.  Where can I learn more?   Centers for Disease Control and Prevention  https://www.cdc.gov/coronavirus/2019-ncov/about/index.html   Centers for Disease Control and Prevention  https://www.cdc.gov/coronavirus/2019-ncov/hcp/disposition-in-home-patients.html   World Health Organization  https://www.who.int/news-room/q-a-detail/n-s-kuomkwzbbcmxf   Last Reviewed Date   2021-10-05  Consumer Information Use and Disclaimer   This information is not specific medical advice and does not replace information you receive from your health care provider. This is only a brief summary of general information. It does NOT include all information about conditions, illnesses, injuries, tests, procedures, treatments, therapies, discharge instructions or life-style choices that may apply to you. You must talk with your health care provider for complete information about your health and treatment options. This information should not be used to decide whether or not to accept  your health care providers advice, instructions or recommendations. Only your health care provider has the knowledge and training to provide advice that is right for you.  Copyright   Copyright © 2021 [employer] and its affiliates and/or licensors. All rights reserved.

## 2022-01-14 ENCOUNTER — PATIENT MESSAGE (OUTPATIENT)
Dept: CARDIOLOGY | Facility: CLINIC | Age: 62
End: 2022-01-14
Payer: COMMERCIAL

## 2022-01-14 NOTE — TELEPHONE ENCOUNTER
Called pt in regards to the pt portal message.   Informed pt Dr. Asif would like an updated ECHO and the ECHO is different type of test than CTA    Pt expressed understanding  No further questions     ND

## 2022-01-17 ENCOUNTER — INFUSION (OUTPATIENT)
Dept: INFECTIOUS DISEASES | Facility: HOSPITAL | Age: 62
End: 2022-01-17
Attending: INTERNAL MEDICINE
Payer: COMMERCIAL

## 2022-01-17 VITALS
DIASTOLIC BLOOD PRESSURE: 75 MMHG | SYSTOLIC BLOOD PRESSURE: 121 MMHG | RESPIRATION RATE: 20 BRPM | HEART RATE: 87 BPM | WEIGHT: 150 LBS | HEIGHT: 62 IN | TEMPERATURE: 99 F | OXYGEN SATURATION: 98 % | BODY MASS INDEX: 27.6 KG/M2

## 2022-01-17 DIAGNOSIS — U07.1 COVID-19: Primary | ICD-10-CM

## 2022-01-17 PROCEDURE — M0243 CASIRIVI AND IMDEVI INFUSION: HCPCS | Performed by: INTERNAL MEDICINE

## 2022-01-17 PROCEDURE — 25000003 PHARM REV CODE 250: Performed by: INTERNAL MEDICINE

## 2022-01-17 PROCEDURE — 63600175 PHARM REV CODE 636 W HCPCS: Performed by: INTERNAL MEDICINE

## 2022-01-17 RX ORDER — SODIUM CHLORIDE 0.9 % (FLUSH) 0.9 %
10 SYRINGE (ML) INJECTION
Status: DISCONTINUED | OUTPATIENT
Start: 2022-01-17 | End: 2022-06-07 | Stop reason: HOSPADM

## 2022-01-17 RX ORDER — ACETAMINOPHEN 325 MG/1
650 TABLET ORAL ONCE AS NEEDED
Status: DISCONTINUED | OUTPATIENT
Start: 2022-01-17 | End: 2022-06-07 | Stop reason: HOSPADM

## 2022-01-17 RX ORDER — ONDANSETRON 4 MG/1
4 TABLET, ORALLY DISINTEGRATING ORAL ONCE AS NEEDED
Status: DISCONTINUED | OUTPATIENT
Start: 2022-01-17 | End: 2022-06-07 | Stop reason: HOSPADM

## 2022-01-17 RX ORDER — DIPHENHYDRAMINE HYDROCHLORIDE 50 MG/ML
25 INJECTION INTRAMUSCULAR; INTRAVENOUS ONCE AS NEEDED
Status: DISCONTINUED | OUTPATIENT
Start: 2022-01-17 | End: 2022-06-07 | Stop reason: HOSPADM

## 2022-01-17 RX ORDER — ALBUTEROL SULFATE 90 UG/1
2 AEROSOL, METERED RESPIRATORY (INHALATION)
Status: DISCONTINUED | OUTPATIENT
Start: 2022-01-17 | End: 2022-12-12

## 2022-01-17 RX ORDER — EPINEPHRINE 0.3 MG/.3ML
0.3 INJECTION SUBCUTANEOUS
Status: DISCONTINUED | OUTPATIENT
Start: 2022-01-17 | End: 2022-12-12

## 2022-01-17 RX ADMIN — CASIRIVIMAB AND IMDEVIMAB 600 MG: 600; 600 INJECTION, SOLUTION, CONCENTRATE INTRAVENOUS at 11:01

## 2022-01-17 NOTE — PROGRESS NOTES
Patient arrives for casirivimab/ imdevimab infusion. Ambulatory. Pt AAox3. No distress noted. RR even and unlabored.     Symptoms and onset date: 07/07/2022 cough, chills, fever, tired    Tested COVID + on 01/11/2022

## 2022-01-21 ENCOUNTER — HOSPITAL ENCOUNTER (OUTPATIENT)
Dept: CARDIOLOGY | Facility: HOSPITAL | Age: 62
Discharge: HOME | End: 2022-01-21
Attending: INTERNAL MEDICINE
Payer: COMMERCIAL

## 2022-01-21 VITALS — HEIGHT: 62 IN | WEIGHT: 150 LBS | BODY MASS INDEX: 27.6 KG/M2

## 2022-01-21 DIAGNOSIS — I42.9 CARDIOMYOPATHY, UNSPECIFIED TYPE: ICD-10-CM

## 2022-01-21 LAB
AORTIC ROOT ANNULUS: 2.71 CM
ASCENDING AORTA: 2.49 CM
AV INDEX (PROSTH): 0.79
AV MEAN GRADIENT: 4 MMHG
AV PEAK GRADIENT: 7 MMHG
AV VALVE AREA: 2.74 CM2
AV VELOCITY RATIO: 0.72
BSA FOR ECHO PROCEDURE: 1.73 M2
CV ECHO LV RWT: 0.28 CM
DOP CALC AO PEAK VEL: 1.34 M/S
DOP CALC AO VTI: 21.02 CM
DOP CALC LVOT AREA: 3.5 CM2
DOP CALC LVOT DIAMETER: 2.1 CM
DOP CALC LVOT PEAK VEL: 0.96 M/S
DOP CALC LVOT STROKE VOLUME: 57.67 CM3
DOP CALC MV VTI: 19.75 CM
DOP CALCLVOT PEAK VEL VTI: 16.66 CM
E WAVE DECELERATION TIME: 122.84 MSEC
E/A RATIO: 0.79
E/E' RATIO: 14.2 M/S
ECHO LV POSTERIOR WALL: 0.73 CM (ref 0.6–1.1)
EJECTION FRACTION: 35 %
FRACTIONAL SHORTENING: 20 % (ref 28–44)
INTERVENTRICULAR SEPTUM: 0.62 CM (ref 0.6–1.1)
IVRT: 66.6 MSEC
LA MAJOR: 4.45 CM
LA MINOR: 4.9 CM
LA WIDTH: 3.54 CM
LEFT ATRIUM SIZE: 2.92 CM
LEFT ATRIUM VOLUME INDEX MOD: 26.4 ML/M2
LEFT ATRIUM VOLUME INDEX: 24.2 ML/M2
LEFT ATRIUM VOLUME MOD: 44.64 CM3
LEFT ATRIUM VOLUME: 40.98 CM3
LEFT INTERNAL DIMENSION IN SYSTOLE: 4.25 CM (ref 2.1–4)
LEFT VENTRICLE DIASTOLIC VOLUME INDEX: 79.36 ML/M2
LEFT VENTRICLE DIASTOLIC VOLUME: 134.12 ML
LEFT VENTRICLE MASS INDEX: 71 G/M2
LEFT VENTRICLE SYSTOLIC VOLUME INDEX: 47.8 ML/M2
LEFT VENTRICLE SYSTOLIC VOLUME: 80.85 ML
LEFT VENTRICULAR INTERNAL DIMENSION IN DIASTOLE: 5.28 CM (ref 3.5–6)
LEFT VENTRICULAR MASS: 120.6 G
LV LATERAL E/E' RATIO: 11.83 M/S
LV SEPTAL E/E' RATIO: 17.75 M/S
MV PEAK A VEL: 0.9 M/S
MV PEAK E VEL: 0.71 M/S
MV PEAK GRADIENT: 4 MMHG
MV STENOSIS PRESSURE HALF TIME: 35.62 MS
MV VALVE AREA BY CONTINUITY EQUATION: 2.92 CM2
MV VALVE AREA P 1/2 METHOD: 6.18 CM2
PISA MRMAX VEL: 0.04 M/S
PISA TR MAX VEL: 2.15 M/S
PULM VEIN S/D RATIO: 0.86
PV PEAK D VEL: 0.37 M/S
PV PEAK S VEL: 0.32 M/S
PV PEAK VELOCITY: 0.74 CM/S
RA MAJOR: 4.17 CM
RA PRESSURE: 3 MMHG
RA WIDTH: 3.37 CM
RIGHT VENTRICULAR END-DIASTOLIC DIMENSION: 2.03 CM
RV TISSUE DOPPLER FREE WALL SYSTOLIC VELOCITY 1 (APICAL 4 CHAMBER VIEW): 13.19 CM/S
STJ: 2.63 CM
TDI LATERAL: 0.06 M/S
TDI SEPTAL: 0.04 M/S
TDI: 0.05 M/S
TR MAX PG: 18 MMHG
TV REST PULMONARY ARTERY PRESSURE: 21 MMHG

## 2022-01-21 PROCEDURE — 93306 TTE W/DOPPLER COMPLETE: CPT | Mod: 26,,, | Performed by: INTERNAL MEDICINE

## 2022-01-21 PROCEDURE — 93306 ECHO (CUPID ONLY): ICD-10-PCS | Mod: 26,,, | Performed by: INTERNAL MEDICINE

## 2022-01-21 PROCEDURE — 93306 TTE W/DOPPLER COMPLETE: CPT

## 2022-02-07 ENCOUNTER — TELEPHONE (OUTPATIENT)
Dept: INTERNAL MEDICINE | Facility: CLINIC | Age: 62
End: 2022-02-07
Payer: COMMERCIAL

## 2022-02-07 NOTE — TELEPHONE ENCOUNTER
----- Message from Justus Santiago sent at 2/7/2022  1:50 PM CST -----  Contact: Pt self [phone]  Type: Patient Call Back         Who called: Pt self          What is the request in detail:Pt states she needs pre procedural Lab work put in for today for BMP.         Can the clinic reply by [facility]?No         Would the patient rather a call back or a response via [facility]? Call back          Best call back number:[phone]         Additional Information:            Thank You

## 2022-02-10 ENCOUNTER — TELEPHONE (OUTPATIENT)
Dept: ADMINISTRATIVE | Facility: OTHER | Age: 62
End: 2022-02-10
Payer: COMMERCIAL

## 2022-02-17 DIAGNOSIS — I42.9 CARDIOMYOPATHY, UNSPECIFIED TYPE: Primary | ICD-10-CM

## 2022-02-17 NOTE — PROGRESS NOTES
Discussed echo with patient re pumping function of heart similar to prior echo. Recommend EP (rhythm specialist) referral. Orders placed

## 2022-02-18 ENCOUNTER — TELEPHONE (OUTPATIENT)
Dept: CARDIOLOGY | Facility: CLINIC | Age: 62
End: 2022-02-18
Payer: COMMERCIAL

## 2022-02-18 NOTE — TELEPHONE ENCOUNTER
Called pt back in regards to this message.   Informed pt of results    Scheduled follow up with Dr. Tovar on 03-24-22 at 10 am    Scheduled follow up with Dr. Asif on 03-31-22 at 10 am    mailed appt reminders to pt    ND

## 2022-02-18 NOTE — TELEPHONE ENCOUNTER
----- Message from Akanksha Asif MD sent at 2/17/2022  8:22 AM CST -----  Discussed echo with patient re pumping function of heart similar to prior echo. Recommend EP (rhythm specialist) referral. Orders placed

## 2022-02-18 NOTE — TELEPHONE ENCOUNTER
----- Message from Akanksha Asif MD sent at 2/17/2022  8:16 AM CST -----  Please call patient re triglycerides in a better range. Based on LDL levels, still recommend statin medication discussed last clinic appointment. Can schedule appointment if would like to discuss further before starting thanks

## 2022-03-14 ENCOUNTER — PATIENT OUTREACH (OUTPATIENT)
Dept: ADMINISTRATIVE | Facility: OTHER | Age: 62
End: 2022-03-14
Payer: COMMERCIAL

## 2022-03-18 DIAGNOSIS — I42.9 CARDIOMYOPATHY, UNSPECIFIED TYPE: Primary | ICD-10-CM

## 2022-03-24 ENCOUNTER — OFFICE VISIT (OUTPATIENT)
Dept: CARDIOLOGY | Facility: CLINIC | Age: 62
End: 2022-03-24
Payer: COMMERCIAL

## 2022-03-24 VITALS
WEIGHT: 149.94 LBS | SYSTOLIC BLOOD PRESSURE: 136 MMHG | DIASTOLIC BLOOD PRESSURE: 92 MMHG | BODY MASS INDEX: 27.59 KG/M2 | HEART RATE: 88 BPM | HEIGHT: 62 IN

## 2022-03-24 DIAGNOSIS — I50.22 CHF (CONGESTIVE HEART FAILURE), NYHA CLASS II, CHRONIC, SYSTOLIC: ICD-10-CM

## 2022-03-24 DIAGNOSIS — I42.8 CARDIOMYOPATHY, NONISCHEMIC: ICD-10-CM

## 2022-03-24 DIAGNOSIS — I42.9 CARDIOMYOPATHY, UNSPECIFIED TYPE: ICD-10-CM

## 2022-03-24 PROCEDURE — 99245 OFF/OP CONSLTJ NEW/EST HI 55: CPT | Mod: S$GLB,,, | Performed by: INTERNAL MEDICINE

## 2022-03-24 PROCEDURE — 93005 RHYTHM STRIP: ICD-10-PCS | Mod: S$GLB,,, | Performed by: INTERNAL MEDICINE

## 2022-03-24 PROCEDURE — 93010 ELECTROCARDIOGRAM REPORT: CPT | Mod: S$GLB,,, | Performed by: INTERNAL MEDICINE

## 2022-03-24 PROCEDURE — 93005 ELECTROCARDIOGRAM TRACING: CPT | Mod: S$GLB,,, | Performed by: INTERNAL MEDICINE

## 2022-03-24 PROCEDURE — 93010 RHYTHM STRIP: ICD-10-PCS | Mod: S$GLB,,, | Performed by: INTERNAL MEDICINE

## 2022-03-24 PROCEDURE — 99245 PR OFFICE CONSULTATION,LEVEL V: ICD-10-PCS | Mod: S$GLB,,, | Performed by: INTERNAL MEDICINE

## 2022-03-24 PROCEDURE — 99999 PR PBB SHADOW E&M-EST. PATIENT-LVL V: ICD-10-PCS | Mod: PBBFAC,,, | Performed by: INTERNAL MEDICINE

## 2022-03-24 PROCEDURE — 99999 PR PBB SHADOW E&M-EST. PATIENT-LVL V: CPT | Mod: PBBFAC,,, | Performed by: INTERNAL MEDICINE

## 2022-03-24 RX ORDER — TOBRAMYCIN AND DEXAMETHASONE 3; 1 MG/ML; MG/ML
SUSPENSION/ DROPS OPHTHALMIC
COMMUNITY
Start: 2022-03-08 | End: 2022-12-12

## 2022-03-24 RX ORDER — LISINOPRIL 5 MG/1
5 TABLET ORAL DAILY
Qty: 90 TABLET | Refills: 3 | Status: SHIPPED | OUTPATIENT
Start: 2022-03-24 | End: 2022-03-24 | Stop reason: SDUPTHER

## 2022-03-24 RX ORDER — LISINOPRIL 5 MG/1
5 TABLET ORAL DAILY
Qty: 90 TABLET | Refills: 3 | Status: SHIPPED | OUTPATIENT
Start: 2022-03-24 | End: 2022-05-30

## 2022-03-24 RX ORDER — CEPHALEXIN 500 MG/1
500 TABLET ORAL 3 TIMES DAILY
Status: ON HOLD | COMMUNITY
Start: 2022-01-27 | End: 2022-05-30 | Stop reason: HOSPADM

## 2022-03-24 RX ORDER — ONDANSETRON 8 MG/1
8 TABLET, ORALLY DISINTEGRATING ORAL EVERY 8 HOURS PRN
Status: ON HOLD | COMMUNITY
Start: 2022-01-27 | End: 2022-05-30 | Stop reason: HOSPADM

## 2022-03-24 NOTE — PROGRESS NOTES
Subjective:    Patient ID:  Suly aJime is a 62 y.o. female who presents for evaluation of NICM    HPI   62 y.o. F retired   nonobstructive CAD and possible dissection on CTA  IVCD  chronic bronchitis    She c/o NYHA II sx: stops walking after exerts self a bit. No CP.    10/2021 35% LVEF.  1/2022 35%  CTA: nonobstructive RCA CAD and possible RCA dissection    My interpretation of today's ECG is NSR. IVCD with QRSd 114 ms.    Review of Systems   Constitutional: Negative. Negative for malaise/fatigue.   HENT: Negative.  Negative for ear pain and tinnitus.    Eyes: Negative for blurred vision.   Cardiovascular: Positive for dyspnea on exertion. Negative for chest pain, near-syncope, palpitations and syncope.   Respiratory: Negative.  Negative for shortness of breath.    Endocrine: Negative.  Negative for polyuria.   Hematologic/Lymphatic: Does not bruise/bleed easily.   Skin: Negative.  Negative for rash.   Musculoskeletal: Negative.  Negative for joint pain and muscle weakness.   Gastrointestinal: Negative.  Negative for abdominal pain and change in bowel habit.   Genitourinary: Negative for frequency.   Neurological: Negative.  Negative for dizziness and weakness.   Psychiatric/Behavioral: Negative.  Negative for depression. The patient is not nervous/anxious.    Allergic/Immunologic: Negative for environmental allergies.        Objective:    Physical Exam  Vitals and nursing note reviewed.   Constitutional:       Appearance: Normal appearance. She is well-developed.   HENT:      Head: Normocephalic and atraumatic.   Eyes:      Conjunctiva/sclera: Conjunctivae normal.   Neck:      Thyroid: No thyroid mass or thyromegaly.      Trachea: No tracheal deviation.   Cardiovascular:      Rate and Rhythm: Normal rate and regular rhythm.   Pulmonary:      Effort: Pulmonary effort is normal. No respiratory distress.      Breath sounds: Normal breath sounds. No wheezing.   Abdominal:      General: There is no  distension.   Musculoskeletal:         General: Normal range of motion.      Cervical back: Normal range of motion. No edema.   Skin:     General: Skin is warm and dry.      Findings: No rash.   Neurological:      Mental Status: She is alert and oriented to person, place, and time.      Coordination: Coordination normal.   Psychiatric:         Speech: Speech normal.         Behavior: Behavior normal. Behavior is cooperative.         Thought Content: Thought content normal.           Assessment:       1. Cardiomyopathy, unspecified type    2. Cardiomyopathy, nonischemic    3. CHF (congestive heart failure), NYHA class II, chronic, systolic         Plan:       This patient has a chronic nonischemic systolic cardiomyopathy with an LVEF of 35% with NYHA class 2 heart failure symptoms that has persisted despite over 3 months of maximally tolerated goal directed medical therapy.    PET stress to fully evaluate ? ischemia, given unusual cardiac CT.    I spent about a half hour discussing the risks and benefits of ICD placement and testing. Our discussion of risks included (but was not limited to) the possibility of infection, death, stroke, MI, pneumothorax, embolism, cardiac perforation, cardiac tamponade, renal injury, and bleeding.  I discussed with patient risks, indications, benefits, and alternatives of the planned procedure. All questions were answered. Patient understands and wishes to proceed.    Plan single-chamber SJM ICD.

## 2022-03-25 ENCOUNTER — PATIENT MESSAGE (OUTPATIENT)
Dept: CARDIOLOGY | Facility: CLINIC | Age: 62
End: 2022-03-25
Payer: COMMERCIAL

## 2022-03-26 ENCOUNTER — PATIENT MESSAGE (OUTPATIENT)
Dept: CARDIOLOGY | Facility: CLINIC | Age: 62
End: 2022-03-26
Payer: COMMERCIAL

## 2022-03-28 ENCOUNTER — PATIENT MESSAGE (OUTPATIENT)
Dept: CARDIOLOGY | Facility: CLINIC | Age: 62
End: 2022-03-28
Payer: COMMERCIAL

## 2022-03-28 ENCOUNTER — TELEPHONE (OUTPATIENT)
Dept: CARDIOLOGY | Facility: HOSPITAL | Age: 62
End: 2022-03-28
Payer: COMMERCIAL

## 2022-03-28 DIAGNOSIS — I42.8 CARDIOMYOPATHY, NONISCHEMIC: Primary | ICD-10-CM

## 2022-03-28 NOTE — TELEPHONE ENCOUNTER
This was discussed with Dr Tovar when you saw him in clinic. It seems to be similar, with some minor changes. I would not say it has resolved

## 2022-03-29 ENCOUNTER — PATIENT MESSAGE (OUTPATIENT)
Dept: CARDIOLOGY | Facility: CLINIC | Age: 62
End: 2022-03-29
Payer: COMMERCIAL

## 2022-03-29 ENCOUNTER — PATIENT MESSAGE (OUTPATIENT)
Dept: ELECTROPHYSIOLOGY | Facility: CLINIC | Age: 62
End: 2022-03-29
Payer: COMMERCIAL

## 2022-03-29 ENCOUNTER — TELEPHONE (OUTPATIENT)
Dept: CARDIOLOGY | Facility: CLINIC | Age: 62
End: 2022-03-29
Payer: COMMERCIAL

## 2022-03-29 NOTE — TELEPHONE ENCOUNTER
Called pt back in regards to this message.     Informed pt of Dr. Asif's responses through the pt portal     Reminded pt of Pet stress test tomorrow and follow up with Dr. Asif on Thursday 03-31-22    No further questions at this time    ND

## 2022-03-29 NOTE — TELEPHONE ENCOUNTER
----- Message from Rodger Covarrubias sent at 3/29/2022  8:25 AM CDT -----  Type:  Needs Medical Advice    Who Called: self  Reason:regarding appointment   Would the patient rather a call back or a response via [facility]? call  Best Call Back Number: [phone]  Additional Information: none

## 2022-03-30 ENCOUNTER — HOSPITAL ENCOUNTER (OUTPATIENT)
Dept: CARDIOLOGY | Facility: HOSPITAL | Age: 62
Discharge: HOME | End: 2022-03-30
Attending: INTERNAL MEDICINE
Payer: COMMERCIAL

## 2022-03-30 VITALS
BODY MASS INDEX: 27.42 KG/M2 | HEART RATE: 83 BPM | HEIGHT: 62 IN | DIASTOLIC BLOOD PRESSURE: 93 MMHG | SYSTOLIC BLOOD PRESSURE: 136 MMHG | WEIGHT: 149 LBS

## 2022-03-30 DIAGNOSIS — I50.22 CHF (CONGESTIVE HEART FAILURE), NYHA CLASS II, CHRONIC, SYSTOLIC: ICD-10-CM

## 2022-03-30 DIAGNOSIS — I42.9 CARDIOMYOPATHY, UNSPECIFIED TYPE: ICD-10-CM

## 2022-03-30 LAB
CFR FLOW - ANTERIOR: 2.18
CFR FLOW - INFERIOR: 2.28
CFR FLOW - LATERAL: 2.07
CFR FLOW - MAX: 3.09
CFR FLOW - MIN: 1.83
CFR FLOW - SEPTAL: 2.63
CFR FLOW - WHOLE HEART: 2.29
CV STRESS BASE HR: 83 BPM
DIASTOLIC BLOOD PRESSURE: 93 MMHG
EJECTION FRACTION- HIGH: 65 %
END DIASTOLIC INDEX-HIGH: 153 ML/M2
END DIASTOLIC INDEX-LOW: 93 ML/M2
END SYSTOLIC INDEX-HIGH: 71 ML/M2
END SYSTOLIC INDEX-LOW: 31 ML/M2
NUC REST DIASTOLIC VOLUME INDEX: 101
NUC REST EJECTION FRACTION: 41
NUC REST SYSTOLIC VOLUME INDEX: 60
NUC STRESS DIASTOLIC VOLUME INDEX: 105
NUC STRESS EJECTION FRACTION: 46 %
NUC STRESS SYSTOLIC VOLUME INDEX: 57
OHS CV CPX 85 PERCENT MAX PREDICTED HEART RATE MALE: 129
OHS CV CPX MAX PREDICTED HEART RATE: 151
OHS CV CPX PATIENT IS FEMALE: 1
OHS CV CPX PATIENT IS MALE: 0
OHS CV CPX PEAK DIASTOLIC BLOOD PRESSURE: 85 MMHG
OHS CV CPX PEAK HEAR RATE: 105 BPM
OHS CV CPX PEAK RATE PRESSURE PRODUCT: NORMAL
OHS CV CPX PEAK SYSTOLIC BLOOD PRESSURE: 158 MMHG
OHS CV CPX PERCENT MAX PREDICTED HEART RATE ACHIEVED: 69
OHS CV CPX RATE PRESSURE PRODUCT PRESENTING: NORMAL
REST FLOW - ANTERIOR: 0.72 CC/MIN/G
REST FLOW - INFERIOR: 0.68 CC/MIN/G
REST FLOW - LATERAL: 0.84 CC/MIN/G
REST FLOW - MAX: 0.98 CC/MIN/G
REST FLOW - MIN: 0.44 CC/MIN/G
REST FLOW - SEPTAL: 0.56 CC/MIN/G
REST FLOW - WHOLE HEART: 0.7 CC/MIN/G
RETIRED EF AND QEF - SEE NOTES: 53 %
STRESS FLOW - ANTERIOR: 1.54 CC/MIN/G
STRESS FLOW - INFERIOR: 1.54 CC/MIN/G
STRESS FLOW - LATERAL: 1.74 CC/MIN/G
STRESS FLOW - MAX: 1.98 CC/MIN/G
STRESS FLOW - MIN: 0.85 CC/MIN/G
STRESS FLOW - SEPTAL: 1.47 CC/MIN/G
STRESS FLOW - WHOLE HEART: 1.57 CC/MIN/G
SYSTOLIC BLOOD PRESSURE: 136 MMHG

## 2022-03-30 PROCEDURE — 78434 AQMBF PET REST & RX STRESS: CPT | Mod: 26,,, | Performed by: INTERNAL MEDICINE

## 2022-03-30 PROCEDURE — 93016 CV STRESS TEST SUPVJ ONLY: CPT | Mod: ,,, | Performed by: INTERNAL MEDICINE

## 2022-03-30 PROCEDURE — 78431 MYOCRD IMG PET RST&STRS CT: CPT

## 2022-03-30 PROCEDURE — 93016 CARDIAC PET SCAN STRESS (CUPID ONLY): ICD-10-PCS | Mod: ,,, | Performed by: INTERNAL MEDICINE

## 2022-03-30 PROCEDURE — 78434 CARDIAC PET SCAN STRESS (CUPID ONLY): ICD-10-PCS | Mod: 26,,, | Performed by: INTERNAL MEDICINE

## 2022-03-30 PROCEDURE — 78434 AQMBF PET REST & RX STRESS: CPT

## 2022-03-30 PROCEDURE — 93018 CV STRESS TEST I&R ONLY: CPT | Mod: ,,, | Performed by: INTERNAL MEDICINE

## 2022-03-30 PROCEDURE — 78431 MYOCRD IMG PET RST&STRS CT: CPT | Mod: 26,,, | Performed by: INTERNAL MEDICINE

## 2022-03-30 PROCEDURE — 78431 CARDIAC PET SCAN STRESS (CUPID ONLY): ICD-10-PCS | Mod: 26,,, | Performed by: INTERNAL MEDICINE

## 2022-03-30 PROCEDURE — 93018 CARDIAC PET SCAN STRESS (CUPID ONLY): ICD-10-PCS | Mod: ,,, | Performed by: INTERNAL MEDICINE

## 2022-03-30 PROCEDURE — 63600175 PHARM REV CODE 636 W HCPCS: Performed by: INTERNAL MEDICINE

## 2022-03-30 RX ORDER — REGADENOSON 0.08 MG/ML
0.4 INJECTION, SOLUTION INTRAVENOUS ONCE
Status: COMPLETED | OUTPATIENT
Start: 2022-03-30 | End: 2022-03-30

## 2022-03-30 RX ORDER — AMINOPHYLLINE 25 MG/ML
75 INJECTION, SOLUTION INTRAVENOUS ONCE
Status: COMPLETED | OUTPATIENT
Start: 2022-03-30 | End: 2022-03-30

## 2022-03-30 RX ADMIN — AMINOPHYLLINE 75 MG: 25 INJECTION, SOLUTION INTRAVENOUS at 07:03

## 2022-03-30 RX ADMIN — REGADENOSON 0.4 MG: 0.08 INJECTION, SOLUTION INTRAVENOUS at 07:03

## 2022-03-31 ENCOUNTER — TELEPHONE (OUTPATIENT)
Dept: CARDIOLOGY | Facility: CLINIC | Age: 62
End: 2022-03-31
Payer: COMMERCIAL

## 2022-04-07 ENCOUNTER — PATIENT MESSAGE (OUTPATIENT)
Dept: MEDSURG UNIT | Facility: HOSPITAL | Age: 62
End: 2022-04-07
Payer: COMMERCIAL

## 2022-05-10 ENCOUNTER — TELEPHONE (OUTPATIENT)
Dept: ORTHOPEDICS | Facility: CLINIC | Age: 62
End: 2022-05-10
Payer: MEDICAID

## 2022-05-10 DIAGNOSIS — M79.646 PAIN OF FINGER, UNSPECIFIED LATERALITY: Primary | ICD-10-CM

## 2022-05-23 ENCOUNTER — LAB VISIT (OUTPATIENT)
Dept: LAB | Facility: HOSPITAL | Age: 62
End: 2022-05-23
Attending: INTERNAL MEDICINE
Payer: COMMERCIAL

## 2022-05-23 DIAGNOSIS — I42.8 CARDIOMYOPATHY, NONISCHEMIC: ICD-10-CM

## 2022-05-23 LAB
ANION GAP SERPL CALC-SCNC: 10 MMOL/L (ref 8–16)
APTT BLDCRRT: 26.5 SEC (ref 21–32)
BUN SERPL-MCNC: 16 MG/DL (ref 8–23)
CALCIUM SERPL-MCNC: 9.1 MG/DL (ref 8.7–10.5)
CHLORIDE SERPL-SCNC: 106 MMOL/L (ref 95–110)
CO2 SERPL-SCNC: 24 MMOL/L (ref 23–29)
CREAT SERPL-MCNC: 0.8 MG/DL (ref 0.5–1.4)
ERYTHROCYTE [DISTWIDTH] IN BLOOD BY AUTOMATED COUNT: 12.7 % (ref 11.5–14.5)
EST. GFR  (AFRICAN AMERICAN): >60 ML/MIN/1.73 M^2
EST. GFR  (NON AFRICAN AMERICAN): >60 ML/MIN/1.73 M^2
GLUCOSE SERPL-MCNC: 95 MG/DL (ref 70–110)
HCT VFR BLD AUTO: 42.2 % (ref 37–48.5)
HGB BLD-MCNC: 13.5 G/DL (ref 12–16)
INR PPP: 1 (ref 0.8–1.2)
MCH RBC QN AUTO: 28.2 PG (ref 27–31)
MCHC RBC AUTO-ENTMCNC: 32 G/DL (ref 32–36)
MCV RBC AUTO: 88 FL (ref 82–98)
PLATELET # BLD AUTO: 267 K/UL (ref 150–450)
PMV BLD AUTO: 11.4 FL (ref 9.2–12.9)
POTASSIUM SERPL-SCNC: 4.5 MMOL/L (ref 3.5–5.1)
PROTHROMBIN TIME: 10 SEC (ref 9–12.5)
RBC # BLD AUTO: 4.78 M/UL (ref 4–5.4)
SODIUM SERPL-SCNC: 140 MMOL/L (ref 136–145)
WBC # BLD AUTO: 9.3 K/UL (ref 3.9–12.7)

## 2022-05-23 PROCEDURE — 85610 PROTHROMBIN TIME: CPT | Performed by: INTERNAL MEDICINE

## 2022-05-23 PROCEDURE — 36415 COLL VENOUS BLD VENIPUNCTURE: CPT | Mod: PO | Performed by: INTERNAL MEDICINE

## 2022-05-23 PROCEDURE — 85730 THROMBOPLASTIN TIME PARTIAL: CPT | Performed by: INTERNAL MEDICINE

## 2022-05-23 PROCEDURE — 85027 COMPLETE CBC AUTOMATED: CPT | Performed by: INTERNAL MEDICINE

## 2022-05-23 PROCEDURE — 80048 BASIC METABOLIC PNL TOTAL CA: CPT | Performed by: INTERNAL MEDICINE

## 2022-05-27 ENCOUNTER — TELEPHONE (OUTPATIENT)
Dept: ELECTROPHYSIOLOGY | Facility: CLINIC | Age: 62
End: 2022-05-27
Payer: MEDICAID

## 2022-05-29 ENCOUNTER — ANESTHESIA EVENT (OUTPATIENT)
Dept: MEDSURG UNIT | Facility: HOSPITAL | Age: 62
End: 2022-05-29
Payer: COMMERCIAL

## 2022-05-30 ENCOUNTER — ANESTHESIA (OUTPATIENT)
Dept: MEDSURG UNIT | Facility: HOSPITAL | Age: 62
End: 2022-05-30
Payer: MEDICAID

## 2022-05-30 ENCOUNTER — HOSPITAL ENCOUNTER (OUTPATIENT)
Facility: HOSPITAL | Age: 62
Discharge: HOME | End: 2022-05-30
Attending: INTERNAL MEDICINE | Admitting: INTERNAL MEDICINE
Payer: COMMERCIAL

## 2022-05-30 VITALS
OXYGEN SATURATION: 98 % | TEMPERATURE: 98 F | HEART RATE: 63 BPM | BODY MASS INDEX: 29.44 KG/M2 | RESPIRATION RATE: 20 BRPM | DIASTOLIC BLOOD PRESSURE: 90 MMHG | WEIGHT: 160 LBS | SYSTOLIC BLOOD PRESSURE: 160 MMHG | HEIGHT: 62 IN

## 2022-05-30 DIAGNOSIS — I50.22 CHF (CONGESTIVE HEART FAILURE), NYHA CLASS II, CHRONIC, SYSTOLIC: ICD-10-CM

## 2022-05-30 DIAGNOSIS — Z95.810 ICD (IMPLANTABLE CARDIOVERTER-DEFIBRILLATOR) IN PLACE: ICD-10-CM

## 2022-05-30 DIAGNOSIS — I42.8 CARDIOMYOPATHY, NONISCHEMIC: ICD-10-CM

## 2022-05-30 DIAGNOSIS — Z95.9 CARDIAC DEVICE IN SITU: ICD-10-CM

## 2022-05-30 PROCEDURE — 93010 EKG 12-LEAD: ICD-10-PCS | Mod: ,,, | Performed by: INTERNAL MEDICINE

## 2022-05-30 PROCEDURE — 37000008 HC ANESTHESIA 1ST 15 MINUTES: Performed by: INTERNAL MEDICINE

## 2022-05-30 PROCEDURE — D9220A PRA ANESTHESIA: ICD-10-PCS | Mod: ANES,,, | Performed by: ANESTHESIOLOGY

## 2022-05-30 PROCEDURE — 25000003 PHARM REV CODE 250: Performed by: INTERNAL MEDICINE

## 2022-05-30 PROCEDURE — 25000003 PHARM REV CODE 250: Performed by: NURSE ANESTHETIST, CERTIFIED REGISTERED

## 2022-05-30 PROCEDURE — 33249 INSJ/RPLCMT DEFIB W/LEAD(S): CPT | Performed by: INTERNAL MEDICINE

## 2022-05-30 PROCEDURE — 33249 INSJ/RPLCMT DEFIB W/LEAD(S): CPT | Mod: ,,, | Performed by: INTERNAL MEDICINE

## 2022-05-30 PROCEDURE — 93005 ELECTROCARDIOGRAM TRACING: CPT

## 2022-05-30 PROCEDURE — D9220A PRA ANESTHESIA: ICD-10-PCS | Mod: CRNA,,, | Performed by: NURSE ANESTHETIST, CERTIFIED REGISTERED

## 2022-05-30 PROCEDURE — 93010 ELECTROCARDIOGRAM REPORT: CPT | Mod: ,,, | Performed by: INTERNAL MEDICINE

## 2022-05-30 PROCEDURE — C1894 INTRO/SHEATH, NON-LASER: HCPCS | Performed by: INTERNAL MEDICINE

## 2022-05-30 PROCEDURE — 27201423 OPTIME MED/SURG SUP & DEVICES STERILE SUPPLY: Performed by: INTERNAL MEDICINE

## 2022-05-30 PROCEDURE — 33249 PR ICD INSERT SNGL/DUAL W/LEADS: ICD-10-PCS | Mod: ,,, | Performed by: INTERNAL MEDICINE

## 2022-05-30 PROCEDURE — D9220A PRA ANESTHESIA: Mod: CRNA,,, | Performed by: NURSE ANESTHETIST, CERTIFIED REGISTERED

## 2022-05-30 PROCEDURE — 37000009 HC ANESTHESIA EA ADD 15 MINS: Performed by: INTERNAL MEDICINE

## 2022-05-30 PROCEDURE — 25000003 PHARM REV CODE 250: Performed by: NURSE PRACTITIONER

## 2022-05-30 PROCEDURE — C1722 AICD, SINGLE CHAMBER: HCPCS | Performed by: INTERNAL MEDICINE

## 2022-05-30 PROCEDURE — 63600175 PHARM REV CODE 636 W HCPCS: Performed by: NURSE ANESTHETIST, CERTIFIED REGISTERED

## 2022-05-30 PROCEDURE — C1777 LEAD, AICD, ENDO SINGLE COIL: HCPCS | Performed by: INTERNAL MEDICINE

## 2022-05-30 PROCEDURE — D9220A PRA ANESTHESIA: Mod: ANES,,, | Performed by: ANESTHESIOLOGY

## 2022-05-30 PROCEDURE — 99499 NO LOS: ICD-10-PCS | Mod: ,,, | Performed by: INTERNAL MEDICINE

## 2022-05-30 PROCEDURE — 99499 UNLISTED E&M SERVICE: CPT | Mod: ,,, | Performed by: INTERNAL MEDICINE

## 2022-05-30 PROCEDURE — 93010 ELECTROCARDIOGRAM REPORT: CPT | Mod: 76,,, | Performed by: INTERNAL MEDICINE

## 2022-05-30 DEVICE — IMPLANTABLE CARDIOVERTER DEFIBRILLATOR
Type: IMPLANTABLE DEVICE | Site: CHEST | Status: FUNCTIONAL
Brand: GALLANT™

## 2022-05-30 DEVICE — DEFIBRILLATION LEAD
Type: IMPLANTABLE DEVICE | Site: HEART | Status: NON-FUNCTIONAL
Brand: DURATA™
Removed: 2022-06-06

## 2022-05-30 RX ORDER — LOSARTAN POTASSIUM 25 MG/1
25 TABLET ORAL DAILY
Qty: 30 TABLET | Refills: 11 | Status: SHIPPED | OUTPATIENT
Start: 2022-05-30 | End: 2022-12-12 | Stop reason: SDUPTHER

## 2022-05-30 RX ORDER — PROPOFOL 10 MG/ML
VIAL (ML) INTRAVENOUS CONTINUOUS PRN
Status: DISCONTINUED | OUTPATIENT
Start: 2022-05-30 | End: 2022-05-30

## 2022-05-30 RX ORDER — FENTANYL CITRATE 50 UG/ML
INJECTION, SOLUTION INTRAMUSCULAR; INTRAVENOUS
Status: DISCONTINUED | OUTPATIENT
Start: 2022-05-30 | End: 2022-05-30

## 2022-05-30 RX ORDER — BUPIVACAINE HYDROCHLORIDE 2.5 MG/ML
INJECTION, SOLUTION EPIDURAL; INFILTRATION; INTRACAUDAL
Status: DISCONTINUED | OUTPATIENT
Start: 2022-05-30 | End: 2022-05-30 | Stop reason: HOSPADM

## 2022-05-30 RX ORDER — LIDOCAINE HYDROCHLORIDE 20 MG/ML
INJECTION, SOLUTION EPIDURAL; INFILTRATION; INTRACAUDAL; PERINEURAL
Status: DISCONTINUED | OUTPATIENT
Start: 2022-05-30 | End: 2022-05-30

## 2022-05-30 RX ORDER — MIDAZOLAM HYDROCHLORIDE 1 MG/ML
INJECTION, SOLUTION INTRAMUSCULAR; INTRAVENOUS
Status: DISCONTINUED | OUTPATIENT
Start: 2022-05-30 | End: 2022-05-30

## 2022-05-30 RX ORDER — ONDANSETRON 2 MG/ML
4 INJECTION INTRAMUSCULAR; INTRAVENOUS DAILY PRN
Status: DISCONTINUED | OUTPATIENT
Start: 2022-05-30 | End: 2022-05-30 | Stop reason: HOSPADM

## 2022-05-30 RX ORDER — CEPHALEXIN 500 MG/1
500 CAPSULE ORAL EVERY 8 HOURS
Qty: 15 CAPSULE | Refills: 0 | Status: ON HOLD | OUTPATIENT
Start: 2022-05-30 | End: 2022-06-06 | Stop reason: SDUPTHER

## 2022-05-30 RX ORDER — CEFAZOLIN SODIUM 1 G/50ML
1 SOLUTION INTRAVENOUS
Status: DISCONTINUED | OUTPATIENT
Start: 2022-05-30 | End: 2022-05-30 | Stop reason: HOSPADM

## 2022-05-30 RX ORDER — PHENYLEPHRINE HCL IN 0.9% NACL 1 MG/10 ML
SYRINGE (ML) INTRAVENOUS
Status: DISCONTINUED | OUTPATIENT
Start: 2022-05-30 | End: 2022-05-30

## 2022-05-30 RX ORDER — CEFAZOLIN SODIUM/D5W 2 G/100 ML
2 PLASTIC BAG, INJECTION (ML) INTRAVENOUS
Status: COMPLETED | OUTPATIENT
Start: 2022-05-30 | End: 2022-05-30

## 2022-05-30 RX ORDER — PROPOFOL 10 MG/ML
VIAL (ML) INTRAVENOUS
Status: DISCONTINUED | OUTPATIENT
Start: 2022-05-30 | End: 2022-05-30

## 2022-05-30 RX ORDER — ACETAMINOPHEN 325 MG/1
650 TABLET ORAL EVERY 4 HOURS PRN
Status: DISCONTINUED | OUTPATIENT
Start: 2022-05-30 | End: 2022-05-30 | Stop reason: HOSPADM

## 2022-05-30 RX ORDER — LIDOCAINE HYDROCHLORIDE 20 MG/ML
INJECTION, SOLUTION INFILTRATION; PERINEURAL
Status: DISCONTINUED | OUTPATIENT
Start: 2022-05-30 | End: 2022-05-30 | Stop reason: HOSPADM

## 2022-05-30 RX ORDER — FENTANYL CITRATE 50 UG/ML
25 INJECTION, SOLUTION INTRAMUSCULAR; INTRAVENOUS EVERY 5 MIN PRN
Status: DISCONTINUED | OUTPATIENT
Start: 2022-05-30 | End: 2022-05-30 | Stop reason: HOSPADM

## 2022-05-30 RX ORDER — SODIUM CHLORIDE 0.9 % (FLUSH) 0.9 %
10 SYRINGE (ML) INJECTION
Status: DISCONTINUED | OUTPATIENT
Start: 2022-05-30 | End: 2022-05-30 | Stop reason: HOSPADM

## 2022-05-30 RX ADMIN — LIDOCAINE HYDROCHLORIDE 50 MG: 20 INJECTION, SOLUTION EPIDURAL; INFILTRATION; INTRACAUDAL at 07:05

## 2022-05-30 RX ADMIN — Medication 2 G: at 07:05

## 2022-05-30 RX ADMIN — SODIUM CHLORIDE: 0.9 INJECTION, SOLUTION INTRAVENOUS at 06:05

## 2022-05-30 RX ADMIN — PROPOFOL 50 MG: 10 INJECTION, EMULSION INTRAVENOUS at 07:05

## 2022-05-30 RX ADMIN — FENTANYL CITRATE 50 MCG: 50 INJECTION INTRAMUSCULAR; INTRAVENOUS at 07:05

## 2022-05-30 RX ADMIN — Medication 100 MCG: at 08:05

## 2022-05-30 RX ADMIN — MIDAZOLAM 2 MG: 1 INJECTION INTRAMUSCULAR; INTRAVENOUS at 07:05

## 2022-05-30 RX ADMIN — ACETAMINOPHEN 650 MG: 325 TABLET ORAL at 11:05

## 2022-05-30 RX ADMIN — Medication 50 MCG/KG/MIN: at 07:05

## 2022-05-30 NOTE — BRIEF OP NOTE
: Dr. La MD  Date of procedure: 05/30/2022   Post-operative Diagnosis: NICM    Procedure Performed: single chamber ICD implant    Description of Procedure: The patient was brought to the EP lab in the fasting state. Prepped and draped in sterile fashion. Safety timeout was performed. Sedation administered by anesthesia staff. Selective venogram of the left axillary and cephalic veins performed via left ac IV. Lidocaine used for local anesthetic. Fluoroscopic guided axillary access utilized. Guide/J Wire advanced and confirmed in IVC. Incision made. Blunt and electrocautery dissection performed. Pocket made. Peel away sheath advanced over J wire. ICD lead advanced into RV. R waves and injury pattern adequate. Lead fixed into place and sutured in place. Pocket washed using antibiotic solution. Leads connected to generator. Generator placed into pocket, sutured in place, and washed with antibiotic solution. Deep layer closed with interrupted 3-0 suture. Intermediate layer closed with running 3-0 suture. Superficial layer closed with running 4-0 suture. Skin closed with Dermabond. Aquacel dressing to be placed after dermabond has dried. Pressure dressing over aquacel.    EBL: <10 mL    Specimens Removed: None  Complications: no immediate    1. Ancef 2 grams q8 hours x 2 doses (ordered)  2. Sling to left arm - wear for 48 hours, then only at night for 6 weeks.  3. NO HEPARIN PRODUCTS  4. Keflex 500 mg TID for 5 days at discharge (or after the 2 doses of IV antibiotics if still in the hospital)  5. No lifting left elbow above shoulder height  6. No lifting over 5 pounds  7. No driving for 1 week and for 4 weeks if patient uses left arm to make turns  8. Dressing will be removed in AM by EP  9. Chest Xray (ordered)  10. Follow up in device clinic in 1 week for device and wound checks.     Dr. Tovar, the attending electrophysiologist, was present for the entirety of this procedure.    Ranulfo Hunt,  MD  Cardiovascular Disease Fellow PGY V  Pager [phone]

## 2022-05-30 NOTE — ANESTHESIA POSTPROCEDURE EVALUATION
Anesthesia Post Evaluation    Patient: Suly Jaime    Procedure(s) Performed: Procedure(s) (LRB):  INSERTION, ICD, SINGLE CHAMBER (N/A)    Final Anesthesia Type: general (Natural airway)      Patient location during evaluation: PACU  Patient participation: Yes- Able to Participate  Level of consciousness: awake and alert  Post-procedure vital signs: reviewed and stable  Pain management: adequate  Airway patency: patent    PONV status at discharge: No PONV  Anesthetic complications: no      Cardiovascular status: hemodynamically stable  Respiratory status: unassisted  Hydration status: euvolemic  Follow-up not needed.          Vitals Value Taken Time   /96 05/30/22 1031   Temp 36.5 °C (97.7 °F) 05/30/22 0924   Pulse 71 05/30/22 1057   Resp 26 05/30/22 1056   SpO2 99 % 05/30/22 1057   Vitals shown include unvalidated device data.      No case tracking events are documented in the log.      Pain/Zeke Score: Zeke Score: 10 (5/30/2022  9:24 AM)

## 2022-05-30 NOTE — ANESTHESIA PREPROCEDURE EVALUATION
05/30/2022  Suly Jaime is a 62 y.o., female.    Pre-operative evaluation for Procedure(s) (LRB):  INSERTION, ICD, SINGLE CHAMBER (N/A)    Suly Jaime is a 62 y.o. female     Patient Active Problem List   Diagnosis    Abdominal pain    Oropharyngeal lesion    Chronic cough    Eosinophilic bronchitis    Post-nasal drip    Restrictive lung disease    Cardiomyopathy, nonischemic    CHF (congestive heart failure), NYHA class II, chronic, systolic       Review of patient's allergies indicates:  No Known Allergies    Current Facility-Administered Medications on File Prior to Encounter   Medication Dose Route Frequency Provider Last Rate Last Admin    lidocaine (PF) 10 mg/ml (1%) injection 10 mg  1 mL Intradermal Once Deidre Calderon MD        sodium chloride 0.9% flush 5 mL  5 mL Intravenous PRN Deidre Calderon MD         Current Outpatient Medications on File Prior to Encounter   Medication Sig Dispense Refill    fluticasone propionate (FLOVENT HFA) 110 mcg/actuation inhaler Inhale 2 puffs into the lungs 2 (two) times daily. Controller 12 g 12    latanoprost 0.005 % ophthalmic solution INSTILL 1 DROP INTO BOTH EYES AT BEDTIME AS DIRECTED      lisinopriL (PRINIVIL,ZESTRIL) 5 MG tablet Take 1 tablet (5 mg total) by mouth once daily. 90 tablet 3    metoprolol succinate (TOPROL-XL) 25 MG 24 hr tablet Take 1 tablet (25 mg total) by mouth once daily. 30 tablet 11    tobramycin-dexamethasone 0.3-0.1% (TOBRADEX) 0.3-0.1 % DrpS SHAKE LIQUID AND INSTILL 1 TO 2 DROPS IN BOTH EYES FOUR TIMES DAILY      beclomethasone (QVAR) 80 mcg/actuation Aero Inhale 2 puffs into the lungs 2 (two) times a day. Controller 1 g 11    budesonide (RINOCORT AQUA) 32 mcg/actuation nasal spray 1 spray (32 mcg total) by Nasal route once daily. (Patient not taking: Reported on 3/24/2022) 8.43 mL 11    cephalexin  (KEFTAB) 500 mg tablet Take 500 mg by mouth 3 (three) times daily.      gabapentin (NEURONTIN) 300 MG capsule Take 1 capsule (300 mg total) by mouth 3 (three) times daily as needed (pain). 90 capsule 0    ibuprofen (ADVIL,MOTRIN) 600 MG tablet Take 1 tablet (600 mg total) by mouth 3 (three) times daily. 10 tablet 0    levocetirizine (XYZAL) 5 MG tablet Take 1 tablet (5 mg total) by mouth every evening. 90 tablet 3    montelukast (SINGULAIR) 10 mg tablet Take 1 tablet (10 mg total) by mouth every evening. 90 tablet 3    ondansetron (ZOFRAN-ODT) 8 MG TbDL Take 8 mg by mouth every 8 (eight) hours as needed.      pantoprazole (PROTONIX) 40 MG tablet Take 1 tablet (40 mg total) by mouth once daily. 30 tablet 11    valACYclovir (VALTREX) 1000 MG tablet Take 1 tablet (1,000 mg total) by mouth 3 (three) times daily. for 7 days (Patient not taking: No sig reported) 21 tablet 0       Past Surgical History:   Procedure Laterality Date    HYSTERECTOMY      full    KNEE ARTHROSCOPY      LARYNGOSCOPY N/A 2019    Procedure: DIRECT MICRO LARYNGOSCOPY WITH BIOPSY;  Surgeon: Deidre Calderon MD;  Location: [facility];  Service: ENT;  Laterality: N/A;  video    OOPHORECTOMY         Social History     Socioeconomic History    Marital status:    Tobacco Use    Smoking status: Former Smoker     Packs/day: 0.50     Quit date: 2017     Years since quittin.4    Smokeless tobacco: Never Used   Substance and Sexual Activity    Alcohol use: No    Drug use: No    Sexual activity: Not Currently     Partners: Male         CBC: No results for input(s): WBC, RBC, HGB, HCT, PLT, MCV, MCH, MCHC in the last 72 hours.    CMP: No results for input(s): NA, K, CL, CO2, BUN, CREATININE, GLU, MG, PHOS, CALCIUM, ALBUMIN, PROT, ALKPHOS, ALT, AST, BILITOT in the last 72 hours.    INR  No results for input(s): PT, INR, PROTIME, APTT in the last 72 hours.        Diagnostic Studies:      EKD Echo:  No results  found for this or any previous visit.        Pre-op Assessment    I have reviewed the Patient Summary Reports.    I have reviewed the NPO Status.   I have reviewed the Medications.     Review of Systems  Anesthesia Hx:  No problems with previous Anesthesia   Denies Personal Hx of Anesthesia complications.   Cardiovascular:   Exercise tolerance: poor Hypertension CHF    Pulmonary:  Pulmonary Normal    Renal/:  Renal/ Normal     Hepatic/GI:  Hepatic/GI Normal    Neurological:   Denies CVA. Denies Seizures.        Physical Exam  General: Cooperative, Alert and Oriented    Airway:  Mallampati: III   Mouth Opening: Small, but > 3cm  TM Distance: Normal  Tongue: Normal  Neck ROM: Normal ROM    Dental:        Anesthesia Plan  Type of Anesthesia, risks & benefits discussed:    Anesthesia Type: Gen Natural Airway  Intra-op Monitoring Plan: Standard ASA Monitors  Post Op Pain Control Plan: multimodal analgesia and IV/PO Opioids PRN  Induction:  IV  Informed Consent: Informed consent signed with the Patient and all parties understand the risks and agree with anesthesia plan.  All questions answered.   ASA Score: 3  Day of Surgery Review of History & Physical: H&P Update referred to the surgeon/provider.    Ready For Surgery From Anesthesia Perspective.     .

## 2022-05-30 NOTE — NURSING TRANSFER
Nursing Transfer Note      5/30/2022     Reason patient is being transferred: Outpatient    Transfer To: [facility]    Transfer via stretcher    Transfer with cardiac monitoring    Transported by Nehemiah Dobbins, SCOT    Medicines sent: none    Any special needs or follow-up needed: discharge instructions    Chart send with patient: Yes    Notified: Partner    Patient reassessed at: 5/30

## 2022-05-30 NOTE — ASSESSMENT & PLAN NOTE
This patient has a chronic nonischemic systolic cardiomyopathy with an LVEF of 35% with NYHA class 2 heart failure symptoms that has persisted despite over 3 months of maximally tolerated goal directed medical therapy.    PET stress negative for ischemia.      We discussed the alternatives, benefits and risks of the procedure including pain, infection, bleeding, injury to lung causing pneumothorax requiring tube placement, injury to heart valves, puncture of the heart leading to pericardial effusion or tamponade requiring tube drainage, heart attack, stroke and death.       Plan to proceed with single-chamber SJM ICD.

## 2022-05-30 NOTE — PLAN OF CARE
Received pt from PACU, AAOx4, estevan, denies c/o pain. Left chest wall with pressure dsg intact. PIVx2, intact. VSS. Sling and swathe on. Requests to sleep. Voided small amount of urine on bedpan. Call light within reach.

## 2022-05-30 NOTE — PROGRESS NOTES
Dr Hunt at bedside, spoke with pt and her SO. Allowed pt up to bathroom, she voided well. Pressure dressing removed per MD order.

## 2022-05-30 NOTE — PLAN OF CARE
Discharged homw, Pain now 4/10 and very tolerable, as per pt. Robert and rose on, dsg D/I. D/C per wc with SO.

## 2022-05-30 NOTE — DISCHARGE INSTRUCTIONS
- Wear your sling for the first 24 hours after your discharge and avoid getting your wound wet.  - You may shower in 48 hours, but do not let beam of shower hit site directly and no scrubbing in area  - For the first 6 weeks, while your implanted leads become permanently fixed, we ask that you avoid raising your left elbow above your shoulder and lifting greater than 5-10 lbs, and strongly urge that you wear your sling at night time during that time while you sleep to avoid excessive movement.  - We will schedule a visit to our wound clinic 1 week after your procedure for close follow up, in the interim period please keep your wound as clean & dry as possible, If you notice any discharge,worsening tenderness or discomfort from the area please call our clinic ASAP.  - Please avoid lifting more then 5 lbs with the involved side.  - No driving for 1 week and for 4 weeks if patient uses Left arm to make turns  - You have received antibiotics during your hospital stay and will continue these on discharge for 5 days.   - You may use over the counter medications for pain relief, if that is not sufficient your physician may have prescribed you additional pain medication-take as instructed  - Please resume your home medication.   - Follow with Dr. Tovar  in 4 months post procedure, we will call you with an appointment.

## 2022-05-30 NOTE — SUBJECTIVE & OBJECTIVE
Past Medical History:   Diagnosis Date    Allergy     Cardiomyopathy     Hypertension        Past Surgical History:   Procedure Laterality Date    HYSTERECTOMY  2003    full    KNEE ARTHROSCOPY      LARYNGOSCOPY N/A 2/18/2019    Procedure: DIRECT MICRO LARYNGOSCOPY WITH BIOPSY;  Surgeon: Deidre Calderon MD;  Location: [facility];  Service: ENT;  Laterality: N/A;  video    OOPHORECTOMY         Review of patient's allergies indicates:  No Known Allergies    Current Facility-Administered Medications on File Prior to Encounter   Medication    lidocaine (PF) 10 mg/ml (1%) injection 10 mg    sodium chloride 0.9% flush 5 mL     Current Outpatient Medications on File Prior to Encounter   Medication Sig    fluticasone propionate (FLOVENT HFA) 110 mcg/actuation inhaler Inhale 2 puffs into the lungs 2 (two) times daily. Controller    latanoprost 0.005 % ophthalmic solution INSTILL 1 DROP INTO BOTH EYES AT BEDTIME AS DIRECTED    lisinopriL (PRINIVIL,ZESTRIL) 5 MG tablet Take 1 tablet (5 mg total) by mouth once daily.    metoprolol succinate (TOPROL-XL) 25 MG 24 hr tablet Take 1 tablet (25 mg total) by mouth once daily.    tobramycin-dexamethasone 0.3-0.1% (TOBRADEX) 0.3-0.1 % DrpS SHAKE LIQUID AND INSTILL 1 TO 2 DROPS IN BOTH EYES FOUR TIMES DAILY    beclomethasone (QVAR) 80 mcg/actuation Aero Inhale 2 puffs into the lungs 2 (two) times a day. Controller    budesonide (RINOCORT AQUA) 32 mcg/actuation nasal spray 1 spray (32 mcg total) by Nasal route once daily. (Patient not taking: Reported on 3/24/2022)    cephalexin (KEFTAB) 500 mg tablet Take 500 mg by mouth 3 (three) times daily.    gabapentin (NEURONTIN) 300 MG capsule Take 1 capsule (300 mg total) by mouth 3 (three) times daily as needed (pain).    ibuprofen (ADVIL,MOTRIN) 600 MG tablet Take 1 tablet (600 mg total) by mouth 3 (three) times daily.    levocetirizine (XYZAL) 5 MG tablet Take 1 tablet (5 mg total) by mouth every evening.    montelukast (SINGULAIR) 10 mg  tablet Take 1 tablet (10 mg total) by mouth every evening.    ondansetron (ZOFRAN-ODT) 8 MG TbDL Take 8 mg by mouth every 8 (eight) hours as needed.    pantoprazole (PROTONIX) 40 MG tablet Take 1 tablet (40 mg total) by mouth once daily.    valACYclovir (VALTREX) 1000 MG tablet Take 1 tablet (1,000 mg total) by mouth 3 (three) times daily. for 7 days (Patient not taking: No sig reported)     Family History       Problem Relation (Age of Onset)    Allergies Daughter    Diabetes Mother, Maternal Grandmother    Heart disease Maternal Grandmother          Tobacco Use    Smoking status: Former Smoker     Packs/day: 0.50     Quit date:      Years since quittin.4    Smokeless tobacco: Never Used   Substance and Sexual Activity    Alcohol use: No    Drug use: No    Sexual activity: Not Currently     Partners: Male     Review of Systems   Constitutional: Negative for chills, decreased appetite and fever.   HENT:  Negative for congestion and sore throat.         Cough   Eyes:  Negative for blurred vision and discharge.   Cardiovascular:  Negative for chest pain, claudication, cyanosis, dyspnea on exertion and leg swelling.   Respiratory:  Negative for cough, hemoptysis and shortness of breath.    Endocrine: Negative for cold intolerance and heat intolerance.   Skin:  Negative for color change.   Musculoskeletal:  Negative for muscle weakness and myalgias.   Gastrointestinal:  Negative for bloating and abdominal pain.   Neurological:  Negative for dizziness, focal weakness and weakness.   Psychiatric/Behavioral:  Negative for altered mental status and depression.    Objective:     Vital Signs (Most Recent):  Temp: 97.7 °F (36.5 °C) (22 0538)  Pulse: 75 (22 0538)  Resp: 16 (22 0538)  BP: 134/82 (22 0540)  SpO2: 97 % (22 0538) Vital Signs (24h Range):  Temp:  [97.7 °F (36.5 °C)] 97.7 °F (36.5 °C)  Pulse:  [75] 75  Resp:  [16] 16  SpO2:  [97 %] 97 %  BP: (133-134)/(79-82) 134/82       Weight:  72.6 kg (160 lb)  Body mass index is 29.26 kg/m².    SpO2: 97 %  O2 Device (Oxygen Therapy): room air    Physical Exam  Constitutional:       Appearance: She is well-developed.   HENT:      Head: Normocephalic and atraumatic.      Right Ear: External ear normal.      Left Ear: External ear normal.   Eyes:      Conjunctiva/sclera: Conjunctivae normal.   Cardiovascular:      Rate and Rhythm: Normal rate and regular rhythm.      Pulses: Intact distal pulses.           Radial pulses are 2+ on the right side and 2+ on the left side.      Heart sounds: Normal heart sounds.   Pulmonary:      Effort: Pulmonary effort is normal. No respiratory distress.      Breath sounds: Normal breath sounds. No wheezing.      Comments: +cough  Abdominal:      General: Bowel sounds are normal. There is no distension.      Palpations: Abdomen is soft.      Tenderness: There is no abdominal tenderness.   Musculoskeletal:         General: Normal range of motion.      Cervical back: Normal range of motion and neck supple.   Skin:     General: Skin is warm and dry.   Neurological:      Mental Status: She is alert and oriented to person, place, and time.   Psychiatric:         Mood and Affect: Mood normal.         Behavior: Behavior normal.       Significant Labs: CMP: No results for input(s): NA, K, CL, CO2, GLU, BUN, CREATININE, CALCIUM, PROT, ALBUMIN, BILITOT, ALKPHOS, AST, ALT, ANIONGAP, ESTGFRAFRICA, EGFRNONAA in the last 48 hours. and CBC: No results for input(s): WBC, HGB, HCT, PLT in the last 48 hours.    Significant Imaging: EKG: NSR, IVCD

## 2022-05-30 NOTE — DISCHARGE SUMMARY
[facility] - Short Stay Cardiac Unit  Cardiac Electrophysiology  Discharge Summary      Patient Name: Suly Jaime  MRN: [id number]  Admission Date: 5/30/2022  Hospital Length of Stay: 0 days  Discharge Date and Time:  05/30/2022 11:11 AM  Attending Physician: Cruzito Tovar MD    Discharging Provider: Ranulfo Hunt MD  Primary Care Physician: Irene Lopez MD    HPI:   Suly Jaime is a 62 y.o. female who presents for elective ICD implantation for primary prevention for SCA in light of NICM. Pt doing well this morning without any complaints. She denies any use of blood thinning medications.    PET stress    The myocardial perfusion images are normal without evidence of ischemia or scar.    The whole heart absolute myocardial perfusion values averaged 0.70 cc/min/g at rest, which is normal; 1.57 cc/min/g at stress, which is mildly reduced; and CFR is 2.29 , which is mildly reduced.    CT attenuation images demonstrate no coronary calcifications and no aortic calcifications.    The gated perfusion images showed an ejection fraction of 41% at rest and 46% during stress. A normal ejection fraction is greater than 53%.    The wall motion is normal during stress.    There is mild global hypokinesis at rest.    The LV cavity size is normal at rest and stress.    The EKG portion of this study is negative for ischemia.    There were no arrhythmias during stress.    The patient reported no chest pain during the stress test.    There are no prior studies for comparison.       ECG today shows NSR with IVCD, qrs duration of 110 ms.    Pt followed by Dr. Tovar. Following history obtained from most recent EP clinic encounter 3/24/22.     HPI   62 y.o. F retired   nonobstructive CAD and possible dissection on CTA  IVCD  chronic bronchitis     She c/o NYHA II sx: stops walking after exerts self a bit. No CP.     10/2021 35% LVEF.  1/2022 35%  CTA: nonobstructive RCA CAD and possible RCA dissection          Procedure(s) (LRB):  INSERTION, ICD, SINGLE CHAMBER (N/A)     Indwelling Lines/Drains at time of discharge:  Lines/Drains/Airways     Drain  Duration                Urethral Catheter 02/08/22 1842 Latex 16 Fr. 110 days                Hospital Course:  Pt underwent succesful single chamber ICD  for primary prevention of SCD with history of systolic heart failure. Pt tolerated procedure well. She was seen post operatively and reported no complaints or concerns. She was instructed to take keflex x 5 days as directed. Pt to follow up with device clinic in 1 week and with Dr. Tovar in 4 months. Her lisinopril was discontinued, and she was started on losartan due to her chronic cough.      Goals of Care Treatment Preferences:  Code Status: Full Code          Pending Diagnostic Studies:     None          Final Active Diagnoses:    Diagnosis Date Noted POA    CHF (congestive heart failure), NYHA class II, chronic, systolic [I50.22] 03/24/2022 Yes      Problems Resolved During this Admission:     No new Assessment & Plan notes have been filed under this hospital service since the last note was generated.  Service: Arrhythmia      Discharged Condition: good    Disposition: home    Follow Up:   Follow-up Information     DEVICE CHECK CLINIC Follow up in 1 week(s).           Cruzito Tovar MD Follow up in 4 month(s).    Specialties: Electrophysiology, Cardiology  Contact information:  [address] [place] [address] [id number] [phone]                       Patient Instructions:   - Wear your sling for the first 24 hours after your discharge and avoid getting your wound wet.  - You may shower in 48 hours, but do not let beam of shower hit site directly and no scrubbing in area  - For the first 6 weeks, while your implanted leads become permanently fixed, we ask that you avoid raising your left elbow above your shoulder and lifting greater than 5-10 lbs, and strongly urge that you wear your sling at night time during  that time while you sleep to avoid excessive movement.  - We will schedule a visit to our wound clinic 1 week after your procedure for close follow up, in the interim period please keep your wound as clean & dry as possible, If you notice any discharge,worsening tenderness or discomfort from the area please call our clinic ASAP.  - Please avoid lifting more then 5 lbs with the involved side.  - No driving for 1 week and for 4 weeks if patient uses Left arm to make turns  - You have received antibiotics during your hospital stay and will continue these on discharge for 5 days.   - You may use over the counter medications for pain relief, if that is not sufficient your physician may have prescribed you additional pain medication-take as instructed  - Please resume your home medication.   - Follow with Dr. Tovar  in 4 months post procedure, we will call you with an appointment.       Medications:  Reconciled Home Medications:      Medication List      START taking these medications    cephALEXin 500 MG capsule  Commonly known as: KEFLEX  Take 1 capsule (500 mg total) by mouth every 8 (eight) hours.  Replaces: cephalexin 500 mg tablet     losartan 25 MG tablet  Commonly known as: COZAAR  Take 1 tablet (25 mg total) by mouth once daily.        CONTINUE taking these medications    budesonide 32 mcg/actuation nasal spray  Commonly known as: RINOCORT AQUA  1 spray (32 mcg total) by Nasal route once daily.     fluticasone propionate 110 mcg/actuation inhaler  Commonly known as: FLOVENT HFA  Inhale 2 puffs into the lungs 2 (two) times daily. Controller     latanoprost 0.005 % ophthalmic solution  INSTILL 1 DROP INTO BOTH EYES AT BEDTIME AS DIRECTED     metoprolol succinate 25 MG 24 hr tablet  Commonly known as: TOPROL-XL  Take 1 tablet (25 mg total) by mouth once daily.     tobramycin-dexamethasone 0.3-0.1% 0.3-0.1 % Drps  Commonly known as: TOBRADEX  SHAKE LIQUID AND INSTILL 1 TO 2 DROPS IN BOTH EYES FOUR TIMES DAILY         STOP taking these medications    beclomethasone 80 mcg/actuation Aero  Commonly known as: QVAR     cephalexin 500 mg tablet  Commonly known as: KEFTAB  Replaced by: cephALEXin 500 MG capsule     gabapentin 300 MG capsule  Commonly known as: NEURONTIN     ibuprofen 600 MG tablet  Commonly known as: ADVIL,MOTRIN     levocetirizine 5 MG tablet  Commonly known as: XYZAL     lisinopriL 5 MG tablet  Commonly known as: PRINIVIL,ZESTRIL     montelukast 10 mg tablet  Commonly known as: SINGULAIR     ondansetron 8 MG Tbdl  Commonly known as: ZOFRAN-ODT        ASK your doctor about these medications    pantoprazole 40 MG tablet  Commonly known as: PROTONIX  Take 1 tablet (40 mg total) by mouth once daily.     valACYclovir 1000 MG tablet  Commonly known as: VALTREX  Take 1 tablet (1,000 mg total) by mouth 3 (three) times daily. for 7 days            Time spent on the discharge of patient: 20 minutes    Ranulfo Hunt MD  Cardiac Electrophysiology  [facility] - Short Stay Cardiac Unit

## 2022-05-30 NOTE — TRANSFER OF CARE
"Anesthesia Transfer of Care Note    Patient: Suly Jaime    Procedure(s) Performed: Procedure(s) (LRB):  INSERTION, ICD, SINGLE CHAMBER (N/A)    Patient location: PACU    Anesthesia Type: general    Transport from OR: Transported from OR on 6-10 L/min O2 by face mask with adequate spontaneous ventilation    Post pain: adequate analgesia    Post assessment: no apparent anesthetic complications    Post vital signs: stable    Level of consciousness: awake, alert and oriented    Nausea/Vomiting: no nausea/vomiting    Complications: none    Transfer of care protocol was followed      Last vitals:   Visit Vitals  /82 (BP Location: Right arm, Patient Position: Lying)   Pulse 75   Temp 36.5 °C (97.7 °F) (Temporal)   Resp 16   Ht 5' 2" (1.575 m)   Wt 72.6 kg (160 lb)   SpO2 97%   Breastfeeding No   BMI 29.26 kg/m²     "

## 2022-05-30 NOTE — PLAN OF CARE
Patient arrived to room. PIV placed x2.  Admit assessment completed. Plan of care discussed with patient. Will monitor. Call light within reach, instructed in use.

## 2022-05-30 NOTE — H&P
[facility] - Short Stay Cardiac Unit  Cardiac Electrophysiology  History and Physical     Admission Date: 5/30/2022  Code Status: Prior   Attending Provider: Cruzito Tovar MD   Principal Problem:<principal problem not specified>    Subjective:     Chief Complaint:  Elective ICD     HPI:  Suly Jaime is a 62 y.o. female who presents for elective ICD implantation for primary prevention for SCA in light of NICM. Pt doing well this morning without any complaints. She denies any use of blood thinning medications.    PET stress    The myocardial perfusion images are normal without evidence of ischemia or scar.    The whole heart absolute myocardial perfusion values averaged 0.70 cc/min/g at rest, which is normal; 1.57 cc/min/g at stress, which is mildly reduced; and CFR is 2.29 , which is mildly reduced.    CT attenuation images demonstrate no coronary calcifications and no aortic calcifications.    The gated perfusion images showed an ejection fraction of 41% at rest and 46% during stress. A normal ejection fraction is greater than 53%.    The wall motion is normal during stress.    There is mild global hypokinesis at rest.    The LV cavity size is normal at rest and stress.    The EKG portion of this study is negative for ischemia.    There were no arrhythmias during stress.    The patient reported no chest pain during the stress test.    There are no prior studies for comparison.       ECG today shows NSR with IVCD, qrs duration of 110 ms.    Pt followed by Dr. Tovar. Following history obtained from most recent EP clinic encounter 3/24/22.     HPI   62 y.o. F retired   nonobstructive CAD and possible dissection on CTA  IVCD  chronic bronchitis     She c/o NYHA II sx: stops walking after exerts self a bit. No CP.     10/2021 35% LVEF.  1/2022 35%  CTA: nonobstructive RCA CAD and possible RCA dissection         Past Medical History:   Diagnosis Date    Allergy     Cardiomyopathy     Hypertension         Past Surgical History:   Procedure Laterality Date    HYSTERECTOMY  2003    full    KNEE ARTHROSCOPY      LARYNGOSCOPY N/A 2/18/2019    Procedure: DIRECT MICRO LARYNGOSCOPY WITH BIOPSY;  Surgeon: Deidre Calderon MD;  Location: [facility];  Service: ENT;  Laterality: N/A;  video    OOPHORECTOMY         Review of patient's allergies indicates:  No Known Allergies    Current Facility-Administered Medications on File Prior to Encounter   Medication    lidocaine (PF) 10 mg/ml (1%) injection 10 mg    sodium chloride 0.9% flush 5 mL     Current Outpatient Medications on File Prior to Encounter   Medication Sig    fluticasone propionate (FLOVENT HFA) 110 mcg/actuation inhaler Inhale 2 puffs into the lungs 2 (two) times daily. Controller    latanoprost 0.005 % ophthalmic solution INSTILL 1 DROP INTO BOTH EYES AT BEDTIME AS DIRECTED    lisinopriL (PRINIVIL,ZESTRIL) 5 MG tablet Take 1 tablet (5 mg total) by mouth once daily.    metoprolol succinate (TOPROL-XL) 25 MG 24 hr tablet Take 1 tablet (25 mg total) by mouth once daily.    tobramycin-dexamethasone 0.3-0.1% (TOBRADEX) 0.3-0.1 % DrpS SHAKE LIQUID AND INSTILL 1 TO 2 DROPS IN BOTH EYES FOUR TIMES DAILY    beclomethasone (QVAR) 80 mcg/actuation Aero Inhale 2 puffs into the lungs 2 (two) times a day. Controller    budesonide (RINOCORT AQUA) 32 mcg/actuation nasal spray 1 spray (32 mcg total) by Nasal route once daily. (Patient not taking: Reported on 3/24/2022)    cephalexin (KEFTAB) 500 mg tablet Take 500 mg by mouth 3 (three) times daily.    gabapentin (NEURONTIN) 300 MG capsule Take 1 capsule (300 mg total) by mouth 3 (three) times daily as needed (pain).    ibuprofen (ADVIL,MOTRIN) 600 MG tablet Take 1 tablet (600 mg total) by mouth 3 (three) times daily.    levocetirizine (XYZAL) 5 MG tablet Take 1 tablet (5 mg total) by mouth every evening.    montelukast (SINGULAIR) 10 mg tablet Take 1 tablet (10 mg total) by mouth every evening.     ondansetron (ZOFRAN-ODT) 8 MG TbDL Take 8 mg by mouth every 8 (eight) hours as needed.    pantoprazole (PROTONIX) 40 MG tablet Take 1 tablet (40 mg total) by mouth once daily.    valACYclovir (VALTREX) 1000 MG tablet Take 1 tablet (1,000 mg total) by mouth 3 (three) times daily. for 7 days (Patient not taking: No sig reported)     Family History       Problem Relation (Age of Onset)    Allergies Daughter    Diabetes Mother, Maternal Grandmother    Heart disease Maternal Grandmother          Tobacco Use    Smoking status: Former Smoker     Packs/day: 0.50     Quit date:      Years since quittin.4    Smokeless tobacco: Never Used   Substance and Sexual Activity    Alcohol use: No    Drug use: No    Sexual activity: Not Currently     Partners: Male     Review of Systems   Constitutional: Negative for chills, decreased appetite and fever.   HENT:  Negative for congestion and sore throat.         Cough   Eyes:  Negative for blurred vision and discharge.   Cardiovascular:  Negative for chest pain, claudication, cyanosis, dyspnea on exertion and leg swelling.   Respiratory:  Negative for cough, hemoptysis and shortness of breath.    Endocrine: Negative for cold intolerance and heat intolerance.   Skin:  Negative for color change.   Musculoskeletal:  Negative for muscle weakness and myalgias.   Gastrointestinal:  Negative for bloating and abdominal pain.   Neurological:  Negative for dizziness, focal weakness and weakness.   Psychiatric/Behavioral:  Negative for altered mental status and depression.    Objective:     Vital Signs (Most Recent):  Temp: 97.7 °F (36.5 °C) (22 0538)  Pulse: 75 (22 0538)  Resp: 16 (22 0538)  BP: 134/82 (22 0540)  SpO2: 97 % (22 0538) Vital Signs (24h Range):  Temp:  [97.7 °F (36.5 °C)] 97.7 °F (36.5 °C)  Pulse:  [75] 75  Resp:  [16] 16  SpO2:  [97 %] 97 %  BP: (133-134)/(79-82) 134/82       Weight: 72.6 kg (160 lb)  Body mass index is 29.26  kg/m².    SpO2: 97 %  O2 Device (Oxygen Therapy): room air    Physical Exam  Constitutional:       Appearance: She is well-developed.   HENT:      Head: Normocephalic and atraumatic.      Right Ear: External ear normal.      Left Ear: External ear normal.   Eyes:      Conjunctiva/sclera: Conjunctivae normal.   Cardiovascular:      Rate and Rhythm: Normal rate and regular rhythm.      Pulses: Intact distal pulses.           Radial pulses are 2+ on the right side and 2+ on the left side.      Heart sounds: Normal heart sounds.   Pulmonary:      Effort: Pulmonary effort is normal. No respiratory distress.      Breath sounds: Normal breath sounds. No wheezing.      Comments: +cough  Abdominal:      General: Bowel sounds are normal. There is no distension.      Palpations: Abdomen is soft.      Tenderness: There is no abdominal tenderness.   Musculoskeletal:         General: Normal range of motion.      Cervical back: Normal range of motion and neck supple.   Skin:     General: Skin is warm and dry.   Neurological:      Mental Status: She is alert and oriented to person, place, and time.   Psychiatric:         Mood and Affect: Mood normal.         Behavior: Behavior normal.       Significant Labs: CMP: No results for input(s): NA, K, CL, CO2, GLU, BUN, CREATININE, CALCIUM, PROT, ALBUMIN, BILITOT, ALKPHOS, AST, ALT, ANIONGAP, ESTGFRAFRICA, EGFRNONAA in the last 48 hours. and CBC: No results for input(s): WBC, HGB, HCT, PLT in the last 48 hours.    Significant Imaging: EKG: NSR, IVCD    Assessment and Plan:     CHF (congestive heart failure), NYHA class II, chronic, systolic  This patient has a chronic nonischemic systolic cardiomyopathy with an LVEF of 35% with NYHA class 2 heart failure symptoms that has persisted despite over 3 months of maximally tolerated goal directed medical therapy.    PET stress negative for ischemia.      We discussed the alternatives, benefits and risks of the procedure including pain,  infection, bleeding, injury to lung causing pneumothorax requiring tube placement, injury to heart valves, puncture of the heart leading to pericardial effusion or tamponade requiring tube drainage, heart attack, stroke and death.       Plan to proceed with single-chamber SJM ICD.          Ranulfo Hunt MD  Cardiac Electrophysiology  [facility] - Short Stay Cardiac Unit

## 2022-05-31 ENCOUNTER — PATIENT MESSAGE (OUTPATIENT)
Dept: ADMINISTRATIVE | Facility: HOSPITAL | Age: 62
End: 2022-05-31
Payer: MEDICAID

## 2022-05-31 ENCOUNTER — TELEPHONE (OUTPATIENT)
Dept: CARDIOLOGY | Facility: HOSPITAL | Age: 62
End: 2022-05-31
Payer: MEDICAID

## 2022-05-31 ENCOUNTER — TELEPHONE (OUTPATIENT)
Dept: ELECTROPHYSIOLOGY | Facility: CLINIC | Age: 62
End: 2022-05-31
Payer: MEDICAID

## 2022-05-31 NOTE — TELEPHONE ENCOUNTER
"Spoke with pt, reports starting today she had some "shooting" "electrical" type of pain around her breast area, denies any incisional pain, reports it lasts about 2 mins in duration and has happened a few times, sent message to device nurse to see if she needed to be brought in for device check    Pt also asked for tech support number for the [employer] marbin, called pt back with the number and reports she spoke with the device team and did get her marbin set up  "

## 2022-05-31 NOTE — TELEPHONE ENCOUNTER
"Spoke with patient and informed her that she will feel slight pain for the first few days due to the new device and leads.  I informed her if the pain should intensify or last longer she should give us a call back so we can get her in.  I also informed her if she feels the need to be seen prior to her wound check appointment to give us a call.  Patient stated understanding.   ----- Message from Leah Marquez RN sent at 5/31/2022 12:14 PM CDT -----  Pt s/p ICD placement yesterday, feels "shooting" "electrical" type pain around her breast area, denies pain at the incision site, reports it just started today and last about 2 mins in duration and has happened now a few times, Not sure if pt needs to come in today or tomorrow to have the device checked??   Thanks,   Leah       "

## 2022-05-31 NOTE — TELEPHONE ENCOUNTER
----- Message from Ashlie Gibbs sent at 5/31/2022 11:51 AM CDT -----  Patient just had an ICD implanted yesterday.  Patient is complaining of pain.  Patient would like a call back from the Nurse.  Her call back number is [phone].  Thank you.

## 2022-06-03 ENCOUNTER — HOSPITAL ENCOUNTER (INPATIENT)
Facility: HOSPITAL | Age: 62
LOS: 4 days | Discharge: HOME | DRG: 265 | End: 2022-06-07
Attending: STUDENT IN AN ORGANIZED HEALTH CARE EDUCATION/TRAINING PROGRAM | Admitting: STUDENT IN AN ORGANIZED HEALTH CARE EDUCATION/TRAINING PROGRAM
Payer: MEDICAID

## 2022-06-03 ENCOUNTER — HOSPITAL ENCOUNTER (EMERGENCY)
Facility: HOSPITAL | Age: 62
Discharge: ANOTHER HOSPITAL | End: 2022-06-03
Attending: EMERGENCY MEDICINE
Payer: MEDICAID

## 2022-06-03 ENCOUNTER — TELEPHONE (OUTPATIENT)
Dept: CARDIOLOGY | Facility: HOSPITAL | Age: 62
End: 2022-06-03
Payer: MEDICAID

## 2022-06-03 ENCOUNTER — PATIENT MESSAGE (OUTPATIENT)
Dept: CARDIOLOGY | Facility: CLINIC | Age: 62
End: 2022-06-03
Payer: MEDICAID

## 2022-06-03 VITALS
HEART RATE: 82 BPM | WEIGHT: 160 LBS | DIASTOLIC BLOOD PRESSURE: 70 MMHG | RESPIRATION RATE: 19 BRPM | SYSTOLIC BLOOD PRESSURE: 160 MMHG | OXYGEN SATURATION: 96 % | BODY MASS INDEX: 29.44 KG/M2 | HEIGHT: 62 IN | TEMPERATURE: 98 F

## 2022-06-03 DIAGNOSIS — T82.118A AICD MALFUNCTION: ICD-10-CM

## 2022-06-03 DIAGNOSIS — U07.1 COVID-19: ICD-10-CM

## 2022-06-03 DIAGNOSIS — I49.9 ARRHYTHMIA: ICD-10-CM

## 2022-06-03 DIAGNOSIS — I50.9 HEART FAILURE: ICD-10-CM

## 2022-06-03 DIAGNOSIS — R07.9 CHEST PAIN: ICD-10-CM

## 2022-06-03 DIAGNOSIS — I42.8 CARDIOMYOPATHY, NONISCHEMIC: Primary | ICD-10-CM

## 2022-06-03 DIAGNOSIS — T82.118A ICD (IMPLANTABLE CARDIOVERTER-DEFIBRILLATOR) MALFUNCTION, INITIAL ENCOUNTER: Primary | ICD-10-CM

## 2022-06-03 LAB
ALBUMIN SERPL BCP-MCNC: 3.6 G/DL (ref 3.5–5.2)
ALP SERPL-CCNC: 99 U/L (ref 55–135)
ALT SERPL W/O P-5'-P-CCNC: 19 U/L (ref 10–44)
ANION GAP SERPL CALC-SCNC: 14 MMOL/L (ref 8–16)
AST SERPL-CCNC: 17 U/L (ref 10–40)
BASOPHILS # BLD AUTO: 0.04 K/UL (ref 0–0.2)
BASOPHILS NFR BLD: 0.4 % (ref 0–1.9)
BILIRUB SERPL-MCNC: 0.3 MG/DL (ref 0.1–1)
BNP SERPL-MCNC: 17 PG/ML (ref 0–99)
BUN SERPL-MCNC: 16 MG/DL (ref 8–23)
CALCIUM SERPL-MCNC: 9.3 MG/DL (ref 8.7–10.5)
CHLORIDE SERPL-SCNC: 105 MMOL/L (ref 95–110)
CO2 SERPL-SCNC: 20 MMOL/L (ref 23–29)
CREAT SERPL-MCNC: 0.8 MG/DL (ref 0.5–1.4)
CTP QC/QA: YES
DIFFERENTIAL METHOD: ABNORMAL
EOSINOPHIL # BLD AUTO: 0.3 K/UL (ref 0–0.5)
EOSINOPHIL NFR BLD: 2.8 % (ref 0–8)
ERYTHROCYTE [DISTWIDTH] IN BLOOD BY AUTOMATED COUNT: 12.9 % (ref 11.5–14.5)
EST. GFR  (AFRICAN AMERICAN): >60 ML/MIN/1.73 M^2
EST. GFR  (NON AFRICAN AMERICAN): >60 ML/MIN/1.73 M^2
GLUCOSE SERPL-MCNC: 166 MG/DL (ref 70–110)
HCT VFR BLD AUTO: 38.6 % (ref 37–48.5)
HGB BLD-MCNC: 12.7 G/DL (ref 12–16)
IMM GRANULOCYTES # BLD AUTO: 0.05 K/UL (ref 0–0.04)
IMM GRANULOCYTES NFR BLD AUTO: 0.5 % (ref 0–0.5)
LYMPHOCYTES # BLD AUTO: 1.8 K/UL (ref 1–4.8)
LYMPHOCYTES NFR BLD: 18.3 % (ref 18–48)
MCH RBC QN AUTO: 28.7 PG (ref 27–31)
MCHC RBC AUTO-ENTMCNC: 32.9 G/DL (ref 32–36)
MCV RBC AUTO: 87 FL (ref 82–98)
MONOCYTES # BLD AUTO: 0.7 K/UL (ref 0.3–1)
MONOCYTES NFR BLD: 6.9 % (ref 4–15)
NEUTROPHILS # BLD AUTO: 6.9 K/UL (ref 1.8–7.7)
NEUTROPHILS NFR BLD: 71.1 % (ref 38–73)
NRBC BLD-RTO: 0 /100 WBC
PLATELET # BLD AUTO: 226 K/UL (ref 150–450)
PMV BLD AUTO: 11.5 FL (ref 9.2–12.9)
POTASSIUM SERPL-SCNC: 4 MMOL/L (ref 3.5–5.1)
PROT SERPL-MCNC: 7.5 G/DL (ref 6–8.4)
RBC # BLD AUTO: 4.42 M/UL (ref 4–5.4)
SARS-COV-2 RDRP RESP QL NAA+PROBE: NEGATIVE
SODIUM SERPL-SCNC: 139 MMOL/L (ref 136–145)
TROPONIN I SERPL DL<=0.01 NG/ML-MCNC: 0.01 NG/ML (ref 0–0.03)
TROPONIN I SERPL DL<=0.01 NG/ML-MCNC: <0.006 NG/ML (ref 0–0.03)
WBC # BLD AUTO: 9.69 K/UL (ref 3.9–12.7)

## 2022-06-03 PROCEDURE — G0379 DIRECT REFER HOSPITAL OBSERV: HCPCS

## 2022-06-03 PROCEDURE — 99214 OFFICE O/P EST MOD 30 MIN: CPT | Mod: 24,,, | Performed by: STUDENT IN AN ORGANIZED HEALTH CARE EDUCATION/TRAINING PROGRAM

## 2022-06-03 PROCEDURE — 93010 ELECTROCARDIOGRAM REPORT: CPT | Mod: ,,, | Performed by: INTERNAL MEDICINE

## 2022-06-03 PROCEDURE — 20600001 HC STEP DOWN PRIVATE ROOM

## 2022-06-03 PROCEDURE — 96375 TX/PRO/DX INJ NEW DRUG ADDON: CPT

## 2022-06-03 PROCEDURE — 93010 EKG 12-LEAD: ICD-10-PCS | Mod: ,,, | Performed by: INTERNAL MEDICINE

## 2022-06-03 PROCEDURE — 63600175 PHARM REV CODE 636 W HCPCS: Performed by: EMERGENCY MEDICINE

## 2022-06-03 PROCEDURE — U0002 COVID-19 LAB TEST NON-CDC: HCPCS | Performed by: EMERGENCY MEDICINE

## 2022-06-03 PROCEDURE — G0378 HOSPITAL OBSERVATION PER HR: HCPCS

## 2022-06-03 PROCEDURE — 99214 PR OFFICE/OUTPT VISIT, EST, LEVL IV, 30-39 MIN: ICD-10-PCS | Mod: 24,,, | Performed by: STUDENT IN AN ORGANIZED HEALTH CARE EDUCATION/TRAINING PROGRAM

## 2022-06-03 PROCEDURE — 99284 EMERGENCY DEPT VISIT MOD MDM: CPT | Mod: 25

## 2022-06-03 PROCEDURE — 80053 COMPREHEN METABOLIC PANEL: CPT | Performed by: EMERGENCY MEDICINE

## 2022-06-03 PROCEDURE — 83880 ASSAY OF NATRIURETIC PEPTIDE: CPT | Performed by: EMERGENCY MEDICINE

## 2022-06-03 PROCEDURE — 93005 ELECTROCARDIOGRAM TRACING: CPT

## 2022-06-03 PROCEDURE — 84484 ASSAY OF TROPONIN QUANT: CPT | Performed by: EMERGENCY MEDICINE

## 2022-06-03 PROCEDURE — 96374 THER/PROPH/DIAG INJ IV PUSH: CPT

## 2022-06-03 PROCEDURE — 85025 COMPLETE CBC W/AUTO DIFF WBC: CPT | Performed by: EMERGENCY MEDICINE

## 2022-06-03 RX ORDER — METOPROLOL SUCCINATE 25 MG/1
25 TABLET, EXTENDED RELEASE ORAL DAILY
Status: DISCONTINUED | OUTPATIENT
Start: 2022-06-04 | End: 2022-06-07 | Stop reason: HOSPADM

## 2022-06-03 RX ORDER — ONDANSETRON 4 MG/1
4 TABLET, ORALLY DISINTEGRATING ORAL EVERY 6 HOURS PRN
Status: DISCONTINUED | OUTPATIENT
Start: 2022-06-04 | End: 2022-06-07 | Stop reason: HOSPADM

## 2022-06-03 RX ORDER — ONDANSETRON 2 MG/ML
4 INJECTION INTRAMUSCULAR; INTRAVENOUS
Status: COMPLETED | OUTPATIENT
Start: 2022-06-03 | End: 2022-06-03

## 2022-06-03 RX ORDER — LOSARTAN POTASSIUM 25 MG/1
25 TABLET ORAL DAILY
Status: DISCONTINUED | OUTPATIENT
Start: 2022-06-04 | End: 2022-06-07 | Stop reason: HOSPADM

## 2022-06-03 RX ORDER — MORPHINE SULFATE 4 MG/ML
4 INJECTION, SOLUTION INTRAMUSCULAR; INTRAVENOUS
Status: COMPLETED | OUTPATIENT
Start: 2022-06-03 | End: 2022-06-03

## 2022-06-03 RX ADMIN — MORPHINE SULFATE 4 MG: 4 INJECTION, SOLUTION INTRAMUSCULAR; INTRAVENOUS at 06:06

## 2022-06-03 RX ADMIN — ONDANSETRON 4 MG: 2 INJECTION, SOLUTION INTRAMUSCULAR; INTRAVENOUS at 06:06

## 2022-06-03 NOTE — Clinical Note
A generator pocket was opened at the left chest with blunt dissection, electrocautery, plasma blade and sharp dissection.

## 2022-06-03 NOTE — ED NOTES
APPEARANCE: Awake, alert, & oriented. No acute distress.  CARDIAC: Normal rate and rhythm. Reports left sided chest wall pain with movement.     RESPIRATORY: Respirations are even and unlabored no obvious signs of distress. No accessory muscle use. Breath sounds clear bilaterally throughout chest.  PERIPHERAL VASCULAR: peripheral pulses present. Normal cap refill. No edema.   GASTRO: soft, no tenderness, no abdominal distention.  MUSC: Full ROM. No bony tenderness or soft tissue tenderness. No obvious deformity.  SKIN: Skin is warm, dry, and intact. Normal skin turgor and color. WDL except for incision site to left chest wall that shows no signs or symptoms of infection and sutures are intact with no drainage under clear tegaderm  NEURO: Equal strength bilaterally. Andressa coma scale: Eye Response-4, Motor Response-6, Verbal Response-5. Total=15. Clear speech. No neurological abnormalities.   EENT: No c/o vision or hearing difficulties. Oropharynx clear.

## 2022-06-03 NOTE — ED PROVIDER NOTES
Encounter Date: 6/3/2022       History     Chief Complaint   Patient presents with    Chest Pain     AICD placed Monday and patient presents with sudden onset sharp left sided chest pain that is associated with weakness, nausea and SOB with deep breathing. Patient voiced concerns that the device fired. Received 4mg of zofran and 50mcg of fentanyl en route.      HPI   62f with hx cardiomyopathy sp AICD placement w Dr Tovar 3/30 pw feeling of intermittent sharp shooting pains in her L chest and then sudden L chest pain as if she had a shock from her device today  Currently asymptomatic  Felt something likea muscle twitch prior to the shocking feeling  Denies f/c/n/v/SOB/abd pain/urinary sxs/diarrhea.      Review of patient's allergies indicates:  No Known Allergies  Past Medical History:   Diagnosis Date    Allergy     Cardiomyopathy     Hypertension      Past Surgical History:   Procedure Laterality Date    CARDIAC DEFIBRILLATOR PLACEMENT Left     HYSTERECTOMY      full    KNEE ARTHROSCOPY      LARYNGOSCOPY N/A 2019    Procedure: DIRECT MICRO LARYNGOSCOPY WITH BIOPSY;  Surgeon: Deidre Calderon MD;  Location: [facility];  Service: ENT;  Laterality: N/A;  video    OOPHORECTOMY       Family History   Problem Relation Age of Onset    Diabetes Mother     Diabetes Maternal Grandmother     Heart disease Maternal Grandmother     Allergies Daughter     Asthma Neg Hx     Allergic rhinitis Neg Hx     Angioedema Neg Hx     Atopy Neg Hx     Eczema Neg Hx     Immunodeficiency Neg Hx     Rhinitis Neg Hx     Urticaria Neg Hx      Social History     Tobacco Use    Smoking status: Former Smoker     Packs/day: 0.50     Quit date:      Years since quittin.4    Smokeless tobacco: Never Used   Substance Use Topics    Alcohol use: No    Drug use: No     Review of Systems   Constitutional: Negative for appetite change, chills, diaphoresis and fever.   HENT: Negative for congestion,  nosebleeds, sore throat, trouble swallowing and voice change.    Eyes: Negative for photophobia, pain, redness and itching.   Respiratory: Negative for cough, chest tightness and shortness of breath.    Cardiovascular: Positive for chest pain. Negative for leg swelling.   Gastrointestinal: Negative for abdominal pain, constipation, diarrhea, nausea and vomiting.   Genitourinary: Negative for decreased urine volume, difficulty urinating, dysuria and frequency.   Musculoskeletal: Negative for gait problem.   Skin: Negative for color change, rash and wound.   Neurological: Negative for dizziness, facial asymmetry, speech difficulty and headaches.   Psychiatric/Behavioral: Negative for agitation, confusion and suicidal ideas.   All other systems reviewed and are negative.      Physical Exam     Initial Vitals [06/03/22 1215]   BP Pulse Resp Temp SpO2   130/81 79 17 97.9 °F (36.6 °C) 98 %      MAP       --         Physical Exam    Nursing note and vitals reviewed.  Constitutional:   EXAM  General: Awake, alert and oriented. No acute distress.     Head: normocephalic and atraumatic     Eyes: Conjunctivae are clear without exudates or hemorrhage. Sclera is non-icteric. EOM are intact. Eyelids are normal in appearance without swelling or lesions.     Ears: The external ear and ear canal are non-tender and without swelling. The canal is clear without discharge. Hearing intact.     Nose: Nares are patent bilaterally.     Neck: The neck is supple. Trachea is midline. Full ROM.     Cardiac: Regular rate. AICD site with dressing, no erythema surrounding, minimal pain with palpation. Pain under left breast at pectoralis muscle, no erythema.     Respiratory: No signs of respiratory distress. No audible wheezes.     Abdominal: Non-distended.     Extremities: Upper and lower extremities are atraumatic in appearance without tenderness or deformity. No swelling or erythema. Full range of motion.     Skin: Appropriate color for  ethnicity.     Neurological: The patient is awake, alert and oriented to person, place, and time with normal speech.     Psychiatric: Appropriate mood and affect.     In light of current/ongoing global covid-19 pandemic, all my encounters w pt were with full ppe including but not limited to gown, gloves, n95, eye protection OR from >6 ft away.             ED Course   Procedures  Labs Reviewed   CBC W/ AUTO DIFFERENTIAL - Abnormal; Notable for the following components:       Result Value    Immature Grans (Abs) 0.05 (*)     All other components within normal limits   COMPREHENSIVE METABOLIC PANEL - Abnormal; Notable for the following components:    CO2 20 (*)     Glucose 166 (*)     All other components within normal limits   SARS-COV-2 RDRP GENE - Normal   TROPONIN I   B-TYPE NATRIURETIC PEPTIDE   TROPONIN I     EKG Readings: (Independently Interpreted)   Sinus rhythm, PVCs, normal intervals, T-wave flattening V4 V5       Imaging Results          X-Ray Chest PA And Lateral (Final result)  Result time 06/03/22 14:57:47    Final result by Ti Hunter MD (06/03/22 14:57:47)                 Impression:      Cardiac device without detrimental change or radiographic acute intrathoracic process seen.      Electronically signed by: Ti Hunter MD  Date:    06/03/2022  Time:    14:57             Narrative:    EXAMINATION:  XR CHEST PA AND LATERAL    CLINICAL HISTORY:  Chest pain, unspecified    TECHNIQUE:  PA and lateral views of the chest were performed.    COMPARISON:  Chest radiograph 05/30/2022 and CT thorax 06/02/2020    FINDINGS:  Monitoring leads overlie the chest.  Patient is slightly rotated.    Left chest single lead cardiac device stable.  Cardiomediastinal silhouette is midline noting calcification and tortuosity of the aorta and upper limits of normal cardiac silhouette similar to prior.  Pulmonary vasculature and hilar contours are within normal limits.  Scattered linear opacities throughout each lung  consistent with minimal platelike scarring versus atelectasis.  The lungs are otherwise symmetrically well expanded without consolidation, pleural effusion or pneumothorax.  Osseous structures appear stable without acute process seen.                                 Medications   morphine injection 4 mg (4 mg Intravenous Given 6/3/22 1815)   ondansetron injection 4 mg (4 mg Intravenous Given 6/3/22 1815)     Medical Decision Making:   ED Management:  Patient's EKG does not show any evidence of arrhythmia.  Did let patient's EP, Dr. Tovar, know that patient was in the emergency department.  Labs are within normal limits.  [employer] representative came to interrogate the device after incomplete wireless transmission from the device.  The representative is concerned that the device may not be capturing.  No evidence of VF/VT, no shocks were given when the patient was feeling the sensation in her chest earlier today.  Did speak with EP on-call at [facility], Dr. Chaudhary, advised admission to [facility] for evalution if pt is willing. When pt's device is paced by the representative, patient feels extreme pain.  Possible lead dislodgement versus microperforation.  Plan for transfer to [facility] for further workup and evaluation.  Patient's pain improved with morphine administration.            Attending Attestation:         Attending Critical Care:   Critical Care Comments: I have personally provided 85 minutes of critical care time exclusive of time spent on separately billable procedures. Time includes review of laboratory data, radiology results, discussion with consultants, and monitoring for potential decompensation. Interventions were performed as documented above.                      Clinical Impression:   Final diagnoses:  [R07.9] Chest pain  [T82.118A] ICD (implantable cardioverter-defibrillator) malfunction, initial encounter (Primary)          ED Disposition Condition    Transfer to Another Facility  Stable              Keely Null MD  06/04/22 2162

## 2022-06-03 NOTE — Clinical Note
The chest was prepped. The site was prepped with ChloraPrep. The site was clipped. The patient was draped. The patient was positioned supine. The patient was secured using safety straps, with ulnar pads, with a wedge under the patient and to an armboard.

## 2022-06-03 NOTE — TELEPHONE ENCOUNTER
Patients family member called stating the patient is continueously getting shocked by her [employer] ICD. I informed him to hang up and call 911 or bring her to the ED.  He stated understanding.

## 2022-06-03 NOTE — Clinical Note
Echocardiogram performed to check for possible cardiac effusion, no changes on echo at this time compared to previous echo per Dr Mendoza

## 2022-06-03 NOTE — Clinical Note
A dressing was applied to the incision. Aquacel dressing applied 30 mins after dermabond dries followed with pressure dressing to site

## 2022-06-04 PROBLEM — I50.22 CHF (CONGESTIVE HEART FAILURE), NYHA CLASS II, CHRONIC, SYSTOLIC: Chronic | Status: ACTIVE | Noted: 2022-03-24

## 2022-06-04 PROBLEM — J98.4 RESTRICTIVE LUNG DISEASE: Chronic | Status: ACTIVE | Noted: 2020-05-29

## 2022-06-04 LAB
ALBUMIN SERPL BCP-MCNC: 3.5 G/DL (ref 3.5–5.2)
ALP SERPL-CCNC: 93 U/L (ref 55–135)
ALT SERPL W/O P-5'-P-CCNC: 20 U/L (ref 10–44)
ANION GAP SERPL CALC-SCNC: 9 MMOL/L (ref 8–16)
ASCENDING AORTA: 2.84 CM
AST SERPL-CCNC: 12 U/L (ref 10–40)
AV INDEX (PROSTH): 0.62
AV MEAN GRADIENT: 5 MMHG
AV PEAK GRADIENT: 9 MMHG
AV VALVE AREA: 1.91 CM2
AV VELOCITY RATIO: 0.63
BASOPHILS # BLD AUTO: 0.04 K/UL (ref 0–0.2)
BASOPHILS NFR BLD: 0.4 % (ref 0–1.9)
BILIRUB SERPL-MCNC: 0.3 MG/DL (ref 0.1–1)
BSA FOR ECHO PROCEDURE: 1.78 M2
BUN SERPL-MCNC: 17 MG/DL (ref 8–23)
CALCIUM SERPL-MCNC: 9.4 MG/DL (ref 8.7–10.5)
CHLORIDE SERPL-SCNC: 104 MMOL/L (ref 95–110)
CO2 SERPL-SCNC: 25 MMOL/L (ref 23–29)
CREAT SERPL-MCNC: 0.8 MG/DL (ref 0.5–1.4)
CV ECHO LV RWT: 0.38 CM
DIFFERENTIAL METHOD: NORMAL
DOP CALC AO PEAK VEL: 1.54 M/S
DOP CALC AO VTI: 29.43 CM
DOP CALC LVOT AREA: 3.1 CM2
DOP CALC LVOT DIAMETER: 1.98 CM
DOP CALC LVOT PEAK VEL: 0.97 M/S
DOP CALC LVOT STROKE VOLUME: 56.26 CM3
DOP CALCLVOT PEAK VEL VTI: 18.28 CM
E WAVE DECELERATION TIME: 149.38 MSEC
E/A RATIO: 0.89
E/E' RATIO: 6.25 M/S
ECHO LV POSTERIOR WALL: 0.8 CM (ref 0.6–1.1)
EJECTION FRACTION: 50 %
EOSINOPHIL # BLD AUTO: 0.2 K/UL (ref 0–0.5)
EOSINOPHIL NFR BLD: 2.5 % (ref 0–8)
ERYTHROCYTE [DISTWIDTH] IN BLOOD BY AUTOMATED COUNT: 12.8 % (ref 11.5–14.5)
EST. GFR  (AFRICAN AMERICAN): >60 ML/MIN/1.73 M^2
EST. GFR  (NON AFRICAN AMERICAN): >60 ML/MIN/1.73 M^2
FRACTIONAL SHORTENING: 26 % (ref 28–44)
GLUCOSE SERPL-MCNC: 99 MG/DL (ref 70–110)
HCT VFR BLD AUTO: 40.9 % (ref 37–48.5)
HGB BLD-MCNC: 13.4 G/DL (ref 12–16)
IMM GRANULOCYTES # BLD AUTO: 0.03 K/UL (ref 0–0.04)
IMM GRANULOCYTES NFR BLD AUTO: 0.3 % (ref 0–0.5)
INTERVENTRICULAR SEPTUM: 0.87 CM (ref 0.6–1.1)
IVRT: 148.43 MSEC
LA MAJOR: 5.21 CM
LA MINOR: 4.96 CM
LA WIDTH: 3 CM
LEFT ATRIUM SIZE: 3 CM
LEFT ATRIUM VOLUME INDEX MOD: 14.3 ML/M2
LEFT ATRIUM VOLUME INDEX: 22.3 ML/M2
LEFT ATRIUM VOLUME MOD: 24.8 CM3
LEFT ATRIUM VOLUME: 38.88 CM3
LEFT INTERNAL DIMENSION IN SYSTOLE: 3.08 CM (ref 2.1–4)
LEFT VENTRICLE DIASTOLIC VOLUME INDEX: 44.09 ML/M2
LEFT VENTRICLE DIASTOLIC VOLUME: 76.72 ML
LEFT VENTRICLE MASS INDEX: 61 G/M2
LEFT VENTRICLE SYSTOLIC VOLUME INDEX: 21.4 ML/M2
LEFT VENTRICLE SYSTOLIC VOLUME: 37.25 ML
LEFT VENTRICULAR INTERNAL DIMENSION IN DIASTOLE: 4.16 CM (ref 3.5–6)
LEFT VENTRICULAR MASS: 105.57 G
LV LATERAL E/E' RATIO: 5.77 M/S
LV SEPTAL E/E' RATIO: 6.82 M/S
LYMPHOCYTES # BLD AUTO: 2.3 K/UL (ref 1–4.8)
LYMPHOCYTES NFR BLD: 23.6 % (ref 18–48)
MAGNESIUM SERPL-MCNC: 2.1 MG/DL (ref 1.6–2.6)
MCH RBC QN AUTO: 29.3 PG (ref 27–31)
MCHC RBC AUTO-ENTMCNC: 32.8 G/DL (ref 32–36)
MCV RBC AUTO: 90 FL (ref 82–98)
MONOCYTES # BLD AUTO: 0.8 K/UL (ref 0.3–1)
MONOCYTES NFR BLD: 8.5 % (ref 4–15)
MV PEAK A VEL: 0.84 M/S
MV PEAK E VEL: 0.75 M/S
MV STENOSIS PRESSURE HALF TIME: 43.32 MS
MV VALVE AREA P 1/2 METHOD: 5.08 CM2
NEUTROPHILS # BLD AUTO: 6.3 K/UL (ref 1.8–7.7)
NEUTROPHILS NFR BLD: 64.7 % (ref 38–73)
NRBC BLD-RTO: 0 /100 WBC
PHOSPHATE SERPL-MCNC: 4.7 MG/DL (ref 2.7–4.5)
PISA TR MAX VEL: 2.48 M/S
PLATELET # BLD AUTO: 233 K/UL (ref 150–450)
PMV BLD AUTO: 10.9 FL (ref 9.2–12.9)
POTASSIUM SERPL-SCNC: 4.2 MMOL/L (ref 3.5–5.1)
PROT SERPL-MCNC: 7.1 G/DL (ref 6–8.4)
RA MAJOR: 3.94 CM
RA PRESSURE: 3 MMHG
RA WIDTH: 2.6 CM
RBC # BLD AUTO: 4.57 M/UL (ref 4–5.4)
RIGHT VENTRICULAR END-DIASTOLIC DIMENSION: 2.79 CM
RV TISSUE DOPPLER FREE WALL SYSTOLIC VELOCITY 1 (APICAL 4 CHAMBER VIEW): 14.85 CM/S
SINUS: 2.88 CM
SODIUM SERPL-SCNC: 138 MMOL/L (ref 136–145)
STJ: 2.46 CM
TDI LATERAL: 0.13 M/S
TDI SEPTAL: 0.11 M/S
TDI: 0.12 M/S
TR MAX PG: 25 MMHG
TRICUSPID ANNULAR PLANE SYSTOLIC EXCURSION: 1.57 CM
TV REST PULMONARY ARTERY PRESSURE: 28 MMHG
WBC # BLD AUTO: 9.78 K/UL (ref 3.9–12.7)

## 2022-06-04 PROCEDURE — 80053 COMPREHEN METABOLIC PANEL: CPT

## 2022-06-04 PROCEDURE — 83735 ASSAY OF MAGNESIUM: CPT

## 2022-06-04 PROCEDURE — 25500020 PHARM REV CODE 255: Performed by: STUDENT IN AN ORGANIZED HEALTH CARE EDUCATION/TRAINING PROGRAM

## 2022-06-04 PROCEDURE — 96372 THER/PROPH/DIAG INJ SC/IM: CPT

## 2022-06-04 PROCEDURE — 99214 OFFICE O/P EST MOD 30 MIN: CPT | Mod: 24,57,, | Performed by: STUDENT IN AN ORGANIZED HEALTH CARE EDUCATION/TRAINING PROGRAM

## 2022-06-04 PROCEDURE — 85025 COMPLETE CBC W/AUTO DIFF WBC: CPT

## 2022-06-04 PROCEDURE — 93005 ELECTROCARDIOGRAM TRACING: CPT

## 2022-06-04 PROCEDURE — 25000003 PHARM REV CODE 250: Performed by: FAMILY MEDICINE

## 2022-06-04 PROCEDURE — 93010 EKG 12-LEAD: ICD-10-PCS | Mod: ,,, | Performed by: INTERNAL MEDICINE

## 2022-06-04 PROCEDURE — G0378 HOSPITAL OBSERVATION PER HR: HCPCS

## 2022-06-04 PROCEDURE — 36415 COLL VENOUS BLD VENIPUNCTURE: CPT

## 2022-06-04 PROCEDURE — 99214 PR OFFICE/OUTPT VISIT, EST, LEVL IV, 30-39 MIN: ICD-10-PCS | Mod: 24,57,, | Performed by: STUDENT IN AN ORGANIZED HEALTH CARE EDUCATION/TRAINING PROGRAM

## 2022-06-04 PROCEDURE — 99223 PR INITIAL HOSPITAL CARE,LEVL III: ICD-10-PCS | Mod: ,,, | Performed by: FAMILY MEDICINE

## 2022-06-04 PROCEDURE — 20600001 HC STEP DOWN PRIVATE ROOM

## 2022-06-04 PROCEDURE — 93010 ELECTROCARDIOGRAM REPORT: CPT | Mod: ,,, | Performed by: INTERNAL MEDICINE

## 2022-06-04 PROCEDURE — 99223 1ST HOSP IP/OBS HIGH 75: CPT | Mod: ,,, | Performed by: FAMILY MEDICINE

## 2022-06-04 PROCEDURE — 63600175 PHARM REV CODE 636 W HCPCS

## 2022-06-04 PROCEDURE — 84100 ASSAY OF PHOSPHORUS: CPT

## 2022-06-04 PROCEDURE — 25000003 PHARM REV CODE 250

## 2022-06-04 RX ORDER — GLUCAGON 1 MG
1 KIT INJECTION
Status: DISCONTINUED | OUTPATIENT
Start: 2022-06-04 | End: 2022-06-07 | Stop reason: HOSPADM

## 2022-06-04 RX ORDER — ENOXAPARIN SODIUM 100 MG/ML
40 INJECTION SUBCUTANEOUS EVERY 24 HOURS
Status: DISCONTINUED | OUTPATIENT
Start: 2022-06-04 | End: 2022-06-05

## 2022-06-04 RX ORDER — MONTELUKAST SODIUM 10 MG/1
10 TABLET ORAL NIGHTLY
COMMUNITY
End: 2023-11-11

## 2022-06-04 RX ORDER — NALOXONE HCL 0.4 MG/ML
0.02 VIAL (ML) INJECTION
Status: DISCONTINUED | OUTPATIENT
Start: 2022-06-04 | End: 2022-06-07 | Stop reason: HOSPADM

## 2022-06-04 RX ORDER — IBUPROFEN 200 MG
24 TABLET ORAL
Status: DISCONTINUED | OUTPATIENT
Start: 2022-06-04 | End: 2022-06-07 | Stop reason: HOSPADM

## 2022-06-04 RX ORDER — ACETAMINOPHEN 500 MG
1000 TABLET ORAL EVERY 6 HOURS PRN
Status: ON HOLD | COMMUNITY
End: 2022-06-07 | Stop reason: HOSPADM

## 2022-06-04 RX ORDER — ACETAMINOPHEN 325 MG/1
650 TABLET ORAL EVERY 6 HOURS PRN
Status: DISCONTINUED | OUTPATIENT
Start: 2022-06-04 | End: 2022-06-06 | Stop reason: SDUPTHER

## 2022-06-04 RX ORDER — OXYCODONE HYDROCHLORIDE 5 MG/1
5 TABLET ORAL EVERY 6 HOURS PRN
Status: DISCONTINUED | OUTPATIENT
Start: 2022-06-04 | End: 2022-06-07 | Stop reason: HOSPADM

## 2022-06-04 RX ORDER — CALCIUM CARBONATE 200(500)MG
1 TABLET,CHEWABLE ORAL DAILY PRN
COMMUNITY

## 2022-06-04 RX ORDER — FLUTICASONE PROPIONATE 50 MCG
1 SPRAY, SUSPENSION (ML) NASAL DAILY PRN
COMMUNITY

## 2022-06-04 RX ORDER — IBUPROFEN 200 MG
16 TABLET ORAL
Status: DISCONTINUED | OUTPATIENT
Start: 2022-06-04 | End: 2022-06-07 | Stop reason: HOSPADM

## 2022-06-04 RX ORDER — SODIUM CHLORIDE 0.9 % (FLUSH) 0.9 %
10 SYRINGE (ML) INJECTION EVERY 12 HOURS PRN
Status: DISCONTINUED | OUTPATIENT
Start: 2022-06-04 | End: 2022-06-07 | Stop reason: HOSPADM

## 2022-06-04 RX ORDER — BENZONATATE 100 MG/1
100 CAPSULE ORAL 3 TIMES DAILY PRN
Status: DISCONTINUED | OUTPATIENT
Start: 2022-06-04 | End: 2022-06-07 | Stop reason: HOSPADM

## 2022-06-04 RX ADMIN — HUMAN ALBUMIN MICROSPHERES AND PERFLUTREN 0.11 MG: 10; .22 INJECTION, SOLUTION INTRAVENOUS at 07:06

## 2022-06-04 RX ADMIN — OXYCODONE 5 MG: 5 TABLET ORAL at 07:06

## 2022-06-04 RX ADMIN — ACETAMINOPHEN 650 MG: 325 TABLET ORAL at 08:06

## 2022-06-04 RX ADMIN — OXYCODONE 5 MG: 5 TABLET ORAL at 04:06

## 2022-06-04 RX ADMIN — ACETAMINOPHEN 650 MG: 325 TABLET ORAL at 01:06

## 2022-06-04 RX ADMIN — METOPROLOL SUCCINATE 25 MG: 25 TABLET, EXTENDED RELEASE ORAL at 11:06

## 2022-06-04 RX ADMIN — LOSARTAN POTASSIUM 25 MG: 25 TABLET, FILM COATED ORAL at 11:06

## 2022-06-04 RX ADMIN — BENZONATATE 100 MG: 100 CAPSULE ORAL at 04:06

## 2022-06-04 RX ADMIN — ENOXAPARIN SODIUM 40 MG: 100 INJECTION SUBCUTANEOUS at 04:06

## 2022-06-04 NOTE — CONSULTS
[facility] - Cardiology Stepdown  Cardiac Electrophysiology  Consult Note    Admission Date: 6/3/2022  Code Status: Prior   Attending Provider: Ke Neil MD  Consulting Provider: Evens Quinones MD  Principal Problem:<principal problem not specified>    Inpatient consult to Electrophysiology  Consult performed by: Evens Quinones MD  Consult ordered by: Andrea Goodwin DO        Subjective:     Chief Complaint:  Chest Pain     HPI:   Suly Jaime is a 62 y.o. female with Combined Diastolic/Systolic heart failure (EF35%) s/p AICD ([employer]) placement 5/30 with Dr Tovar who presented to [place] [facility] [place] with left sided chest pain 6/3. Symptoms felt like intermittent sharp shooting pains in her L chest, then a sudden severe L chest pain that worried the patient she may have had a shock from her device.      Vitally stable on presentation with EKG reported to be without evidence of arrhythmia or ischemia, negative troponins. [place] [facility] notedrecommended interrogation of device by [employer] Rep. During the device interrogation, patient again had severe L sided chest pain. [place] [facility] [place] to review interrogation data, which did not show evidence of VT/VF or any shocks being delivered. No large changes that would be concerning for a lead disconnect, though potentially with lead migration; microperforation thought to be less likely.     Pt also with hypertension with systolics of >200 shortly after pain/interrogation event, but improved with administration of morphine.  Discussed with referring provider, who wish to admit patient for pain control. Case discussed with EP and cardiology [place] [facility] Pt will need device evaluated, likely Monday 6/6 unless pt's condition worsens. Given normal EKG and troponin, recommend admission to hospital medicine for pain control.     EP consulted for chest pain and ICD interrogation. Patient arrived overnight and not having any chest pain currently. ICD interrogated and  again, no VT/VF, discharges along with no significant changes in sensing thresholds and impedence since implantation with Dr Tovar 5/30/2022. She note that she had recurrence of the pain though while ICD was being tested at [facility]. Bedside echo with ICD in RV apex with small, anterior pericardial effusion, but no evidence of lead perforation and/or tamponade.      Past Medical History:   Diagnosis Date    Allergy     Cardiomyopathy     Hypertension        Past Surgical History:   Procedure Laterality Date    CARDIAC DEFIBRILLATOR PLACEMENT Left     HYSTERECTOMY  2003    full    KNEE ARTHROSCOPY      LARYNGOSCOPY N/A 02/18/2019    Procedure: DIRECT MICRO LARYNGOSCOPY WITH BIOPSY;  Surgeon: Deidre Calderon MD;  Location: [facility] OR;  Service: ENT;  Laterality: N/A;  video    OOPHORECTOMY         Review of patient's allergies indicates:  No Known Allergies    Current Facility-Administered Medications on File Prior to Encounter   Medication    lidocaine (PF) 10 mg/ml (1%) injection 10 mg    [COMPLETED] morphine injection 4 mg    [COMPLETED] ondansetron injection 4 mg    sodium chloride 0.9% bolus 1,000 mL    sodium chloride 0.9% flush 5 mL     Current Outpatient Medications on File Prior to Encounter   Medication Sig    cephALEXin (KEFLEX) 500 MG capsule Take 1 capsule (500 mg total) by mouth every 8 (eight) hours.    fluticasone propionate (FLOVENT HFA) 110 mcg/actuation inhaler Inhale 2 puffs into the lungs 2 (two) times daily. Controller    latanoprost 0.005 % ophthalmic solution INSTILL 1 DROP INTO BOTH EYES AT BEDTIME AS DIRECTED    losartan (COZAAR) 25 MG tablet Take 1 tablet (25 mg total) by mouth once daily.    metoprolol succinate (TOPROL-XL) 25 MG 24 hr tablet Take 1 tablet (25 mg total) by mouth once daily.    tobramycin-dexamethasone 0.3-0.1% (TOBRADEX) 0.3-0.1 % DrpS SHAKE LIQUID AND INSTILL 1 TO 2 DROPS IN BOTH EYES FOUR TIMES DAILY    budesonide (RINOCORT AQUA) 32  mcg/actuation nasal spray 1 spray (32 mcg total) by Nasal route once daily. (Patient not taking: No sig reported)    pantoprazole (PROTONIX) 40 MG tablet Take 1 tablet (40 mg total) by mouth once daily.    valACYclovir (VALTREX) 1000 MG tablet Take 1 tablet (1,000 mg total) by mouth 3 (three) times daily. for 7 days (Patient not taking: No sig reported)    [DISCONTINUED] beclomethasone (QVAR) 80 mcg/actuation Aero Inhale 2 puffs into the lungs 2 (two) times a day. Controller    [DISCONTINUED] gabapentin (NEURONTIN) 300 MG capsule Take 1 capsule (300 mg total) by mouth 3 (three) times daily as needed (pain).    [DISCONTINUED] levocetirizine (XYZAL) 5 MG tablet Take 1 tablet (5 mg total) by mouth every evening.    [DISCONTINUED] lisinopriL (PRINIVIL,ZESTRIL) 5 MG tablet Take 1 tablet (5 mg total) by mouth once daily.     Family History       Problem Relation (Age of Onset)    Allergies Daughter    Diabetes Mother, Maternal Grandmother    Heart disease Maternal Grandmother          Tobacco Use    Smoking status: Former Smoker     Packs/day: 0.50     Quit date:      Years since quittin.4    Smokeless tobacco: Never Used   Substance and Sexual Activity    Alcohol use: No    Drug use: No    Sexual activity: Not Currently     Partners: Male     Review of Systems   Constitutional: Negative.   HENT: Negative.     Eyes: Negative.    Cardiovascular:  Positive for chest pain. Negative for dyspnea on exertion, leg swelling, orthopnea, palpitations, paroxysmal nocturnal dyspnea and syncope.   Respiratory:  Positive for shortness of breath.    Endocrine: Negative.    Hematologic/Lymphatic: Negative.    Skin: Negative.    Musculoskeletal: Negative.    Gastrointestinal: Negative.    Genitourinary: Negative.    Neurological:  Negative for dizziness and light-headedness.   Psychiatric/Behavioral: Negative.     Allergic/Immunologic: Negative.    Objective:     Vital Signs (Most Recent):  Temp: 97.9 °F (36.6 °C)  (06/03/22 2204)  Pulse: 98 (06/03/22 2306)  Resp: 20 (06/03/22 2204)  BP: (!) 158/77 (06/03/22 2204)  SpO2: 97 % (06/03/22 2204)   Vital Signs (24h Range):  Temp:  [97.9 °F (36.6 °C)] 97.9 °F (36.6 °C)  Pulse:  [] 98  Resp:  [12-22] 20  SpO2:  [95 %-99 %] 97 %  BP: (130-209)/(70-98) 158/77          Body mass index is 29.27 kg/m².    SpO2: 97 %  O2 Device (Oxygen Therapy): room air    Physical Exam  Constitutional:       General: She is not in acute distress.     Appearance: She is not ill-appearing.      Comments: Pleasant middle age female   HENT:      Head: Normocephalic.      Mouth/Throat:      Mouth: Mucous membranes are moist.   Eyes:      Extraocular Movements: Extraocular movements intact.   Neck:      Comments: No JVD  Cardiovascular:      Rate and Rhythm: Normal rate and regular rhythm.      Pulses: Normal pulses.      Heart sounds: No murmur heard.     Comments: ICD in L upper chest without erythema, drainage, or hematoma  Pulmonary:      Effort: Pulmonary effort is normal. No respiratory distress.      Breath sounds: Normal breath sounds.   Abdominal:      General: Bowel sounds are normal. There is no distension.      Palpations: Abdomen is soft.      Tenderness: There is no abdominal tenderness.   Musculoskeletal:      Cervical back: Neck supple.      Right lower leg: No edema.      Left lower leg: No edema.      Comments: Mild left anterior chest wall tenderness to deep palpation under left breast   Skin:     General: Skin is warm.      Findings: No bruising, erythema, lesion or rash.   Neurological:      General: No focal deficit present.      Mental Status: She is alert and oriented to person, place, and time.   Psychiatric:         Mood and Affect: Mood normal.         Behavior: Behavior normal.       Significant Labs: BMP:   Recent Labs   Lab 06/03/22  1421   *      K 4.0      CO2 20*   BUN 16   CREATININE 0.8   CALCIUM 9.3   , CMP:   Recent Labs   Lab 06/03/22  1421   NA  139   K 4.0      CO2 20*   *   BUN 16   CREATININE 0.8   CALCIUM 9.3   PROT 7.5   ALBUMIN 3.6   BILITOT 0.3   ALKPHOS 99   AST 17   ALT 19   ANIONGAP 14   ESTGFRAFRICA >60   EGFRNONAA >60   , CBC:   Recent Labs   Lab 06/03/22  1421   WBC 9.69   HGB 12.7   HCT 38.6      , INR: No results for input(s): INR, PROTIME in the last 48 hours., Lipid Panel No results for input(s): CHOL, HDL, LDLCALC, TRIG, CHOLHDL in the last 48 hours., and Troponin   Recent Labs   Lab 06/03/22  1421 06/03/22  1851   TROPONINI <0.006 0.009       Significant Imaging:   Reviewed        Assessment and Plan:     Chest pain  Onset since ICD implanted 5/30/2022 with Dr Tovar. Intermittent, lasts only minutes before spontaneously resolving. Labs unremarkable. CXR with slight lead migration, but no cardiomegaly or pulmonary edema. Hemodynamically stable. ICD site clean. Bedside TTE without small effusion and no tamponade. [employer] ICD re-interrogated without VT/VF, discharges, or significant changes in sensing or impedance. Chest pain could also be musculoskeletal.     - Repeat EKG  - Follow-up formal TTE in the morning; no pericardial effusion noted on last TTE 1/2022  - Resume home GDMT  - Monitor on telemetry  - Will review interrogation with staff in the morning; placed in patient's physical chart  - Hold heparin products to avoid pocket hematoma  - Pain control per primary team      Case to be formally staffed by Dr Flores Chaudhary MD.    Thank you for your consult. I will follow-up with patient. Please contact us if you have any additional questions.    Evens Quinones MD  Cardiac Electrophysiology  [facility] - Cardiology Stepdown

## 2022-06-04 NOTE — HPI
Suly Jaime is a 62 y.o. female with Combined Diastolic/Systolic heart failure (EF35%) s/p AICD ([employer]) placement 5/30 with Dr Tovar who presented to the ED at [facility] [place] with left sided chest pain 6/3. Symptoms felt like intermittent sharp shooting pains in her L chest, then a sudden severe L chest pain that worried the patient she may have had a shock from her device.      Vitally stable on presentation with EKG reported to be without evidence of arrhythmia or ischemia, negative troponins. [place] [facility] notedrecommended interrogation of device by [employer] Rep. During the device interrogation, patient again had severe L sided chest pain. [place] [facility] [place] to review interrogation data, which did not show evidence of VT/VF or any shocks being delivered. No large changes that would be concerning for a lead disconnect, though potentially with lead migration; microperforation thought to be less likely.     Pt also with hypertension with systolics of >200 shortly after pain/interrogation event, but improved with administration of morphine.  Discussed with referring provider, who wish to admit patient for pain control. Case discussed with EP and cardiology [place] [facility]. Pt will need device evaluated, likely Monday 6/6 unless pt's condition worsens. Given normal EKG and troponin, recommend admission to hospital medicine for pain control.     EP consulted for chest pain and ICD interrogation. Patient arrived overnight and not having any chest pain currently. ICD interrogated and again, no VT/VF, discharges along with no significant changes in sensing thresholds and impedence since implantation with Dr Tovar 5/30/2022. She note that she had recurrence of the pain though while ICD was being tested at [facility]. Bedside echo with ICD in RV apex with small, anterior pericardial effusion, but no evidence of lead perforation and/or tamponade.

## 2022-06-04 NOTE — ASSESSMENT & PLAN NOTE
Onset since ICD implanted 5/30/2022 with Dr Tovar. Intermittent, lasts only minutes before spontaneously resolving. Labs unremarkable. CXR with slight lead migration, but no cardiomegaly or pulmonary edema. Hemodynamically stable. ICD site clean. Bedside TTE without small effusion and no tamponade. [employer] ICD re-interrogated without VT/VF, discharges, or significant changes in sensing or impedance.   Xray concern for lead migration though device functioning well. Given recurrent symptoms, will plan to do lead revision on Monday.  - TTE pending  - Reviewed EKG, Xray and device interrogation  - Monitor on telemetry  - Pain control per primary team    D/W Dr. Chaudhary

## 2022-06-04 NOTE — SUBJECTIVE & OBJECTIVE
Past Medical History:   Diagnosis Date    Allergy     Cardiomyopathy     Hypertension        Past Surgical History:   Procedure Laterality Date    CARDIAC DEFIBRILLATOR PLACEMENT Left     HYSTERECTOMY  2003    full    KNEE ARTHROSCOPY      LARYNGOSCOPY N/A 02/18/2019    Procedure: DIRECT MICRO LARYNGOSCOPY WITH BIOPSY;  Surgeon: Deidre Calderon MD;  Location: [facility];  Service: ENT;  Laterality: N/A;  video    OOPHORECTOMY         Review of patient's allergies indicates:  No Known Allergies    Current Facility-Administered Medications on File Prior to Encounter   Medication    lidocaine (PF) 10 mg/ml (1%) injection 10 mg    [COMPLETED] morphine injection 4 mg    [COMPLETED] ondansetron injection 4 mg    sodium chloride 0.9% bolus 1,000 mL    sodium chloride 0.9% flush 5 mL     Current Outpatient Medications on File Prior to Encounter   Medication Sig    cephALEXin (KEFLEX) 500 MG capsule Take 1 capsule (500 mg total) by mouth every 8 (eight) hours.    fluticasone propionate (FLOVENT HFA) 110 mcg/actuation inhaler Inhale 2 puffs into the lungs 2 (two) times daily. Controller    latanoprost 0.005 % ophthalmic solution INSTILL 1 DROP INTO BOTH EYES AT BEDTIME AS DIRECTED    losartan (COZAAR) 25 MG tablet Take 1 tablet (25 mg total) by mouth once daily.    metoprolol succinate (TOPROL-XL) 25 MG 24 hr tablet Take 1 tablet (25 mg total) by mouth once daily.    tobramycin-dexamethasone 0.3-0.1% (TOBRADEX) 0.3-0.1 % DrpS SHAKE LIQUID AND INSTILL 1 TO 2 DROPS IN BOTH EYES FOUR TIMES DAILY    budesonide (RINOCORT AQUA) 32 mcg/actuation nasal spray 1 spray (32 mcg total) by Nasal route once daily. (Patient not taking: No sig reported)    pantoprazole (PROTONIX) 40 MG tablet Take 1 tablet (40 mg total) by mouth once daily.    valACYclovir (VALTREX) 1000 MG tablet Take 1 tablet (1,000 mg total) by mouth 3 (three) times daily. for 7 days (Patient not taking: No sig reported)    [DISCONTINUED] beclomethasone (QVAR) 80  mcg/actuation Aero Inhale 2 puffs into the lungs 2 (two) times a day. Controller    [DISCONTINUED] gabapentin (NEURONTIN) 300 MG capsule Take 1 capsule (300 mg total) by mouth 3 (three) times daily as needed (pain).    [DISCONTINUED] levocetirizine (XYZAL) 5 MG tablet Take 1 tablet (5 mg total) by mouth every evening.    [DISCONTINUED] lisinopriL (PRINIVIL,ZESTRIL) 5 MG tablet Take 1 tablet (5 mg total) by mouth once daily.     Family History       Problem Relation (Age of Onset)    Allergies Daughter    Diabetes Mother, Maternal Grandmother    Heart disease Maternal Grandmother          Tobacco Use    Smoking status: Former Smoker     Packs/day: 0.50     Quit date:      Years since quittin.4    Smokeless tobacco: Never Used   Substance and Sexual Activity    Alcohol use: No    Drug use: No    Sexual activity: Not Currently     Partners: Male     Review of Systems   Constitutional:  Positive for diaphoresis. Negative for chills, fatigue and fever.   HENT:  Negative for sore throat and trouble swallowing.    Eyes:  Negative for visual disturbance.   Respiratory:  Positive for cough (chronic) and shortness of breath.    Cardiovascular:  Positive for chest pain. Negative for leg swelling.   Gastrointestinal:  Negative for abdominal pain, blood in stool, constipation, diarrhea, nausea and vomiting.   Genitourinary:  Negative for difficulty urinating and dysuria.   Musculoskeletal:  Negative for back pain.   Skin:  Negative for rash and wound.   Neurological:  Negative for syncope, light-headedness and headaches.   Psychiatric/Behavioral:  Negative for confusion.    Objective:     Vital Signs (Most Recent):  Temp: 98 °F (36.7 °C) (22)  Pulse: 80 (22 0030)  Resp: 18 (22)  BP: 138/73 (22 0030)  SpO2: 98 % (22) Vital Signs (24h Range):  Temp:  [97.9 °F (36.6 °C)-98 °F (36.7 °C)] 98 °F (36.7 °C)  Pulse:  [] 80  Resp:  [12-22] 18  SpO2:  [95 %-99 %] 98 %  BP:  (130-209)/(70-98) 138/73     Weight: 72.6 kg (160 lb 0.9 oz)  Body mass index is 29.27 kg/m².    Physical Exam  Constitutional:       Appearance: Normal appearance.   HENT:      Head: Normocephalic and atraumatic.      Mouth/Throat:      Mouth: Mucous membranes are moist.   Eyes:      Extraocular Movements: Extraocular movements intact.      Pupils: Pupils are equal, round, and reactive to light.   Cardiovascular:      Rate and Rhythm: Normal rate and regular rhythm.      Pulses: Normal pulses.      Heart sounds: Normal heart sounds. No murmur heard.  Pulmonary:      Effort: Pulmonary effort is normal.      Breath sounds: Normal breath sounds.   Abdominal:      General: Abdomen is flat.      Palpations: Abdomen is soft.      Tenderness: There is no abdominal tenderness.   Musculoskeletal:         General: No swelling.      Cervical back: Neck supple.      Right lower leg: No edema.      Left lower leg: No edema.   Skin:     General: Skin is warm and dry.      Findings: No rash.   Neurological:      General: No focal deficit present.      Mental Status: She is alert and oriented to person, place, and time.   Psychiatric:         Mood and Affect: Mood normal.         Behavior: Behavior normal.         CRANIAL NERVES     CN III, IV, VI   Pupils are equal, round, and reactive to light.     Significant Labs: All pertinent labs within the past 24 hours have been reviewed.  CBC:   Recent Labs   Lab 06/03/22  1421   WBC 9.69   HGB 12.7   HCT 38.6        CMP:   Recent Labs   Lab 06/03/22  1421      K 4.0      CO2 20*   *   BUN 16   CREATININE 0.8   CALCIUM 9.3   PROT 7.5   ALBUMIN 3.6   BILITOT 0.3   ALKPHOS 99   AST 17   ALT 19   ANIONGAP 14   EGFRNONAA >60     Cardiac Markers:   Recent Labs   Lab 06/03/22  1421   BNP 17       Significant Imaging: I have reviewed all pertinent imaging results/findings within the past 24 hours.

## 2022-06-04 NOTE — ASSESSMENT & PLAN NOTE
Patient has a past medical history of systolic and diastolic heart failure EF 35%.      -- Continue home GDMT

## 2022-06-04 NOTE — ASSESSMENT & PLAN NOTE
Patient with combined systolic and diastolic heart failure EF 35% S/P AICD on 05/30 presenting with new chest pain at rest that began around 11:00 a.m. on 06/3.  She states that the episode lasted for 5 minutes in describes her pain as 10/10 burning constant pain under her left breast.  She described to the felt like fire and was painful with inspiration.  She had associated diaphoresis.  Her episodes self resolved.  She had a 2nd episode of chest pain around 3 or 4:00 p.m. while in the [place].  She states that her pain was similar to her 1st episode and lasted for roughly 10 minutes.  Her troponin was not elevated.  Her AICD was interrogated and showed no VT/VF, cardiac events, or discharges.  [facility] EP reviewed her device interrogation and excepted her for evaluation.  Cardiology fellow at bedside performing device interrogation and bedside echo.    -- formal echo ordered  -- EKG  -- electrophysiology consulted.  Appreciate the assistance!  -- p.r.n. Tylenol and oxycodone for pain

## 2022-06-04 NOTE — NURSING
Pt in no distress at this time. Pt denies chest discomfort. Pt was seen by primary team and EP. Pt is scheduled for pacer lead revision on Monday. Safety measures maintained. Pt encouraged to call for assistance.

## 2022-06-04 NOTE — SUBJECTIVE & OBJECTIVE
Past Medical History:   Diagnosis Date    Allergy     Cardiomyopathy     Hypertension        Past Surgical History:   Procedure Laterality Date    CARDIAC DEFIBRILLATOR PLACEMENT Left     HYSTERECTOMY  2003    full    KNEE ARTHROSCOPY      LARYNGOSCOPY N/A 02/18/2019    Procedure: DIRECT MICRO LARYNGOSCOPY WITH BIOPSY;  Surgeon: Deidre Calderon MD;  Location: [facility];  Service: ENT;  Laterality: N/A;  video    OOPHORECTOMY         Review of patient's allergies indicates:  No Known Allergies    Current Facility-Administered Medications on File Prior to Encounter   Medication    lidocaine (PF) 10 mg/ml (1%) injection 10 mg    [COMPLETED] morphine injection 4 mg    [COMPLETED] ondansetron injection 4 mg    sodium chloride 0.9% bolus 1,000 mL    sodium chloride 0.9% flush 5 mL     Current Outpatient Medications on File Prior to Encounter   Medication Sig    cephALEXin (KEFLEX) 500 MG capsule Take 1 capsule (500 mg total) by mouth every 8 (eight) hours.    fluticasone propionate (FLOVENT HFA) 110 mcg/actuation inhaler Inhale 2 puffs into the lungs 2 (two) times daily. Controller    latanoprost 0.005 % ophthalmic solution INSTILL 1 DROP INTO BOTH EYES AT BEDTIME AS DIRECTED    losartan (COZAAR) 25 MG tablet Take 1 tablet (25 mg total) by mouth once daily.    metoprolol succinate (TOPROL-XL) 25 MG 24 hr tablet Take 1 tablet (25 mg total) by mouth once daily.    tobramycin-dexamethasone 0.3-0.1% (TOBRADEX) 0.3-0.1 % DrpS SHAKE LIQUID AND INSTILL 1 TO 2 DROPS IN BOTH EYES FOUR TIMES DAILY    budesonide (RINOCORT AQUA) 32 mcg/actuation nasal spray 1 spray (32 mcg total) by Nasal route once daily. (Patient not taking: No sig reported)    pantoprazole (PROTONIX) 40 MG tablet Take 1 tablet (40 mg total) by mouth once daily.    valACYclovir (VALTREX) 1000 MG tablet Take 1 tablet (1,000 mg total) by mouth 3 (three) times daily. for 7 days (Patient not taking: No sig reported)    [DISCONTINUED] beclomethasone (QVAR) 80  mcg/actuation Aero Inhale 2 puffs into the lungs 2 (two) times a day. Controller    [DISCONTINUED] gabapentin (NEURONTIN) 300 MG capsule Take 1 capsule (300 mg total) by mouth 3 (three) times daily as needed (pain).    [DISCONTINUED] levocetirizine (XYZAL) 5 MG tablet Take 1 tablet (5 mg total) by mouth every evening.    [DISCONTINUED] lisinopriL (PRINIVIL,ZESTRIL) 5 MG tablet Take 1 tablet (5 mg total) by mouth once daily.     Family History       Problem Relation (Age of Onset)    Allergies Daughter    Diabetes Mother, Maternal Grandmother    Heart disease Maternal Grandmother          Tobacco Use    Smoking status: Former Smoker     Packs/day: 0.50     Quit date:      Years since quittin.4    Smokeless tobacco: Never Used   Substance and Sexual Activity    Alcohol use: No    Drug use: No    Sexual activity: Not Currently     Partners: Male     Review of Systems   Constitutional: Negative.   HENT: Negative.     Eyes: Negative.    Cardiovascular:  Positive for chest pain. Negative for dyspnea on exertion, leg swelling, orthopnea, palpitations, paroxysmal nocturnal dyspnea and syncope.   Respiratory:  Positive for shortness of breath.    Endocrine: Negative.    Hematologic/Lymphatic: Negative.    Skin: Negative.    Musculoskeletal: Negative.    Gastrointestinal: Negative.    Genitourinary: Negative.    Neurological:  Negative for dizziness and light-headedness.   Psychiatric/Behavioral: Negative.     Allergic/Immunologic: Negative.    Objective:     Vital Signs (Most Recent):  Temp: 97.9 °F (36.6 °C) (22)  Pulse: 98 (22)  Resp: 20 (22)  BP: (!) 158/77 (22)  SpO2: 97 % (22)   Vital Signs (24h Range):  Temp:  [97.9 °F (36.6 °C)] 97.9 °F (36.6 °C)  Pulse:  [] 98  Resp:  [12-22] 20  SpO2:  [95 %-99 %] 97 %  BP: (130-209)/(70-98) 158/77          Body mass index is 29.27 kg/m².    SpO2: 97 %  O2 Device (Oxygen Therapy): room air    Physical  Exam  Constitutional:       General: She is not in acute distress.     Appearance: She is not ill-appearing.      Comments: Pleasant middle age female   HENT:      Head: Normocephalic.      Mouth/Throat:      Mouth: Mucous membranes are moist.   Eyes:      Extraocular Movements: Extraocular movements intact.   Neck:      Comments: No JVD  Cardiovascular:      Rate and Rhythm: Normal rate and regular rhythm.      Pulses: Normal pulses.      Heart sounds: No murmur heard.     Comments: ICD in L upper chest without erythema, drainage, or hematoma  Pulmonary:      Effort: Pulmonary effort is normal. No respiratory distress.      Breath sounds: Normal breath sounds.   Abdominal:      General: Bowel sounds are normal. There is no distension.      Palpations: Abdomen is soft.      Tenderness: There is no abdominal tenderness.   Musculoskeletal:      Cervical back: Neck supple.      Right lower leg: No edema.      Left lower leg: No edema.      Comments: Mild left anterior chest wall tenderness to deep palpation under left breast   Skin:     General: Skin is warm.      Findings: No bruising, erythema, lesion or rash.   Neurological:      General: No focal deficit present.      Mental Status: She is alert and oriented to person, place, and time.   Psychiatric:         Mood and Affect: Mood normal.         Behavior: Behavior normal.       Significant Labs: BMP:   Recent Labs   Lab 06/03/22  1421   *      K 4.0      CO2 20*   BUN 16   CREATININE 0.8   CALCIUM 9.3   , CMP:   Recent Labs   Lab 06/03/22  1421      K 4.0      CO2 20*   *   BUN 16   CREATININE 0.8   CALCIUM 9.3   PROT 7.5   ALBUMIN 3.6   BILITOT 0.3   ALKPHOS 99   AST 17   ALT 19   ANIONGAP 14   ESTGFRAFRICA >60   EGFRNONAA >60   , CBC:   Recent Labs   Lab 06/03/22  1421   WBC 9.69   HGB 12.7   HCT 38.6      , INR: No results for input(s): INR, PROTIME in the last 48 hours., Lipid Panel No results for input(s): CHOL,  HDL, LDLCALC, TRIG, CHOLHDL in the last 48 hours., and Troponin   Recent Labs   Lab 06/03/22  1421 06/03/22  1851   TROPONINI <0.006 0.009       Significant Imaging:   Reviewed

## 2022-06-04 NOTE — NURSING
Patient admitted to unit, ambulated to bed with steady gait, respirations even and unlabored on RA, placed on telemetry monitor. Bed in lowest position, call light within reach, side rails up x2, family at bedside. Will continue to monitor.

## 2022-06-04 NOTE — SUBJECTIVE & OBJECTIVE
Interval History: Pt seen and examined today at bedside. Had [facility] [facility] PPM on 5/30, had pain at lateral chest wall on left side while walking dog, 10/10 lasted 5 minutes and resolved with moving around. Had similar chest pain while device being interrogated at OSH. Had device interrogation at [facility]. Pain now improved. Walked to restroom with no pain. Had similar pain while lying on left side for echo. Xray reviewed and compared from 5/30.Device interrogation: [employer] ICD re-interrogated without VT/VF, discharges, or significant changes in sensing or impedance. V lead impedence since implant 400-830 Ohm.      Review of Systems   Constitutional: Negative for decreased appetite, fever, malaise/fatigue and weight loss.   HENT:  Negative for congestion.    Eyes:  Negative for blurred vision.   Cardiovascular:  Negative for chest pain, dyspnea on exertion, orthopnea and palpitations.   Respiratory:  Negative for cough, shortness of breath and wheezing.    Endocrine: Negative for polyuria.   Gastrointestinal:  Negative for bloating and abdominal pain.   Objective:     Vital Signs (Most Recent):  Temp: 97.9 °F (36.6 °C) (06/04/22 0738)  Pulse: 70 (06/04/22 0738)  Resp: 18 (06/04/22 0738)  BP: 131/65 (06/04/22 0738)  SpO2: (!) 93 % (06/04/22 0738)   Vital Signs (24h Range):  Temp:  [97.9 °F (36.6 °C)-98.1 °F (36.7 °C)] 97.9 °F (36.6 °C)  Pulse:  [] 70  Resp:  [12-22] 18  SpO2:  [93 %-99 %] 93 %  BP: (130-209)/(65-98) 131/65     Weight: 72.6 kg (160 lb 0.9 oz)  Body mass index is 29.27 kg/m².     SpO2: (!) 93 %  O2 Device (Oxygen Therapy): room air    Physical Exam  Vitals and nursing note reviewed.   Constitutional:       Appearance: She is well-developed.   HENT:      Head: Normocephalic and atraumatic.      Mouth/Throat:      Mouth: Mucous membranes are moist.   Eyes:      Pupils: Pupils are equal, round, and reactive to light.   Cardiovascular:      Rate and Rhythm: Normal rate and regular rhythm.      Pulses:  Normal pulses and intact distal pulses.      Heart sounds: No murmur heard.  Pulmonary:      Effort: Pulmonary effort is normal. No respiratory distress.      Breath sounds: Normal breath sounds. No rales.   Abdominal:      General: Abdomen is flat. Bowel sounds are normal. There is no distension.      Palpations: Abdomen is soft.      Tenderness: There is no abdominal tenderness. There is no guarding.   Musculoskeletal:      Cervical back: Normal range of motion.      Right lower leg: No edema.      Left lower leg: No edema.   Skin:     General: Skin is warm.      Comments: Left chest wall pacemaker insertion site with no evidence of infection, mild tenderness and swelling around suture site.   Neurological:      General: No focal deficit present.      Mental Status: She is alert and oriented to person, place, and time.   Psychiatric:         Mood and Affect: Mood normal.       Significant Labs: BMP:   Recent Labs   Lab 06/03/22  1421 06/04/22  0512   * 99    138   K 4.0 4.2    104   CO2 20* 25   BUN 16 17   CREATININE 0.8 0.8   CALCIUM 9.3 9.4   MG  --  2.1   , CMP:   Recent Labs   Lab 06/03/22  1421 06/04/22  0512    138   K 4.0 4.2    104   CO2 20* 25   * 99   BUN 16 17   CREATININE 0.8 0.8   CALCIUM 9.3 9.4   PROT 7.5 7.1   ALBUMIN 3.6 3.5   BILITOT 0.3 0.3   ALKPHOS 99 93   AST 17 12   ALT 19 20   ANIONGAP 14 9   ESTGFRAFRICA >60 >60.0   EGFRNONAA >60 >60.0   , and CBC:   Recent Labs   Lab 06/03/22  1421 06/04/22  0512   WBC 9.69 9.78   HGB 12.7 13.4   HCT 38.6 40.9    233       Significant Imaging: Echocardiogram: Transthoracic echo (TTE) complete (Cupid Only):   Results for orders placed or performed during the hospital encounter of 01/21/22   Echo   Result Value Ref Range    Ascending aorta 2.49 cm    STJ 2.63 cm    AV mean gradient 4 mmHg    Ao peak silvano 1.34 m/s    Ao VTI 21.02 cm    IVRT 66.60 msec    IVS 0.62 0.6 - 1.1 cm    LA size 2.92 cm    Left Atrium  Major Axis 4.45 cm    Left Atrium Minor Axis 4.90 cm    LVIDd 5.28 3.5 - 6.0 cm    LVIDs 4.25 (A) 2.1 - 4.0 cm    LVOT diameter 2.10 cm    LVOT peak VTI 16.66 cm    Posterior Wall 0.73 0.6 - 1.1 cm    MV Peak A Marvin 0.90 m/s    E wave deceleration time 122.84 msec    MV Peak E Marvin 0.71 m/s    PV Peak D Marvin 0.37 m/s    PV Peak S Marvin 0.32 m/s    RA Major Axis 4.17 cm    RA Width 3.37 cm    RVDD 2.03 cm    TR Max Marvin 2.15 m/s    TDI LATERAL 0.06 m/s    TDI SEPTAL 0.04 m/s    LA WIDTH 3.54 cm    Ao root annulus 2.71 cm    PV PEAK VELOCITY 0.74 cm/s    MV stenosis pressure 1/2 time 35.62 ms    LV Diastolic Volume 134.12 mL    LV Systolic Volume 80.85 mL    LVOT peak marvin 0.96 m/s    Mr max marvin 0.04 m/s    LA volume (mod) 44.64 cm3    MV peak gradient 4 mmHg    RV S' 13.19 cm/s    MV VTI 19.75 cm    LV LATERAL E/E' RATIO 11.83 m/s    LV SEPTAL E/E' RATIO 17.75 m/s    FS 20 %    LA volume 40.98 cm3    LV mass 120.60 g    Left Ventricle Relative Wall Thickness 0.28 cm    AV valve area 2.74 cm2    AV Velocity Ratio 0.72     AV index (prosthetic) 0.79     MV valve area p 1/2 method 6.18 cm2    MV valve area by continuity eq 2.92 cm2    E/A ratio 0.79     Mean e' 0.05 m/s    Pulm vein S/D ratio 0.86     LVOT area 3.5 cm2    LVOT stroke volume 57.67 cm3    AV peak gradient 7 mmHg    E/E' ratio 14.20 m/s    Triscuspid Valve Regurgitation Peak Gradient 18 mmHg    BSA 1.73 m2    LV Systolic Volume Index 47.8 mL/m2    LV Diastolic Volume Index 79.36 mL/m2    LA Volume Index 24.2 mL/m2    LV Mass Index 71 g/m2    LA Volume Index (Mod) 26.4 mL/m2    Right Atrial Pressure (from IVC) 3 mmHg    EF 35 %    TV rest pulmonary artery pressure 21 mmHg    Narrative    · The left ventricle is mildly enlarged with moderately decreased systolic   function.  · The estimated ejection fraction is 35%.  · There is left ventricular global hypokinesis.  · Grade I left ventricular diastolic dysfunction.  · Normal right ventricular size with normal right  ventricular systolic   function.  · Mild mitral regurgitation.  · Normal central venous pressure (3 mmHg).  · The estimated PA systolic pressure is 21 mmHg.

## 2022-06-04 NOTE — PHARMACY MED REC
"Admission Medication History     The home medication history was taken by Stacey Nation.    You may go to "Admission" then "Reconcile Home Medications" tabs to review and/or act upon these items.      The home medication list has been updated by the Pharmacy department.    Please read ALL comments highlighted in yellow.    Please address this information as you see fit.     Feel free to contact us if you have any questions or require assistance.      The medications listed below were removed from the home medication list. Please reorder if appropriate:  Patient reports no longer taking the following medication(s):   BECLOMETHASONE 80 MCG    BUDESONIDE 32 MCG NASAL SPRAY    GABAPENTIN 300 MG   LEVOCETIRIZINE 5 MG   LISINOPRIL 5 MG   PANTOPRAZOLE 40 MG   VALACYCLOVIR 1000 MG      Medications listed below were obtained from: Patient/family    PTA Medications   Medication Sig    acetaminophen (TYLENOL) 500 MG tablet Take 1,000 mg by mouth every 6 (six) hours as needed for Pain.    calcium carbonate (TUMS) 200 mg calcium (500 mg) chewable tablet Take 1 tablet by mouth daily as needed for Heartburn.    cephALEXin (KEFLEX) 500 MG capsule Take 1 capsule (500 mg total) by mouth every 8 (eight) hours.    fluticasone propionate (FLONASE) 50 mcg/actuation nasal spray 1 spray by Each Nostril route daily as needed for Rhinitis or Allergies.    fluticasone propionate (FLOVENT HFA) 110 mcg/actuation inhaler Inhale 2 puffs into the lungs 2 (two) times daily. Controller    latanoprost 0.005 % ophthalmic solution INSTILL 1 DROP INTO BOTH EYES AT BEDTIME AS DIRECTED    losartan (COZAAR) 25 MG tablet Take 1 tablet (25 mg total) by mouth once daily.    metoprolol succinate (TOPROL-XL) 25 MG 24 hr tablet Take 1 tablet (25 mg total) by mouth once daily.    montelukast (SINGULAIR) 10 mg tablet Take 10 mg by mouth every evening.    mv-mn/folic acid/vit [id number] (ALIVE ONCE DAILY WOMEN 50 PLUS ORAL) Take 1 tablet by " mouth once daily.    tobramycin-dexamethasone 0.3-0.1% (TOBRADEX) 0.3-0.1 % DrpS SHAKE LIQUID AND INSTILL 1 TO 2 DROPS IN BOTH EYES FOUR TIMES DAILY         Stacey Nation  EXT [id number]                  .

## 2022-06-04 NOTE — PROVIDER TRANSFER
Outside Transfer Acceptance Note / Regional Referral Center    Referring facility: [facility]   Referring provider: RADHA ARGUETA CAROL W.  Accepting facility: [facility]  Accepting provider: RAMON GOODWIN  Admitting provider: Ramon Goodwin DO   Reason for transfer:  Higher Level of Care  Transfer diagnosis: Chest pain, AICD malfunction  Transfer specialty requested: Hospital Medicine  Cardiology  Transfer specialty notified: yes  Transfer level: NUMBER 1-5: 2  Bed type requested: Cardiac Telemetry  Isolation status: No active isolations   Admission class or status: OP- Observation  IP- Inpatient      Narrative     Suly Jaime is a 62 y.o. female with Combined Diastolic/Systolic heart failure (EF35%) s/p AICD placement 5/30 with Dr Tovar who presented to the ED at [facility] with Left sided chest pain 6/3. Symptoms felt like intermittent sharp shooting pains in her L chest, then a sudden severe L chest pain that worried the patient she may have had a shock from her device.     Vitally stable on presentation with EKG reported to be without evidence of arrhythmia or ischemia, negative troponins. EP at [facility] contacted, notedrecommended interrogation of device by [facility] Rep. During the device interrogation, patient again had severe L sided chest pain. EP at [facility] able to review interrogation data, which did not show evidence of VT/VF or any shocks being delivered. No large changes that would be concerning for a lead disconnect, though potentially with lead migration; microperforation thought to be less likely.     Pt also with hypertension with systolics of >200 shortly after pain/interrogation event, but improved with administration of morphine.  Discussed with referring provider, who wish to admit patient for pain control. Case discussed with EP and cardiology at [facility]. Pt will need device evaluated, likely Monday 6/6 unless pt's  condition worsens. Given normal EKG and troponin, recommend admission to hospital medicine for pain control.     Objective     Vitals: Temp: 97.9 °F (36.6 °C) (06/03/22 1215)  Pulse: 90 (06/03/22 1823)  Resp: (!) 22 (06/03/22 1815)  BP: (!) 188/82 (06/03/22 1823)  SpO2: 97 % (06/03/22 1823)  Recent Labs:   All pertinent labs within the past 24 hours have been reviewed.  CBC:   Recent Labs   Lab 06/03/22  1421   WBC 9.69   HGB 12.7   HCT 38.6        CMP:   Recent Labs   Lab 06/03/22  1421      K 4.0      CO2 20*   *   BUN 16   CREATININE 0.8   CALCIUM 9.3   PROT 7.5   ALBUMIN 3.6   BILITOT 0.3   ALKPHOS 99   AST 17   ALT 19   ANIONGAP 14   EGFRNONAA >60     Cardiac Markers:   Recent Labs   Lab 06/03/22  1421   BNP 17     Recent imaging:  X-Ray Chest PA And Lateral  Narrative: EXAMINATION:  XR CHEST PA AND LATERAL    CLINICAL HISTORY:  Chest pain, unspecified    TECHNIQUE:  PA and lateral views of the chest were performed.    COMPARISON:  Chest radiograph 05/30/2022 and CT thorax 06/02/2020    FINDINGS:  Monitoring leads overlie the chest.  Patient is slightly rotated.    Left chest single lead cardiac device stable.  Cardiomediastinal silhouette is midline noting calcification and tortuosity of the aorta and upper limits of normal cardiac silhouette similar to prior.  Pulmonary vasculature and hilar contours are within normal limits.  Scattered linear opacities throughout each lung consistent with minimal platelike scarring versus atelectasis.  The lungs are otherwise symmetrically well expanded without consolidation, pleural effusion or pneumothorax.  Osseous structures appear stable without acute process seen.  Impression: Cardiac device without detrimental change or radiographic acute intrathoracic process seen.    Electronically signed by: Ti Hunter MD  Date:    06/03/2022  Time:    14:57     Airway:     Vent settings:     IV access:        Peripheral IV - Single Lumen 06/03/22 1815  20 G Left;Posterior Hand (Active)     Infusions: none  Allergies: Review of patient's allergies indicates:  No Known Allergies   NPO: Yes      Anticoagulation:   Anticoagulants     None           Instructions      Abel Patel-  Admit to Hospital Medicine  Upon patient arrival to floor, please send SecureChat to [facility] or call extension [phone] (if no answer, this will flip to a beeper, so enter your call back number) for Hospital Medicine admit team assignment and for additional admit orders for the patient.  Do not page the attending physician associated with the patient on arrival (this physician may not be on duty at the time of arrival).  Rather, always call [phone] to reach the triage physician for orders and team assignment.    To do:  Pain control  Monitor for developing hypertensive emergency  Repeat Echocardiogram  Consult Electrophysiology

## 2022-06-04 NOTE — HPI
60-year-old female, past medical history of combined systolic and diastolic heart failure (EF 35%) s/p AICD placement on 05/30, and hypertension who presents as transfer and is being admitted for chest pain and suspected dysfunction AICD.  Patient states that at 11:00 a.m. on Friday morning she was outside with her dogs feeling her normal state of health.  She sat down on her front porch and began to experience 10/10  constant burning pain below her left breast.  She states that this pain felt like a fire and she had pain with inspiration.  She had associated diaphoresis with her pain.  she has no clear triggers her chest pain.  she does not smoke, rarely drinks alcohol, does not use any illicit substances.  She endorses a chronic cough.  She denies fever, chills, nausea, vomiting, lightheadedness, difficulty swallowing, syncope, peripheral edema.    In [place] ED she was noted to be hypertensive 158/77 with all other vital signs stable.  CO2 20, but the remainder of labs including CBC and the rest of her CMP were unremarkable.  The troponin 0.009.  She received morphine 4 mg and Zofran.  Her device was interrogated at the [place] ED and did not show any signs VT/VF, cardiac events, or discharges.  [place] [facility] [place].

## 2022-06-04 NOTE — PROGRESS NOTES
[facility] - Cardiology Stepdown  Cardiac Electrophysiology  Progress Note    Admission Date: 6/3/2022  Code Status: Full Code   Attending Physician: Ke Neil MD   Expected Discharge Date:   Principal Problem:Chest pain    Subjective:     Interval History: Pt seen and examined today at bedside. Had SC [employer] PPM on 5/30, had pain at lateral chest wall on left side while walking dog, 10/10 lasted 5 minutes and resolved with moving around. Had similar chest pain while device being interrogated at OSH. Had device interrogation at [facility]. Pain now improved. Walked to restroom with no pain. Had similar pain while lying on left side for echo. Xray reviewed and compared from 5/30.Device interrogation: [employer] ICD re-interrogated without VT/VF, discharges, or significant changes in sensing or impedance. V lead impedence since implant 400->830 Ohm.      Review of Systems   Constitutional: Negative for decreased appetite, fever, malaise/fatigue and weight loss.   HENT:  Negative for congestion.    Eyes:  Negative for blurred vision.   Cardiovascular:  Negative for chest pain, dyspnea on exertion, orthopnea and palpitations.   Respiratory:  Negative for cough, shortness of breath and wheezing.    Endocrine: Negative for polyuria.   Gastrointestinal:  Negative for bloating and abdominal pain.   Objective:     Vital Signs (Most Recent):  Temp: 97.9 °F (36.6 °C) (06/04/22 0738)  Pulse: 70 (06/04/22 0738)  Resp: 18 (06/04/22 0738)  BP: 131/65 (06/04/22 0738)  SpO2: (!) 93 % (06/04/22 0738)   Vital Signs (24h Range):  Temp:  [97.9 °F (36.6 °C)-98.1 °F (36.7 °C)] 97.9 °F (36.6 °C)  Pulse:  [] 70  Resp:  [12-22] 18  SpO2:  [93 %-99 %] 93 %  BP: (130-209)/(65-98) 131/65     Weight: 72.6 kg (160 lb 0.9 oz)  Body mass index is 29.27 kg/m².     SpO2: (!) 93 %  O2 Device (Oxygen Therapy): room air    Physical Exam  Vitals and nursing note reviewed.   Constitutional:       Appearance: She is well-developed.   HENT:       Head: Normocephalic and atraumatic.      Mouth/Throat:      Mouth: Mucous membranes are moist.   Eyes:      Pupils: Pupils are equal, round, and reactive to light.   Cardiovascular:      Rate and Rhythm: Normal rate and regular rhythm.      Pulses: Normal pulses and intact distal pulses.      Heart sounds: No murmur heard.  Pulmonary:      Effort: Pulmonary effort is normal. No respiratory distress.      Breath sounds: Normal breath sounds. No rales.   Abdominal:      General: Abdomen is flat. Bowel sounds are normal. There is no distension.      Palpations: Abdomen is soft.      Tenderness: There is no abdominal tenderness. There is no guarding.   Musculoskeletal:      Cervical back: Normal range of motion.      Right lower leg: No edema.      Left lower leg: No edema.   Skin:     General: Skin is warm.      Comments: Left chest wall pacemaker insertion site with no evidence of infection, mild tenderness and swelling around suture site.   Neurological:      General: No focal deficit present.      Mental Status: She is alert and oriented to person, place, and time.   Psychiatric:         Mood and Affect: Mood normal.       Significant Labs: BMP:   Recent Labs   Lab 06/03/22  1421 06/04/22  0512   * 99    138   K 4.0 4.2    104   CO2 20* 25   BUN 16 17   CREATININE 0.8 0.8   CALCIUM 9.3 9.4   MG  --  2.1   , CMP:   Recent Labs   Lab 06/03/22  1421 06/04/22  0512    138   K 4.0 4.2    104   CO2 20* 25   * 99   BUN 16 17   CREATININE 0.8 0.8   CALCIUM 9.3 9.4   PROT 7.5 7.1   ALBUMIN 3.6 3.5   BILITOT 0.3 0.3   ALKPHOS 99 93   AST 17 12   ALT 19 20   ANIONGAP 14 9   ESTGFRAFRICA >60 >60.0   EGFRNONAA >60 >60.0   , and CBC:   Recent Labs   Lab 06/03/22  1421 06/04/22  0512   WBC 9.69 9.78   HGB 12.7 13.4   HCT 38.6 40.9    233       Significant Imaging: Echocardiogram: Transthoracic echo (TTE) complete (Cupid Only):   Results for orders placed or performed during the hospital  encounter of 01/21/22   Echo   Result Value Ref Range    Ascending aorta 2.49 cm    STJ 2.63 cm    AV mean gradient 4 mmHg    Ao peak marvin 1.34 m/s    Ao VTI 21.02 cm    IVRT 66.60 msec    IVS 0.62 0.6 - 1.1 cm    LA size 2.92 cm    Left Atrium Major Axis 4.45 cm    Left Atrium Minor Axis 4.90 cm    LVIDd 5.28 3.5 - 6.0 cm    LVIDs 4.25 (A) 2.1 - 4.0 cm    LVOT diameter 2.10 cm    LVOT peak VTI 16.66 cm    Posterior Wall 0.73 0.6 - 1.1 cm    MV Peak A Marvin 0.90 m/s    E wave deceleration time 122.84 msec    MV Peak E Marvin 0.71 m/s    PV Peak D Marvin 0.37 m/s    PV Peak S Marvin 0.32 m/s    RA Major Axis 4.17 cm    RA Width 3.37 cm    RVDD 2.03 cm    TR Max Marvin 2.15 m/s    TDI LATERAL 0.06 m/s    TDI SEPTAL 0.04 m/s    LA WIDTH 3.54 cm    Ao root annulus 2.71 cm    PV PEAK VELOCITY 0.74 cm/s    MV stenosis pressure 1/2 time 35.62 ms    LV Diastolic Volume 134.12 mL    LV Systolic Volume 80.85 mL    LVOT peak marvin 0.96 m/s    Mr max marvin 0.04 m/s    LA volume (mod) 44.64 cm3    MV peak gradient 4 mmHg    RV S' 13.19 cm/s    MV VTI 19.75 cm    LV LATERAL E/E' RATIO 11.83 m/s    LV SEPTAL E/E' RATIO 17.75 m/s    FS 20 %    LA volume 40.98 cm3    LV mass 120.60 g    Left Ventricle Relative Wall Thickness 0.28 cm    AV valve area 2.74 cm2    AV Velocity Ratio 0.72     AV index (prosthetic) 0.79     MV valve area p 1/2 method 6.18 cm2    MV valve area by continuity eq 2.92 cm2    E/A ratio 0.79     Mean e' 0.05 m/s    Pulm vein S/D ratio 0.86     LVOT area 3.5 cm2    LVOT stroke volume 57.67 cm3    AV peak gradient 7 mmHg    E/E' ratio 14.20 m/s    Triscuspid Valve Regurgitation Peak Gradient 18 mmHg    BSA 1.73 m2    LV Systolic Volume Index 47.8 mL/m2    LV Diastolic Volume Index 79.36 mL/m2    LA Volume Index 24.2 mL/m2    LV Mass Index 71 g/m2    LA Volume Index (Mod) 26.4 mL/m2    Right Atrial Pressure (from IVC) 3 mmHg    EF 35 %    TV rest pulmonary artery pressure 21 mmHg    Narrative    · The left ventricle is mildly  enlarged with moderately decreased systolic   function.  · The estimated ejection fraction is 35%.  · There is left ventricular global hypokinesis.  · Grade I left ventricular diastolic dysfunction.  · Normal right ventricular size with normal right ventricular systolic   function.  · Mild mitral regurgitation.  · Normal central venous pressure (3 mmHg).  · The estimated PA systolic pressure is 21 mmHg.        Assessment and Plan:     * Chest pain  Onset since ICD implanted 5/30/2022 with Dr Tovar. Intermittent, lasts only minutes before spontaneously resolving. Labs unremarkable. CXR with slight lead migration, but no cardiomegaly or pulmonary edema. Hemodynamically stable. ICD site clean. Bedside TTE without small effusion and no tamponade. [employer] ICD re-interrogated without VT/VF, discharges, or significant changes in sensing or impedance.   Xray concern for lead migration though device functioning well. Given recurrent symptoms, will plan to do lead revision on Monday.  - TTE pending  - Reviewed EKG, Xray and device interrogation  - Monitor on telemetry  - Pain control per primary team    D/W Dr. Jet Leigh MD  Cardiac Electrophysiology  [facility] - Cardiology Stepdown

## 2022-06-04 NOTE — H&P
[facility] - Cardiology [facility] Medicine  History & Physical    Patient Name: Suly Jaime  MRN: [id number]  Patient Class: OP- Observation  Admission Date: 6/3/2022  Attending Physician: Ke Neil MD   Primary Care Provider: Irene Lopez MD         Patient information was obtained from patient, spouse/SO, past medical records and ER records.     Subjective:     Principal Problem:Chest pain    Chief Complaint: No chief complaint on file.       HPI: 60-year-old female, past medical history of combined systolic and diastolic heart failure (EF 35%) s/p AICD placement on 05/30, and hypertension who presents as transfer and is being admitted for chest pain and suspected dysfunction AICD.  Patient states that at 11:00 a.m. on Friday morning she was outside with her dogs feeling her normal state of health.  She sat down on her front porch and began to experience 10/10  constant burning pain below her left breast.  She states that this pain felt like a fire and she had pain with inspiration.  She had associated diaphoresis with her pain.  she has no clear triggers her chest pain.  she does not smoke, rarely drinks alcohol, does not use any illicit substances.  She endorses a chronic cough.  She denies fever, chills, nausea, vomiting, lightheadedness, difficulty swallowing, syncope, peripheral edema.    In [facility] ED she was noted to be hypertensive 158/77 with all other vital signs stable.  CO2 20, but the remainder of labs including CBC and the rest of her CMP were unremarkable.  The troponin 0.009.  She received morphine 4 mg and Zofran.  Her device was interrogated at the [facility] ED and did not show any signs VT/VF, cardiac events, or discharges.  She was transferred to [facility] for EP evaluation.        Past Medical History:   Diagnosis Date    Allergy     Cardiomyopathy     Hypertension        Past Surgical History:   Procedure Laterality Date    CARDIAC DEFIBRILLATOR PLACEMENT Left      HYSTERECTOMY  2003    full    KNEE ARTHROSCOPY      LARYNGOSCOPY N/A 02/18/2019    Procedure: DIRECT MICRO LARYNGOSCOPY WITH BIOPSY;  Surgeon: Deidre Calderon MD;  Location: [facility];  Service: ENT;  Laterality: N/A;  video    OOPHORECTOMY         Review of patient's allergies indicates:  No Known Allergies    Current Facility-Administered Medications on File Prior to Encounter   Medication    lidocaine (PF) 10 mg/ml (1%) injection 10 mg    [COMPLETED] morphine injection 4 mg    [COMPLETED] ondansetron injection 4 mg    sodium chloride 0.9% bolus 1,000 mL    sodium chloride 0.9% flush 5 mL     Current Outpatient Medications on File Prior to Encounter   Medication Sig    cephALEXin (KEFLEX) 500 MG capsule Take 1 capsule (500 mg total) by mouth every 8 (eight) hours.    fluticasone propionate (FLOVENT HFA) 110 mcg/actuation inhaler Inhale 2 puffs into the lungs 2 (two) times daily. Controller    latanoprost 0.005 % ophthalmic solution INSTILL 1 DROP INTO BOTH EYES AT BEDTIME AS DIRECTED    losartan (COZAAR) 25 MG tablet Take 1 tablet (25 mg total) by mouth once daily.    metoprolol succinate (TOPROL-XL) 25 MG 24 hr tablet Take 1 tablet (25 mg total) by mouth once daily.    tobramycin-dexamethasone 0.3-0.1% (TOBRADEX) 0.3-0.1 % DrpS SHAKE LIQUID AND INSTILL 1 TO 2 DROPS IN BOTH EYES FOUR TIMES DAILY    budesonide (RINOCORT AQUA) 32 mcg/actuation nasal spray 1 spray (32 mcg total) by Nasal route once daily. (Patient not taking: No sig reported)    pantoprazole (PROTONIX) 40 MG tablet Take 1 tablet (40 mg total) by mouth once daily.    valACYclovir (VALTREX) 1000 MG tablet Take 1 tablet (1,000 mg total) by mouth 3 (three) times daily. for 7 days (Patient not taking: No sig reported)    [DISCONTINUED] beclomethasone (QVAR) 80 mcg/actuation Aero Inhale 2 puffs into the lungs 2 (two) times a day. Controller    [DISCONTINUED] gabapentin (NEURONTIN) 300 MG capsule Take 1 capsule (300 mg total) by  mouth 3 (three) times daily as needed (pain).    [DISCONTINUED] levocetirizine (XYZAL) 5 MG tablet Take 1 tablet (5 mg total) by mouth every evening.    [DISCONTINUED] lisinopriL (PRINIVIL,ZESTRIL) 5 MG tablet Take 1 tablet (5 mg total) by mouth once daily.     Family History       Problem Relation (Age of Onset)    Allergies Daughter    Diabetes Mother, Maternal Grandmother    Heart disease Maternal Grandmother          Tobacco Use    Smoking status: Former Smoker     Packs/day: 0.50     Quit date:      Years since quittin.4    Smokeless tobacco: Never Used   Substance and Sexual Activity    Alcohol use: No    Drug use: No    Sexual activity: Not Currently     Partners: Male     Review of Systems   Constitutional:  Positive for diaphoresis. Negative for chills, fatigue and fever.   HENT:  Negative for sore throat and trouble swallowing.    Eyes:  Negative for visual disturbance.   Respiratory:  Positive for cough (chronic) and shortness of breath.    Cardiovascular:  Positive for chest pain. Negative for leg swelling.   Gastrointestinal:  Negative for abdominal pain, blood in stool, constipation, diarrhea, nausea and vomiting.   Genitourinary:  Negative for difficulty urinating and dysuria.   Musculoskeletal:  Negative for back pain.   Skin:  Negative for rash and wound.   Neurological:  Negative for syncope, light-headedness and headaches.   Psychiatric/Behavioral:  Negative for confusion.    Objective:     Vital Signs (Most Recent):  Temp: 98 °F (36.7 °C) (22)  Pulse: 80 (22)  Resp: 18 (22)  BP: 138/73 (220)  SpO2: 98 % (22) Vital Signs (24h Range):  Temp:  [97.9 °F (36.6 °C)-98 °F (36.7 °C)] 98 °F (36.7 °C)  Pulse:  [] 80  Resp:  [12-22] 18  SpO2:  [95 %-99 %] 98 %  BP: (130-209)/(70-98) 138/73     Weight: 72.6 kg (160 lb 0.9 oz)  Body mass index is 29.27 kg/m².    Physical Exam  Constitutional:       Appearance: Normal appearance.    HENT:      Head: Normocephalic and atraumatic.      Mouth/Throat:      Mouth: Mucous membranes are moist.   Eyes:      Extraocular Movements: Extraocular movements intact.      Pupils: Pupils are equal, round, and reactive to light.   Cardiovascular:      Rate and Rhythm: Normal rate and regular rhythm.      Pulses: Normal pulses.      Heart sounds: Normal heart sounds. No murmur heard.  Pulmonary:      Effort: Pulmonary effort is normal.      Breath sounds: Normal breath sounds.   Abdominal:      General: Abdomen is flat.      Palpations: Abdomen is soft.      Tenderness: There is no abdominal tenderness.   Musculoskeletal:         General: No swelling.      Cervical back: Neck supple.      Right lower leg: No edema.      Left lower leg: No edema.   Skin:     General: Skin is warm and dry.      Findings: No rash.   Neurological:      General: No focal deficit present.      Mental Status: She is alert and oriented to person, place, and time.   Psychiatric:         Mood and Affect: Mood normal.         Behavior: Behavior normal.         CRANIAL NERVES     CN III, IV, VI   Pupils are equal, round, and reactive to light.     Significant Labs: All pertinent labs within the past 24 hours have been reviewed.  CBC:   Recent Labs   Lab 06/03/22  1421   WBC 9.69   HGB 12.7   HCT 38.6        CMP:   Recent Labs   Lab 06/03/22  1421      K 4.0      CO2 20*   *   BUN 16   CREATININE 0.8   CALCIUM 9.3   PROT 7.5   ALBUMIN 3.6   BILITOT 0.3   ALKPHOS 99   AST 17   ALT 19   ANIONGAP 14   EGFRNONAA >60     Cardiac Markers:   Recent Labs   Lab 06/03/22  1421   BNP 17       Significant Imaging: I have reviewed all pertinent imaging results/findings within the past 24 hours.    Assessment/Plan:     * Chest pain  Patient with combined systolic and diastolic heart failure EF 35% S/P AICD on 05/30 presenting with new chest pain at rest that began around 11:00 a.m. on 06/3.  She states that the episode lasted  for 5 minutes in describes her pain as 10/10 burning constant pain under her left breast.  She described to the felt like fire and was painful with inspiration.  She had associated diaphoresis.  Her episodes self resolved.  She had a 2nd episode of chest pain around 3 or 4:00 p.m. while in the [facility].  She states that her pain was similar to her 1st episode and lasted for roughly 10 minutes.  Her troponin was not elevated.  Her AICD was interrogated and showed no VT/VF, cardiac events, or discharges.  [facility] EP reviewed her device interrogation and excepted her for evaluation.  Cardiology fellow at bedside performing device interrogation and bedside echo.    -- formal echo ordered  -- EKG  -- electrophysiology consulted.  Appreciate the assistance!  -- p.r.n. Tylenol and oxycodone for pain      CHF (congestive heart failure), NYHA class II, chronic, systolic  Patient has a past medical history of systolic and diastolic heart failure EF 35%.      Chronic cough  Patient with chronic cough for several years.      -- p.r.n. Tessalon        VTE Risk Mitigation (From admission, onward)         Ordered     enoxaparin injection 40 mg  Daily         06/04/22 0151     IP VTE HIGH RISK PATIENT  Once         06/04/22 0151     Place sequential compression device  Until discontinued         06/04/22 0151                   Tyler Callahan MD   PGY-1  Department of Hospital Medicine   [facility] - Cardiology Stepdown

## 2022-06-04 NOTE — ASSESSMENT & PLAN NOTE
Onset since ICD implanted 5/30/2022 with Dr Tovar. Intermittent, lasts only minutes before spontaneously resolving. Labs unremarkable. CXR with slight lead migration, but no cardiomegaly or pulmonary edema. Hemodynamically stable. ICD site clean. Bedside TTE without small effusion and no tamponade. [employer] ICD re-interrogated without VT/VF, discharges, or significant changes in sensing or impedance. Chest pain could also be musculoskeletal.     - Repeat EKG  - Follow-up formal TTE in the morning  - Monitor on telemetry  - Pain control per primary team

## 2022-06-05 LAB
ABO + RH BLD: NORMAL
ALBUMIN SERPL BCP-MCNC: 3.5 G/DL (ref 3.5–5.2)
ALP SERPL-CCNC: 104 U/L (ref 55–135)
ALT SERPL W/O P-5'-P-CCNC: 18 U/L (ref 10–44)
ANION GAP SERPL CALC-SCNC: 10 MMOL/L (ref 8–16)
AST SERPL-CCNC: 12 U/L (ref 10–40)
BASOPHILS # BLD AUTO: 0.03 K/UL (ref 0–0.2)
BASOPHILS NFR BLD: 0.3 % (ref 0–1.9)
BILIRUB SERPL-MCNC: 0.2 MG/DL (ref 0.1–1)
BLD GP AB SCN CELLS X3 SERPL QL: NORMAL
BUN SERPL-MCNC: 18 MG/DL (ref 8–23)
CALCIUM SERPL-MCNC: 9.1 MG/DL (ref 8.7–10.5)
CHLORIDE SERPL-SCNC: 105 MMOL/L (ref 95–110)
CO2 SERPL-SCNC: 23 MMOL/L (ref 23–29)
CREAT SERPL-MCNC: 0.8 MG/DL (ref 0.5–1.4)
DIFFERENTIAL METHOD: ABNORMAL
EOSINOPHIL # BLD AUTO: 0.3 K/UL (ref 0–0.5)
EOSINOPHIL NFR BLD: 3.6 % (ref 0–8)
ERYTHROCYTE [DISTWIDTH] IN BLOOD BY AUTOMATED COUNT: 12.7 % (ref 11.5–14.5)
EST. GFR  (AFRICAN AMERICAN): >60 ML/MIN/1.73 M^2
EST. GFR  (NON AFRICAN AMERICAN): >60 ML/MIN/1.73 M^2
GLUCOSE SERPL-MCNC: 117 MG/DL (ref 70–110)
HCT VFR BLD AUTO: 41.4 % (ref 37–48.5)
HGB BLD-MCNC: 12.7 G/DL (ref 12–16)
IMM GRANULOCYTES # BLD AUTO: 0.03 K/UL (ref 0–0.04)
IMM GRANULOCYTES NFR BLD AUTO: 0.3 % (ref 0–0.5)
LYMPHOCYTES # BLD AUTO: 2.1 K/UL (ref 1–4.8)
LYMPHOCYTES NFR BLD: 24.5 % (ref 18–48)
MAGNESIUM SERPL-MCNC: 2 MG/DL (ref 1.6–2.6)
MCH RBC QN AUTO: 27.7 PG (ref 27–31)
MCHC RBC AUTO-ENTMCNC: 30.7 G/DL (ref 32–36)
MCV RBC AUTO: 90 FL (ref 82–98)
MONOCYTES # BLD AUTO: 0.8 K/UL (ref 0.3–1)
MONOCYTES NFR BLD: 9.5 % (ref 4–15)
NEUTROPHILS # BLD AUTO: 5.4 K/UL (ref 1.8–7.7)
NEUTROPHILS NFR BLD: 61.8 % (ref 38–73)
NRBC BLD-RTO: 0 /100 WBC
PHOSPHATE SERPL-MCNC: 3.8 MG/DL (ref 2.7–4.5)
PLATELET # BLD AUTO: 243 K/UL (ref 150–450)
PMV BLD AUTO: 11.1 FL (ref 9.2–12.9)
POTASSIUM SERPL-SCNC: 4.6 MMOL/L (ref 3.5–5.1)
PROT SERPL-MCNC: 6.6 G/DL (ref 6–8.4)
RBC # BLD AUTO: 4.58 M/UL (ref 4–5.4)
SARS-COV-2 RDRP RESP QL NAA+PROBE: NEGATIVE
SODIUM SERPL-SCNC: 138 MMOL/L (ref 136–145)
TROPONIN I SERPL DL<=0.01 NG/ML-MCNC: <0.006 NG/ML (ref 0–0.03)
WBC # BLD AUTO: 8.73 K/UL (ref 3.9–12.7)

## 2022-06-05 PROCEDURE — 84100 ASSAY OF PHOSPHORUS: CPT

## 2022-06-05 PROCEDURE — 86850 RBC ANTIBODY SCREEN: CPT

## 2022-06-05 PROCEDURE — 93005 ELECTROCARDIOGRAM TRACING: CPT

## 2022-06-05 PROCEDURE — 99226 PR SUBSEQUENT OBSERVATION CARE,LEVEL III: ICD-10-PCS | Mod: ,,, | Performed by: STUDENT IN AN ORGANIZED HEALTH CARE EDUCATION/TRAINING PROGRAM

## 2022-06-05 PROCEDURE — 93010 EKG 12-LEAD: ICD-10-PCS | Mod: ,,, | Performed by: INTERNAL MEDICINE

## 2022-06-05 PROCEDURE — 99226 PR SUBSEQUENT OBSERVATION CARE,LEVEL III: CPT | Mod: ,,, | Performed by: STUDENT IN AN ORGANIZED HEALTH CARE EDUCATION/TRAINING PROGRAM

## 2022-06-05 PROCEDURE — 93010 EKG 12-LEAD: ICD-10-PCS | Mod: 77,,, | Performed by: INTERNAL MEDICINE

## 2022-06-05 PROCEDURE — 99024 POSTOP FOLLOW-UP VISIT: CPT | Mod: ,,, | Performed by: STUDENT IN AN ORGANIZED HEALTH CARE EDUCATION/TRAINING PROGRAM

## 2022-06-05 PROCEDURE — 20600001 HC STEP DOWN PRIVATE ROOM

## 2022-06-05 PROCEDURE — 85025 COMPLETE CBC W/AUTO DIFF WBC: CPT

## 2022-06-05 PROCEDURE — 99024 PR POST-OP FOLLOW-UP VISIT: ICD-10-PCS | Mod: ,,, | Performed by: STUDENT IN AN ORGANIZED HEALTH CARE EDUCATION/TRAINING PROGRAM

## 2022-06-05 PROCEDURE — 63600175 PHARM REV CODE 636 W HCPCS: Performed by: STUDENT IN AN ORGANIZED HEALTH CARE EDUCATION/TRAINING PROGRAM

## 2022-06-05 PROCEDURE — U0002 COVID-19 LAB TEST NON-CDC: HCPCS

## 2022-06-05 PROCEDURE — 93010 ELECTROCARDIOGRAM REPORT: CPT | Mod: ,,, | Performed by: INTERNAL MEDICINE

## 2022-06-05 PROCEDURE — 25000003 PHARM REV CODE 250

## 2022-06-05 PROCEDURE — 63600175 PHARM REV CODE 636 W HCPCS

## 2022-06-05 PROCEDURE — 80053 COMPREHEN METABOLIC PANEL: CPT

## 2022-06-05 PROCEDURE — 36415 COLL VENOUS BLD VENIPUNCTURE: CPT

## 2022-06-05 PROCEDURE — G0378 HOSPITAL OBSERVATION PER HR: HCPCS

## 2022-06-05 PROCEDURE — 93010 ELECTROCARDIOGRAM REPORT: CPT | Mod: 77,,, | Performed by: INTERNAL MEDICINE

## 2022-06-05 PROCEDURE — 25000003 PHARM REV CODE 250: Performed by: FAMILY MEDICINE

## 2022-06-05 PROCEDURE — 83735 ASSAY OF MAGNESIUM: CPT

## 2022-06-05 PROCEDURE — 84484 ASSAY OF TROPONIN QUANT: CPT | Performed by: STUDENT IN AN ORGANIZED HEALTH CARE EDUCATION/TRAINING PROGRAM

## 2022-06-05 RX ORDER — MORPHINE SULFATE 2 MG/ML
2 INJECTION, SOLUTION INTRAMUSCULAR; INTRAVENOUS ONCE
Status: COMPLETED | OUTPATIENT
Start: 2022-06-05 | End: 2022-06-05

## 2022-06-05 RX ORDER — MORPHINE SULFATE 2 MG/ML
2 INJECTION, SOLUTION INTRAMUSCULAR; INTRAVENOUS EVERY 4 HOURS PRN
Status: DISCONTINUED | OUTPATIENT
Start: 2022-06-05 | End: 2022-06-07 | Stop reason: HOSPADM

## 2022-06-05 RX ADMIN — MORPHINE SULFATE 2 MG: 2 INJECTION, SOLUTION INTRAMUSCULAR; INTRAVENOUS at 10:06

## 2022-06-05 RX ADMIN — MORPHINE SULFATE 2 MG: 2 INJECTION, SOLUTION INTRAMUSCULAR; INTRAVENOUS at 11:06

## 2022-06-05 RX ADMIN — MORPHINE SULFATE 2 MG: 2 INJECTION, SOLUTION INTRAMUSCULAR; INTRAVENOUS at 01:06

## 2022-06-05 RX ADMIN — MORPHINE SULFATE 2 MG: 2 INJECTION, SOLUTION INTRAMUSCULAR; INTRAVENOUS at 05:06

## 2022-06-05 RX ADMIN — OXYCODONE 5 MG: 5 TABLET ORAL at 08:06

## 2022-06-05 RX ADMIN — MORPHINE SULFATE 2 MG: 2 INJECTION, SOLUTION INTRAMUSCULAR; INTRAVENOUS at 03:06

## 2022-06-05 RX ADMIN — LOSARTAN POTASSIUM 25 MG: 25 TABLET, FILM COATED ORAL at 08:06

## 2022-06-05 RX ADMIN — BENZONATATE 100 MG: 100 CAPSULE ORAL at 06:06

## 2022-06-05 RX ADMIN — OXYCODONE 5 MG: 5 TABLET ORAL at 03:06

## 2022-06-05 RX ADMIN — ACETAMINOPHEN 650 MG: 325 TABLET ORAL at 08:06

## 2022-06-05 RX ADMIN — METOPROLOL SUCCINATE 25 MG: 25 TABLET, EXTENDED RELEASE ORAL at 08:06

## 2022-06-05 NOTE — PROGRESS NOTES
[facility] - Cardiology Stepdown  Cardiac Electrophysiology  Progress Note    Admission Date: 6/3/2022  Code Status: Full Code   Attending Physician: Ke Neil MD   Expected Discharge Date: 6/7/2022  Principal Problem:Chest pain    Subjective:     Interval History: Pt seen and examined today at bedside. Had pain around 3 am which improved with pan medications. Had another episode in am when sitting in chair, received pain medications. TTE with no significant pericardial effusion. Plan for LEad revision in am.    Review of Systems   Constitutional: Negative for decreased appetite, fever, malaise/fatigue and weight loss.   HENT:  Negative for congestion.    Eyes:  Negative for blurred vision.   Cardiovascular:  Positive for chest pain. Negative for dyspnea on exertion, orthopnea and palpitations.   Respiratory:  Negative for cough, shortness of breath and wheezing.    Endocrine: Negative for polyuria.   Gastrointestinal:  Negative for bloating and abdominal pain.   Objective:     Vital Signs (Most Recent):  Temp: 97.3 °F (36.3 °C) (06/05/22 0721)  Pulse: 68 (06/05/22 0721)  Resp: 18 (06/05/22 0859)  BP: 134/80 (06/05/22 0721)  SpO2: (!) 94 % (06/05/22 0721)   Vital Signs (24h Range):  Temp:  [96.3 °F (35.7 °C)-98.9 °F (37.2 °C)] 97.3 °F (36.3 °C)  Pulse:  [68-96] 68  Resp:  [17-24] 18  SpO2:  [91 %-95 %] 94 %  BP: (118-152)/(62-88) 134/80     Weight: 71 kg (156 lb 8.4 oz)  Body mass index is 28.63 kg/m².     SpO2: (!) 94 %  O2 Device (Oxygen Therapy): room air    Physical Exam  Vitals and nursing note reviewed.   Constitutional:       Appearance: She is well-developed.   HENT:      Head: Normocephalic and atraumatic.      Mouth/Throat:      Mouth: Mucous membranes are moist.   Eyes:      Pupils: Pupils are equal, round, and reactive to light.   Cardiovascular:      Rate and Rhythm: Normal rate and regular rhythm.      Pulses: Normal pulses and intact distal pulses.      Heart sounds: No murmur  heard.  Pulmonary:      Effort: Pulmonary effort is normal. No respiratory distress.      Breath sounds: Normal breath sounds. No rales.   Abdominal:      General: Abdomen is flat. Bowel sounds are normal. There is no distension.      Palpations: Abdomen is soft.      Tenderness: There is no abdominal tenderness. There is no guarding.   Musculoskeletal:      Cervical back: Normal range of motion.      Right lower leg: No edema.      Left lower leg: No edema.   Skin:     General: Skin is warm.   Neurological:      General: No focal deficit present.      Mental Status: She is alert and oriented to person, place, and time.   Psychiatric:         Mood and Affect: Mood normal.       Significant Labs: BMP:   Recent Labs   Lab 06/03/22  1421 06/04/22  0512 06/05/22  0242   * 99 117*    138 138   K 4.0 4.2 4.6    104 105   CO2 20* 25 23   BUN 16 17 18   CREATININE 0.8 0.8 0.8   CALCIUM 9.3 9.4 9.1   MG  --  2.1 2.0   , CMP:   Recent Labs   Lab 06/03/22  1421 06/04/22  0512 06/05/22  0242    138 138   K 4.0 4.2 4.6    104 105   CO2 20* 25 23   * 99 117*   BUN 16 17 18   CREATININE 0.8 0.8 0.8   CALCIUM 9.3 9.4 9.1   PROT 7.5 7.1 6.6   ALBUMIN 3.6 3.5 3.5   BILITOT 0.3 0.3 0.2   ALKPHOS 99 93 104   AST 17 12 12   ALT 19 20 18   ANIONGAP 14 9 10   ESTGFRAFRICA >60 >60.0 >60.0   EGFRNONAA >60 >60.0 >60.0   , and CBC:   Recent Labs   Lab 06/03/22  1421 06/04/22  0512 06/05/22  0242   WBC 9.69 9.78 8.73   HGB 12.7 13.4 12.7   HCT 38.6 40.9 41.4    233 243       Significant Imaging: Echocardiogram: Transthoracic echo (TTE) complete (Cupid Only):   Results for orders placed or performed during the hospital encounter of 06/03/22   Echo   Result Value Ref Range    BSA 1.78 m2    Ascending aorta 2.84 cm    STJ 2.46 cm    AV mean gradient 5 mmHg    Ao peak silvano 1.54 m/s    Ao VTI 29.43 cm    IVRT 148.43 msec    IVS 0.87 0.6 - 1.1 cm    LA size 3.00 cm    Left Atrium Major Axis 5.21 cm    Left  Atrium Minor Axis 4.96 cm    LVIDd 4.16 3.5 - 6.0 cm    LVIDs 3.08 2.1 - 4.0 cm    LVOT diameter 1.98 cm    LVOT peak VTI 18.28 cm    Posterior Wall 0.80 0.6 - 1.1 cm    MV Peak A Marvin 0.84 m/s    E wave deceleration time 149.38 msec    MV Peak E Marvin 0.75 m/s    RA Major Axis 3.94 cm    RA Width 2.60 cm    RVDD 2.79 cm    Sinus 2.88 cm    TAPSE 1.57 cm    TR Max Marvin 2.48 m/s    TDI LATERAL 0.13 m/s    TDI SEPTAL 0.11 m/s    LA WIDTH 3.00 cm    MV stenosis pressure 1/2 time 43.32 ms    LV Diastolic Volume 76.72 mL    LV Systolic Volume 37.25 mL    RV S' 14.85 cm/s    LVOT peak marvin 0.97 m/s    LA volume (mod) 24.80 cm3    LV LATERAL E/E' RATIO 5.77 m/s    LV SEPTAL E/E' RATIO 6.82 m/s    FS 26 %    LA volume 38.88 cm3    LV mass 105.57 g    Left Ventricle Relative Wall Thickness 0.38 cm    AV valve area 1.91 cm2    AV Velocity Ratio 0.63     AV index (prosthetic) 0.62     MV valve area p 1/2 method 5.08 cm2    E/A ratio 0.89     Mean e' 0.12 m/s    LVOT area 3.1 cm2    LVOT stroke volume 56.26 cm3    AV peak gradient 9 mmHg    E/E' ratio 6.25 m/s    LV Systolic Volume Index 21.4 mL/m2    LV Diastolic Volume Index 44.09 mL/m2    LA Volume Index 22.3 mL/m2    LV Mass Index 61 g/m2    Triscuspid Valve Regurgitation Peak Gradient 25 mmHg    LA Volume Index (Mod) 14.3 mL/m2    Right Atrial Pressure (from IVC) 3 mmHg    EF 50 %    TV rest pulmonary artery pressure 28 mmHg    Narrative    · The left ventricle is normal in size with low normal systolic function.   The estimated ejection fraction is 50%.  · There is abnormal septal wall motion.  · Normal left ventricular diastolic function.  · Normal right ventricular size with normal right ventricular systolic   function.  · Mild tricuspid regurgitation.  · The estimated PA systolic pressure is 28 mmHg.  · Normal central venous pressure (3 mmHg).        Assessment and Plan:     * Chest pain  Onset since ICD implanted 5/30/2022 with Dr Tovar. Intermittent, lasts only minutes  before spontaneously resolving. Labs unremarkable. CXR with slight lead migration, but no cardiomegaly or pulmonary edema. Hemodynamically stable. ICD site clean. Bedside TTE without small effusion and no tamponade. [employer] ICD re-interrogated without VT/VF, discharges, or significant changes in sensing or impedance.   Xray concern for lead migration though device functioning well. Given recurrent symptoms, will plan to do lead revision on Monday.  - TTE  With no significant pericardial effusion  - Discontinue heparin products.  - NPO past MN ( ordered), order request in. COVID ordered. Consent today. Left IV in place.  - Echo during procedure. IC informed.  - Reviewed EKG, Xray and device interrogation  - Monitor on telemetry  - Pain control per primary team      D/W Dr. Jet Leigh MD  Cardiac Electrophysiology  [facility] - Cardiology Stepdown

## 2022-06-05 NOTE — SUBJECTIVE & OBJECTIVE
Interval History: Pt seen and examined today at bedside. Had pain around 3 am which improved with pan medications. Had another episode in am when sitting in chair, received pain medications. TTE with no significant pericardial effusion. Plan for LEad revision in am.    Review of Systems   Constitutional: Negative for decreased appetite, fever, malaise/fatigue and weight loss.   HENT:  Negative for congestion.    Eyes:  Negative for blurred vision.   Cardiovascular:  Positive for chest pain. Negative for dyspnea on exertion, orthopnea and palpitations.   Respiratory:  Negative for cough, shortness of breath and wheezing.    Endocrine: Negative for polyuria.   Gastrointestinal:  Negative for bloating and abdominal pain.   Objective:     Vital Signs (Most Recent):  Temp: 97.3 °F (36.3 °C) (06/05/22 0721)  Pulse: 68 (06/05/22 0721)  Resp: 18 (06/05/22 0859)  BP: 134/80 (06/05/22 0721)  SpO2: (!) 94 % (06/05/22 0721)   Vital Signs (24h Range):  Temp:  [96.3 °F (35.7 °C)-98.9 °F (37.2 °C)] 97.3 °F (36.3 °C)  Pulse:  [68-96] 68  Resp:  [17-24] 18  SpO2:  [91 %-95 %] 94 %  BP: (118-152)/(62-88) 134/80     Weight: 71 kg (156 lb 8.4 oz)  Body mass index is 28.63 kg/m².     SpO2: (!) 94 %  O2 Device (Oxygen Therapy): room air    Physical Exam  Vitals and nursing note reviewed.   Constitutional:       Appearance: She is well-developed.   HENT:      Head: Normocephalic and atraumatic.      Mouth/Throat:      Mouth: Mucous membranes are moist.   Eyes:      Pupils: Pupils are equal, round, and reactive to light.   Cardiovascular:      Rate and Rhythm: Normal rate and regular rhythm.      Pulses: Normal pulses and intact distal pulses.      Heart sounds: No murmur heard.  Pulmonary:      Effort: Pulmonary effort is normal. No respiratory distress.      Breath sounds: Normal breath sounds. No rales.   Abdominal:      General: Abdomen is flat. Bowel sounds are normal. There is no distension.      Palpations: Abdomen is soft.       Tenderness: There is no abdominal tenderness. There is no guarding.   Musculoskeletal:      Cervical back: Normal range of motion.      Right lower leg: No edema.      Left lower leg: No edema.   Skin:     General: Skin is warm.   Neurological:      General: No focal deficit present.      Mental Status: She is alert and oriented to person, place, and time.   Psychiatric:         Mood and Affect: Mood normal.       Significant Labs: BMP:   Recent Labs   Lab 06/03/22  1421 06/04/22  0512 06/05/22  0242   * 99 117*    138 138   K 4.0 4.2 4.6    104 105   CO2 20* 25 23   BUN 16 17 18   CREATININE 0.8 0.8 0.8   CALCIUM 9.3 9.4 9.1   MG  --  2.1 2.0   , CMP:   Recent Labs   Lab 06/03/22  1421 06/04/22  0512 06/05/22  0242    138 138   K 4.0 4.2 4.6    104 105   CO2 20* 25 23   * 99 117*   BUN 16 17 18   CREATININE 0.8 0.8 0.8   CALCIUM 9.3 9.4 9.1   PROT 7.5 7.1 6.6   ALBUMIN 3.6 3.5 3.5   BILITOT 0.3 0.3 0.2   ALKPHOS 99 93 104   AST 17 12 12   ALT 19 20 18   ANIONGAP 14 9 10   ESTGFRAFRICA >60 >60.0 >60.0   EGFRNONAA >60 >60.0 >60.0   , and CBC:   Recent Labs   Lab 06/03/22  1421 06/04/22  0512 06/05/22  0242   WBC 9.69 9.78 8.73   HGB 12.7 13.4 12.7   HCT 38.6 40.9 41.4    233 243       Significant Imaging: Echocardiogram: Transthoracic echo (TTE) complete (Cupid Only):   Results for orders placed or performed during the hospital encounter of 06/03/22   Echo   Result Value Ref Range    BSA 1.78 m2    Ascending aorta 2.84 cm    STJ 2.46 cm    AV mean gradient 5 mmHg    Ao peak marvin 1.54 m/s    Ao VTI 29.43 cm    IVRT 148.43 msec    IVS 0.87 0.6 - 1.1 cm    LA size 3.00 cm    Left Atrium Major Axis 5.21 cm    Left Atrium Minor Axis 4.96 cm    LVIDd 4.16 3.5 - 6.0 cm    LVIDs 3.08 2.1 - 4.0 cm    LVOT diameter 1.98 cm    LVOT peak VTI 18.28 cm    Posterior Wall 0.80 0.6 - 1.1 cm    MV Peak A Marvin 0.84 m/s    E wave deceleration time 149.38 msec    MV Peak E Marvin 0.75 m/s    RA  Major Axis 3.94 cm    RA Width 2.60 cm    RVDD 2.79 cm    Sinus 2.88 cm    TAPSE 1.57 cm    TR Max Marvin 2.48 m/s    TDI LATERAL 0.13 m/s    TDI SEPTAL 0.11 m/s    LA WIDTH 3.00 cm    MV stenosis pressure 1/2 time 43.32 ms    LV Diastolic Volume 76.72 mL    LV Systolic Volume 37.25 mL    RV S' 14.85 cm/s    LVOT peak marvin 0.97 m/s    LA volume (mod) 24.80 cm3    LV LATERAL E/E' RATIO 5.77 m/s    LV SEPTAL E/E' RATIO 6.82 m/s    FS 26 %    LA volume 38.88 cm3    LV mass 105.57 g    Left Ventricle Relative Wall Thickness 0.38 cm    AV valve area 1.91 cm2    AV Velocity Ratio 0.63     AV index (prosthetic) 0.62     MV valve area p 1/2 method 5.08 cm2    E/A ratio 0.89     Mean e' 0.12 m/s    LVOT area 3.1 cm2    LVOT stroke volume 56.26 cm3    AV peak gradient 9 mmHg    E/E' ratio 6.25 m/s    LV Systolic Volume Index 21.4 mL/m2    LV Diastolic Volume Index 44.09 mL/m2    LA Volume Index 22.3 mL/m2    LV Mass Index 61 g/m2    Triscuspid Valve Regurgitation Peak Gradient 25 mmHg    LA Volume Index (Mod) 14.3 mL/m2    Right Atrial Pressure (from IVC) 3 mmHg    EF 50 %    TV rest pulmonary artery pressure 28 mmHg    Narrative    · The left ventricle is normal in size with low normal systolic function.   The estimated ejection fraction is 50%.  · There is abnormal septal wall motion.  · Normal left ventricular diastolic function.  · Normal right ventricular size with normal right ventricular systolic   function.  · Mild tricuspid regurgitation.  · The estimated PA systolic pressure is 28 mmHg.  · Normal central venous pressure (3 mmHg).

## 2022-06-05 NOTE — PLAN OF CARE
"Patient had an episode of chest pain with rating complaints of 10/10 out of pain scale 0-10, Contacted on call team "Dr. Aaron" who came to bedside with myself and patient. He ordered 1 dose of Morphine 2mg IV stat, 12 lead EKG and Troponin, still awaiting results. Patient describes pain as sharp shooting radiating pain to breast and back region without relief from Oxycodone and Morphine, is also complaining of shortness of breath but vitals remain within normal limits.   "

## 2022-06-05 NOTE — ASSESSMENT & PLAN NOTE
Patient with combined systolic and diastolic heart failure EF 35% S/P AICD on 05/30 presenting with new chest pain at rest that began around 11:00 a.m. on 06/3.  She states that the episode lasted for 5 minutes in describes her pain as 10/10 burning constant pain under her left breast.  She described to the felt like fire and was painful with inspiration.  She had associated diaphoresis.  Her episodes self resolved.  She had a 2nd episode of chest pain around 3 or 4:00 p.m. while in the [place] [facility].  She states that her pain was similar to her 1st episode and lasted for roughly 10 minutes.  Her troponin was not elevated.  Her AICD was interrogated and showed no VT/VF, cardiac events, or discharges.  [facility] [place] interrogation and excepted her for evaluation.  Cardiology fellow at bedside performing device interrogation and bedside echo.    plan  -- EKG  -- electrophysiology consulted-- concerned for AICD lead dislodgement  -- p.r.n. Tylenol and oxycodone for pain

## 2022-06-05 NOTE — ASSESSMENT & PLAN NOTE
Onset since ICD implanted 5/30/2022 with Dr Tovar. Intermittent, lasts only minutes before spontaneously resolving. Labs unremarkable. CXR with slight lead migration, but no cardiomegaly or pulmonary edema. Hemodynamically stable. ICD site clean. Bedside TTE without small effusion and no tamponade. [facility] [employer] [facility] ICD re-interrogated without VT/VF, discharges, or significant changes in sensing or impedance.   Xray concern for lead migration though device functioning well. Given recurrent symptoms, will plan to do lead revision on Monday.  - TTE  With no significant pericardial effusion  - Discontinue heparin products.  - NPO past MN ( ordered), order request in. COVID ordered.  - Echo during procedure. IC informed.  - Reviewed EKG, Xray and device interrogation  - Monitor on telemetry  - Pain control per primary team    D/W Dr. Chaudhary

## 2022-06-05 NOTE — ASSESSMENT & PLAN NOTE
Patient with chronic cough for several years. Possible cause of lead/screw dislodgement    -- p.r.n. Tessalon

## 2022-06-05 NOTE — PROGRESS NOTES
[facility] - Cardiology [facility] Medicine  Progress Note    Patient Name: Suly Jaime  MRN: [id number]  Patient Class: OP- Observation   Admission Date: 6/3/2022  Length of Stay: 0 days  Attending Physician: Ke Neil MD  Primary Care Provider: Irene Lopez MD        Subjective:     Principal Problem:Chest pain        HPI:  60-year-old female, past medical history of combined systolic and diastolic heart failure (EF 35%) s/p AICD placement on 05/30, and hypertension who presents as transfer and is being admitted for chest pain and suspected dysfunction AICD.  Patient states that at 11:00 a.m. on Friday morning she was outside with her dogs feeling her normal state of health.  She sat down on her front porch and began to experience 10/10  constant burning pain below her left breast.  She states that this pain felt like a fire and she had pain with inspiration.  She had associated diaphoresis with her pain.  she has no clear triggers her chest pain.  she does not smoke, rarely drinks alcohol, does not use any illicit substances.  She endorses a chronic cough.  She denies fever, chills, nausea, vomiting, lightheadedness, difficulty swallowing, syncope, peripheral edema.    In [place] ED she was noted to be hypertensive 158/77 with all other vital signs stable.  CO2 20, but the remainder of labs including CBC and the rest of her CMP were unremarkable.  The troponin 0.009.  She received morphine 4 mg and Zofran.  Her device was interrogated at the [place] ED and did not show any signs VT/VF, cardiac events, or discharges.  [facility] evaluation.        Overview/Hospital Course:  EP consulted, concerned for dislodged AICD lead. She continued to have significant chest wall pain with movement. EKG and trop remained unremarkable. Plan for lead revision on 6/7 with EP.      Interval History: Chest wall pain overnight, EKG, CXR, and trop WNL. NPO midnight for TERRENCE + EP eval    Review  of Systems   Constitutional:  Positive for diaphoresis. Negative for chills, fatigue and fever.   HENT:  Negative for sore throat and trouble swallowing.    Eyes:  Negative for visual disturbance.   Respiratory:  Positive for cough (chronic) and shortness of breath.    Cardiovascular:  Positive for chest pain. Negative for leg swelling.   Gastrointestinal:  Negative for abdominal pain, blood in stool, constipation, diarrhea, nausea and vomiting.   Genitourinary:  Negative for difficulty urinating and dysuria.   Musculoskeletal:  Negative for back pain.   Skin:  Negative for rash and wound.   Neurological:  Negative for syncope, light-headedness and headaches.   Psychiatric/Behavioral:  Negative for confusion.    Objective:     Vital Signs (Most Recent):  Temp: 97.3 °F (36.3 °C) (06/05/22 0721)  Pulse: 68 (06/05/22 0721)  Resp: 18 (06/05/22 0859)  BP: 134/80 (06/05/22 0721)  SpO2: (!) 94 % (06/05/22 0721)   Vital Signs (24h Range):  Temp:  [96.3 °F (35.7 °C)-98.9 °F (37.2 °C)] 97.3 °F (36.3 °C)  Pulse:  [68-96] 68  Resp:  [17-24] 18  SpO2:  [91 %-95 %] 94 %  BP: (118-152)/(62-88) 134/80     Weight: 71 kg (156 lb 8.4 oz)  Body mass index is 28.63 kg/m².    Intake/Output Summary (Last 24 hours) at 6/5/2022 1017  Last data filed at 6/5/2022 0448  Gross per 24 hour   Intake 250 ml   Output 500 ml   Net -250 ml      Physical Exam  Vitals and nursing note reviewed.   Constitutional:       Appearance: Normal appearance.   HENT:      Head: Normocephalic and atraumatic.      Mouth/Throat:      Mouth: Mucous membranes are moist.   Eyes:      Extraocular Movements: Extraocular movements intact.      Pupils: Pupils are equal, round, and reactive to light.   Cardiovascular:      Rate and Rhythm: Normal rate and regular rhythm.      Pulses: Normal pulses.      Heart sounds: Normal heart sounds. No murmur heard.  Pulmonary:      Effort: Pulmonary effort is normal.      Breath sounds: Normal breath sounds.   Abdominal:      General:  Abdomen is flat.      Palpations: Abdomen is soft.      Tenderness: There is no abdominal tenderness.   Musculoskeletal:         General: No swelling.      Cervical back: Neck supple.      Right lower leg: No edema.      Left lower leg: No edema.   Skin:     General: Skin is warm and dry.      Findings: No rash.   Neurological:      General: No focal deficit present.      Mental Status: She is alert and oriented to person, place, and time.   Psychiatric:         Mood and Affect: Mood normal.         Behavior: Behavior normal.       Significant Labs: All pertinent labs within the past 24 hours have been reviewed.  CBC:   Recent Labs   Lab 06/03/22  1421 06/04/22  0512 06/05/22  0242   WBC 9.69 9.78 8.73   HGB 12.7 13.4 12.7   HCT 38.6 40.9 41.4    233 243     CMP:   Recent Labs   Lab 06/03/22  1421 06/04/22  0512 06/05/22  0242    138 138   K 4.0 4.2 4.6    104 105   CO2 20* 25 23   * 99 117*   BUN 16 17 18   CREATININE 0.8 0.8 0.8   CALCIUM 9.3 9.4 9.1   PROT 7.5 7.1 6.6   ALBUMIN 3.6 3.5 3.5   BILITOT 0.3 0.3 0.2   ALKPHOS 99 93 104   AST 17 12 12   ALT 19 20 18   ANIONGAP 14 9 10   EGFRNONAA >60 >60.0 >60.0           Assessment/Plan:      * Chest pain  Patient with combined systolic and diastolic heart failure EF 35% S/P AICD on 05/30 presenting with new chest pain at rest that began around 11:00 a.m. on 06/3.  She states that the episode lasted for 5 minutes in describes her pain as 10/10 burning constant pain under her left breast.  She described to the felt like fire and was painful with inspiration.  She had associated diaphoresis.  Her episodes self resolved.  She had a 2nd episode of chest pain around 3 or 4:00 p.m. while in the [place] [facility].  She states that her pain was similar to her 1st episode and lasted for roughly 10 minutes.  Her troponin was not elevated.  Her AICD was interrogated and showed no VT/VF, cardiac events, or discharges.  [facility] [place]  device interrogation and excepted her for evaluation.  Cardiology fellow at bedside performing device interrogation and bedside echo.    plan  -- EKG  -- electrophysiology consulted-- concerned for AICD lead dislodgement  -- p.r.n. Tylenol and oxycodone for pain      CHF (congestive heart failure), NYHA class II, chronic, systolic  Patient has a past medical history of systolic and diastolic heart failure EF 35%.      -- Continue home GDMT    Chronic cough  Patient with chronic cough for several years. Possible cause of lead/screw dislodgement    -- p.r.n. Tessalon      VTE Risk Mitigation (From admission, onward)         Ordered     IP VTE HIGH RISK PATIENT  Once         06/04/22 0151     Place sequential compression device  Until discontinued         06/04/22 0151                Discharge Planning   BEATRIZ: 6/7/2022     Code Status: Full Code   Is the patient medically ready for discharge?: No    Reason for patient still in hospital (select all that apply): Patient trending condition and Consult recommendations             Jay Maki MD  Department of Hospital Medicine   [facility] - Cardiology Stepdown

## 2022-06-05 NOTE — HOSPITAL COURSE
Admitted for chest pain. EP consulted, concerned for dislodged AICD lead. Continued to have significant chest wall pain with movement. EKG and trop remained unremarkable with each episode; episodes resolved with analgesia; thought that the episodes were related to the dislodged ICD lead. Had lead revision with EP on 6/6; no immediate complications and chest pain episodes resolved. Will need to remain on keflex for 5 days. Will follow-up with EP for a device check in 1 week.

## 2022-06-05 NOTE — SUBJECTIVE & OBJECTIVE
Interval History: Chest wall pain overnight, EKG, CXR, and trop WNL. NPO midnight for TERRENCE + EP eval    Review of Systems   Constitutional:  Positive for diaphoresis. Negative for chills, fatigue and fever.   HENT:  Negative for sore throat and trouble swallowing.    Eyes:  Negative for visual disturbance.   Respiratory:  Positive for cough (chronic) and shortness of breath.    Cardiovascular:  Positive for chest pain. Negative for leg swelling.   Gastrointestinal:  Negative for abdominal pain, blood in stool, constipation, diarrhea, nausea and vomiting.   Genitourinary:  Negative for difficulty urinating and dysuria.   Musculoskeletal:  Negative for back pain.   Skin:  Negative for rash and wound.   Neurological:  Negative for syncope, light-headedness and headaches.   Psychiatric/Behavioral:  Negative for confusion.    Objective:     Vital Signs (Most Recent):  Temp: 97.3 °F (36.3 °C) (06/05/22 0721)  Pulse: 68 (06/05/22 0721)  Resp: 18 (06/05/22 0859)  BP: 134/80 (06/05/22 0721)  SpO2: (!) 94 % (06/05/22 0721)   Vital Signs (24h Range):  Temp:  [96.3 °F (35.7 °C)-98.9 °F (37.2 °C)] 97.3 °F (36.3 °C)  Pulse:  [68-96] 68  Resp:  [17-24] 18  SpO2:  [91 %-95 %] 94 %  BP: (118-152)/(62-88) 134/80     Weight: 71 kg (156 lb 8.4 oz)  Body mass index is 28.63 kg/m².    Intake/Output Summary (Last 24 hours) at 6/5/2022 1017  Last data filed at 6/5/2022 0448  Gross per 24 hour   Intake 250 ml   Output 500 ml   Net -250 ml      Physical Exam  Vitals and nursing note reviewed.   Constitutional:       Appearance: Normal appearance.   HENT:      Head: Normocephalic and atraumatic.      Mouth/Throat:      Mouth: Mucous membranes are moist.   Eyes:      Extraocular Movements: Extraocular movements intact.      Pupils: Pupils are equal, round, and reactive to light.   Cardiovascular:      Rate and Rhythm: Normal rate and regular rhythm.      Pulses: Normal pulses.      Heart sounds: Normal heart sounds. No murmur heard.  Pulmonary:       Effort: Pulmonary effort is normal.      Breath sounds: Normal breath sounds.   Abdominal:      General: Abdomen is flat.      Palpations: Abdomen is soft.      Tenderness: There is no abdominal tenderness.   Musculoskeletal:         General: No swelling.      Cervical back: Neck supple.      Right lower leg: No edema.      Left lower leg: No edema.   Skin:     General: Skin is warm and dry.      Findings: No rash.   Neurological:      General: No focal deficit present.      Mental Status: She is alert and oriented to person, place, and time.   Psychiatric:         Mood and Affect: Mood normal.         Behavior: Behavior normal.       Significant Labs: All pertinent labs within the past 24 hours have been reviewed.  CBC:   Recent Labs   Lab 06/03/22  1421 06/04/22  0512 06/05/22  0242   WBC 9.69 9.78 8.73   HGB 12.7 13.4 12.7   HCT 38.6 40.9 41.4    233 243     CMP:   Recent Labs   Lab 06/03/22  1421 06/04/22  0512 06/05/22  0242    138 138   K 4.0 4.2 4.6    104 105   CO2 20* 25 23   * 99 117*   BUN 16 17 18   CREATININE 0.8 0.8 0.8   CALCIUM 9.3 9.4 9.1   PROT 7.5 7.1 6.6   ALBUMIN 3.6 3.5 3.5   BILITOT 0.3 0.3 0.2   ALKPHOS 99 93 104   AST 17 12 12   ALT 19 20 18   ANIONGAP 14 9 10   EGFRNONAA >60 >60.0 >60.0

## 2022-06-06 ENCOUNTER — ANESTHESIA EVENT (OUTPATIENT)
Dept: MEDSURG UNIT | Facility: HOSPITAL | Age: 62
DRG: 265 | End: 2022-06-06
Payer: MEDICAID

## 2022-06-06 ENCOUNTER — ANESTHESIA (OUTPATIENT)
Dept: MEDSURG UNIT | Facility: HOSPITAL | Age: 62
DRG: 265 | End: 2022-06-06
Payer: MEDICAID

## 2022-06-06 LAB
ALBUMIN SERPL BCP-MCNC: 3.7 G/DL (ref 3.5–5.2)
ALP SERPL-CCNC: 102 U/L (ref 55–135)
ALT SERPL W/O P-5'-P-CCNC: 20 U/L (ref 10–44)
ANION GAP SERPL CALC-SCNC: 10 MMOL/L (ref 8–16)
AST SERPL-CCNC: 13 U/L (ref 10–40)
BASOPHILS # BLD AUTO: 0.03 K/UL (ref 0–0.2)
BASOPHILS NFR BLD: 0.3 % (ref 0–1.9)
BILIRUB SERPL-MCNC: 0.4 MG/DL (ref 0.1–1)
BUN SERPL-MCNC: 15 MG/DL (ref 8–23)
CALCIUM SERPL-MCNC: 9.5 MG/DL (ref 8.7–10.5)
CHLORIDE SERPL-SCNC: 104 MMOL/L (ref 95–110)
CO2 SERPL-SCNC: 25 MMOL/L (ref 23–29)
CREAT SERPL-MCNC: 0.8 MG/DL (ref 0.5–1.4)
DIFFERENTIAL METHOD: NORMAL
EOSINOPHIL # BLD AUTO: 0.3 K/UL (ref 0–0.5)
EOSINOPHIL NFR BLD: 2.9 % (ref 0–8)
ERYTHROCYTE [DISTWIDTH] IN BLOOD BY AUTOMATED COUNT: 12.8 % (ref 11.5–14.5)
EST. GFR  (AFRICAN AMERICAN): >60 ML/MIN/1.73 M^2
EST. GFR  (NON AFRICAN AMERICAN): >60 ML/MIN/1.73 M^2
GLUCOSE SERPL-MCNC: 102 MG/DL (ref 70–110)
HCT VFR BLD AUTO: 43 % (ref 37–48.5)
HGB BLD-MCNC: 14 G/DL (ref 12–16)
IMM GRANULOCYTES # BLD AUTO: 0.03 K/UL (ref 0–0.04)
IMM GRANULOCYTES NFR BLD AUTO: 0.3 % (ref 0–0.5)
LYMPHOCYTES # BLD AUTO: 2 K/UL (ref 1–4.8)
LYMPHOCYTES NFR BLD: 22.2 % (ref 18–48)
MAGNESIUM SERPL-MCNC: 2.1 MG/DL (ref 1.6–2.6)
MCH RBC QN AUTO: 27.9 PG (ref 27–31)
MCHC RBC AUTO-ENTMCNC: 32.6 G/DL (ref 32–36)
MCV RBC AUTO: 86 FL (ref 82–98)
MONOCYTES # BLD AUTO: 0.9 K/UL (ref 0.3–1)
MONOCYTES NFR BLD: 9.6 % (ref 4–15)
NEUTROPHILS # BLD AUTO: 5.8 K/UL (ref 1.8–7.7)
NEUTROPHILS NFR BLD: 64.7 % (ref 38–73)
NRBC BLD-RTO: 0 /100 WBC
PHOSPHATE SERPL-MCNC: 4 MG/DL (ref 2.7–4.5)
PLATELET # BLD AUTO: 232 K/UL (ref 150–450)
PMV BLD AUTO: 10.3 FL (ref 9.2–12.9)
POTASSIUM SERPL-SCNC: 5 MMOL/L (ref 3.5–5.1)
PROT SERPL-MCNC: 7.1 G/DL (ref 6–8.4)
RBC # BLD AUTO: 5.01 M/UL (ref 4–5.4)
SODIUM SERPL-SCNC: 139 MMOL/L (ref 136–145)
TROPONIN I SERPL DL<=0.01 NG/ML-MCNC: <0.006 NG/ML (ref 0–0.03)
WBC # BLD AUTO: 9.04 K/UL (ref 3.9–12.7)

## 2022-06-06 PROCEDURE — 33244 REMOVE ELCTRD TRANSVENOUSLY: CPT | Mod: ,,, | Performed by: INTERNAL MEDICINE

## 2022-06-06 PROCEDURE — 37000008 HC ANESTHESIA 1ST 15 MINUTES: Performed by: INTERNAL MEDICINE

## 2022-06-06 PROCEDURE — 84100 ASSAY OF PHOSPHORUS: CPT

## 2022-06-06 PROCEDURE — 33216 INSERT 1 ELECTRODE PM-DEFIB: CPT | Mod: ,,, | Performed by: INTERNAL MEDICINE

## 2022-06-06 PROCEDURE — D9220A PRA ANESTHESIA: ICD-10-PCS | Mod: ANES,,, | Performed by: ANESTHESIOLOGY

## 2022-06-06 PROCEDURE — C1894 INTRO/SHEATH, NON-LASER: HCPCS | Performed by: INTERNAL MEDICINE

## 2022-06-06 PROCEDURE — 83735 ASSAY OF MAGNESIUM: CPT

## 2022-06-06 PROCEDURE — 33244 PR RMV PACER/ELECTRD,TRANSVEN EXTRCT: ICD-10-PCS | Mod: ,,, | Performed by: INTERNAL MEDICINE

## 2022-06-06 PROCEDURE — 94761 N-INVAS EAR/PLS OXIMETRY MLT: CPT

## 2022-06-06 PROCEDURE — 33216 PR INSERT TRANSVEN ELECTRODE,SING CHAMBER: ICD-10-PCS | Mod: ,,, | Performed by: INTERNAL MEDICINE

## 2022-06-06 PROCEDURE — 80053 COMPREHEN METABOLIC PANEL: CPT

## 2022-06-06 PROCEDURE — 93010 ELECTROCARDIOGRAM REPORT: CPT | Mod: ,,, | Performed by: INTERNAL MEDICINE

## 2022-06-06 PROCEDURE — 25000003 PHARM REV CODE 250: Performed by: NURSE ANESTHETIST, CERTIFIED REGISTERED

## 2022-06-06 PROCEDURE — 36415 COLL VENOUS BLD VENIPUNCTURE: CPT

## 2022-06-06 PROCEDURE — 27201423 OPTIME MED/SURG SUP & DEVICES STERILE SUPPLY: Performed by: INTERNAL MEDICINE

## 2022-06-06 PROCEDURE — D9220A PRA ANESTHESIA: Mod: CRNA,,, | Performed by: NURSE ANESTHETIST, CERTIFIED REGISTERED

## 2022-06-06 PROCEDURE — 93005 ELECTROCARDIOGRAM TRACING: CPT

## 2022-06-06 PROCEDURE — 93010 EKG 12-LEAD: ICD-10-PCS | Mod: ,,, | Performed by: INTERNAL MEDICINE

## 2022-06-06 PROCEDURE — 20600001 HC STEP DOWN PRIVATE ROOM

## 2022-06-06 PROCEDURE — 99232 PR SUBSEQUENT HOSPITAL CARE,LEVL II: ICD-10-PCS | Mod: ,,, | Performed by: STUDENT IN AN ORGANIZED HEALTH CARE EDUCATION/TRAINING PROGRAM

## 2022-06-06 PROCEDURE — 37000009 HC ANESTHESIA EA ADD 15 MINS: Performed by: INTERNAL MEDICINE

## 2022-06-06 PROCEDURE — 63600175 PHARM REV CODE 636 W HCPCS: Performed by: NURSE ANESTHETIST, CERTIFIED REGISTERED

## 2022-06-06 PROCEDURE — 63600175 PHARM REV CODE 636 W HCPCS: Performed by: INTERNAL MEDICINE

## 2022-06-06 PROCEDURE — 85025 COMPLETE CBC W/AUTO DIFF WBC: CPT

## 2022-06-06 PROCEDURE — 25000003 PHARM REV CODE 250: Performed by: FAMILY MEDICINE

## 2022-06-06 PROCEDURE — 33244 REMOVE ELCTRD TRANSVENOUSLY: CPT | Performed by: INTERNAL MEDICINE

## 2022-06-06 PROCEDURE — 33216 INSERT 1 ELECTRODE PM-DEFIB: CPT | Performed by: INTERNAL MEDICINE

## 2022-06-06 PROCEDURE — 25000003 PHARM REV CODE 250: Performed by: INTERNAL MEDICINE

## 2022-06-06 PROCEDURE — 84484 ASSAY OF TROPONIN QUANT: CPT

## 2022-06-06 PROCEDURE — D9220A PRA ANESTHESIA: Mod: ANES,,, | Performed by: ANESTHESIOLOGY

## 2022-06-06 PROCEDURE — 25000003 PHARM REV CODE 250: Performed by: STUDENT IN AN ORGANIZED HEALTH CARE EDUCATION/TRAINING PROGRAM

## 2022-06-06 PROCEDURE — 63600175 PHARM REV CODE 636 W HCPCS: Performed by: STUDENT IN AN ORGANIZED HEALTH CARE EDUCATION/TRAINING PROGRAM

## 2022-06-06 PROCEDURE — D9220A PRA ANESTHESIA: ICD-10-PCS | Mod: CRNA,,, | Performed by: NURSE ANESTHETIST, CERTIFIED REGISTERED

## 2022-06-06 PROCEDURE — 99232 SBSQ HOSP IP/OBS MODERATE 35: CPT | Mod: ,,, | Performed by: STUDENT IN AN ORGANIZED HEALTH CARE EDUCATION/TRAINING PROGRAM

## 2022-06-06 PROCEDURE — C1777 LEAD, AICD, ENDO SINGLE COIL: HCPCS | Performed by: INTERNAL MEDICINE

## 2022-06-06 DEVICE — DEFIBRILLATION LEAD
Type: IMPLANTABLE DEVICE | Site: HEART | Status: FUNCTIONAL
Brand: DURATA™

## 2022-06-06 RX ORDER — AMOXICILLIN 250 MG
1 CAPSULE ORAL DAILY
Status: DISCONTINUED | OUTPATIENT
Start: 2022-06-06 | End: 2022-06-07 | Stop reason: HOSPADM

## 2022-06-06 RX ORDER — FENTANYL CITRATE 50 UG/ML
25 INJECTION, SOLUTION INTRAMUSCULAR; INTRAVENOUS EVERY 5 MIN PRN
Status: DISCONTINUED | OUTPATIENT
Start: 2022-06-06 | End: 2022-06-07 | Stop reason: HOSPADM

## 2022-06-06 RX ORDER — MIDAZOLAM HYDROCHLORIDE 1 MG/ML
INJECTION INTRAMUSCULAR; INTRAVENOUS
Status: DISCONTINUED | OUTPATIENT
Start: 2022-06-06 | End: 2022-06-06

## 2022-06-06 RX ORDER — CEPHALEXIN 500 MG/1
500 CAPSULE ORAL EVERY 8 HOURS
Status: DISCONTINUED | OUTPATIENT
Start: 2022-06-07 | End: 2022-06-07 | Stop reason: HOSPADM

## 2022-06-06 RX ORDER — CEFAZOLIN SODIUM 1 G/3ML
INJECTION, POWDER, FOR SOLUTION INTRAMUSCULAR; INTRAVENOUS
Status: DISCONTINUED | OUTPATIENT
Start: 2022-06-06 | End: 2022-06-06

## 2022-06-06 RX ORDER — LIDOCAINE HYDROCHLORIDE 20 MG/ML
INJECTION, SOLUTION INFILTRATION; PERINEURAL
Status: DISCONTINUED | OUTPATIENT
Start: 2022-06-06 | End: 2022-06-07 | Stop reason: HOSPADM

## 2022-06-06 RX ORDER — DIPHENHYDRAMINE HYDROCHLORIDE 50 MG/ML
25 INJECTION INTRAMUSCULAR; INTRAVENOUS EVERY 6 HOURS PRN
Status: DISCONTINUED | OUTPATIENT
Start: 2022-06-06 | End: 2022-06-07 | Stop reason: HOSPADM

## 2022-06-06 RX ORDER — LIDOCAINE HCL/PF 100 MG/5ML
SYRINGE (ML) INTRAVENOUS
Status: DISCONTINUED | OUTPATIENT
Start: 2022-06-06 | End: 2022-06-06

## 2022-06-06 RX ORDER — CEFAZOLIN SODIUM/D5W 2 G/100 ML
2 PLASTIC BAG, INJECTION (ML) INTRAVENOUS ONCE
Status: DISCONTINUED | OUTPATIENT
Start: 2022-06-06 | End: 2022-06-07 | Stop reason: HOSPADM

## 2022-06-06 RX ORDER — VANCOMYCIN HYDROCHLORIDE 1 G/20ML
INJECTION, POWDER, LYOPHILIZED, FOR SOLUTION INTRAVENOUS
Status: DISCONTINUED | OUTPATIENT
Start: 2022-06-06 | End: 2022-06-07 | Stop reason: HOSPADM

## 2022-06-06 RX ORDER — CEPHALEXIN 500 MG/1
500 CAPSULE ORAL EVERY 8 HOURS
Qty: 15 CAPSULE | Refills: 0 | Status: SHIPPED | OUTPATIENT
Start: 2022-06-06 | End: 2022-06-07 | Stop reason: SDUPTHER

## 2022-06-06 RX ORDER — ONDANSETRON 2 MG/ML
4 INJECTION INTRAMUSCULAR; INTRAVENOUS ONCE AS NEEDED
Status: DISCONTINUED | OUTPATIENT
Start: 2022-06-06 | End: 2022-06-07 | Stop reason: HOSPADM

## 2022-06-06 RX ORDER — SODIUM CHLORIDE 0.9 G/100ML
IRRIGANT IRRIGATION
Status: DISCONTINUED | OUTPATIENT
Start: 2022-06-06 | End: 2022-06-07 | Stop reason: HOSPADM

## 2022-06-06 RX ORDER — PHENYLEPHRINE HYDROCHLORIDE 10 MG/ML
INJECTION INTRAVENOUS
Status: DISCONTINUED | OUTPATIENT
Start: 2022-06-06 | End: 2022-06-06

## 2022-06-06 RX ORDER — PROPOFOL 10 MG/ML
VIAL (ML) INTRAVENOUS CONTINUOUS PRN
Status: DISCONTINUED | OUTPATIENT
Start: 2022-06-06 | End: 2022-06-06

## 2022-06-06 RX ORDER — HYDROMORPHONE HYDROCHLORIDE 1 MG/ML
0.2 INJECTION, SOLUTION INTRAMUSCULAR; INTRAVENOUS; SUBCUTANEOUS EVERY 5 MIN PRN
Status: DISCONTINUED | OUTPATIENT
Start: 2022-06-06 | End: 2022-06-07 | Stop reason: HOSPADM

## 2022-06-06 RX ORDER — BUPIVACAINE HYDROCHLORIDE 2.5 MG/ML
INJECTION, SOLUTION EPIDURAL; INFILTRATION; INTRACAUDAL
Status: DISCONTINUED | OUTPATIENT
Start: 2022-06-06 | End: 2022-06-07 | Stop reason: HOSPADM

## 2022-06-06 RX ORDER — ACETAMINOPHEN 325 MG/1
650 TABLET ORAL EVERY 4 HOURS PRN
Status: DISCONTINUED | OUTPATIENT
Start: 2022-06-06 | End: 2022-06-07 | Stop reason: HOSPADM

## 2022-06-06 RX ORDER — FENTANYL CITRATE 50 UG/ML
INJECTION, SOLUTION INTRAMUSCULAR; INTRAVENOUS
Status: DISCONTINUED | OUTPATIENT
Start: 2022-06-06 | End: 2022-06-06

## 2022-06-06 RX ADMIN — PHENYLEPHRINE HYDROCHLORIDE 300 MCG: 10 INJECTION, SOLUTION INTRAMUSCULAR; INTRAVENOUS; SUBCUTANEOUS at 01:06

## 2022-06-06 RX ADMIN — METOPROLOL SUCCINATE 25 MG: 25 TABLET, EXTENDED RELEASE ORAL at 09:06

## 2022-06-06 RX ADMIN — MORPHINE SULFATE 2 MG: 2 INJECTION, SOLUTION INTRAMUSCULAR; INTRAVENOUS at 06:06

## 2022-06-06 RX ADMIN — LOSARTAN POTASSIUM 25 MG: 25 TABLET, FILM COATED ORAL at 09:06

## 2022-06-06 RX ADMIN — PHENYLEPHRINE HYDROCHLORIDE 200 MCG: 10 INJECTION, SOLUTION INTRAMUSCULAR; INTRAVENOUS; SUBCUTANEOUS at 02:06

## 2022-06-06 RX ADMIN — MIDAZOLAM HYDROCHLORIDE 2 MG: 1 INJECTION, SOLUTION INTRAMUSCULAR; INTRAVENOUS at 01:06

## 2022-06-06 RX ADMIN — SODIUM CHLORIDE: 9 INJECTION, SOLUTION INTRAVENOUS at 01:06

## 2022-06-06 RX ADMIN — MORPHINE SULFATE 2 MG: 2 INJECTION, SOLUTION INTRAMUSCULAR; INTRAVENOUS at 09:06

## 2022-06-06 RX ADMIN — CEFAZOLIN SODIUM 2 G: 1 INJECTION, POWDER, FOR SOLUTION INTRAMUSCULAR; INTRAVENOUS at 01:06

## 2022-06-06 RX ADMIN — LIDOCAINE HYDROCHLORIDE 100 MG: 20 INJECTION, SOLUTION INTRAVENOUS at 01:06

## 2022-06-06 RX ADMIN — MORPHINE SULFATE 2 MG: 2 INJECTION, SOLUTION INTRAMUSCULAR; INTRAVENOUS at 05:06

## 2022-06-06 RX ADMIN — SENNOSIDES AND DOCUSATE SODIUM 1 TABLET: 50; 8.6 TABLET ORAL at 05:06

## 2022-06-06 RX ADMIN — FENTANYL CITRATE 100 MCG: 50 INJECTION, SOLUTION INTRAMUSCULAR; INTRAVENOUS at 01:06

## 2022-06-06 RX ADMIN — PROPOFOL 150 MCG/KG/MIN: 10 INJECTION, EMULSION INTRAVENOUS at 01:06

## 2022-06-06 NOTE — PROGRESS NOTES
While applying hydrocolloid dressing to incision, noticed localized swelling to site.  Notified Dr. Mendoza.  Informed to wait until he can assess the site before applying pressure dressing.

## 2022-06-06 NOTE — PLAN OF CARE
Plan of care discussed with pt. She remains NPO for pacer lead revision today. Safety measures maintained. Personal items and call light left within reach. Pt encouraged to call for assistance.

## 2022-06-06 NOTE — TRANSFER OF CARE
"Anesthesia Transfer of Care Note    Patient: Suly Jaime    Procedure(s) Performed: Procedure(s) (LRB):  REVISION, INSERTION, ELECTRODE LEAD, ICD (N/A)    Patient location: Cath Lab    Anesthesia Type: general    Transport from OR: Transported from OR on room air with adequate spontaneous ventilation    Post pain: adequate analgesia    Post assessment: no apparent anesthetic complications and tolerated procedure well    Post vital signs: stable    Level of consciousness: awake and alert    Nausea/Vomiting: no nausea/vomiting    Complications: none    Transfer of care protocol was followed      Last vitals:   Visit Vitals  /78 (BP Location: Left arm, Patient Position: Lying)   Pulse 67   Temp 36.4 °C (97.5 °F) (Oral)   Resp 14   Ht 5' 2" (1.575 m)   Wt 71 kg (156 lb 8.4 oz)   SpO2 97%   BMI 28.63 kg/m²     "

## 2022-06-06 NOTE — SUBJECTIVE & OBJECTIVE
Interval History: Chest wall pain overnight, EKG, CXR, and trop WNL. Plan for TERRENCE + EP evaluation today    Review of Systems   Constitutional:  Positive for diaphoresis. Negative for chills, fatigue and fever.   HENT:  Negative for sore throat and trouble swallowing.    Eyes:  Negative for visual disturbance.   Respiratory:  Positive for cough (chronic) and shortness of breath.    Cardiovascular:  Positive for chest pain. Negative for leg swelling.   Gastrointestinal:  Negative for abdominal pain, blood in stool, constipation, diarrhea, nausea and vomiting.   Genitourinary:  Negative for difficulty urinating and dysuria.   Musculoskeletal:  Negative for back pain.   Skin:  Negative for rash and wound.   Neurological:  Negative for syncope, light-headedness and headaches.   Psychiatric/Behavioral:  Negative for confusion.    Objective:     Vital Signs (Most Recent):  Temp: 97.5 °F (36.4 °C) (06/06/22 1111)  Pulse: 67 (06/06/22 1111)  Resp: 14 (06/06/22 1111)  BP: 135/78 (06/06/22 1111)  SpO2: 97 % (06/06/22 1111)   Vital Signs (24h Range):  Temp:  [96.4 °F (35.8 °C)-98 °F (36.7 °C)] 97.5 °F (36.4 °C)  Pulse:  [65-86] 67  Resp:  [14-20] 14  SpO2:  [92 %-98 %] 97 %  BP: (114-152)/(57-85) 135/78     Weight: 71 kg (156 lb 8.4 oz)  Body mass index is 28.63 kg/m².    Intake/Output Summary (Last 24 hours) at 6/6/2022 1424  Last data filed at 6/6/2022 1406  Gross per 24 hour   Intake 540 ml   Output 300 ml   Net 240 ml        Physical Exam  Vitals and nursing note reviewed.   Constitutional:       Appearance: Normal appearance.   HENT:      Head: Normocephalic and atraumatic.      Mouth/Throat:      Mouth: Mucous membranes are moist.   Eyes:      Extraocular Movements: Extraocular movements intact.      Pupils: Pupils are equal, round, and reactive to light.   Cardiovascular:      Rate and Rhythm: Normal rate and regular rhythm.      Pulses: Normal pulses.      Heart sounds: Normal heart sounds. No murmur heard.  Pulmonary:       Effort: Pulmonary effort is normal.      Breath sounds: Normal breath sounds.   Abdominal:      General: Abdomen is flat.      Palpations: Abdomen is soft.      Tenderness: There is no abdominal tenderness.   Musculoskeletal:         General: No swelling.      Cervical back: Neck supple.      Right lower leg: No edema.      Left lower leg: No edema.   Skin:     General: Skin is warm and dry.      Findings: No rash.   Neurological:      General: No focal deficit present.      Mental Status: She is alert and oriented to person, place, and time.   Psychiatric:         Mood and Affect: Mood normal.         Behavior: Behavior normal.       Significant Labs: All pertinent labs within the past 24 hours have been reviewed.  CBC:   Recent Labs   Lab 06/05/22  0242 06/06/22 0528   WBC 8.73 9.04   HGB 12.7 14.0   HCT 41.4 43.0    232       CMP:   Recent Labs   Lab 06/05/22  0242 06/06/22  0528    139   K 4.6 5.0    104   CO2 23 25   * 102   BUN 18 15   CREATININE 0.8 0.8   CALCIUM 9.1 9.5   PROT 6.6 7.1   ALBUMIN 3.5 3.7   BILITOT 0.2 0.4   ALKPHOS 104 102   AST 12 13   ALT 18 20   ANIONGAP 10 10   EGFRNONAA >60.0 >60.0

## 2022-06-06 NOTE — ANESTHESIA PREPROCEDURE EVALUATION
06/06/2022  Suly Jaime is a 62 y.o., female.  Patient Active Problem List   Diagnosis    Abdominal pain    Oropharyngeal lesion    Chronic cough    Eosinophilic bronchitis    Post-nasal drip    Restrictive lung disease    Cardiomyopathy, nonischemic    CHF (congestive heart failure), NYHA class II, chronic, systolic    Chest pain           Pre-op Assessment          Review of Systems      Physical Exam    Airway:  No airway management difficulties anticipated  Dental:No active dental issues noted  Chest/Lungs:  Clear to auscultation    Heart:  Rate: Normal  Rhythm: Regular Rhythm  Sounds: Normal        Anesthesia Plan  Type of Anesthesia, risks & benefits discussed:    Anesthesia Type: Gen Supraglottic Airway, Gen Natural Airway  Informed Consent: Informed consent signed with the Patient and all parties understand the risks and agree with anesthesia plan.  All questions answered.   ASA Score: 3  Anesthesia Plan Notes: Chart reviewed. Patient seen and examined. Anesthesia plan discussed and questions answered. E-consent signed. Oh Engel MD    Ready For Surgery From Anesthesia Perspective.     .

## 2022-06-06 NOTE — NURSING
PT RESTING QUIETLY WITH EYES CLOSED.  NO C/O PAIN OR RESPIRATORY DISTRESS NOTED WHEN AROUSED. SALINE LOCK INTACT TO LEFT HAND, NO S/S COMPLICATIONS NOTED.  FAMILY AT BEDSIDE.  GRACE ELLISON

## 2022-06-06 NOTE — PROGRESS NOTES
[facility] - Cardiology  [facility] Medicine  Progress Note    Patient Name: Suly Jaime  MRN: [id number]  Patient Class: IP- Inpatient   Admission Date: 6/3/2022  Length of Stay: 0 days  Attending Physician: Ke Neil MD  Primary Care Provider: Irene Lopez MD    Subjective:     Principal Problem:Chest pain    HPI:  60-year-old female, past medical history of combined systolic and diastolic heart failure (EF 35%) s/p AICD placement on 05/30, and hypertension who presents as transfer and is being admitted for chest pain and suspected dysfunction AICD.  Patient states that at 11:00 a.m. on Friday morning she was outside with her dogs feeling her normal state of health.  She sat down on her front porch and began to experience 10/10  constant burning pain below her left breast.  She states that this pain felt like a fire and she had pain with inspiration.  She had associated diaphoresis with her pain.  she has no clear triggers her chest pain.  she does not smoke, rarely drinks alcohol, does not use any illicit substances.  She endorses a chronic cough.  She denies fever, chills, nausea, vomiting, lightheadedness, difficulty swallowing, syncope, peripheral edema.    In [place] ED she was noted to be hypertensive 158/77 with all other vital signs stable.  CO2 20, but the remainder of labs including CBC and the rest of her CMP were unremarkable.  The troponin 0.009.  She received morphine 4 mg and Zofran.  Her device was interrogated at the [place] ED and did not show any signs VT/VF, cardiac events, or discharges.  She was transferred to [facility] for EP evaluation.      Overview/Hospital Course:  EP consulted, concerned for dislodged AICD lead. Continued to have significant chest wall pain with movement. EKG and trop remained unremarkable with each episode; episodes resolved with analgesia. Plan for lead revision on 6/7 with EP.    Interval History: Chest wall pain overnight, EKG, CXR, and trop WNL. Plan for TERRENCE +  EP evaluation today    Review of Systems   Constitutional:  Positive for diaphoresis. Negative for chills, fatigue and fever.   HENT:  Negative for sore throat and trouble swallowing.    Eyes:  Negative for visual disturbance.   Respiratory:  Positive for cough (chronic) and shortness of breath.    Cardiovascular:  Positive for chest pain. Negative for leg swelling.   Gastrointestinal:  Negative for abdominal pain, blood in stool, constipation, diarrhea, nausea and vomiting.   Genitourinary:  Negative for difficulty urinating and dysuria.   Musculoskeletal:  Negative for back pain.   Skin:  Negative for rash and wound.   Neurological:  Negative for syncope, light-headedness and headaches.   Psychiatric/Behavioral:  Negative for confusion.    Objective:     Vital Signs (Most Recent):  Temp: 97.5 °F (36.4 °C) (06/06/22 1111)  Pulse: 67 (06/06/22 1111)  Resp: 14 (06/06/22 1111)  BP: 135/78 (06/06/22 1111)  SpO2: 97 % (06/06/22 1111)   Vital Signs (24h Range):  Temp:  [96.4 °F (35.8 °C)-98 °F (36.7 °C)] 97.5 °F (36.4 °C)  Pulse:  [65-86] 67  Resp:  [14-20] 14  SpO2:  [92 %-98 %] 97 %  BP: (114-152)/(57-85) 135/78     Weight: 71 kg (156 lb 8.4 oz)  Body mass index is 28.63 kg/m².    Intake/Output Summary (Last 24 hours) at 6/6/2022 1424  Last data filed at 6/6/2022 1406  Gross per 24 hour   Intake 540 ml   Output 300 ml   Net 240 ml        Physical Exam  Vitals and nursing note reviewed.   Constitutional:       Appearance: Normal appearance.   HENT:      Head: Normocephalic and atraumatic.      Mouth/Throat:      Mouth: Mucous membranes are moist.   Eyes:      Extraocular Movements: Extraocular movements intact.      Pupils: Pupils are equal, round, and reactive to light.   Cardiovascular:      Rate and Rhythm: Normal rate and regular rhythm.      Pulses: Normal pulses.      Heart sounds: Normal heart sounds. No murmur heard.  Pulmonary:      Effort: Pulmonary effort is normal.      Breath sounds: Normal breath sounds.    Abdominal:      General: Abdomen is flat.      Palpations: Abdomen is soft.      Tenderness: There is no abdominal tenderness.   Musculoskeletal:         General: No swelling.      Cervical back: Neck supple.      Right lower leg: No edema.      Left lower leg: No edema.   Skin:     General: Skin is warm and dry.      Findings: No rash.   Neurological:      General: No focal deficit present.      Mental Status: She is alert and oriented to person, place, and time.   Psychiatric:         Mood and Affect: Mood normal.         Behavior: Behavior normal.       Significant Labs: All pertinent labs within the past 24 hours have been reviewed.  CBC:   Recent Labs   Lab 06/05/22 0242 06/06/22 0528   WBC 8.73 9.04   HGB 12.7 14.0   HCT 41.4 43.0    232       CMP:   Recent Labs   Lab 06/05/22 0242 06/06/22 0528    139   K 4.6 5.0    104   CO2 23 25   * 102   BUN 18 15   CREATININE 0.8 0.8   CALCIUM 9.1 9.5   PROT 6.6 7.1   ALBUMIN 3.5 3.7   BILITOT 0.2 0.4   ALKPHOS 104 102   AST 12 13   ALT 18 20   ANIONGAP 10 10   EGFRNONAA >60.0 >60.0             Assessment/Plan:      * Chest pain  Patient with combined systolic and diastolic heart failure EF 35% S/P AICD on 05/30 presenting with new chest pain at rest that began around 11:00 a.m. on 06/3.  She states that the episode lasted for 5 minutes in describes her pain as 10/10 burning constant pain under her left breast.  She described to the felt like fire and was painful with inspiration.  She had associated diaphoresis.  Her episodes self resolved.  She had a 2nd episode of chest pain around 3 or 4:00 p.m. while in the [facility].  She states that her pain was similar to her 1st episode and lasted for roughly 10 minutes.  Her troponin was not elevated.  Her AICD was interrogated and showed no VT/VF, cardiac events, or discharges.  [facility] EP reviewed her device interrogation and excepted her for evaluation.  Cardiology fellow at  bedside performing device interrogation and bedside echo.    Plan:  - EKG prn  - electrophysiology consulted-- concerned for AICD lead dislodgement; plan for TERRENCE and EP evaluation and ICD revision on 6/6  - tylenol and oxycodone prn for pain      CHF (congestive heart failure), NYHA class II, chronic, systolic  Patient has a past medical history of systolic and diastolic heart failure EF 35%.  Repeat TTE showing EF 50%    Plan:  - continue home GDMT    Chronic cough  Patient with chronic cough for several years. Possible cause of lead/screw dislodgement    Plan:  - tessalon perles prn      VTE Risk Mitigation (From admission, onward)         Ordered     IP VTE HIGH RISK PATIENT  Once         06/04/22 0151     Place sequential compression device  Until discontinued         06/04/22 0151              Discharge Planning   BEATRIZ: 6/6/2022     Code Status: Full Code   Is the patient medically ready for discharge?: No    Reason for patient still in hospital (select all that apply): Patient trending condition and Consult recommendations  Discharge Plan A: Home      Esther Wolff MD  Department of Hospital Medicine   [facility] - Cardiology

## 2022-06-06 NOTE — ASSESSMENT & PLAN NOTE
Patient with combined systolic and diastolic heart failure EF 35% S/P AICD on 05/30 presenting with new chest pain at rest that began around 11:00 a.m. on 06/3.  She states that the episode lasted for 5 minutes in describes her pain as 10/10 burning constant pain under her left breast.  She described to the felt like fire and was painful with inspiration.  She had associated diaphoresis.  Her episodes self resolved.  She had a 2nd episode of chest pain around 3 or 4:00 p.m. while in the [place] [facility].  She states that her pain was similar to her 1st episode and lasted for roughly 10 minutes.  Her troponin was not elevated.  Her AICD was interrogated and showed no VT/VF, cardiac events, or discharges.  [facility] EP reviewed her device interrogation and excepted her for evaluation.  Cardiology fellow at bedside performing device interrogation and bedside echo.    Plan:  - EKG prn  - electrophysiology consulted-- concerned for AICD lead dislodgement; plan for TERRENCE and EP evaluation and ICD revision on 6/6  - tylenol and oxycodone prn for pain

## 2022-06-06 NOTE — ASSESSMENT & PLAN NOTE
Patient with chronic cough for several years. Possible cause of lead/screw dislodgement    Plan:  - tessalon perles prn

## 2022-06-06 NOTE — NURSING
Pt in no distress at this time. Pt scheduled for lead revision. COVID screen test done. Pt kept comfortable. Family and bedside. Personal items and call light left within reach. Safety measures maintained. Pt instructed to call for assistance.

## 2022-06-06 NOTE — NURSING
THE PAIN UNDER LEFT BREAST HAD RETURNED AND CONTINUOUS.  PAGED ON CALL RESIDENT FOR MED TEAM 5.  PAIN MEDICATION EASED FOR A SHORT TERM. ORDERS RECEIVED. GRACE ELLISON

## 2022-06-06 NOTE — NURSING TRANSFER
Nursing Transfer Note      6/6/2022         Transfer To: 325    Transfer via stretcher    Transfer with cardiac monitoring    Transported by transport team    Medicines sent: n/a        Chart send with patient: Yes    Notified: spouse    Patient reassessed at: 6/6/2022 @ [id number]

## 2022-06-06 NOTE — PLAN OF CARE
SW assigned to pt's care team.  SW will continue to follow.  No needs identified at this time.    Annalisa Shields LMSW  PRN-  [facility]  Ext. [phone]

## 2022-06-06 NOTE — PLAN OF CARE
[facility] - Cardiology Stepdown  Initial Discharge Assessment       Primary Care Provider: Irene Lopez MD    Admission Diagnosis: Chest pain [R07.9]    Admission Date: 6/3/2022  Expected Discharge Date: 6/6/2022         Payor: MEDICAID / Plan: [place] [facility] CONNECT / Product Type: Managed Medicaid /     Extended Emergency Contact Information  Primary Emergency Contact: Ava Cooney   [facility]  Home Phone: [phone]  Relation: Daughter  Secondary Emergency Contact: Olman Goins  Mobile Phone: [phone]  Relation: Friend    Discharge Plan A: Home  Discharge Plan B: Home with family      [id number] - [place], [place] - [address] AT CORNER OF [facility] [address]  [address]  [address]  [place] [address]  Phone: [phone] Fax: [phone]    [id number] - [place] - [address] [facility] AT [facility] [address]  [address] [address]  Phone: [phone] Fax: [phone]    [id number] - [place], [place] - [address] [facility] [address]  [address]  [place] [address]  Phone: [phone] Fax: [phone]      Initial Assessment (most recent)     Adult Discharge Assessment - 06/06/22 1112        Discharge Assessment    Assessment Type Discharge Planning Assessment     Confirmed/corrected address, phone number and insurance Yes     Source of Information patient;family     Communicated BEATRIZ with patient/caregiver Yes     Lives With significant other;other (see comments)   adult grandchild    Do you expect to return to your current living situation? Yes     Do you have help at home or someone to help you manage your care at home? Yes     Who are your caregiver(s) and their phone number(s)? Significant other Olman Briseida [phone]     Prior to hospitilization cognitive status: Alert/Oriented     Current cognitive status: Alert/Oriented     Walking or Climbing Stairs Difficulty none      Dressing/Bathing Difficulty none     Equipment Currently Used at Home none     Readmission within 30 days? No     Patient currently being followed by outpatient case management? No     Do you currently have service(s) that help you manage your care at home? No     Do you take prescription medications? Yes     Do you have prescription coverage? Yes     Coverage Medicaid/ [place] [facility]     Do you have any problems affording any of your prescribed medications? No     Is the patient taking medications as prescribed? yes     Who is going to help you get home at discharge? Olman Goins [phone] and Nephew Jonatan Pope [phone]     Are you on dialysis? No     Do you take coumadin? No     Discharge Plan A Home     Discharge Plan B Home with family     DME Needed Upon Discharge  none     Discharge Plan discussed with: Patient   Bernabe Pope [phone] by bedside       Relationship/Environment    Name(s) of Who Lives With Patient Olman Goins [phone] and adult grandchild                 Bernabe Pope [phone] by bedside.  Pt admitted 6/3/2022  9:58 PM    For Chest pain [R07.9]      Discharge packet provided to patient. All questions answered prior to leaving room and contact number provided to reach CM.    Pt is followed by cardiologist Dr Cruzito Morrison on Coumadin     PCP is Irene Lopez MD  [phone] (p)  [phone] (f)    Future Appointments   Date Time Provider Department Center   6/15/2022  3:00 PM Clyde Banuelos Jr., MD [facility] [place]   7/6/2022 10:00 AM [facility]         YAA Ribeiro, RN   Case Management [phone]

## 2022-06-06 NOTE — ASSESSMENT & PLAN NOTE
Patient has a past medical history of systolic and diastolic heart failure EF 35%.  Repeat TTE showing EF 50%    Plan:  - continue home GDMT

## 2022-06-07 ENCOUNTER — DOCUMENTATION ONLY (OUTPATIENT)
Dept: CARDIOLOGY | Facility: HOSPITAL | Age: 62
End: 2022-06-07
Payer: MEDICAID

## 2022-06-07 ENCOUNTER — DOCUMENTATION ONLY (OUTPATIENT)
Dept: ELECTROPHYSIOLOGY | Facility: CLINIC | Age: 62
End: 2022-06-07
Payer: MEDICAID

## 2022-06-07 VITALS
SYSTOLIC BLOOD PRESSURE: 117 MMHG | TEMPERATURE: 97 F | DIASTOLIC BLOOD PRESSURE: 65 MMHG | BODY MASS INDEX: 28.8 KG/M2 | RESPIRATION RATE: 14 BRPM | HEART RATE: 85 BPM | HEIGHT: 62 IN | OXYGEN SATURATION: 96 % | WEIGHT: 156.5 LBS

## 2022-06-07 LAB
ALBUMIN SERPL BCP-MCNC: 3.5 G/DL (ref 3.5–5.2)
ALP SERPL-CCNC: 92 U/L (ref 55–135)
ALT SERPL W/O P-5'-P-CCNC: 18 U/L (ref 10–44)
ANION GAP SERPL CALC-SCNC: 10 MMOL/L (ref 8–16)
AST SERPL-CCNC: 13 U/L (ref 10–40)
BASOPHILS # BLD AUTO: 0.02 K/UL (ref 0–0.2)
BASOPHILS NFR BLD: 0.2 % (ref 0–1.9)
BILIRUB SERPL-MCNC: 0.4 MG/DL (ref 0.1–1)
BUN SERPL-MCNC: 16 MG/DL (ref 8–23)
CALCIUM SERPL-MCNC: 9.2 MG/DL (ref 8.7–10.5)
CHLORIDE SERPL-SCNC: 102 MMOL/L (ref 95–110)
CO2 SERPL-SCNC: 24 MMOL/L (ref 23–29)
CREAT SERPL-MCNC: 0.8 MG/DL (ref 0.5–1.4)
DIFFERENTIAL METHOD: ABNORMAL
EOSINOPHIL # BLD AUTO: 0.2 K/UL (ref 0–0.5)
EOSINOPHIL NFR BLD: 2.1 % (ref 0–8)
ERYTHROCYTE [DISTWIDTH] IN BLOOD BY AUTOMATED COUNT: 12.7 % (ref 11.5–14.5)
EST. GFR  (AFRICAN AMERICAN): >60 ML/MIN/1.73 M^2
EST. GFR  (NON AFRICAN AMERICAN): >60 ML/MIN/1.73 M^2
GLUCOSE SERPL-MCNC: 101 MG/DL (ref 70–110)
HCT VFR BLD AUTO: 41.3 % (ref 37–48.5)
HGB BLD-MCNC: 13.4 G/DL (ref 12–16)
IMM GRANULOCYTES # BLD AUTO: 0.04 K/UL (ref 0–0.04)
IMM GRANULOCYTES NFR BLD AUTO: 0.4 % (ref 0–0.5)
LYMPHOCYTES # BLD AUTO: 1.6 K/UL (ref 1–4.8)
LYMPHOCYTES NFR BLD: 14.5 % (ref 18–48)
MAGNESIUM SERPL-MCNC: 2 MG/DL (ref 1.6–2.6)
MCH RBC QN AUTO: 28.3 PG (ref 27–31)
MCHC RBC AUTO-ENTMCNC: 32.4 G/DL (ref 32–36)
MCV RBC AUTO: 87 FL (ref 82–98)
MONOCYTES # BLD AUTO: 0.9 K/UL (ref 0.3–1)
MONOCYTES NFR BLD: 8.7 % (ref 4–15)
NEUTROPHILS # BLD AUTO: 8 K/UL (ref 1.8–7.7)
NEUTROPHILS NFR BLD: 74.1 % (ref 38–73)
NRBC BLD-RTO: 0 /100 WBC
PHOSPHATE SERPL-MCNC: 4 MG/DL (ref 2.7–4.5)
PLATELET # BLD AUTO: 241 K/UL (ref 150–450)
PMV BLD AUTO: 10.8 FL (ref 9.2–12.9)
POTASSIUM SERPL-SCNC: 4.3 MMOL/L (ref 3.5–5.1)
PROT SERPL-MCNC: 7.1 G/DL (ref 6–8.4)
RBC # BLD AUTO: 4.73 M/UL (ref 4–5.4)
SODIUM SERPL-SCNC: 136 MMOL/L (ref 136–145)
WBC # BLD AUTO: 10.75 K/UL (ref 3.9–12.7)

## 2022-06-07 PROCEDURE — 99239 PR HOSPITAL DISCHARGE DAY,>30 MIN: ICD-10-PCS | Mod: ,,, | Performed by: STUDENT IN AN ORGANIZED HEALTH CARE EDUCATION/TRAINING PROGRAM

## 2022-06-07 PROCEDURE — 85025 COMPLETE CBC W/AUTO DIFF WBC: CPT

## 2022-06-07 PROCEDURE — 99239 HOSP IP/OBS DSCHRG MGMT >30: CPT | Mod: ,,, | Performed by: STUDENT IN AN ORGANIZED HEALTH CARE EDUCATION/TRAINING PROGRAM

## 2022-06-07 PROCEDURE — 83735 ASSAY OF MAGNESIUM: CPT

## 2022-06-07 PROCEDURE — 25000003 PHARM REV CODE 250: Performed by: STUDENT IN AN ORGANIZED HEALTH CARE EDUCATION/TRAINING PROGRAM

## 2022-06-07 PROCEDURE — 63600175 PHARM REV CODE 636 W HCPCS: Performed by: STUDENT IN AN ORGANIZED HEALTH CARE EDUCATION/TRAINING PROGRAM

## 2022-06-07 PROCEDURE — 84100 ASSAY OF PHOSPHORUS: CPT

## 2022-06-07 PROCEDURE — 99024 POSTOP FOLLOW-UP VISIT: CPT | Mod: ,,, | Performed by: INTERNAL MEDICINE

## 2022-06-07 PROCEDURE — 99024 PR POST-OP FOLLOW-UP VISIT: ICD-10-PCS | Mod: ,,, | Performed by: INTERNAL MEDICINE

## 2022-06-07 PROCEDURE — 25000003 PHARM REV CODE 250

## 2022-06-07 PROCEDURE — 80053 COMPREHEN METABOLIC PANEL: CPT

## 2022-06-07 PROCEDURE — 36415 COLL VENOUS BLD VENIPUNCTURE: CPT

## 2022-06-07 PROCEDURE — 25000003 PHARM REV CODE 250: Performed by: FAMILY MEDICINE

## 2022-06-07 PROCEDURE — 25000003 PHARM REV CODE 250: Performed by: INTERNAL MEDICINE

## 2022-06-07 RX ORDER — CEPHALEXIN 500 MG/1
500 CAPSULE ORAL EVERY 8 HOURS
Qty: 15 CAPSULE | Refills: 0 | Status: SHIPPED | OUTPATIENT
Start: 2022-06-07 | End: 2022-06-12

## 2022-06-07 RX ADMIN — SENNOSIDES AND DOCUSATE SODIUM 1 TABLET: 50; 8.6 TABLET ORAL at 09:06

## 2022-06-07 RX ADMIN — MORPHINE SULFATE 2 MG: 2 INJECTION, SOLUTION INTRAMUSCULAR; INTRAVENOUS at 05:06

## 2022-06-07 RX ADMIN — LOSARTAN POTASSIUM 25 MG: 25 TABLET, FILM COATED ORAL at 09:06

## 2022-06-07 RX ADMIN — METOPROLOL SUCCINATE 25 MG: 25 TABLET, EXTENDED RELEASE ORAL at 09:06

## 2022-06-07 RX ADMIN — CEPHALEXIN 500 MG: 500 CAPSULE ORAL at 01:06

## 2022-06-07 RX ADMIN — MORPHINE SULFATE 2 MG: 2 INJECTION, SOLUTION INTRAMUSCULAR; INTRAVENOUS at 01:06

## 2022-06-07 RX ADMIN — MORPHINE SULFATE 2 MG: 2 INJECTION, SOLUTION INTRAMUSCULAR; INTRAVENOUS at 11:06

## 2022-06-07 RX ADMIN — BENZONATATE 100 MG: 100 CAPSULE ORAL at 09:06

## 2022-06-07 RX ADMIN — OXYCODONE 5 MG: 5 TABLET ORAL at 12:06

## 2022-06-07 RX ADMIN — OXYCODONE 5 MG: 5 TABLET ORAL at 09:06

## 2022-06-07 RX ADMIN — CEPHALEXIN 500 MG: 500 CAPSULE ORAL at 06:06

## 2022-06-07 NOTE — PROGRESS NOTES
[facility]  Arrhythmia  Progress Note     Hospital Length of Stay: 1    Interval History:  -No acute events overnight, no new issues  -Interrogation performed this morning with lead threshold at 0.75 mA  -Telemetry reviewed    Vitals:  Temp:  [96.7 °F (35.9 °C)-98.5 °F (36.9 °C)] 97.3 °F (36.3 °C)  Pulse:  [67-89] 84  Resp:  [14-20] 18  SpO2:  [93 %-98 %] 94 %  BP: (123-163)/(69-99) 123/69    Intake/Output    Intake/Output Summary (Last 24 hours) at 6/7/2022 0924  Last data filed at 6/6/2022 1457  Gross per 24 hour   Intake 400 ml   Output --   Net 400 ml       Physical Exam  Gen: No apparent distress, resting comfortably  HEENT: Pupils equal and reactive to light  Cardio: Regular rate, point of maximal impulse not displaced, no murmur noted, 2+ radial pulses bilaterally, 2+ DP pulses bilaterally  Resp: CTAB, no wheezing  Abd: Soft, non-tender, non-distended  Skin: Warm, dry, no peripheral edema noted  Neuro: Alert and oriented x3  Psych: Normal mood and affect    Labs:  Recent Labs   Lab 06/05/22  0242 06/06/22  0528 06/07/22  0354   WBC 8.73 9.04 10.75   HGB 12.7 14.0 13.4   HCT 41.4 43.0 41.3    232 241     Recent Labs   Lab 06/05/22  0242 06/06/22  0528 06/07/22  0354    139 136   K 4.6 5.0 4.3    104 102   CO2 23 25 24   BUN 18 15 16   CREATININE 0.8 0.8 0.8   * 102 101   CALCIUM 9.1 9.5 9.2   MG 2.0 2.1 2.0   PHOS 3.8 4.0 4.0       Current Meds:   ceFAZolin (ANCEF) IVPB  2 g Intravenous Once    cephALEXin  500 mg Oral Q8H    losartan  25 mg Oral Daily    metoprolol succinate  25 mg Oral Daily    senna-docusate 8.6-50 mg  1 tablet Oral Daily       Continuous Infusions:        Assessment and Plan:    1. ICD Lead revision  -Originally implanted 5/30/22, [facility] device  -Revision with Dr. Tovar on 6/6/22    Sling to left arm - wear for 24 hours, then only at night for 6 weeks.  NO HEPARIN PRODUCTS  PO Keflex 500 mg q8 hrs  for 5 days at discharge  No lifting left  elbow above shoulder height  No lifting over 5 pounds  No driving for 1 week and for 4 weeks if patient uses left arm to make turns  Patient may shower in 24 hours, do not let beam of shower hit site directly and no scrubbing in area  Follow up in device clinic in 1 week and with Dr. Tovar in 4 months.    Ok for discharge from EP perspective.    Zay Youssef MD  [facility] PGY-4    Pager number: [phone]    Staff attestation to follow.    This note was prepared using voice recognition system and may have sound alike errors that may have been overlooked even with proof reading.

## 2022-06-07 NOTE — NURSING
Pt returned from EP lab AOX4. VSS Left chest dressing dry and intact. Family at bedside. Safety measures maintained. Pt instructed to call for help.

## 2022-06-07 NOTE — NURSING
Discharge instructions, activity, mediation and follow up appointment discussed with pt and spouse- both verbalized understanding. IVs and tele-monitor removed.

## 2022-06-07 NOTE — DISCHARGE SUMMARY
[facility]el - Cardiology [facility] Medicine  Discharge Summary      Patient Name: Suly Jaime  MRN: [id number]  Patient Class: IP- Inpatient  Admission Date: 6/3/2022  Hospital Length of Stay: 1 days  Discharge Date and Time:  06/07/2022 12:56 PM  Attending Physician: Ke Neil MD   Discharging Provider: Esther Wolff MD  Primary Care Provider: Irene Lopez MD  Hospital Medicine Team: [facility] 5 Esther Wolff MD    HPI:   60-year-old female, past medical history of combined systolic and diastolic heart failure (EF 35%) s/p AICD placement on 05/30, and hypertension who presents as transfer and is being admitted for chest pain and suspected dysfunction AICD.  Patient states that at 11:00 a.m. on Friday morning she was outside with her dogs feeling her normal state of health.  She sat down on her front porch and began to experience 10/10  constant burning pain below her left breast.  She states that this pain felt like a fire and she had pain with inspiration.  She had associated diaphoresis with her pain.  she has no clear triggers her chest pain.  she does not smoke, rarely drinks alcohol, does not use any illicit substances.  She endorses a chronic cough.  She denies fever, chills, nausea, vomiting, lightheadedness, difficulty swallowing, syncope, peripheral edema.    In [facility] she was noted to be hypertensive 158/77 with all other vital signs stable.  CO2 20, but the remainder of labs including CBC and the rest of her CMP were unremarkable.  The troponin 0.009.  She received morphine 4 mg and Zofran.  Her device was interrogated at the [facility] and did not show any signs VT/VF, cardiac events, or discharges.  She was transferred to [facility] [place] evaluation.      Procedure(s) (LRB):  REVISION, INSERTION, ELECTRODE LEAD, ICD (N/A)      Hospital Course:   Admitted for chest pain. EP consulted, concerned for dislodged AICD lead. Continued to have significant chest wall pain with  movement. EKG and trop remained unremarkable with each episode; episodes resolved with analgesia; thought that the episodes were related to the dislodged ICD lead. Had lead revision with EP on 6/6; no immediate complications and chest pain episodes resolved. Will need to remain on keflex for 5 days. Will follow-up with EP for a device check in 1 week.      Review of Systems   Constitutional:  Negative for chills, diaphoresis, fatigue and fever.   HENT:  Negative for sore throat and trouble swallowing.    Eyes:  Negative for visual disturbance.   Respiratory:  Positive for cough (chronic). Negative for shortness of breath.    Cardiovascular:  Negative for chest pain and leg swelling.   Gastrointestinal:  Negative for abdominal pain, blood in stool, constipation, diarrhea, nausea and vomiting.   Genitourinary:  Negative for difficulty urinating and dysuria.   Musculoskeletal:  Negative for back pain.   Skin:  Negative for rash and wound.   Neurological:  Negative for syncope, light-headedness and headaches.   Psychiatric/Behavioral:  Negative for confusion.    Objective:     Vital Signs (Most Recent):  Temp: 96.5 °F (35.8 °C) (06/07/22 1206)  Pulse: 85 (06/07/22 1206)  Resp: 14 (06/07/22 1206)  BP: 117/65 (06/07/22 1206)  SpO2: 96 % (06/07/22 1206)   Vital Signs (24h Range):  Temp:  [96.5 °F (35.8 °C)-98.5 °F (36.9 °C)] 96.5 °F (35.8 °C)  Pulse:  [69-89] 85  Resp:  [14-20] 14  SpO2:  [93 %-98 %] 96 %  BP: (117-163)/(65-99) 117/65     Weight: 71 kg (156 lb 8.4 oz)  Body mass index is 28.63 kg/m².    Intake/Output Summary (Last 24 hours) at 6/7/2022 1256  Last data filed at 6/6/2022 1457  Gross per 24 hour   Intake 400 ml   Output --   Net 400 ml        Physical Exam  Vitals and nursing note reviewed.   Constitutional:       Appearance: Normal appearance.   HENT:      Head: Normocephalic and atraumatic.      Mouth/Throat:      Mouth: Mucous membranes are moist.   Eyes:      Extraocular Movements: Extraocular movements  intact.      Pupils: Pupils are equal, round, and reactive to light.   Cardiovascular:      Rate and Rhythm: Normal rate and regular rhythm.      Pulses: Normal pulses.      Heart sounds: Normal heart sounds. No murmur heard.  Pulmonary:      Effort: Pulmonary effort is normal.      Breath sounds: Normal breath sounds.   Abdominal:      General: Abdomen is flat.      Palpations: Abdomen is soft.      Tenderness: There is no abdominal tenderness.   Musculoskeletal:         General: No swelling.      Cervical back: Neck supple.      Right lower leg: No edema.      Left lower leg: No edema.   Skin:     General: Skin is warm and dry.      Findings: No rash.   Neurological:      General: No focal deficit present.      Mental Status: She is alert and oriented to person, place, and time.   Psychiatric:         Mood and Affect: Mood normal.         Behavior: Behavior normal.       Significant Labs: All pertinent labs within the past 24 hours have been reviewed.  CBC:   Recent Labs   Lab 06/06/22  0528 06/07/22  0354   WBC 9.04 10.75   HGB 14.0 13.4   HCT 43.0 41.3    241       CMP:   Recent Labs   Lab 06/06/22  0528 06/07/22  0354    136   K 5.0 4.3    102   CO2 25 24    101   BUN 15 16   CREATININE 0.8 0.8   CALCIUM 9.5 9.2   PROT 7.1 7.1   ALBUMIN 3.7 3.5   BILITOT 0.4 0.4   ALKPHOS 102 92   AST 13 13   ALT 20 18   ANIONGAP 10 10   EGFRNONAA >60.0 >60.0     Goals of Care Treatment Preferences:  Code Status: Full Code    Consults:   Consults (From admission, onward)        Status Ordering Provider     Inpatient consult to Electrophysiology  Once        Provider:  (Not yet assigned)    Completed RAMON ABBASI        Service: Hospital Medicine    Final Active Diagnoses:    Diagnosis Date Noted POA    PRINCIPAL PROBLEM:  Chest pain [R07.9] 06/03/2022 Yes    CHF (congestive heart failure), NYHA class II, chronic, systolic [I50.22] 03/24/2022 Yes     Chronic    Restrictive lung disease  [J98.4] 05/29/2020 Yes     Chronic    Chronic cough [R05.3]  Yes     Chronic      Problems Resolved During this Admission:       Discharged Condition: fair    Disposition: Home or Self Care    Follow Up:    Patient Instructions:   No discharge procedures on file.    Significant Diagnostic Studies: Labs:   CMP   Recent Labs   Lab 06/06/22 0528 06/07/22  0354    136   K 5.0 4.3    102   CO2 25 24    101   BUN 15 16   CREATININE 0.8 0.8   CALCIUM 9.5 9.2   PROT 7.1 7.1   ALBUMIN 3.7 3.5   BILITOT 0.4 0.4   ALKPHOS 102 92   AST 13 13   ALT 20 18   ANIONGAP 10 10   ESTGFRAFRICA >60.0 >60.0   EGFRNONAA >60.0 >60.0    and CBC   Recent Labs   Lab 06/06/22 0528 06/07/22  0354   WBC 9.04 10.75   HGB 14.0 13.4   HCT 43.0 41.3    241       Pending Diagnostic Studies:     None         Medications:  Reconciled Home Medications:      Medication List      CONTINUE taking these medications    ALIVE ONCE DAILY WOMEN 50 PLUS ORAL  Take 1 tablet by mouth once daily.     calcium carbonate 200 mg calcium (500 mg) chewable tablet  Commonly known as: TUMS  Take 1 tablet by mouth daily as needed for Heartburn.     cephALEXin 500 MG capsule  Commonly known as: KEFLEX  Take 1 capsule (500 mg total) by mouth every 8 (eight) hours. for 5 days     * fluticasone propionate 110 mcg/actuation inhaler  Commonly known as: FLOVENT HFA  Inhale 2 puffs into the lungs 2 (two) times daily. Controller     * fluticasone propionate 50 mcg/actuation nasal spray  Commonly known as: FLONASE  1 spray by Each Nostril route daily as needed for Rhinitis or Allergies.     latanoprost 0.005 % ophthalmic solution  INSTILL 1 DROP INTO BOTH EYES AT BEDTIME AS DIRECTED     losartan 25 MG tablet  Commonly known as: COZAAR  Take 1 tablet (25 mg total) by mouth once daily.     metoprolol succinate 25 MG 24 hr tablet  Commonly known as: TOPROL-XL  Take 1 tablet (25 mg total) by mouth once daily.     montelukast 10 mg tablet  Commonly known as:  SINGULAIR  Take 10 mg by mouth every evening.     tobramycin-dexamethasone 0.3-0.1% 0.3-0.1 % Drps  Commonly known as: TOBRADEX  SHAKE LIQUID AND INSTILL 1 TO 2 DROPS IN BOTH EYES FOUR TIMES DAILY         * This list has 2 medication(s) that are the same as other medications prescribed for you. Read the directions carefully, and ask your doctor or other care provider to review them with you.            STOP taking these medications    acetaminophen 500 MG tablet  Commonly known as: TYLENOL     beclomethasone 80 mcg/actuation Aero  Commonly known as: QVAR     gabapentin 300 MG capsule  Commonly known as: NEURONTIN     levocetirizine 5 MG tablet  Commonly known as: XYZAL     lisinopriL 5 MG tablet  Commonly known as: PRINIVIL,ZESTRIL          Indwelling Lines/Drains at time of discharge:   Lines/Drains/Airways     None               Time spent on the discharge of patient: 35 minutes    Esther Wolff MD  Department of Hospital Medicine  [facility] - Cardiology Stepdown

## 2022-06-07 NOTE — PLAN OF CARE
Plan of care discussed with pt and spouse. Pt for discharge home today. Pt instructed to keep left arm in sling. Safety measures maintained. Personal belongings and call light within reach.

## 2022-06-07 NOTE — SUBJECTIVE & OBJECTIVE
Interval History: Chest wall pain overnight, EKG, CXR, and trop WNL. Plan for TERRENCE + EP evaluation today    Review of Systems   Constitutional:  Negative for chills, diaphoresis, fatigue and fever.   HENT:  Negative for sore throat and trouble swallowing.    Eyes:  Negative for visual disturbance.   Respiratory:  Positive for cough (chronic). Negative for shortness of breath.    Cardiovascular:  Negative for chest pain and leg swelling.   Gastrointestinal:  Negative for abdominal pain, blood in stool, constipation, diarrhea, nausea and vomiting.   Genitourinary:  Negative for difficulty urinating and dysuria.   Musculoskeletal:  Negative for back pain.   Skin:  Negative for rash and wound.   Neurological:  Negative for syncope, light-headedness and headaches.   Psychiatric/Behavioral:  Negative for confusion.    Objective:     Vital Signs (Most Recent):  Temp: 96.5 °F (35.8 °C) (06/07/22 1206)  Pulse: 85 (06/07/22 1206)  Resp: 14 (06/07/22 1206)  BP: 117/65 (06/07/22 1206)  SpO2: 96 % (06/07/22 1206)   Vital Signs (24h Range):  Temp:  [96.5 °F (35.8 °C)-98.5 °F (36.9 °C)] 96.5 °F (35.8 °C)  Pulse:  [69-89] 85  Resp:  [14-20] 14  SpO2:  [93 %-98 %] 96 %  BP: (117-163)/(65-99) 117/65     Weight: 71 kg (156 lb 8.4 oz)  Body mass index is 28.63 kg/m².    Intake/Output Summary (Last 24 hours) at 6/7/2022 1256  Last data filed at 6/6/2022 1457  Gross per 24 hour   Intake 400 ml   Output --   Net 400 ml        Physical Exam  Vitals and nursing note reviewed.   Constitutional:       Appearance: Normal appearance.   HENT:      Head: Normocephalic and atraumatic.      Mouth/Throat:      Mouth: Mucous membranes are moist.   Eyes:      Extraocular Movements: Extraocular movements intact.      Pupils: Pupils are equal, round, and reactive to light.   Cardiovascular:      Rate and Rhythm: Normal rate and regular rhythm.      Pulses: Normal pulses.      Heart sounds: Normal heart sounds. No murmur heard.  Pulmonary:      Effort:  Pulmonary effort is normal.      Breath sounds: Normal breath sounds.   Abdominal:      General: Abdomen is flat.      Palpations: Abdomen is soft.      Tenderness: There is no abdominal tenderness.   Musculoskeletal:         General: No swelling.      Cervical back: Neck supple.      Right lower leg: No edema.      Left lower leg: No edema.   Skin:     General: Skin is warm and dry.      Findings: No rash.   Neurological:      General: No focal deficit present.      Mental Status: She is alert and oriented to person, place, and time.   Psychiatric:         Mood and Affect: Mood normal.         Behavior: Behavior normal.       Significant Labs: All pertinent labs within the past 24 hours have been reviewed.  CBC:   Recent Labs   Lab 06/06/22  0528 06/07/22  0354   WBC 9.04 10.75   HGB 14.0 13.4   HCT 43.0 41.3    241       CMP:   Recent Labs   Lab 06/06/22  0528 06/07/22  0354    136   K 5.0 4.3    102   CO2 25 24    101   BUN 15 16   CREATININE 0.8 0.8   CALCIUM 9.5 9.2   PROT 7.1 7.1   ALBUMIN 3.7 3.5   BILITOT 0.4 0.4   ALKPHOS 102 92   AST 13 13   ALT 20 18   ANIONGAP 10 10   EGFRNONAA >60.0 >60.0

## 2022-06-07 NOTE — ANESTHESIA POSTPROCEDURE EVALUATION
Anesthesia Post Evaluation    Patient: Suly Jaime    Procedure(s) Performed: Procedure(s) (LRB):  REVISION, INSERTION, ELECTRODE LEAD, ICD (N/A)    Final Anesthesia Type: general      Level of consciousness: awake and alert  Post-procedure vital signs: reviewed and stable  Pain control: Pain has been treated.  Airway patency: patent    PONV status: Absent or treated.  Anesthetic complications: no      Cardiovascular status: hemodynamically stable  Respiratory status: unassisted  Hydration status: euvolemic            Vitals Value Taken Time   /80 06/06/22 1714   Temp 35.9 °C (96.7 °F) 06/06/22 1714   Pulse 85 06/06/22 1911   Resp 18 06/06/22 1732   SpO2 97 % 06/06/22 1631   Vitals shown include unvalidated device data.      No case tracking events are documented in the log.      Pain/Zeke Score: Pain Rating Prior to Med Admin: 9 (6/6/2022  5:32 PM)  Pain Rating Post Med Admin: 6 (6/6/2022 12:00 AM)  Zeke Score: 9 (6/6/2022  3:15 PM)

## 2022-06-07 NOTE — PLAN OF CARE
[facility]el - Cardiology Stepdown  Discharge Final Note    Primary Care Provider: Ireen Lopez MD    Expected Discharge Date: 6/7/2022    Final Discharge Note (most recent)     Final Note - 06/07/22 1433        Final Note    Assessment Type Final Discharge Note     Anticipated Discharge Disposition Home or Self Care               Leni Long LMSW  [facility]  [id number]      Future Appointments   Date Time Provider Department Center   6/15/2022  3:00 PM Clyde Banuelos Jr., MD [facility]   7/6/2022 10:00 AM [facility] MAMMO1 HOLOGIC DIMENSIONS [facility] MAMMO [facility]   10/6/2022  9:40 AM Cruzito Tovar MD [facility] SHARONDA [facility]

## 2022-06-07 NOTE — PROGRESS NOTES
Cardiac device interrogation and/or reprogramming completed by industry representative, Carola with [employer]; please refer to report located in the Media tab.

## 2022-06-08 ENCOUNTER — PATIENT MESSAGE (OUTPATIENT)
Dept: CARDIOLOGY | Facility: CLINIC | Age: 62
End: 2022-06-08
Payer: MEDICAID

## 2022-06-08 DIAGNOSIS — R05.3 CHRONIC COUGH: Primary | ICD-10-CM

## 2022-06-08 RX ORDER — BENZONATATE 100 MG/1
100 CAPSULE ORAL 3 TIMES DAILY PRN
Qty: 30 CAPSULE | Refills: 0 | Status: SHIPPED | OUTPATIENT
Start: 2022-06-08 | End: 2022-06-17 | Stop reason: SDUPTHER

## 2022-06-09 ENCOUNTER — TELEPHONE (OUTPATIENT)
Dept: ELECTROPHYSIOLOGY | Facility: CLINIC | Age: 62
End: 2022-06-09
Payer: MEDICAID

## 2022-06-09 ENCOUNTER — PATIENT OUTREACH (OUTPATIENT)
Dept: EMERGENCY MEDICINE | Facility: HOSPITAL | Age: 62
End: 2022-06-09
Payer: MEDICAID

## 2022-06-09 DIAGNOSIS — R07.9 CHEST PAIN, UNSPECIFIED TYPE: Primary | ICD-10-CM

## 2022-06-09 NOTE — TELEPHONE ENCOUNTER
Spoke to patient regarding post op care. Patient states she is still SOB sometimes and she needs a refill on her Tessalon for her cough.  She said this really helped her and she is out of this medication.  Will review and message through portal if a refill is completed.  She states her wound site dry and intact without redness, swelling, hematoma or drainage. Patient denies any fever or pain. Her dressing is inplace.     Patient  has resumed medications and is taking her keflex as instructed.   She needs a wound check appt for next Monday, a message has been sent to device team.   Patient verbalizes understanding of all post op instructions and has no further questions.

## 2022-06-10 ENCOUNTER — TELEPHONE (OUTPATIENT)
Dept: CARDIOLOGY | Facility: HOSPITAL | Age: 62
End: 2022-06-10
Payer: MEDICAID

## 2022-06-10 NOTE — TELEPHONE ENCOUNTER
Attempted to contact patient re:wound check appt. I left a message on her voicemail to return my call at [phone].

## 2022-06-13 NOTE — PHYSICIAN QUERY
PT Name: Suly Jaime  MR #: [id number]     DOCUMENTATION CLARIFICATION     CDS/: Kaity Thacker RN             Contact information: [email or web address]  This form is a permanent document in the medical record.     Query Date: June 13, 2022    By submitting this query, we are merely seeking further clarification of documentation.  Please utilize your independent clinical judgment when addressing the question(s) below.    The Medical Record contains the following   Indicators Supporting Clinical Findings Location in Medical Record   x Heart Failure documented Patient with combined systolic and diastolic heart failure EF 35%       CHF (congestive heart failure), NYHA class II, chronic, systolic  Patient has a past medical history of systolic and diastolic heart failure EF 35%.      Repeat TTE showing EF 50% H&P 6/4       Hospital Medicine    H&P 6/4       Hospital Medicine        Progress Note 6/6       Hospital Medicine      BNP     x EF/Echo The left ventricle is normal in size with low normal systolic function. The estimated ejection fraction is 50%.  There is abnormal septal wall motion.  Normal left ventricular diastolic function.  Normal right ventricular size with normal right ventricular systolic function.  Mild tricuspid regurgitation.  The estimated PA systolic pressure is 28 mmHg.  Normal central venous pressure (3 mmHg).     Echo 6/4    Radiology findings      Subjective/Objective Respiratory Conditions      Recent/Current MI      Heart Transplant, LVAD      Edema, JVD      Ascites      Diuretics/Meds      Other Treatment      Other       Heart failure is a clinical diagnosis which includes symptomatic fluid retention, elevated intracardiac pressures, and/or the inability of the heart to deliver adequate blood flow.    Heart Failure with reduced Ejection Fraction (HFrEF) or Systolic Heart Failure (loses ability to contract normally, EF is <40%)    Heart Failure with preserved Ejection Fraction  (HFpEF) or Diastolic Heart Failure (stiff ventricles, does not relax properly, EF is >50%)     Heart Failure with Combined Systolic and Diastolic Failure (stiff ventricles, does not relax properly and EF is <50%)    Mid-range or mildly reduced ejection fraction (HFmrEF) is classified as systolic heart failure.   Common clues to acute exacerbation:  Rapidly progressive symptoms (w/in 2 weeks of presentation), using IV diuretics, using supplemental O2, pulmonary edema on Xray, new or worsening pleural effusion, +JVD or other signs of volume overload, MI w/in 4 weeks, and/or BNP >500  The clinical guidelines noted are only system guidelines, and do not replace the providers clinical judgment.    Provider, please clarify conflicting documentation regarding the type of Chronic Heart Failure diagnosis associated with the above clinical findings.    [   ]  Chronic Systolic Heart Failure (HFrEF or HFmrEF) - preexisting and stable     [   ]  Chronic Diastolic Heart Failure (HFpEF) - preexisting and stable     [  x ]  Chronic Combined Systolic and Diastolic Heart Failure - pre-existing and stable     [   ]  Other (please specify): ___________________________________     [   ]  Clinically Undetermined         Please document in your progress notes daily for the duration of treatment until resolved and include in your discharge summary.    References:  American Heart Association editorial staff. (2017, May). Ejection Fraction Heart Failure Measurement. American Heart Association. https://www.heart.org/en/health-topics/heart-failure/diagnosing-heart-failure/ejection-fraction-heart-failure-measurement#:~:text=Ejection%20fraction%20(EF)%20is%20a,pushed%20out%20with%20each%20heartbeat  THIERNO Lehman (2020, December 15). Heart failure with preserved ejection fraction: Clinical manifestations and diagnosis. UpToDate.  https://www.[employer]/contents/heart-failure-with-preserved-ejection-fraction-clinical-manifestations-and-diagnosis.  ICD-10-CM/PCS Coding Clinic Third Quarter ICD-10, Effective with discharges: September 8, 2020 [facility] § Heart failure with mid-range or mildly reduced ejection fraction (2020).  Form No. 20237

## 2022-06-14 ENCOUNTER — PATIENT MESSAGE (OUTPATIENT)
Dept: CARDIOLOGY | Facility: CLINIC | Age: 62
End: 2022-06-14
Payer: MEDICAID

## 2022-06-14 ENCOUNTER — TELEPHONE (OUTPATIENT)
Dept: ORTHOPEDICS | Facility: CLINIC | Age: 62
End: 2022-06-14
Payer: MEDICAID

## 2022-06-14 DIAGNOSIS — M79.646 PAIN OF FINGER, UNSPECIFIED LATERALITY: Primary | ICD-10-CM

## 2022-06-15 ENCOUNTER — CLINICAL SUPPORT (OUTPATIENT)
Dept: CARDIOLOGY | Facility: HOSPITAL | Age: 62
End: 2022-06-15
Attending: INTERNAL MEDICINE
Payer: MEDICAID

## 2022-06-15 ENCOUNTER — OFFICE VISIT (OUTPATIENT)
Dept: ORTHOPEDICS | Facility: CLINIC | Age: 62
End: 2022-06-15
Payer: MEDICAID

## 2022-06-15 DIAGNOSIS — M24.542 CONTRACTURE OF JOINT OF FINGER OF LEFT HAND: ICD-10-CM

## 2022-06-15 DIAGNOSIS — I42.8 CARDIOMYOPATHY, NONISCHEMIC: ICD-10-CM

## 2022-06-15 PROCEDURE — 99204 OFFICE O/P NEW MOD 45 MIN: CPT | Mod: S$PBB,,, | Performed by: ORTHOPAEDIC SURGERY

## 2022-06-15 PROCEDURE — 99999 PR PBB SHADOW E&M-EST. PATIENT-LVL III: ICD-10-PCS | Mod: PBBFAC,,, | Performed by: ORTHOPAEDIC SURGERY

## 2022-06-15 PROCEDURE — 93282 PRGRMG EVAL IMPLANTABLE DFB: CPT | Mod: 26,,, | Performed by: INTERNAL MEDICINE

## 2022-06-15 PROCEDURE — 1159F MED LIST DOCD IN RCRD: CPT | Mod: CPTII,,, | Performed by: ORTHOPAEDIC SURGERY

## 2022-06-15 PROCEDURE — 4010F ACE/ARB THERAPY RXD/TAKEN: CPT | Mod: CPTII,,, | Performed by: ORTHOPAEDIC SURGERY

## 2022-06-15 PROCEDURE — 1111F DSCHRG MED/CURRENT MED MERGE: CPT | Mod: CPTII,,, | Performed by: ORTHOPAEDIC SURGERY

## 2022-06-15 PROCEDURE — 99204 PR OFFICE/OUTPT VISIT, NEW, LEVL IV, 45-59 MIN: ICD-10-PCS | Mod: S$PBB,,, | Performed by: ORTHOPAEDIC SURGERY

## 2022-06-15 PROCEDURE — 1159F PR MEDICATION LIST DOCUMENTED IN MEDICAL RECORD: ICD-10-PCS | Mod: CPTII,,, | Performed by: ORTHOPAEDIC SURGERY

## 2022-06-15 PROCEDURE — 99999 PR PBB SHADOW E&M-EST. PATIENT-LVL III: CPT | Mod: PBBFAC,,, | Performed by: ORTHOPAEDIC SURGERY

## 2022-06-15 PROCEDURE — 4010F PR ACE/ARB THEARPY RXD/TAKEN: ICD-10-PCS | Mod: CPTII,,, | Performed by: ORTHOPAEDIC SURGERY

## 2022-06-15 PROCEDURE — 93282 PRGRMG EVAL IMPLANTABLE DFB: CPT

## 2022-06-15 PROCEDURE — 99213 OFFICE O/P EST LOW 20 MIN: CPT | Mod: PBBFAC,PN | Performed by: ORTHOPAEDIC SURGERY

## 2022-06-15 PROCEDURE — 1111F PR DISCHARGE MEDS RECONCILED W/ CURRENT OUTPATIENT MED LIST: ICD-10-PCS | Mod: CPTII,,, | Performed by: ORTHOPAEDIC SURGERY

## 2022-06-15 PROCEDURE — 93282 CARDIAC DEVICE CHECK - IN CLINIC & HOSPITAL: ICD-10-PCS | Mod: 26,,, | Performed by: INTERNAL MEDICINE

## 2022-06-15 RX ORDER — CEFAZOLIN SODIUM 2 G/50ML
2 SOLUTION INTRAVENOUS
Status: CANCELLED | OUTPATIENT
Start: 2022-06-15

## 2022-06-15 NOTE — PROGRESS NOTES
CC:  Contracture left index finger        HPI:  Suly Jaime is a very pleasant 62 y.o. female sustained injury to her left index finger about 3 months ago when she jammed it  Following this she tried the splints were to treat herself but the finger continued to have difficulty and now has significant stiffness  She has also noticed a small mass near the tip of the finger which she thinks may be related to the previous injury         PAST MEDICAL HISTORY:   Past Medical History:   Diagnosis Date    Allergy     Cardiomyopathy     Hypertension      PAST SURGICAL HISTORY:   Past Surgical History:   Procedure Laterality Date    CARDIAC DEFIBRILLATOR PLACEMENT Left     HYSTERECTOMY      full    KNEE ARTHROSCOPY      LARYNGOSCOPY N/A 2019    Procedure: DIRECT MICRO LARYNGOSCOPY WITH BIOPSY;  Surgeon: Deidre Calderon MD;  Location: [facility] OR;  Service: ENT;  Laterality: N/A;  video    OOPHORECTOMY      REVISION OF IMPLANTABLE CARDIOVERTER-DEFIBRILLATOR (ICD) ELECTRODE LEAD PLACEMENT N/A 2022    Procedure: REVISION, INSERTION, ELECTRODE LEAD, ICD;  Surgeon: Cruzito Tovar MD;  Location: [facility] EP LAB;  Service: Cardiology;  Laterality: N/A;  SINGLE LEAD REVISION, SJM, ANES, EH, 325     FAMILY HISTORY:   Family History   Problem Relation Age of Onset    Diabetes Mother     Diabetes Maternal Grandmother     Heart disease Maternal Grandmother     Allergies Daughter     Asthma Neg Hx     Allergic rhinitis Neg Hx     Angioedema Neg Hx     Atopy Neg Hx     Eczema Neg Hx     Immunodeficiency Neg Hx     Rhinitis Neg Hx     Urticaria Neg Hx      SOCIAL HISTORY:   Social History     Socioeconomic History    Marital status:    Tobacco Use    Smoking status: Former Smoker     Packs/day: 0.50     Quit date:      Years since quittin.4    Smokeless tobacco: Never Used   Substance and Sexual Activity    Alcohol use: No    Drug use: No    Sexual activity: Not Currently      Partners: Male       MEDICATIONS:   Current Outpatient Medications:     calcium carbonate (TUMS) 200 mg calcium (500 mg) chewable tablet, Take 1 tablet by mouth daily as needed for Heartburn., Disp: , Rfl:     fluticasone propionate (FLONASE) 50 mcg/actuation nasal spray, 1 spray by Each Nostril route daily as needed for Rhinitis or Allergies., Disp: , Rfl:     fluticasone propionate (FLOVENT HFA) 110 mcg/actuation inhaler, Inhale 2 puffs into the lungs 2 (two) times daily. Controller, Disp: 12 g, Rfl: 12    latanoprost 0.005 % ophthalmic solution, INSTILL 1 DROP INTO BOTH EYES AT BEDTIME AS DIRECTED, Disp: , Rfl:     losartan (COZAAR) 25 MG tablet, Take 1 tablet (25 mg total) by mouth once daily., Disp: 30 tablet, Rfl: 11    metoprolol succinate (TOPROL-XL) 25 MG 24 hr tablet, Take 1 tablet (25 mg total) by mouth once daily., Disp: 30 tablet, Rfl: 11    montelukast (SINGULAIR) 10 mg tablet, Take 10 mg by mouth every evening., Disp: , Rfl:     mv-mn/folic acid/vit [id number] (ALIVE ONCE DAILY WOMEN 50 PLUS ORAL), Take 1 tablet by mouth once daily., Disp: , Rfl:     tobramycin-dexamethasone 0.3-0.1% (TOBRADEX) 0.3-0.1 % DrpS, SHAKE LIQUID AND INSTILL 1 TO 2 DROPS IN BOTH EYES FOUR TIMES DAILY, Disp: , Rfl:     benzonatate (TESSALON) 100 MG capsule, Take 1 capsule (100 mg total) by mouth 3 (three) times daily as needed for Cough. (Patient not taking: Reported on 6/15/2022), Disp: 30 capsule, Rfl: 0    Current Facility-Administered Medications:     albuterol inhaler 2 puff, 2 puff, Inhalation, Q20 Min PRN, Oh Carrillo MD    EPINEPHrine (EPIPEN) 0.3 mg/0.3 mL pen injection 0.3 mg, 0.3 mg, Intramuscular, PRN, Oh Carrillo MD    methylPREDNISolone sodium succinate injection 40 mg, 40 mg, Intravenous, Once PRN, Oh Carrillo MD    Facility-Administered Medications Ordered in Other Visits:     lidocaine (PF) 10 mg/ml (1%) injection 10 mg, 1 mL, Intradermal, Once, Deidre Calderon MD     sodium chloride 0.9% bolus 1,000 mL, 1,000 mL, Intravenous, Once, Juliana Cason NP    sodium chloride 0.9% flush 5 mL, 5 mL, Intravenous, PRN, Deidre Calderon MD  ALLERGIES: Review of patient's allergies indicates:  No Known Allergies    Review of Systems:  Constitutional: no fever or chills  ENT: no nasal congestion or sore throat  Respiratory: no cough or shortness of breath  Cardiovascular: no chest pain or palpitations  Gastrointestinal: no nausea or vomiting, PUD, GERD, NSAID intolerance  Genitourinary: no hematuria or dysuria  Integument/Breast: no rash or pruritis  Hematologic/Lymphatic: no easy bruising or lymphadenopathy  Musculoskeletal: see HPI  Neurological: no seizures or tremors  Behavioral/Psych: no auditory or visual hallucinations      Physical Exam   Vitals:    06/15/22 1443   PainSc:   4   PainLoc: Finger       Constitutional: Oriented to person, place, and time. Appears well-developed and well-nourished.   HENT:   Head: Normocephalic and atraumatic.   Nose: Nose normal.   Eyes: No scleral icterus.   Neck: Normal range of motion.   Cardiovascular: Normal rate and regular rhythm.    Pulses:       Radial pulses are 2+ on the right side, and 2+ on the left side.   Pulmonary/Chest: Effort normal and breath sounds normal.   Abdominal: Soft.   Neurological: Alert and oriented to person, place, and time.   Skin: Skin is warm.   Psychiatric: Normal mood and affect.     MUSCULOSKELETAL UPPER EXTREMITY:  Examination left index shows a flexed posture with a flexion contracture of about 45-50 degrees  No active extension or passive extension is possible in she has pain with this maneuver  Flexion is intact no instability  She has some stiffness of the D IP joint with flexion  She also has a palpable soft tissue mass over the pulp of the left index finger which is tender to touch  There is no evidence of infection            Diagnostic Studies:  AP lateral x-ray of the left index finger  "demonstrates a soft tissue mass consistent with the soft tissue calcification over the distal phalanx          Assessment:  1. Soft tissue calcification left index finger symptomatic.     2. 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  The consent was signed in the office for surgery.      Clyde Banuelos MD (Jay)  [facility]  Orthopedic Upper Extremity Surgery      "

## 2022-06-17 DIAGNOSIS — R05.3 CHRONIC COUGH: Primary | ICD-10-CM

## 2022-06-17 RX ORDER — BENZONATATE 100 MG/1
100 CAPSULE ORAL 3 TIMES DAILY PRN
Qty: 30 CAPSULE | Refills: 0 | Status: SHIPPED | OUTPATIENT
Start: 2022-06-17 | End: 2022-06-27

## 2022-06-21 ENCOUNTER — PATIENT OUTREACH (OUTPATIENT)
Dept: ADMINISTRATIVE | Facility: HOSPITAL | Age: 62
End: 2022-06-21
Payer: MEDICAID

## 2022-06-23 NOTE — ED NOTES
Sudden onset left sided chest pain when interrogator placed on patient chest. MD notified    Complex Repair And V-Y Plasty Text: The defect edges were debeveled with a #15 scalpel blade.  The primary defect was closed partially with a complex linear closure.  Given the location of the remaining defect, shape of the defect and the proximity to free margins a V-Y plasty was deemed most appropriate for complete closure of the defect.  Using a sterile surgical marker, an appropriate advancement flap was drawn incorporating the defect and placing the expected incisions within the relaxed skin tension lines where possible.    The area thus outlined was incised deep to adipose tissue with a #15 scalpel blade.  The skin margins were undermined to an appropriate distance in all directions utilizing iris scissors.

## 2022-06-29 ENCOUNTER — TELEPHONE (OUTPATIENT)
Dept: CARDIOLOGY | Facility: CLINIC | Age: 62
End: 2022-06-29
Payer: MEDICAID

## 2022-06-29 NOTE — TELEPHONE ENCOUNTER
Called pt to follow up in regards to clearance appt with Dr Asif  Pt can only follow up at [facility] due to insurance change    Pt agreed to be seen at [facility] with Dr. Akhtar for clearance on Tuesdya 07-26-22 at 11:20 am  Mailed appt reminder to the pt    ND

## 2022-06-30 ENCOUNTER — TELEPHONE (OUTPATIENT)
Dept: CARDIOLOGY | Facility: CLINIC | Age: 62
End: 2022-06-30
Payer: MEDICAID

## 2022-06-30 NOTE — TELEPHONE ENCOUNTER
Called pt back in regards to this message.     Pt confirmed appt with Dr. Akhtar for clearance    ND

## 2022-06-30 NOTE — TELEPHONE ENCOUNTER
----- Message from Alison Braswell MA sent at 6/28/2022  5:09 PM CDT -----  FW:  Surgery   Akanksha Asif MD  Sent: Tue June 28, 2022  1:42 PM  To: Alison Braswell MA     Surgery      Akanksha Asif MD  You 3 hours ago (1:42 PM)             Please schedule for clinic appointment thanks          Documentation      You  Akanksha Asif MD 3 hours ago (1:37 PM)    NW    Dear  ,     Pt requesting [facility] Clearance, Last office visit was 1-6-2022.

## 2022-06-30 NOTE — TELEPHONE ENCOUNTER
This message was already handled by our staff.    Patient is already scheduled to see      For her Cardiac Clearance.    I also called patient to remind her of this appointment.        Nw

## 2022-06-30 NOTE — TELEPHONE ENCOUNTER
----- Message from Sisi Zapata sent at 6/30/2022  2:28 PM CDT -----  Contact: [phone]  Type:  Patient Returning Call    Who Called:Pt  Who Left Message for Patient: Melissa  Does the patient know what this is regarding?: appt   Would the patient rather a call back or a response via [facility]? Call Back  Best Call Back Number:[phone]

## 2022-07-26 ENCOUNTER — OFFICE VISIT (OUTPATIENT)
Dept: CARDIOLOGY | Facility: CLINIC | Age: 62
End: 2022-07-26
Payer: MEDICAID

## 2022-07-26 VITALS
SYSTOLIC BLOOD PRESSURE: 109 MMHG | DIASTOLIC BLOOD PRESSURE: 70 MMHG | BODY MASS INDEX: 29.59 KG/M2 | HEART RATE: 84 BPM | OXYGEN SATURATION: 97 % | HEIGHT: 62 IN | WEIGHT: 160.81 LBS

## 2022-07-26 DIAGNOSIS — I42.8 CARDIOMYOPATHY, NONISCHEMIC: ICD-10-CM

## 2022-07-26 DIAGNOSIS — I25.10 CORONARY ARTERY DISEASE INVOLVING NATIVE CORONARY ARTERY OF NATIVE HEART WITHOUT ANGINA PECTORIS: ICD-10-CM

## 2022-07-26 DIAGNOSIS — R06.09 DOE (DYSPNEA ON EXERTION): ICD-10-CM

## 2022-07-26 DIAGNOSIS — E78.00 PURE HYPERCHOLESTEROLEMIA: ICD-10-CM

## 2022-07-26 DIAGNOSIS — Z01.818 PREOPERATIVE CLEARANCE: ICD-10-CM

## 2022-07-26 PROCEDURE — 1160F RVW MEDS BY RX/DR IN RCRD: CPT | Mod: CPTII,,, | Performed by: INTERNAL MEDICINE

## 2022-07-26 PROCEDURE — 99999 PR PBB SHADOW E&M-EST. PATIENT-LVL IV: ICD-10-PCS | Mod: PBBFAC,,, | Performed by: INTERNAL MEDICINE

## 2022-07-26 PROCEDURE — 3074F SYST BP LT 130 MM HG: CPT | Mod: CPTII,,, | Performed by: INTERNAL MEDICINE

## 2022-07-26 PROCEDURE — 3008F BODY MASS INDEX DOCD: CPT | Mod: CPTII,,, | Performed by: INTERNAL MEDICINE

## 2022-07-26 PROCEDURE — 99214 OFFICE O/P EST MOD 30 MIN: CPT | Mod: PBBFAC,PN | Performed by: INTERNAL MEDICINE

## 2022-07-26 PROCEDURE — 3008F PR BODY MASS INDEX (BMI) DOCUMENTED: ICD-10-PCS | Mod: CPTII,,, | Performed by: INTERNAL MEDICINE

## 2022-07-26 PROCEDURE — 4010F PR ACE/ARB THEARPY RXD/TAKEN: ICD-10-PCS | Mod: CPTII,,, | Performed by: INTERNAL MEDICINE

## 2022-07-26 PROCEDURE — 1160F PR REVIEW ALL MEDS BY PRESCRIBER/CLIN PHARMACIST DOCUMENTED: ICD-10-PCS | Mod: CPTII,,, | Performed by: INTERNAL MEDICINE

## 2022-07-26 PROCEDURE — 99999 PR PBB SHADOW E&M-EST. PATIENT-LVL IV: CPT | Mod: PBBFAC,,, | Performed by: INTERNAL MEDICINE

## 2022-07-26 PROCEDURE — 3078F PR MOST RECENT DIASTOLIC BLOOD PRESSURE < 80 MM HG: ICD-10-PCS | Mod: CPTII,,, | Performed by: INTERNAL MEDICINE

## 2022-07-26 PROCEDURE — 99214 PR OFFICE/OUTPT VISIT, EST, LEVL IV, 30-39 MIN: ICD-10-PCS | Mod: S$PBB,,, | Performed by: INTERNAL MEDICINE

## 2022-07-26 PROCEDURE — 3078F DIAST BP <80 MM HG: CPT | Mod: CPTII,,, | Performed by: INTERNAL MEDICINE

## 2022-07-26 PROCEDURE — 3074F PR MOST RECENT SYSTOLIC BLOOD PRESSURE < 130 MM HG: ICD-10-PCS | Mod: CPTII,,, | Performed by: INTERNAL MEDICINE

## 2022-07-26 PROCEDURE — 4010F ACE/ARB THERAPY RXD/TAKEN: CPT | Mod: CPTII,,, | Performed by: INTERNAL MEDICINE

## 2022-07-26 PROCEDURE — 1159F MED LIST DOCD IN RCRD: CPT | Mod: CPTII,,, | Performed by: INTERNAL MEDICINE

## 2022-07-26 PROCEDURE — 1159F PR MEDICATION LIST DOCUMENTED IN MEDICAL RECORD: ICD-10-PCS | Mod: CPTII,,, | Performed by: INTERNAL MEDICINE

## 2022-07-26 PROCEDURE — 99214 OFFICE O/P EST MOD 30 MIN: CPT | Mod: S$PBB,,, | Performed by: INTERNAL MEDICINE

## 2022-07-26 NOTE — ASSESSMENT & PLAN NOTE
range, she has had elevated triglycerides in the past.  Statin therapy to be discussed on next visit with Dr. Asif.   
Condition controlled.  Initially the when diagnosed this was her primary complaint.  ACE-inhibitor therapy, beta-blocker therapy to continue.  
Coronary CTA performed January 2022 confirming mild atherosclerotic nonobstructive coronary disease.  
Ejection fraction 35%.  She is considered [place] [employer] functional capacity II on current meds.  She has an ICD.  
The patient is cleared for upcoming hand surgery.  
2

## 2022-07-26 NOTE — PROGRESS NOTES
[facility]     Subjective:    Patient ID:  Suly Jaime is a 62 y.o. female who presents for follow-up of Cardiomyopathy, Pre-op Exam, ICD, and Coronary Artery Disease    Review of patient's allergies indicates:  No Known Allergies   She is here in need of surgical clearance for hand surgery planned with Dr. Banuelos August 16, 2022. She injured her hand in February.  There is a contracture visualized.  The surgery will be at [facility].  She follows with my partner but lives near this facility which is why she is here.    She has a history of cardiomyopathy diagnosed January 2022. She was found to have ejection fraction 35%.  A CTA of the coronary arteries was performed showing mild coronary disease, calcium score 130. She is on metoprolol and losartan.  She does not require loop diuretics.  She has a history of asthma and takes inhalers.  She has fairly normal exercise tolerance.    She has an ICD that was placed in May.  At a revision was required 1st defibrillator shocks in June 2022. Since then it has not fired.  The left chest has healed well.  She has some residual incisional pain in the left pectoral area around the ICD suture line.  She followed up with Dr. Tovar.  She is a former cigarette smoker.  She has no anginal complaints.  Her LDL is in the 138 mg % range pure hypercholesterolemia.  She has normal renal function.  Her medications are tolerated well.        Review of Systems   Constitutional: Negative for chills, decreased appetite, diaphoresis, fever, malaise/fatigue, night sweats, weight gain and weight loss.   HENT: Negative for congestion, ear discharge, ear pain, hearing loss, hoarse voice, nosebleeds, odynophagia, sore throat, stridor and tinnitus.    Eyes: Negative for blurred vision, discharge, double vision, pain, photophobia, redness, vision loss in left eye, vision loss in right eye, visual disturbance and  visual halos.   Cardiovascular: Negative for chest pain, claudication, cyanosis, dyspnea on exertion, irregular heartbeat, leg swelling, near-syncope, orthopnea, palpitations, paroxysmal nocturnal dyspnea and syncope.   Respiratory: Negative for cough, hemoptysis, shortness of breath, sleep disturbances due to breathing, snoring, sputum production and wheezing.    Endocrine: Negative for cold intolerance, heat intolerance, polydipsia, polyphagia and polyuria.   Hematologic/Lymphatic: Negative for adenopathy and bleeding problem. Does not bruise/bleed easily.   Skin: Negative for color change, dry skin, flushing, itching, nail changes, poor wound healing, rash, skin cancer, suspicious lesions and unusual hair distribution.   Musculoskeletal: Positive for joint pain. Negative for arthritis, back pain, falls, gout, joint swelling, muscle cramps, muscle weakness, myalgias, neck pain and stiffness.   Gastrointestinal: Negative for bloating, abdominal pain, anorexia, change in bowel habit, bowel incontinence, constipation, diarrhea, dysphagia, excessive appetite, flatus, heartburn, hematemesis, hematochezia, hemorrhoids, jaundice, melena, nausea and vomiting.   Genitourinary: Negative for bladder incontinence, decreased libido, dysuria, flank pain, frequency, genital sores, hematuria, hesitancy, incomplete emptying, nocturia and urgency.   Neurological: Negative for aphonia, brief paralysis, difficulty with concentration, disturbances in coordination, excessive daytime sleepiness, dizziness, focal weakness, headaches, light-headedness, loss of balance, numbness, paresthesias, seizures, sensory change, tremors, vertigo and weakness.   Psychiatric/Behavioral: Negative for altered mental status, depression, hallucinations, memory loss, substance abuse, suicidal ideas and thoughts of violence. The patient does not have insomnia and is not nervous/anxious.    Allergic/Immunologic: Negative for hives and persistent infections.  "       Objective:       Vitals:    07/26/22 1117   BP: 109/70   Pulse: 84   SpO2: 97%   Weight: 72.9 kg (160 lb 12.8 oz)   Height: 5' 2" (1.575 m)    Physical Exam  Constitutional:       General: She is not in acute distress.     Appearance: She is well-developed. She is not diaphoretic.   HENT:      Head: Normocephalic and atraumatic.      Nose: Nose normal.   Eyes:      General: No scleral icterus.        Right eye: No discharge.      Conjunctiva/sclera: Conjunctivae normal.      Pupils: Pupils are equal, round, and reactive to light.   Neck:      Thyroid: No thyromegaly.      Vascular: No JVD.      Trachea: No tracheal deviation.   Cardiovascular:      Rate and Rhythm: Normal rate and regular rhythm.      Pulses:           Carotid pulses are 2+ on the right side and 2+ on the left side.       Radial pulses are 2+ on the right side and 2+ on the left side.        Dorsalis pedis pulses are 2+ on the right side and 2+ on the left side.        Posterior tibial pulses are 2+ on the right side and 2+ on the left side.      Heart sounds: Normal heart sounds. No murmur heard.    No friction rub. No gallop.   Pulmonary:      Effort: Pulmonary effort is normal. No respiratory distress.      Breath sounds: Normal breath sounds. No stridor. No wheezing or rales.   Chest:      Chest wall: No tenderness.   Abdominal:      General: Bowel sounds are normal. There is no distension.      Palpations: Abdomen is soft. There is no mass.      Tenderness: There is no abdominal tenderness. There is no guarding or rebound.   Musculoskeletal:         General: No tenderness. Normal range of motion.      Cervical back: Normal range of motion and neck supple.   Lymphadenopathy:      Cervical: No cervical adenopathy.   Skin:     General: Skin is warm and dry.      Coloration: Skin is not pale.      Findings: No erythema or rash.   Neurological:      Mental Status: She is alert and oriented to person, place, and time.      Cranial Nerves: No " cranial nerve deficit.      Coordination: Coordination normal.   Psychiatric:         Behavior: Behavior normal.         Thought Content: Thought content normal.         Judgment: Judgment normal.           Assessment:       1. Cardiomyopathy, nonischemic    2. SALAZAR (dyspnea on exertion)    3. Pure hypercholesterolemia    4. Preoperative clearance    5. Coronary artery disease involving native coronary artery of native heart without angina pectoris      Results for orders placed or performed during the hospital encounter of 06/03/22   CBC with Automated Differential   Result Value Ref Range    WBC 9.78 3.90 - 12.70 K/uL    RBC 4.57 4.00 - 5.40 M/uL    Hemoglobin 13.4 12.0 - 16.0 g/dL    Hematocrit 40.9 37.0 - 48.5 %    MCV 90 82 - 98 fL    MCH 29.3 27.0 - 31.0 pg    MCHC 32.8 32.0 - 36.0 g/dL    RDW 12.8 11.5 - 14.5 %    Platelets 233 150 - 450 K/uL    MPV 10.9 9.2 - 12.9 fL    Immature Granulocytes 0.3 0.0 - 0.5 %    Gran # (ANC) 6.3 1.8 - 7.7 K/uL    Immature Grans (Abs) 0.03 0.00 - 0.04 K/uL    Lymph # 2.3 1.0 - 4.8 K/uL    Mono # 0.8 0.3 - 1.0 K/uL    Eos # 0.2 0.0 - 0.5 K/uL    Baso # 0.04 0.00 - 0.20 K/uL    nRBC 0 0 /100 WBC    Gran % 64.7 38.0 - 73.0 %    Lymph % 23.6 18.0 - 48.0 %    Mono % 8.5 4.0 - 15.0 %    Eosinophil % 2.5 0.0 - 8.0 %    Basophil % 0.4 0.0 - 1.9 %    Differential Method Automated    Comprehensive Metabolic Panel (CMP)   Result Value Ref Range    Sodium 138 136 - 145 mmol/L    Potassium 4.2 3.5 - 5.1 mmol/L    Chloride 104 95 - 110 mmol/L    CO2 25 23 - 29 mmol/L    Glucose 99 70 - 110 mg/dL    BUN 17 8 - 23 mg/dL    Creatinine 0.8 0.5 - 1.4 mg/dL    Calcium 9.4 8.7 - 10.5 mg/dL    Total Protein 7.1 6.0 - 8.4 g/dL    Albumin 3.5 3.5 - 5.2 g/dL    Total Bilirubin 0.3 0.1 - 1.0 mg/dL    Alkaline Phosphatase 93 55 - 135 U/L    AST 12 10 - 40 U/L    ALT 20 10 - 44 U/L    Anion Gap 9 8 - 16 mmol/L    eGFR if African American >60.0 >60 mL/min/1.73 m^2    eGFR if non African American >60.0 >60  mL/min/1.73 m^2   Magnesium   Result Value Ref Range    Magnesium 2.1 1.6 - 2.6 mg/dL   Phosphorus   Result Value Ref Range    Phosphorus 4.7 (H) 2.7 - 4.5 mg/dL   CBC with Automated Differential   Result Value Ref Range    WBC 8.73 3.90 - 12.70 K/uL    RBC 4.58 4.00 - 5.40 M/uL    Hemoglobin 12.7 12.0 - 16.0 g/dL    Hematocrit 41.4 37.0 - 48.5 %    MCV 90 82 - 98 fL    MCH 27.7 27.0 - 31.0 pg    MCHC 30.7 (L) 32.0 - 36.0 g/dL    RDW 12.7 11.5 - 14.5 %    Platelets 243 150 - 450 K/uL    MPV 11.1 9.2 - 12.9 fL    Immature Granulocytes 0.3 0.0 - 0.5 %    Gran # (ANC) 5.4 1.8 - 7.7 K/uL    Immature Grans (Abs) 0.03 0.00 - 0.04 K/uL    Lymph # 2.1 1.0 - 4.8 K/uL    Mono # 0.8 0.3 - 1.0 K/uL    Eos # 0.3 0.0 - 0.5 K/uL    Baso # 0.03 0.00 - 0.20 K/uL    nRBC 0 0 /100 WBC    Gran % 61.8 38.0 - 73.0 %    Lymph % 24.5 18.0 - 48.0 %    Mono % 9.5 4.0 - 15.0 %    Eosinophil % 3.6 0.0 - 8.0 %    Basophil % 0.3 0.0 - 1.9 %    Differential Method Automated    Comprehensive Metabolic Panel (CMP)   Result Value Ref Range    Sodium 138 136 - 145 mmol/L    Potassium 4.6 3.5 - 5.1 mmol/L    Chloride 105 95 - 110 mmol/L    CO2 23 23 - 29 mmol/L    Glucose 117 (H) 70 - 110 mg/dL    BUN 18 8 - 23 mg/dL    Creatinine 0.8 0.5 - 1.4 mg/dL    Calcium 9.1 8.7 - 10.5 mg/dL    Total Protein 6.6 6.0 - 8.4 g/dL    Albumin 3.5 3.5 - 5.2 g/dL    Total Bilirubin 0.2 0.1 - 1.0 mg/dL    Alkaline Phosphatase 104 55 - 135 U/L    AST 12 10 - 40 U/L    ALT 18 10 - 44 U/L    Anion Gap 10 8 - 16 mmol/L    eGFR if African American >60.0 >60 mL/min/1.73 m^2    eGFR if non African American >60.0 >60 mL/min/1.73 m^2   Magnesium   Result Value Ref Range    Magnesium 2.0 1.6 - 2.6 mg/dL   Phosphorus   Result Value Ref Range    Phosphorus 3.8 2.7 - 4.5 mg/dL   Troponin I   Result Value Ref Range    Troponin I <0.006 0.000 - 0.026 ng/mL   COVID-19 Rapid Screening   Result Value Ref Range    SARS-CoV-2 RNA, Amplification, Qual Negative Negative   Troponin I    Result Value Ref Range    Troponin I <0.006 0.000 - 0.026 ng/mL   CBC with Automated Differential   Result Value Ref Range    WBC 9.04 3.90 - 12.70 K/uL    RBC 5.01 4.00 - 5.40 M/uL    Hemoglobin 14.0 12.0 - 16.0 g/dL    Hematocrit 43.0 37.0 - 48.5 %    MCV 86 82 - 98 fL    MCH 27.9 27.0 - 31.0 pg    MCHC 32.6 32.0 - 36.0 g/dL    RDW 12.8 11.5 - 14.5 %    Platelets 232 150 - 450 K/uL    MPV 10.3 9.2 - 12.9 fL    Immature Granulocytes 0.3 0.0 - 0.5 %    Gran # (ANC) 5.8 1.8 - 7.7 K/uL    Immature Grans (Abs) 0.03 0.00 - 0.04 K/uL    Lymph # 2.0 1.0 - 4.8 K/uL    Mono # 0.9 0.3 - 1.0 K/uL    Eos # 0.3 0.0 - 0.5 K/uL    Baso # 0.03 0.00 - 0.20 K/uL    nRBC 0 0 /100 WBC    Gran % 64.7 38.0 - 73.0 %    Lymph % 22.2 18.0 - 48.0 %    Mono % 9.6 4.0 - 15.0 %    Eosinophil % 2.9 0.0 - 8.0 %    Basophil % 0.3 0.0 - 1.9 %    Differential Method Automated    Comprehensive Metabolic Panel (CMP)   Result Value Ref Range    Sodium 139 136 - 145 mmol/L    Potassium 5.0 3.5 - 5.1 mmol/L    Chloride 104 95 - 110 mmol/L    CO2 25 23 - 29 mmol/L    Glucose 102 70 - 110 mg/dL    BUN 15 8 - 23 mg/dL    Creatinine 0.8 0.5 - 1.4 mg/dL    Calcium 9.5 8.7 - 10.5 mg/dL    Total Protein 7.1 6.0 - 8.4 g/dL    Albumin 3.7 3.5 - 5.2 g/dL    Total Bilirubin 0.4 0.1 - 1.0 mg/dL    Alkaline Phosphatase 102 55 - 135 U/L    AST 13 10 - 40 U/L    ALT 20 10 - 44 U/L    Anion Gap 10 8 - 16 mmol/L    eGFR if African American >60.0 >60 mL/min/1.73 m^2    eGFR if non African American >60.0 >60 mL/min/1.73 m^2   Magnesium   Result Value Ref Range    Magnesium 2.1 1.6 - 2.6 mg/dL   Phosphorus   Result Value Ref Range    Phosphorus 4.0 2.7 - 4.5 mg/dL   CBC with Automated Differential   Result Value Ref Range    WBC 10.75 3.90 - 12.70 K/uL    RBC 4.73 4.00 - 5.40 M/uL    Hemoglobin 13.4 12.0 - 16.0 g/dL    Hematocrit 41.3 37.0 - 48.5 %    MCV 87 82 - 98 fL    MCH 28.3 27.0 - 31.0 pg    MCHC 32.4 32.0 - 36.0 g/dL    RDW 12.7 11.5 - 14.5 %    Platelets 241 150  - 450 K/uL    MPV 10.8 9.2 - 12.9 fL    Immature Granulocytes 0.4 0.0 - 0.5 %    Gran # (ANC) 8.0 (H) 1.8 - 7.7 K/uL    Immature Grans (Abs) 0.04 0.00 - 0.04 K/uL    Lymph # 1.6 1.0 - 4.8 K/uL    Mono # 0.9 0.3 - 1.0 K/uL    Eos # 0.2 0.0 - 0.5 K/uL    Baso # 0.02 0.00 - 0.20 K/uL    nRBC 0 0 /100 WBC    Gran % 74.1 (H) 38.0 - 73.0 %    Lymph % 14.5 (L) 18.0 - 48.0 %    Mono % 8.7 4.0 - 15.0 %    Eosinophil % 2.1 0.0 - 8.0 %    Basophil % 0.2 0.0 - 1.9 %    Differential Method Automated    Comprehensive Metabolic Panel (CMP)   Result Value Ref Range    Sodium 136 136 - 145 mmol/L    Potassium 4.3 3.5 - 5.1 mmol/L    Chloride 102 95 - 110 mmol/L    CO2 24 23 - 29 mmol/L    Glucose 101 70 - 110 mg/dL    BUN 16 8 - 23 mg/dL    Creatinine 0.8 0.5 - 1.4 mg/dL    Calcium 9.2 8.7 - 10.5 mg/dL    Total Protein 7.1 6.0 - 8.4 g/dL    Albumin 3.5 3.5 - 5.2 g/dL    Total Bilirubin 0.4 0.1 - 1.0 mg/dL    Alkaline Phosphatase 92 55 - 135 U/L    AST 13 10 - 40 U/L    ALT 18 10 - 44 U/L    Anion Gap 10 8 - 16 mmol/L    eGFR if African American >60.0 >60 mL/min/1.73 m^2    eGFR if non African American >60.0 >60 mL/min/1.73 m^2   Magnesium   Result Value Ref Range    Magnesium 2.0 1.6 - 2.6 mg/dL   Phosphorus   Result Value Ref Range    Phosphorus 4.0 2.7 - 4.5 mg/dL   Echo   Result Value Ref Range    BSA 1.78 m2    Ascending aorta 2.84 cm    STJ 2.46 cm    AV mean gradient 5 mmHg    Ao peak marvin 1.54 m/s    Ao VTI 29.43 cm    IVRT 148.43 msec    IVS 0.87 0.6 - 1.1 cm    LA size 3.00 cm    Left Atrium Major Axis 5.21 cm    Left Atrium Minor Axis 4.96 cm    LVIDd 4.16 3.5 - 6.0 cm    LVIDs 3.08 2.1 - 4.0 cm    LVOT diameter 1.98 cm    LVOT peak VTI 18.28 cm    Posterior Wall 0.80 0.6 - 1.1 cm    MV Peak A Marvin 0.84 m/s    E wave deceleration time 149.38 msec    MV Peak E Marvin 0.75 m/s    RA Major Axis 3.94 cm    RA Width 2.60 cm    RVDD 2.79 cm    Sinus 2.88 cm    TAPSE 1.57 cm    TR Max Marvin 2.48 m/s    TDI LATERAL 0.13 m/s    TDI SEPTAL  0.11 m/s    LA WIDTH 3.00 cm    MV stenosis pressure 1/2 time 43.32 ms    LV Diastolic Volume 76.72 mL    LV Systolic Volume 37.25 mL    RV S' 14.85 cm/s    LVOT peak silvano 0.97 m/s    LA volume (mod) 24.80 cm3    LV LATERAL E/E' RATIO 5.77 m/s    LV SEPTAL E/E' RATIO 6.82 m/s    FS 26 %    LA volume 38.88 cm3    LV mass 105.57 g    Left Ventricle Relative Wall Thickness 0.38 cm    AV valve area 1.91 cm2    AV Velocity Ratio 0.63     AV index (prosthetic) 0.62     MV valve area p 1/2 method 5.08 cm2    E/A ratio 0.89     Mean e' 0.12 m/s    LVOT area 3.1 cm2    LVOT stroke volume 56.26 cm3    AV peak gradient 9 mmHg    E/E' ratio 6.25 m/s    LV Systolic Volume Index 21.4 mL/m2    LV Diastolic Volume Index 44.09 mL/m2    LA Volume Index 22.3 mL/m2    LV Mass Index 61 g/m2    Triscuspid Valve Regurgitation Peak Gradient 25 mmHg    LA Volume Index (Mod) 14.3 mL/m2    Right Atrial Pressure (from IVC) 3 mmHg    EF 50 %    TV rest pulmonary artery pressure 28 mmHg   Type & Screen   Result Value Ref Range    Group & Rh O POS     Indirect Ashley NEG          Current Outpatient Medications:     calcium carbonate (TUMS) 200 mg calcium (500 mg) chewable tablet, Take 1 tablet by mouth daily as needed for Heartburn., Disp: , Rfl:     fluticasone propionate (FLONASE) 50 mcg/actuation nasal spray, 1 spray by Each Nostril route daily as needed for Rhinitis or Allergies., Disp: , Rfl:     fluticasone propionate (FLOVENT HFA) 110 mcg/actuation inhaler, Inhale 2 puffs into the lungs 2 (two) times daily. Controller, Disp: 12 g, Rfl: 12    latanoprost 0.005 % ophthalmic solution, INSTILL 1 DROP INTO BOTH EYES AT BEDTIME AS DIRECTED, Disp: , Rfl:     losartan (COZAAR) 25 MG tablet, Take 1 tablet (25 mg total) by mouth once daily., Disp: 30 tablet, Rfl: 11    metoprolol succinate (TOPROL-XL) 25 MG 24 hr tablet, Take 1 tablet (25 mg total) by mouth once daily., Disp: 30 tablet, Rfl: 11    montelukast (SINGULAIR) 10 mg tablet, Take 10  mg by mouth every evening., Disp: , Rfl:     mv-mn/folic acid/vit [id number] (ALIVE ONCE DAILY WOMEN 50 PLUS ORAL), Take 1 tablet by mouth once daily., Disp: , Rfl:     tobramycin-dexamethasone 0.3-0.1% (TOBRADEX) 0.3-0.1 % DrpS, SHAKE LIQUID AND INSTILL 1 TO 2 DROPS IN BOTH EYES FOUR TIMES DAILY, Disp: , Rfl:     Current Facility-Administered Medications:     albuterol inhaler 2 puff, 2 puff, Inhalation, Q20 Min PRN, Oh Carrillo MD    EPINEPHrine (EPIPEN) 0.3 mg/0.3 mL pen injection 0.3 mg, 0.3 mg, Intramuscular, PRN, Oh Carrillo MD    methylPREDNISolone sodium succinate injection 40 mg, 40 mg, Intravenous, Once PRN, Oh Carrillo MD    Facility-Administered Medications Ordered in Other Visits:     lidocaine (PF) 10 mg/ml (1%) injection 10 mg, 1 mL, Intradermal, Once, Deidre Calderon MD    sodium chloride 0.9% bolus 1,000 mL, 1,000 mL, Intravenous, Once, Juliana Cason NP    sodium chloride 0.9% flush 5 mL, 5 mL, Intravenous, PRN, Deidre Calderon MD     Lab Results   Component Value Date    WBC 10.75 06/07/2022    RBC 4.73 06/07/2022    HGB 13.4 06/07/2022    HCT 41.3 06/07/2022    MCV 87 06/07/2022    MCH 28.3 06/07/2022    MCHC 32.4 06/07/2022    RDW 12.7 06/07/2022     06/07/2022    MPV 10.8 06/07/2022    GRAN 8.0 (H) 06/07/2022    GRAN 74.1 (H) 06/07/2022    LYMPH 1.6 06/07/2022    LYMPH 14.5 (L) 06/07/2022    MONO 0.9 06/07/2022    MONO 8.7 06/07/2022    EOS 0.2 06/07/2022    BASO 0.02 06/07/2022    EOSINOPHIL 2.1 06/07/2022    BASOPHIL 0.2 06/07/2022    MG 2.0 06/07/2022        CMP  Lab Results   Component Value Date     06/07/2022    K 4.3 06/07/2022     06/07/2022    CO2 24 06/07/2022     06/07/2022    BUN 16 06/07/2022    CREATININE 0.8 06/07/2022    CALCIUM 9.2 06/07/2022    PROT 7.1 06/07/2022    ALBUMIN 3.5 06/07/2022    BILITOT 0.4 06/07/2022    ALKPHOS 92 06/07/2022    AST 13 06/07/2022    ALT 18 06/07/2022    ANIONGAP 10  06/07/2022    ESTGFRAFRICA >60.0 06/07/2022    EGFRNONAA >60.0 06/07/2022        No results found for: LABBLOO, LABURIN, RESPIRATORYC, GSRESP         Results for orders placed or performed in visit on 06/09/22   EKG 12-lead    Collection Time: 06/03/22 12:17 PM    Narrative    Test Reason : R07.9,    Vent. Rate : 077 BPM     Atrial Rate : 077 BPM     P-R Int : 158 ms          QRS Dur : 102 ms      QT Int : 382 ms       P-R-T Axes : 056 014 166 degrees     QTc Int : 432 ms    Sinus rhythm with occasional Premature ventricular complexes  possible old anteroseptal infarct  Nonspecific intraventricular block  Nonspecific T wave abnormality  Abnormal ECG  When compared with ECG of 30-MAY-2022 09:10,  Premature ventricular complexes are now Present  KS interval has decreased  QRS wider  T wave changes less prominent   Confirmed by Alysia Daniels MD ([id number]) on 6/13/2022 8:04:10 AM    Referred By:  ED           Confirmed By:Alysia Daniels MD                  Plan:       Problem List Items Addressed This Visit        Cardiac/Vascular    Cardiomyopathy, nonischemic     Ejection fraction 35%.  She is considered [employer] [place] [employer] functional capacity II on current meds.  She has an ICD.           SALAZAR (dyspnea on exertion)     Condition controlled.  Initially the when diagnosed this was her primary complaint.  ACE-inhibitor therapy, beta-blocker therapy to continue.           Pure hypercholesterolemia      range, she has had elevated triglycerides in the past.  Statin therapy to be discussed on next visit with Dr. Asif.            Coronary artery disease involving native coronary artery of native heart without angina pectoris     Coronary CTA performed January 2022 confirming mild atherosclerotic nonobstructive coronary disease.              Other    Preoperative clearance     The patient is cleared for upcoming hand surgery.                      The patient is cleared for her upcoming hand  surgery.           Leoncio Akhtar MD  07/26/2022   11:36 AM

## 2022-08-04 ENCOUNTER — PATIENT MESSAGE (OUTPATIENT)
Dept: SURGERY | Facility: HOSPITAL | Age: 62
End: 2022-08-04
Payer: MEDICAID

## 2022-08-04 ENCOUNTER — TELEPHONE (OUTPATIENT)
Dept: PREADMISSION TESTING | Facility: HOSPITAL | Age: 62
End: 2022-08-04
Payer: MEDICAID

## 2022-08-06 ENCOUNTER — PATIENT MESSAGE (OUTPATIENT)
Dept: SURGERY | Facility: HOSPITAL | Age: 62
End: 2022-08-06
Payer: MEDICAID

## 2022-08-09 ENCOUNTER — TELEPHONE (OUTPATIENT)
Dept: ELECTROPHYSIOLOGY | Facility: CLINIC | Age: 62
End: 2022-08-09
Payer: MEDICAID

## 2022-08-10 ENCOUNTER — OFFICE VISIT (OUTPATIENT)
Dept: URGENT CARE | Facility: CLINIC | Age: 62
End: 2022-08-10
Payer: MEDICAID

## 2022-08-10 VITALS
HEART RATE: 91 BPM | BODY MASS INDEX: 29.57 KG/M2 | HEIGHT: 62 IN | OXYGEN SATURATION: 97 % | TEMPERATURE: 98 F | DIASTOLIC BLOOD PRESSURE: 82 MMHG | SYSTOLIC BLOOD PRESSURE: 123 MMHG | WEIGHT: 160.69 LBS | RESPIRATION RATE: 18 BRPM

## 2022-08-10 DIAGNOSIS — R05.9 COUGH: Primary | ICD-10-CM

## 2022-08-10 DIAGNOSIS — R09.82 PND (POST-NASAL DRIP): ICD-10-CM

## 2022-08-10 LAB
CTP QC/QA: YES
CTP QC/QA: YES
POC MOLECULAR INFLUENZA A AGN: NEGATIVE
POC MOLECULAR INFLUENZA B AGN: NEGATIVE
SARS-COV-2 RDRP RESP QL NAA+PROBE: NEGATIVE

## 2022-08-10 PROCEDURE — 1160F PR REVIEW ALL MEDS BY PRESCRIBER/CLIN PHARMACIST DOCUMENTED: ICD-10-PCS | Mod: CPTII,S$GLB,, | Performed by: NURSE PRACTITIONER

## 2022-08-10 PROCEDURE — U0002: ICD-10-PCS | Mod: QW,S$GLB,, | Performed by: NURSE PRACTITIONER

## 2022-08-10 PROCEDURE — 1160F RVW MEDS BY RX/DR IN RCRD: CPT | Mod: CPTII,S$GLB,, | Performed by: NURSE PRACTITIONER

## 2022-08-10 PROCEDURE — 3079F PR MOST RECENT DIASTOLIC BLOOD PRESSURE 80-89 MM HG: ICD-10-PCS | Mod: CPTII,S$GLB,, | Performed by: NURSE PRACTITIONER

## 2022-08-10 PROCEDURE — 1159F PR MEDICATION LIST DOCUMENTED IN MEDICAL RECORD: ICD-10-PCS | Mod: CPTII,S$GLB,, | Performed by: NURSE PRACTITIONER

## 2022-08-10 PROCEDURE — U0002 COVID-19 LAB TEST NON-CDC: HCPCS | Mod: QW,S$GLB,, | Performed by: NURSE PRACTITIONER

## 2022-08-10 PROCEDURE — 87502 POCT INFLUENZA A/B MOLECULAR: ICD-10-PCS | Mod: QW,S$GLB,, | Performed by: NURSE PRACTITIONER

## 2022-08-10 PROCEDURE — 3008F BODY MASS INDEX DOCD: CPT | Mod: CPTII,S$GLB,, | Performed by: NURSE PRACTITIONER

## 2022-08-10 PROCEDURE — 99214 OFFICE O/P EST MOD 30 MIN: CPT | Mod: S$GLB,,, | Performed by: NURSE PRACTITIONER

## 2022-08-10 PROCEDURE — 71046 X-RAY EXAM CHEST 2 VIEWS: CPT | Mod: FY,S$GLB,, | Performed by: RADIOLOGY

## 2022-08-10 PROCEDURE — 3074F PR MOST RECENT SYSTOLIC BLOOD PRESSURE < 130 MM HG: ICD-10-PCS | Mod: CPTII,S$GLB,, | Performed by: NURSE PRACTITIONER

## 2022-08-10 PROCEDURE — 71046 XR CHEST PA AND LATERAL: ICD-10-PCS | Mod: FY,S$GLB,, | Performed by: RADIOLOGY

## 2022-08-10 PROCEDURE — 3008F PR BODY MASS INDEX (BMI) DOCUMENTED: ICD-10-PCS | Mod: CPTII,S$GLB,, | Performed by: NURSE PRACTITIONER

## 2022-08-10 PROCEDURE — 99214 PR OFFICE/OUTPT VISIT, EST, LEVL IV, 30-39 MIN: ICD-10-PCS | Mod: S$GLB,,, | Performed by: NURSE PRACTITIONER

## 2022-08-10 PROCEDURE — 3074F SYST BP LT 130 MM HG: CPT | Mod: CPTII,S$GLB,, | Performed by: NURSE PRACTITIONER

## 2022-08-10 PROCEDURE — 1159F MED LIST DOCD IN RCRD: CPT | Mod: CPTII,S$GLB,, | Performed by: NURSE PRACTITIONER

## 2022-08-10 PROCEDURE — 3079F DIAST BP 80-89 MM HG: CPT | Mod: CPTII,S$GLB,, | Performed by: NURSE PRACTITIONER

## 2022-08-10 PROCEDURE — 87502 INFLUENZA DNA AMP PROBE: CPT | Mod: QW,S$GLB,, | Performed by: NURSE PRACTITIONER

## 2022-08-10 PROCEDURE — 4010F ACE/ARB THERAPY RXD/TAKEN: CPT | Mod: CPTII,S$GLB,, | Performed by: NURSE PRACTITIONER

## 2022-08-10 PROCEDURE — 4010F PR ACE/ARB THEARPY RXD/TAKEN: ICD-10-PCS | Mod: CPTII,S$GLB,, | Performed by: NURSE PRACTITIONER

## 2022-08-10 RX ORDER — BENZONATATE 100 MG/1
100 CAPSULE ORAL 3 TIMES DAILY PRN
Qty: 20 CAPSULE | Refills: 0 | Status: SHIPPED | OUTPATIENT
Start: 2022-08-10 | End: 2022-09-13 | Stop reason: SDUPTHER

## 2022-08-10 NOTE — PROGRESS NOTES
"Subjective:       Patient ID: Suly Jaime is a 62 y.o. female.    Vitals:  height is 5' 2" (1.575 m) and weight is 72.9 kg (160 lb 11.5 oz). Her temperature is 97.9 °F (36.6 °C). Her blood pressure is 123/82 and her pulse is 91. Her respiration is 18 and oxygen saturation is 97%.     Chief Complaint: Cough    Patient states that she has cough and has been experiencing a cough starting three days ago. Patient states has some chest tightness yesterday .  Patient tried taking Osman, Claritin and Allegra to help with symptoms and had no relief.   Provider note below:  This is a 62 y.o. female with history of chronic cough, restrictive lung disease, CHF NYHA class 2, cardiomyopathy, nonischemic, CAD who presents today with a chief complaint of cough started 3 days ago, patient with history of chronic cough, patient reports she had the episode of some chest tightness yesterday, denies any chest tightness today, patient reports clear sputum, denies fever, body aches or chills, denies wheezing or shortness of breath, denies nausea, vomiting, diarrhea or abdominal pain, denies chest pain, denies exertional chest pain, denies radiating neck or arm pain, or dizziness positional lightheadedness, denies sore throat or trouble swallowing, denies loss of taste or smell, or any other symptoms      Cough  This is a new problem. The current episode started in the past 7 days. The problem has been unchanged. The problem occurs constantly. The cough is productive of sputum (Clear sputum). Associated symptoms include headaches, nasal congestion and postnasal drip. Pertinent negatives include no chest pain, chills, sore throat, shortness of breath or wheezing. The symptoms are aggravated by lying down.       Constitution: Negative for chills, sweating and fatigue.   HENT: Positive for postnasal drip. Negative for sore throat.    Cardiovascular: Negative for chest pain.   Respiratory: Positive for cough. Negative for sputum production, " shortness of breath and wheezing.    Gastrointestinal: Negative for abdominal pain, nausea, vomiting and constipation.   Neurological: Positive for headaches. Negative for dizziness and light-headedness.       Past Medical History:   Diagnosis Date    Allergy     Cardiomyopathy     Hypertension        Objective:      Physical Exam   Constitutional: She is oriented to person, place, and time. She appears well-developed. She is cooperative.  Non-toxic appearance. She does not appear ill. No distress.      Comments:Patient sitting comfortably on the exam table, non toxic appearance  and answering questions appropriately, no acute distress       HENT:   Head: Normocephalic and atraumatic.   Ears:   Right Ear: Hearing, tympanic membrane, external ear and ear canal normal.   Left Ear: Hearing, tympanic membrane, external ear and ear canal normal.   Nose: Nose normal. No mucosal edema, rhinorrhea or nasal deformity. No epistaxis. Right sinus exhibits no maxillary sinus tenderness and no frontal sinus tenderness. Left sinus exhibits no maxillary sinus tenderness and no frontal sinus tenderness.   Mouth/Throat: Uvula is midline, oropharynx is clear and moist and mucous membranes are normal. No trismus in the jaw. Normal dentition. No uvula swelling. No oropharyngeal exudate, posterior oropharyngeal edema or posterior oropharyngeal erythema.   Eyes: Conjunctivae and lids are normal. No scleral icterus. Extraocular movement intact   Neck: Trachea normal and phonation normal. Neck supple. No edema present. No erythema present. No neck rigidity present.   Cardiovascular: Normal rate, regular rhythm, normal heart sounds and normal pulses.      Comments: EKG NSR vent rate 97, compared with previous EKG    Pulmonary/Chest: Effort normal and breath sounds normal. No stridor. No respiratory distress. She has no decreased breath sounds. She has no wheezes. She has no rhonchi. She has no rales.   Lungs CTA         Comments: Lungs  CTA    Abdominal: Normal appearance.   Musculoskeletal: Normal range of motion.         General: No deformity. Normal range of motion.   Neurological: no focal deficit. She is alert and oriented to person, place, and time. She exhibits normal muscle tone. Coordination normal.   Skin: Skin is warm, dry, intact, not diaphoretic and not pale.   Psychiatric: Her speech is normal and behavior is normal. Judgment and thought content normal.   Nursing note and vitals reviewed.        Results for orders placed or performed in visit on 08/10/22   POCT COVID-19 Rapid Screening   Result Value Ref Range    POC Rapid COVID Negative Negative     Acceptable Yes    POCT Influenza A/B MOLECULAR   Result Value Ref Range    POC Molecular Influenza A Ag Negative Negative, Not Reported    POC Molecular Influenza B Ag Negative Negative, Not Reported     Acceptable Yes      X-Ray Chest PA And Lateral    Result Date: 8/10/2022  EXAMINATION: XR CHEST PA AND LATERAL CLINICAL HISTORY: Cough, unspecified FINDINGS: Chest two views: There is a pacer.  Heart size is normal.  There is aortic plaque.  The lungs are clear.  There is DJD.     Impression: No acute process seen. Electronically signed by: Prateek Vo MD Date:    08/10/2022 Time:    12:21      Patient in no acute distress.  Vitals reassuring.  Discussed results/diagnosis/plan in depth with patient in clinic. Strict precautions given to patient to monitor for worsening signs and symptoms. Advised to follow up with primary.All questions answered. Strict ER precautions given. If your symptoms worsens or fail to improve you should go to the Emergency Room. Discharge and follow-up instructions given verbally/printed. Discharge and follow-up instructions discussed with the patient who expressed understanding and willingness to comply with my recommendations.Patient voiced understanding and in agreement with current treatment plan.     Please be advised this  text was dictated with [employer] software and may contain errors due to translation.      Assessment:       1. Cough    2. PND (post-nasal drip)          Plan:         Cough  -     POCT COVID-19 Rapid Screening  -     POCT Influenza A/B MOLECULAR  -     X-Ray Chest PA And Lateral; Future; Expected date: 08/10/2022  -     EKG 12-lead    PND (post-nasal drip)    Other orders  -     benzonatate (TESSALON) 100 MG capsule; Take 1 capsule (100 mg total) by mouth 3 (three) times daily as needed for Cough.  Dispense: 20 capsule; Refill: 0           Medical Decision Making:   History:   Old Medical Records: I decided to obtain old medical records.  Old Records Summarized: records from clinic visits and records from previous admission(s).  Clinical Tests:   Lab Tests: Reviewed  Radiological Study: Ordered and Reviewed  Urgent Care Management:  Patient in no acute distress.  Vitals reassuring.  On exam, patient is nontoxic appearing and afebrile.  Negative COVID-19 and flu test results discussed with patient in detail.  Lungs CTA.  EKG and chest x-ray results discussed with patient in detail.  EKG normal sinus rhythm ventricular rate 97, compared with previous EKG.  Chest x-ray with no acute process.  Patient with history of CHF and cardiomyopathy, CAD and history of chronic cough with restrictive lung disease.  I strongly recommended patient to follow-up with her cardiology for further evaluation secondary to her cardiac history.  Patient prescribed Tessalon Perles previously by her primary and cardiologist for cough which helps.  Will prescribe Tessalon Perles with strict ER precautions and close follow-up with Cardiology/primary.Patient voiced understanding and in agreement with current treatment plan.         Patient Instructions   If your condition worsens or fails to improve we recommend that you receive another evaluation at the ER immediately or contact your PCP to discuss your concerns or return here. You must  understand that you've received an urgent care treatment only and that you may be released before all your medical problems are known or treated. You the patient will arrange for followup care as instructed.    If you were prescribed antibiotics, please take them to completion.  If you were prescribed a narcotic medication, do not drive or operate heavy equipment or machinery while taking these medications.  Please follow up with your primary care doctor or specialist as needed.  If you  smoke, please stop smoking.

## 2022-08-11 ENCOUNTER — PATIENT MESSAGE (OUTPATIENT)
Dept: SURGERY | Facility: HOSPITAL | Age: 62
End: 2022-08-11
Payer: MEDICAID

## 2022-08-11 ENCOUNTER — HOSPITAL ENCOUNTER (OUTPATIENT)
Dept: PREADMISSION TESTING | Facility: HOSPITAL | Age: 62
Discharge: HOME | End: 2022-08-11
Attending: ORTHOPAEDIC SURGERY
Payer: MEDICAID

## 2022-08-11 ENCOUNTER — PATIENT MESSAGE (OUTPATIENT)
Dept: INTERNAL MEDICINE | Facility: CLINIC | Age: 62
End: 2022-08-11
Payer: MEDICAID

## 2022-08-11 ENCOUNTER — ANESTHESIA EVENT (OUTPATIENT)
Dept: SURGERY | Facility: HOSPITAL | Age: 62
End: 2022-08-11
Payer: MEDICAID

## 2022-08-11 ENCOUNTER — PATIENT MESSAGE (OUTPATIENT)
Dept: CARDIOLOGY | Facility: CLINIC | Age: 62
End: 2022-08-11
Payer: MEDICAID

## 2022-08-11 VITALS
SYSTOLIC BLOOD PRESSURE: 117 MMHG | HEART RATE: 93 BPM | DIASTOLIC BLOOD PRESSURE: 68 MMHG | BODY MASS INDEX: 29.25 KG/M2 | RESPIRATION RATE: 16 BRPM | HEIGHT: 62 IN | TEMPERATURE: 97 F | WEIGHT: 158.94 LBS | OXYGEN SATURATION: 97 %

## 2022-08-11 RX ORDER — SODIUM CHLORIDE, SODIUM LACTATE, POTASSIUM CHLORIDE, CALCIUM CHLORIDE 600; 310; 30; 20 MG/100ML; MG/100ML; MG/100ML; MG/100ML
INJECTION, SOLUTION INTRAVENOUS CONTINUOUS
Status: CANCELLED | OUTPATIENT
Start: 2022-08-11

## 2022-08-11 RX ORDER — LIDOCAINE HYDROCHLORIDE 10 MG/ML
1 INJECTION, SOLUTION EPIDURAL; INFILTRATION; INTRACAUDAL; PERINEURAL ONCE
Status: CANCELLED | OUTPATIENT
Start: 2022-08-11 | End: 2022-08-11

## 2022-08-11 RX ORDER — SCOLOPAMINE TRANSDERMAL SYSTEM 1 MG/1
1 PATCH, EXTENDED RELEASE TRANSDERMAL
Status: CANCELLED | OUTPATIENT
Start: 2022-08-11

## 2022-08-11 NOTE — ANESTHESIA PREPROCEDURE EVALUATION
"                                                                                                             08/11/2022     Suly Jaime is a 62 y.o., female scheduled for RELEASE, CONTRACTURE, JOINT (Left ) 9/2/22.    Per cardiology Dr. Akhtar, "The patient is cleared for upcoming hand surgery".    Past Medical History:   Diagnosis Date    Allergy     Cardiomyopathy     Hypertension      Past Surgical History:   Procedure Laterality Date    CARDIAC DEFIBRILLATOR PLACEMENT Left     HYSTERECTOMY  2003    full    KNEE ARTHROSCOPY      LARYNGOSCOPY N/A 02/18/2019    Procedure: DIRECT MICRO LARYNGOSCOPY WITH BIOPSY;  Surgeon: Deidre Calderon MD;  Location: [facility] OR;  Service: ENT;  Laterality: N/A;  video    OOPHORECTOMY      REVISION OF IMPLANTABLE CARDIOVERTER-DEFIBRILLATOR (ICD) ELECTRODE LEAD PLACEMENT N/A 6/6/2022    Procedure: REVISION, INSERTION, ELECTRODE LEAD, ICD;  Surgeon: Cruzito Tovar MD;  Location: [facility] EP LAB;  Service: Cardiology;  Laterality: N/A;  SINGLE LEAD REVISION, SJM, ANES, EH, 325       Pre-op Assessment    I have reviewed the Patient Summary Reports.     I have reviewed the Nursing Notes.    I have reviewed the Medications.     Review of Systems  Anesthesia Hx:  No problems with previous Anesthesia Denies Hx of Anesthetic complications  History of prior surgery of interest to airway management or planning:  Denies Personal Hx of Anesthesia complications.   Social:  Former Smoker, Social Alcohol Use    Cardiovascular:   Exercise tolerance: good Pacemaker Hypertension, well controlled CAD   CHF hyperlipidemia  Denies SALAZAR.    Pulmonary:  Pulmonary Normal  Denies Shortness of breath.    Renal/:  Renal/ Normal     Hepatic/GI:  Hepatic/GI Normal    Musculoskeletal:  Musculoskeletal Normal    Neurological:  Neurology Normal    Endocrine:  Endocrine Normal    Psych:  Psychiatric Normal           Physical Exam    Dental:  Intact, Dentures  Upper denture    CXR 8/10/22  Chest " two views: There is a pacer. Heart size is normal. There is aortic plaque. The lungs are clear. There is DJD.  Impression: No acute process seen.    7/1/22 Pacemaker check  REMOTE Interrogation Report   Presenting E gram demonstrates:  Vs.   Device fxn WNL.   Autocapture algorithms are RV (OFF).   RV pacing  0%.   Ventricular arrhythmias:  None.   Battery Status/Longevity:  8.2-10.1 yrs.   F/U via remote interrogation in 3 months.   Routine in clinic check in March 2023.       Anesthesia Plan  Type of Anesthesia, risks & benefits discussed:    Anesthesia Type: Regional  Intra-op Monitoring Plan: Standard ASA Monitors  Post Op Pain Control Plan: multimodal analgesia  Induction:  IV  Informed Consent: Informed consent signed with the Patient and all parties understand the risks and agree with anesthesia plan.  All questions answered.   ASA Score: 3  Anesthesia Plan Notes: Anesthesia consent to be signed prior to surgery 9/2/22      Ready For Surgery From Anesthesia Perspective.     .

## 2022-08-11 NOTE — DISCHARGE INSTRUCTIONS
Your surgery is scheduled for 9/2/22.    Please report to [facility] on the 1st Floor at 1100 a.m.    THIS TIME IS SUBJECT TO CHANGE.  YOU WILL RECEIVE A PHONE CALL THE DAY BEFORE SURGERY BY 3:30 PM TO CONFIRM YOUR TIME OF ARRIVAL.  IF YOU HAVE NOT RECEIVED A PHONE CALL BY 3:30 PM THE DAY BEFORE YOUR SURGERY PLEASE CALL [phone]     INSTRUCTIONS IMPORTANT!!!  ¨ Do not eat or drink after 12 midnight-including water, candy, gum, & mints. OK to brush teeth.      ____  Proceed to [facility] on *** for additional testing.        ____  Do not wear makeup, including mascara.  ____  No powder, lotions or creams to surgical area.  ____  Please remove all jewelry, including piercings and leave at home.  ____  No money or valuables needed. Please leave at home.  ____  Please bring any documents given by your doctor.  ____  If going home the same day, arrange for a ride home. You will not be able to             drive if Anesthesia was used.  ____  Children under 18 years require a parent / guardian present the entire time             they are in surgery / recovery.  ____  Wear loose fitting clothing. Allow for dressings, bandages.  ____  Stop Aspirin, Ibuprofen, Motrin, Aleve, Goody's/BC powders, Excedrine and Naproxen (NSAIDS) at least 3-5 days before surgery, unless otherwise instructed by your doctor, or the nurse.   You MAY use Tylenol/acetaminophen until day of surgery.  ____  Wash the surgical area with Hibiclens the night before surgery, and again the             morning of surgery.  Be sure to rinse hibiclens off completely (if instructed by   nurse).  ____  If you take diabetic medication, do not take am of surgery unless instructed by Doctor.  ____  Call MD for temperature above 101 degrees or any other signs of infection such as Urinary (bladder) infection, Upper respiratory infection, skin boils, etc.  ____ Stop taking any Fish Oil supplement or any Vitamins that contain Vitamin E at  least 5 days prior to surgery.  ____ Do Not wear your contact lenses the day of your procedure.  You may wear your glasses.      ____Do not shave surgical site for 3 days prior to surgery.  ____ Practice Good hand washing before, during, and after procedure.      I have read or had read and explained to me, and understand the above information.  Additional comments or instructions:  For additional questions call [phone]      ANESTHESIA SIDE EFFECTS  -For the first 24 hours after surgery:  Do not drive, use heavy equipment, make important decisions, or drink alcohol  -It is normal to feel sleepy for several hours.  Rest until you are more awake.  -Have someone stay with you, if needed.  They can watch for problems and help keep you safe.  -Some possible post anesthesia side effects include: nausea and vomiting, sore throat and hoarseness, sleepiness, and dizziness.        Pre-Op Bathing Instructions    Before surgery, you can play an important role in your own health.    Because skin is not sterile, we need to be sure that your skin is as free of germs as possible. By following the instructions below, you can reduce the number of germs on your skin before surgery.    IMPORTANT: You will need to shower with a special soap called Hibiclens*, available at any pharmacy.  If you are allergic to Chlorhexidine (the antiseptic in Hibiclens), use an antibacterial soap such as Dial Soap for your preoperative shower.  You will shower with Hibiclens both the night before your surgery and the morning of your surgery.  Do not use Hibiclens on the head, face or genitals to avoid injury to those areas.    STEP #1: THE NIGHT BEFORE YOUR SURGERY     Do not shave the area of your body where your surgery will be performed.  Shower and wash your hair and body as usual with your normal soap and shampoo.  Rinse your hair and body thoroughly after you shower to remove all soap residue.  With your hand, apply one packet of Hibiclens soap to  the surgical site.   Wash the site gently for five (5) minutes. Do not scrub your skin too hard.   Do not wash with your regular soap after Hibiclens is used.  Rinse your body thoroughly.  Pat yourself dry with a clean, soft towel.  Do not use lotion, cream, or powder.  Wear clean clothes.    STEP #2: THE MORNING OF YOUR SURGERY     Repeat Step #1.    * Not to be used by people allergic to Chlorhexidine.

## 2022-08-11 NOTE — PRE-PROCEDURE INSTRUCTIONS
Olman Goins [phone]    Allergies, medical, surgical, family and psychosocial histories reviewed with patient. Periop plan of care reviewed. Preop instructions given, including medications to take and to hold. Hibiclens soap and instructions on use given. Time allotted for questions to be addressed.  Patient verbalized understanding.

## 2022-08-13 ENCOUNTER — OFFICE VISIT (OUTPATIENT)
Dept: INTERNAL MEDICINE | Facility: CLINIC | Age: 62
End: 2022-08-13
Payer: MEDICAID

## 2022-08-13 VITALS
WEIGHT: 157.63 LBS | SYSTOLIC BLOOD PRESSURE: 122 MMHG | BODY MASS INDEX: 29.01 KG/M2 | DIASTOLIC BLOOD PRESSURE: 76 MMHG | OXYGEN SATURATION: 98 % | HEART RATE: 91 BPM | HEIGHT: 62 IN

## 2022-08-13 DIAGNOSIS — J41.0 SIMPLE CHRONIC BRONCHITIS: ICD-10-CM

## 2022-08-13 DIAGNOSIS — R05.3 CHRONIC COUGH: Primary | ICD-10-CM

## 2022-08-13 PROCEDURE — 3008F PR BODY MASS INDEX (BMI) DOCUMENTED: ICD-10-PCS | Mod: CPTII,,, | Performed by: INTERNAL MEDICINE

## 2022-08-13 PROCEDURE — 4010F PR ACE/ARB THEARPY RXD/TAKEN: ICD-10-PCS | Mod: CPTII,,, | Performed by: INTERNAL MEDICINE

## 2022-08-13 PROCEDURE — 3074F PR MOST RECENT SYSTOLIC BLOOD PRESSURE < 130 MM HG: ICD-10-PCS | Mod: CPTII,,, | Performed by: INTERNAL MEDICINE

## 2022-08-13 PROCEDURE — 3078F PR MOST RECENT DIASTOLIC BLOOD PRESSURE < 80 MM HG: ICD-10-PCS | Mod: CPTII,,, | Performed by: INTERNAL MEDICINE

## 2022-08-13 PROCEDURE — 1159F PR MEDICATION LIST DOCUMENTED IN MEDICAL RECORD: ICD-10-PCS | Mod: CPTII,,, | Performed by: INTERNAL MEDICINE

## 2022-08-13 PROCEDURE — 99999 PR PBB SHADOW E&M-EST. PATIENT-LVL V: ICD-10-PCS | Mod: PBBFAC,,, | Performed by: INTERNAL MEDICINE

## 2022-08-13 PROCEDURE — 3074F SYST BP LT 130 MM HG: CPT | Mod: CPTII,,, | Performed by: INTERNAL MEDICINE

## 2022-08-13 PROCEDURE — 99215 PR OFFICE/OUTPT VISIT, EST, LEVL V, 40-54 MIN: ICD-10-PCS | Mod: S$PBB,,, | Performed by: INTERNAL MEDICINE

## 2022-08-13 PROCEDURE — 1160F RVW MEDS BY RX/DR IN RCRD: CPT | Mod: CPTII,,, | Performed by: INTERNAL MEDICINE

## 2022-08-13 PROCEDURE — 99999 PR PBB SHADOW E&M-EST. PATIENT-LVL V: CPT | Mod: PBBFAC,,, | Performed by: INTERNAL MEDICINE

## 2022-08-13 PROCEDURE — 4010F ACE/ARB THERAPY RXD/TAKEN: CPT | Mod: CPTII,,, | Performed by: INTERNAL MEDICINE

## 2022-08-13 PROCEDURE — 99215 OFFICE O/P EST HI 40 MIN: CPT | Mod: S$PBB,,, | Performed by: INTERNAL MEDICINE

## 2022-08-13 PROCEDURE — 1159F MED LIST DOCD IN RCRD: CPT | Mod: CPTII,,, | Performed by: INTERNAL MEDICINE

## 2022-08-13 PROCEDURE — 1160F PR REVIEW ALL MEDS BY PRESCRIBER/CLIN PHARMACIST DOCUMENTED: ICD-10-PCS | Mod: CPTII,,, | Performed by: INTERNAL MEDICINE

## 2022-08-13 PROCEDURE — 3008F BODY MASS INDEX DOCD: CPT | Mod: CPTII,,, | Performed by: INTERNAL MEDICINE

## 2022-08-13 PROCEDURE — 3078F DIAST BP <80 MM HG: CPT | Mod: CPTII,,, | Performed by: INTERNAL MEDICINE

## 2022-08-13 PROCEDURE — 99215 OFFICE O/P EST HI 40 MIN: CPT | Mod: PBBFAC,PO | Performed by: INTERNAL MEDICINE

## 2022-08-13 RX ORDER — ESOMEPRAZOLE MAGNESIUM 40 MG/1
40 CAPSULE, DELAYED RELEASE ORAL
Qty: 30 CAPSULE | Refills: 0 | Status: SHIPPED | OUTPATIENT
Start: 2022-08-13 | End: 2022-12-12

## 2022-08-13 RX ORDER — FLUTICASONE PROPIONATE 50 MCG
1 SPRAY, SUSPENSION (ML) NASAL DAILY
Qty: 16 G | Refills: 0 | Status: SHIPPED | OUTPATIENT
Start: 2022-08-13 | End: 2022-09-08

## 2022-08-13 RX ORDER — TIOTROPIUM BROMIDE 18 UG/1
18 CAPSULE ORAL; RESPIRATORY (INHALATION) DAILY
Qty: 90 CAPSULE | Refills: 0 | Status: SHIPPED | OUTPATIENT
Start: 2022-08-13 | End: 2022-11-21

## 2022-08-13 RX ORDER — PREDNISONE 20 MG/1
60 TABLET ORAL DAILY
Qty: 15 TABLET | Refills: 0 | Status: SHIPPED | OUTPATIENT
Start: 2022-08-13 | End: 2022-08-18

## 2022-08-13 NOTE — PROGRESS NOTES
Assessment:       1. Chronic cough    2. Simple chronic bronchitis        Plan:         Suly was seen today for cough.    Diagnoses and all orders for this visit:    Chronic cough  -     fluticasone propionate (FLONASE) 50 mcg/actuation nasal spray; 1 spray (50 mcg total) by Each Nostril route once daily.  -     esomeprazole (NEXIUM) 40 MG capsule; Take 1 capsule (40 mg total) by mouth before breakfast.  -     Ambulatory referral/consult to Allergy; Future  -     Ambulatory referral/consult to Pulmonology; Future  -     tiotropium (SPIRIVA) 18 mcg inhalation capsule; Inhale 1 capsule (18 mcg total) into the lungs once daily. Controller  -     predniSONE (DELTASONE) 20 MG tablet; Take 3 tablets (60 mg total) by mouth once daily. for 5 days    Simple chronic bronchitis  -     fluticasone propionate (FLONASE) 50 mcg/actuation nasal spray; 1 spray (50 mcg total) by Each Nostril route once daily.  -     esomeprazole (NEXIUM) 40 MG capsule; Take 1 capsule (40 mg total) by mouth before breakfast.  -     Ambulatory referral/consult to Allergy; Future  -     Ambulatory referral/consult to Pulmonology; Future  -     tiotropium (SPIRIVA) 18 mcg inhalation capsule; Inhale 1 capsule (18 mcg total) into the lungs once daily. Controller  -     predniSONE (DELTASONE) 20 MG tablet; Take 3 tablets (60 mg total) by mouth once daily. for 5 days        For unclear etiology of symptoms.  She has had chronic cough for many years without improvement in symptoms.  Unclear if Protonix might any difference.  Will try different PPI to start.  Additionally she has not been trialed it is seems on medication for emphysema/COPD.  Given the persistence of symptoms will try at this time despite negative PFTs.  She has not had prior trial of allergy shots, maybe this would help, will get her back to Allergy immunology.  She has not had follow-up since 2020 with her pulmonologist, will get her back     I spent at least 40 mins with preparing to see  the patient, obtaining and/or revieweing separately obtained history, preforming a examination and evaluation, counseling, communicating with other health care professional, documenting clinical information in the electronic health record,  and/or coordinating care.               Subjective:       Patient ID: Suly Jaime is a 62 y.o. female.    Chief Complaint: Cough          Seen by urgent care on 8/10/22 for similar symptoms of cough. Negative covid flu , xray ekg and given tesslon pearles . Suspected cardiac?    Patient reports chronic cough for the last 10 years.  She has been seen by multiple providers including pulmonology, Cardiology, ENT, allergy immunology, primary care doctor.  She presents today for urgent care visit for continued cough.  She denies any symptoms of shortness of breath or chest pain.    Her prior workup included evaluation by Cardiology and congestive heart failure.  Her last echocardiogram in July was stable and her BMP was normal.  She was seen by pulmonology Abigail Jaimes in 2020.  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  She is still on Flovent.  She has not had follow-up with pulmonology since that time.  She has normal eosinophils on her last CBCs.    She had prior follow-up with ear nose and throat last visit was in September of 2020.  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  She was also treated for upper airway cough syndrome with allergy regimen including antihistamine, singular, nasal spray.  This did not relieve symptoms.    She was previously trialed on Protonix for reflux symptoms without improvement in symptoms.  She has not had prior upper endoscopy.    She has been seen by allergy immunology- Mandy Valenzuela,  - seen last in 11/2021. Singular, xyzal , flonase is her sinus regimen.    She had pulmonary function test prior that showed no  "obstruction.  She is a former smoker who started in her 20s often on with 1/3-1/2 pack per day smoking history.  She quit in approximately 2018.         Tobacco Use: Medium Risk    Smoking Tobacco Use: Former Smoker    Smokeless Tobacco Use: Never Used       HPI    Review of Systems   All other systems reviewed and are negative.            Health Maintenance Due   Topic Date Due    Hepatitis C Screening  Never done    Pneumococcal Vaccines (Age 0-64) (1 - PCV) Never done    HIV Screening  Never done    High Dose Statin  Never done    Colorectal Cancer Screening  Never done    Shingles Vaccine (1 of 2) Never done    COVID-19 Vaccine (3 - Booster for Pfizer series) 08/24/2021     Tobacco Use: Medium Risk    Smoking Tobacco Use: Former Smoker    Smokeless Tobacco Use: Never Used         Objective:     /76 (BP Location: Right arm, Patient Position: Sitting, BP Method: Large (Manual))   Pulse 91   Ht 5' 2" (1.575 m)   Wt 71.5 kg (157 lb 10.1 oz)   SpO2 98%   BMI 28.83 kg/m²     Vitals 8/13/2022 8/11/2022 8/10/2022 7/26/2022 6/5/2022   Height 62 62 62 62 -   Weight (lbs) 157.63 158.95 160.72 160.8 156.53   BMI (kg/m2) 28.8 29.1 29.4 29.4 28.6                Physical Exam  Vitals and nursing note reviewed.   Constitutional:       General: She is not in acute distress.     Appearance: She is well-developed. She is not diaphoretic.   HENT:      Head: Normocephalic.      Nose: Nose normal.   Eyes:      General:         Right eye: No discharge.         Left eye: No discharge.      Conjunctiva/sclera: Conjunctivae normal.      Pupils: Pupils are equal, round, and reactive to light.   Cardiovascular:      Rate and Rhythm: Normal rate and regular rhythm.      Heart sounds: Normal heart sounds. No murmur heard.    No friction rub. No gallop.   Pulmonary:      Effort: Pulmonary effort is normal. No respiratory distress.   Abdominal:      General: Bowel sounds are normal. There is no distension.      " Palpations: Abdomen is soft.   Musculoskeletal:         General: No deformity. Normal range of motion.      Cervical back: Normal range of motion.   Skin:     General: Skin is warm.   Neurological:      Mental Status: She is alert and oriented to person, place, and time.      Cranial Nerves: No cranial nerve deficit.             Impression:     1. No significant intrathoracic CT abnormalities to account for the reported history of cough.  2. Bilateral solid pulmonary nodules measuring up to 0.5 cm.  For multiple solid nodules all <6 mm, Fleischner Society 2017 guidelines recommend no routine follow up for a low risk patient, or follow up with non-contrast chest CT at 12 months after discovery in a high risk patient.  3. CT findings consistent with hepatic steatosis.  Recommend correlation with liver function test.  4. Probable left renal cortical cyst, incompletely evaluated on this exam.        Electronically signed by: Clyde Dickinson MD  Date:                                            06/02/2020  Time:                                           09:45    Future Appointments   Date Time Provider Department Center   8/28/2022 10:00 AM HOME MONITOR DEVICE CHECK, [facility] [place]   8/29/2022 10:00 AM Alexia Dukes PA-C [facility] ORTHO [place]   11/14/2022 10:10 AM HOSPITAL LAB, [facility]   11/17/2022 10:40 AM Cruzito Tovar MD [facility]         Medication List with Changes/Refills   New Medications    ESOMEPRAZOLE (NEXIUM) 40 MG CAPSULE    Take 1 capsule (40 mg total) by mouth before breakfast.    FLUTICASONE PROPIONATE (FLONASE) 50 MCG/ACTUATION NASAL SPRAY    1 spray (50 mcg total) by Each Nostril route once daily.    PREDNISONE (DELTASONE) 20 MG TABLET    Take 3 tablets (60 mg total) by mouth once daily. for 5 days    TIOTROPIUM (SPIRIVA) 18 MCG INHALATION CAPSULE    Inhale 1 capsule (18 mcg total) into the lungs once daily. Controller   Current Medications     BENZONATATE (TESSALON) 100 MG CAPSULE    Take 1 capsule (100 mg total) by mouth 3 (three) times daily as needed for Cough.    CALCIUM CARBONATE (TUMS) 200 MG CALCIUM (500 MG) CHEWABLE TABLET    Take 1 tablet by mouth daily as needed for Heartburn.    FLUTICASONE PROPIONATE (FLONASE) 50 MCG/ACTUATION NASAL SPRAY    1 spray by Each Nostril route daily as needed for Rhinitis or Allergies.    FLUTICASONE PROPIONATE (FLOVENT HFA) 110 MCG/ACTUATION INHALER    Inhale 2 puffs into the lungs 2 (two) times daily. Controller    LATANOPROST 0.005 % OPHTHALMIC SOLUTION    INSTILL 1 DROP INTO BOTH EYES AT BEDTIME AS DIRECTED    LOSARTAN (COZAAR) 25 MG TABLET    Take 1 tablet (25 mg total) by mouth once daily.    METOPROLOL SUCCINATE (TOPROL-XL) 25 MG 24 HR TABLET    Take 1 tablet (25 mg total) by mouth once daily.    MONTELUKAST (SINGULAIR) 10 MG TABLET    Take 10 mg by mouth every evening.    MV-MN/FOLIC ACID/VIT [id number] (ALIVE ONCE DAILY WOMEN 50 PLUS ORAL)    Take 1 tablet by mouth once daily.    TOBRAMYCIN-DEXAMETHASONE 0.3-0.1% (TOBRADEX) 0.3-0.1 % DRPS    SHAKE LIQUID AND INSTILL 1 TO 2 DROPS IN BOTH EYES FOUR TIMES DAILY         Disclaimer:  This note has been generated using voice-recognition software. There may be grammatical or spelling errors that have been missed during proof-reading

## 2022-08-17 ENCOUNTER — TELEPHONE (OUTPATIENT)
Dept: ENDOCRINOLOGY | Facility: CLINIC | Age: 62
End: 2022-08-17
Payer: MEDICAID

## 2022-08-17 ENCOUNTER — TELEPHONE (OUTPATIENT)
Dept: ALLERGY | Facility: CLINIC | Age: 62
End: 2022-08-17
Payer: MEDICAID

## 2022-08-17 NOTE — TELEPHONE ENCOUNTER
Allergy referral: Smith maloney for [facility] referral. Tried to help, but unable to schedule. Staff messages: [facility]  and [facility] to contact patient for assistance.

## 2022-08-17 NOTE — TELEPHONE ENCOUNTER
----- Message from Dalia Ovalle LPN sent at 8/17/2022  1:46 PM CDT -----  Regarding: REFERRAL  This is [facility] patient. I tried, but I'm unable to schedule.   Please review and contact patient for scheduling.    Thanks,  Diane [facility] Referral RODY

## 2022-08-17 NOTE — TELEPHONE ENCOUNTER
----- Message from Dalia Ovalle LPN sent at 8/17/2022  1:46 PM CDT -----  Regarding: REFERRAL  This is [facility] patient. I tried, but I'm unable to schedule.   Please review and contact patient for scheduling.    Thanks,  Diane [facility] Referral RODY

## 2022-08-18 ENCOUNTER — TELEPHONE (OUTPATIENT)
Dept: PULMONOLOGY | Facility: CLINIC | Age: 62
End: 2022-08-18
Payer: MEDICAID

## 2022-08-18 NOTE — TELEPHONE ENCOUNTER
Spoke with patient, informed her that I'm contacting her in regards to scheduling her appointment with Dr Jaimes. I advised patient that I can schedule her appointment with Dr Jaimes on 9/13/22 for 11:00 AM. Patient verbalized that she understand and accepted the appointment.

## 2022-08-18 NOTE — TELEPHONE ENCOUNTER
"----- Message from Larisa Sanders sent at 8/18/2022  1:53 PM CDT -----  Good afternoon    Patient with a Referral to Pulmonology from Dr. Matthew Phipps.  Diagnosis "Chronic cough /Simple chronic bronchitis " Patient ep with Dr Jaimes.  Please assist scheduling patient.    Thank you    Larisa"

## 2022-08-23 ENCOUNTER — TELEPHONE (OUTPATIENT)
Dept: ELECTROPHYSIOLOGY | Facility: CLINIC | Age: 62
End: 2022-08-23
Payer: MEDICAID

## 2022-08-23 DIAGNOSIS — I42.8 CARDIOMYOPATHY, NONISCHEMIC: Primary | ICD-10-CM

## 2022-08-27 ENCOUNTER — PATIENT MESSAGE (OUTPATIENT)
Dept: SURGERY | Facility: HOSPITAL | Age: 62
End: 2022-08-27
Payer: MEDICAID

## 2022-08-28 ENCOUNTER — CLINICAL SUPPORT (OUTPATIENT)
Dept: CARDIOLOGY | Facility: HOSPITAL | Age: 62
End: 2022-08-28
Payer: MEDICAID

## 2022-08-28 DIAGNOSIS — Z95.810 PRESENCE OF AUTOMATIC (IMPLANTABLE) CARDIAC DEFIBRILLATOR: ICD-10-CM

## 2022-08-28 DIAGNOSIS — I42.9 CARDIOMYOPATHY, UNSPECIFIED: ICD-10-CM

## 2022-08-28 PROCEDURE — 93296 REM INTERROG EVL PM/IDS: CPT | Performed by: INTERNAL MEDICINE

## 2022-08-28 PROCEDURE — 93295 DEV INTERROG REMOTE 1/2/MLT: CPT | Mod: ,,, | Performed by: INTERNAL MEDICINE

## 2022-08-28 PROCEDURE — 93295 CARDIAC DEVICE CHECK - REMOTE: ICD-10-PCS | Mod: ,,, | Performed by: INTERNAL MEDICINE

## 2022-08-29 ENCOUNTER — TELEPHONE (OUTPATIENT)
Dept: ORTHOPEDICS | Facility: CLINIC | Age: 62
End: 2022-08-29
Payer: MEDICAID

## 2022-08-29 NOTE — TELEPHONE ENCOUNTER
Spoke with patient in regards to her surgery on 9/2. Patient stated that she would like to reschedule her surgery. Surgery has been reschedule to 10/4, and surgery center has been notified of changes.

## 2022-08-29 NOTE — TELEPHONE ENCOUNTER
----- Message from Jennifer Andrews sent at 8/29/2022  1:02 PM CDT -----  Regarding: reschedule  Contact: [phone]/self  Patient is requesting a call back regarding she may need to reschedule her surgery.   Would the patient rather a call back or a response via [facility]?  call  Best Call Back Number:  [phone]  Additional Information:

## 2022-09-01 ENCOUNTER — TELEPHONE (OUTPATIENT)
Dept: ALLERGY | Facility: CLINIC | Age: 62
End: 2022-09-01
Payer: MEDICAID

## 2022-09-07 ENCOUNTER — TELEPHONE (OUTPATIENT)
Dept: PULMONOLOGY | Facility: CLINIC | Age: 62
End: 2022-09-07
Payer: MEDICAID

## 2022-09-07 DIAGNOSIS — R05.9 COUGH: Primary | ICD-10-CM

## 2022-09-13 ENCOUNTER — OFFICE VISIT (OUTPATIENT)
Dept: PULMONOLOGY | Facility: CLINIC | Age: 62
End: 2022-09-13
Payer: MEDICAID

## 2022-09-13 VITALS
HEART RATE: 87 BPM | OXYGEN SATURATION: 97 % | BODY MASS INDEX: 28.71 KG/M2 | HEIGHT: 62 IN | DIASTOLIC BLOOD PRESSURE: 74 MMHG | SYSTOLIC BLOOD PRESSURE: 130 MMHG | WEIGHT: 156 LBS

## 2022-09-13 DIAGNOSIS — R05.3 CHRONIC COUGH: Chronic | ICD-10-CM

## 2022-09-13 PROBLEM — J40 EOSINOPHILIC BRONCHITIS: Status: RESOLVED | Noted: 2020-05-29 | Resolved: 2022-09-13

## 2022-09-13 PROBLEM — D72.18 EOSINOPHILIC BRONCHITIS: Status: RESOLVED | Noted: 2020-05-29 | Resolved: 2022-09-13

## 2022-09-13 PROCEDURE — 99213 PR OFFICE/OUTPT VISIT, EST, LEVL III, 20-29 MIN: ICD-10-PCS | Mod: S$PBB,,, | Performed by: INTERNAL MEDICINE

## 2022-09-13 PROCEDURE — 99213 OFFICE O/P EST LOW 20 MIN: CPT | Mod: S$PBB,,, | Performed by: INTERNAL MEDICINE

## 2022-09-13 PROCEDURE — 99214 OFFICE O/P EST MOD 30 MIN: CPT | Mod: PBBFAC | Performed by: INTERNAL MEDICINE

## 2022-09-13 PROCEDURE — 99999 PR PBB SHADOW E&M-EST. PATIENT-LVL IV: CPT | Mod: PBBFAC,,, | Performed by: INTERNAL MEDICINE

## 2022-09-13 PROCEDURE — 4010F ACE/ARB THERAPY RXD/TAKEN: CPT | Mod: CPTII,,, | Performed by: INTERNAL MEDICINE

## 2022-09-13 PROCEDURE — 3008F BODY MASS INDEX DOCD: CPT | Mod: CPTII,,, | Performed by: INTERNAL MEDICINE

## 2022-09-13 PROCEDURE — 3008F PR BODY MASS INDEX (BMI) DOCUMENTED: ICD-10-PCS | Mod: CPTII,,, | Performed by: INTERNAL MEDICINE

## 2022-09-13 PROCEDURE — 3075F SYST BP GE 130 - 139MM HG: CPT | Mod: CPTII,,, | Performed by: INTERNAL MEDICINE

## 2022-09-13 PROCEDURE — 1160F PR REVIEW ALL MEDS BY PRESCRIBER/CLIN PHARMACIST DOCUMENTED: ICD-10-PCS | Mod: CPTII,,, | Performed by: INTERNAL MEDICINE

## 2022-09-13 PROCEDURE — 1160F RVW MEDS BY RX/DR IN RCRD: CPT | Mod: CPTII,,, | Performed by: INTERNAL MEDICINE

## 2022-09-13 PROCEDURE — 3078F DIAST BP <80 MM HG: CPT | Mod: CPTII,,, | Performed by: INTERNAL MEDICINE

## 2022-09-13 PROCEDURE — 1159F MED LIST DOCD IN RCRD: CPT | Mod: CPTII,,, | Performed by: INTERNAL MEDICINE

## 2022-09-13 PROCEDURE — 3078F PR MOST RECENT DIASTOLIC BLOOD PRESSURE < 80 MM HG: ICD-10-PCS | Mod: CPTII,,, | Performed by: INTERNAL MEDICINE

## 2022-09-13 PROCEDURE — 3075F PR MOST RECENT SYSTOLIC BLOOD PRESS GE 130-139MM HG: ICD-10-PCS | Mod: CPTII,,, | Performed by: INTERNAL MEDICINE

## 2022-09-13 PROCEDURE — 4010F PR ACE/ARB THEARPY RXD/TAKEN: ICD-10-PCS | Mod: CPTII,,, | Performed by: INTERNAL MEDICINE

## 2022-09-13 PROCEDURE — 1159F PR MEDICATION LIST DOCUMENTED IN MEDICAL RECORD: ICD-10-PCS | Mod: CPTII,,, | Performed by: INTERNAL MEDICINE

## 2022-09-13 PROCEDURE — 99999 PR PBB SHADOW E&M-EST. PATIENT-LVL IV: ICD-10-PCS | Mod: PBBFAC,,, | Performed by: INTERNAL MEDICINE

## 2022-09-13 RX ORDER — BENZONATATE 100 MG/1
100 CAPSULE ORAL 3 TIMES DAILY PRN
Qty: 20 CAPSULE | Refills: 0 | Status: SHIPPED | OUTPATIENT
Start: 2022-09-13 | End: 2022-12-12

## 2022-09-13 RX ORDER — LISINOPRIL 2.5 MG/1
2.5 TABLET ORAL DAILY
COMMUNITY
Start: 2022-07-03 | End: 2022-12-12

## 2022-09-13 RX ORDER — AZELASTINE 1 MG/ML
1 SPRAY, METERED NASAL 2 TIMES DAILY
Qty: 30 ML | Refills: 11 | Status: SHIPPED | OUTPATIENT
Start: 2022-09-13 | End: 2022-09-13 | Stop reason: SDUPTHER

## 2022-09-13 RX ORDER — BENZONATATE 200 MG/1
200 CAPSULE ORAL 3 TIMES DAILY PRN
Qty: 90 CAPSULE | Refills: 1 | Status: SHIPPED | OUTPATIENT
Start: 2022-09-13 | End: 2022-10-13

## 2022-09-13 RX ORDER — AZELASTINE 1 MG/ML
1 SPRAY, METERED NASAL 2 TIMES DAILY
Qty: 30 ML | Refills: 11 | Status: SHIPPED | OUTPATIENT
Start: 2022-09-13 | End: 2024-03-14

## 2022-09-13 NOTE — ASSESSMENT & PLAN NOTE
Etiology unclear.  Based on description of symptoms, it seems most consistent with an upper airway cough syndrome. PFTs and CT Chest are reassuring.     Given her history of oropharyngeal papilloma, I urged patient to follow up with ENT.     She reports extensive allergy symptoms.  I urged her to follow up with her allergist, Dr. Valenzuela.  Suggested daily zyrtec and added Astelin.  If cough continues, could consider Nucala.  Ms. Jaime has had 200-300 eos persistently.  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    If the above is ineffective in isolating and treating the source of her cough, we can try neuromodulators like amitriptyline or gabapentin for cryptogenic cough.

## 2022-09-13 NOTE — PATIENT INSTRUCTIONS
Take zyrtec daily  Take flonase 1 spray in each nostril daily  Take astelin 1 spray in each nostril twice a day  Continue flovent 2 puffs twice daily  Start spiriva daily  Follow up with ENT  Follow up with Allergy

## 2022-10-03 ENCOUNTER — TELEPHONE (OUTPATIENT)
Dept: ORTHOPEDICS | Facility: CLINIC | Age: 62
End: 2022-10-03
Payer: MEDICAID

## 2022-10-03 NOTE — TELEPHONE ENCOUNTER
----- Message from Leyla Hansen sent at 10/3/2022 12:29 PM CDT -----  Contact: pt, [phone]  Patient requests a call back with the time of her surgery scheduled for tomorrow. Please advise.

## 2022-10-03 NOTE — TELEPHONE ENCOUNTER
Left message for patient stating that hospital will contact her this afternoon after 2 with surgery arrival time and fasting instructions.

## 2022-10-04 ENCOUNTER — HOSPITAL ENCOUNTER (OUTPATIENT)
Facility: HOSPITAL | Age: 62
Discharge: HOME | End: 2022-10-04
Attending: ORTHOPAEDIC SURGERY | Admitting: ORTHOPAEDIC SURGERY
Payer: MEDICAID

## 2022-10-04 ENCOUNTER — ANESTHESIA (OUTPATIENT)
Dept: SURGERY | Facility: HOSPITAL | Age: 62
End: 2022-10-04
Payer: MEDICAID

## 2022-10-04 VITALS
TEMPERATURE: 98 F | DIASTOLIC BLOOD PRESSURE: 74 MMHG | SYSTOLIC BLOOD PRESSURE: 125 MMHG | WEIGHT: 160 LBS | RESPIRATION RATE: 16 BRPM | OXYGEN SATURATION: 97 % | HEART RATE: 75 BPM | BODY MASS INDEX: 29.44 KG/M2 | HEIGHT: 62 IN

## 2022-10-04 DIAGNOSIS — M24.542 CONTRACTURE OF JOINT OF FINGER OF LEFT HAND: ICD-10-CM

## 2022-10-04 PROCEDURE — 63600175 PHARM REV CODE 636 W HCPCS: Performed by: ORTHOPAEDIC SURGERY

## 2022-10-04 PROCEDURE — 26115 PR EXC TUM/VASC MAL SFT TISS HAND/FNGR SUBQ <1.5CM: ICD-10-PCS | Mod: 59,51,F1, | Performed by: ORTHOPAEDIC SURGERY

## 2022-10-04 PROCEDURE — 37000008 HC ANESTHESIA 1ST 15 MINUTES: Performed by: ORTHOPAEDIC SURGERY

## 2022-10-04 PROCEDURE — 63600175 PHARM REV CODE 636 W HCPCS: Performed by: NURSE PRACTITIONER

## 2022-10-04 PROCEDURE — 36000707: Performed by: ORTHOPAEDIC SURGERY

## 2022-10-04 PROCEDURE — 26525 RELEASE FINGER CONTRACTURE: CPT | Mod: F1,,, | Performed by: ORTHOPAEDIC SURGERY

## 2022-10-04 PROCEDURE — 63600175 PHARM REV CODE 636 W HCPCS: Performed by: STUDENT IN AN ORGANIZED HEALTH CARE EDUCATION/TRAINING PROGRAM

## 2022-10-04 PROCEDURE — 01830 ANES ARTHR/NDSC WRST/HND NOS: CPT | Performed by: ORTHOPAEDIC SURGERY

## 2022-10-04 PROCEDURE — C1769 GUIDE WIRE: HCPCS | Performed by: ORTHOPAEDIC SURGERY

## 2022-10-04 PROCEDURE — 36000706: Performed by: ORTHOPAEDIC SURGERY

## 2022-10-04 PROCEDURE — 88304 TISSUE EXAM BY PATHOLOGIST: CPT | Performed by: STUDENT IN AN ORGANIZED HEALTH CARE EDUCATION/TRAINING PROGRAM

## 2022-10-04 PROCEDURE — 71000015 HC POSTOP RECOV 1ST HR: Performed by: ORTHOPAEDIC SURGERY

## 2022-10-04 PROCEDURE — 26115 EXC HAND LES SC < 1.5 CM: CPT | Mod: 59,51,F1, | Performed by: ORTHOPAEDIC SURGERY

## 2022-10-04 PROCEDURE — 88305 TISSUE EXAM BY PATHOLOGIST: ICD-10-PCS | Mod: 26,,, | Performed by: STUDENT IN AN ORGANIZED HEALTH CARE EDUCATION/TRAINING PROGRAM

## 2022-10-04 PROCEDURE — 37000009 HC ANESTHESIA EA ADD 15 MINS: Performed by: ORTHOPAEDIC SURGERY

## 2022-10-04 PROCEDURE — 25000003 PHARM REV CODE 250: Performed by: NURSE PRACTITIONER

## 2022-10-04 PROCEDURE — 71000016 HC POSTOP RECOV ADDL HR: Performed by: ORTHOPAEDIC SURGERY

## 2022-10-04 PROCEDURE — 88305 TISSUE EXAM BY PATHOLOGIST: CPT | Mod: 26,,, | Performed by: STUDENT IN AN ORGANIZED HEALTH CARE EDUCATION/TRAINING PROGRAM

## 2022-10-04 PROCEDURE — 26525 PR RELEASE I-P JT CONTRACTURE: ICD-10-PCS | Mod: F1,,, | Performed by: ORTHOPAEDIC SURGERY

## 2022-10-04 PROCEDURE — 25000003 PHARM REV CODE 250: Performed by: NURSE ANESTHETIST, CERTIFIED REGISTERED

## 2022-10-04 PROCEDURE — 63600175 PHARM REV CODE 636 W HCPCS: Performed by: NURSE ANESTHETIST, CERTIFIED REGISTERED

## 2022-10-04 RX ORDER — ACETAMINOPHEN 325 MG/1
650 TABLET ORAL EVERY 4 HOURS PRN
Status: DISCONTINUED | OUTPATIENT
Start: 2022-10-04 | End: 2022-10-04 | Stop reason: HOSPADM

## 2022-10-04 RX ORDER — MORPHINE SULFATE 2 MG/ML
2 INJECTION, SOLUTION INTRAMUSCULAR; INTRAVENOUS EVERY 10 MIN PRN
Status: DISCONTINUED | OUTPATIENT
Start: 2022-10-04 | End: 2022-10-04 | Stop reason: HOSPADM

## 2022-10-04 RX ORDER — SODIUM CHLORIDE, SODIUM LACTATE, POTASSIUM CHLORIDE, CALCIUM CHLORIDE 600; 310; 30; 20 MG/100ML; MG/100ML; MG/100ML; MG/100ML
INJECTION, SOLUTION INTRAVENOUS CONTINUOUS
Status: DISCONTINUED | OUTPATIENT
Start: 2022-10-04 | End: 2022-10-04 | Stop reason: HOSPADM

## 2022-10-04 RX ORDER — MIDAZOLAM HYDROCHLORIDE 1 MG/ML
INJECTION, SOLUTION INTRAMUSCULAR; INTRAVENOUS
Status: DISCONTINUED | OUTPATIENT
Start: 2022-10-04 | End: 2022-10-04

## 2022-10-04 RX ORDER — ONDANSETRON 8 MG/1
8 TABLET, ORALLY DISINTEGRATING ORAL EVERY 8 HOURS PRN
Status: DISCONTINUED | OUTPATIENT
Start: 2022-10-04 | End: 2022-10-04 | Stop reason: HOSPADM

## 2022-10-04 RX ORDER — HYDROCODONE BITARTRATE AND ACETAMINOPHEN 5; 325 MG/1; MG/1
1 TABLET ORAL EVERY 4 HOURS PRN
Qty: 12 TABLET | Refills: 0 | Status: SHIPPED | OUTPATIENT
Start: 2022-10-04 | End: 2022-10-18 | Stop reason: SDUPTHER

## 2022-10-04 RX ORDER — ROPIVACAINE HYDROCHLORIDE 5 MG/ML
INJECTION, SOLUTION EPIDURAL; INFILTRATION; PERINEURAL
Status: DISCONTINUED | OUTPATIENT
Start: 2022-10-04 | End: 2022-10-04

## 2022-10-04 RX ORDER — LIDOCAINE HYDROCHLORIDE 20 MG/ML
INJECTION INTRAVENOUS
Status: DISCONTINUED | OUTPATIENT
Start: 2022-10-04 | End: 2022-10-04

## 2022-10-04 RX ORDER — CEFAZOLIN SODIUM 2 G/50ML
2 SOLUTION INTRAVENOUS
Status: DISCONTINUED | OUTPATIENT
Start: 2022-10-04 | End: 2022-10-04 | Stop reason: HOSPADM

## 2022-10-04 RX ORDER — SCOLOPAMINE TRANSDERMAL SYSTEM 1 MG/1
1 PATCH, EXTENDED RELEASE TRANSDERMAL
Status: DISCONTINUED | OUTPATIENT
Start: 2022-10-04 | End: 2022-10-04 | Stop reason: HOSPADM

## 2022-10-04 RX ORDER — KETOROLAC TROMETHAMINE 30 MG/ML
30 INJECTION, SOLUTION INTRAMUSCULAR; INTRAVENOUS ONCE
Status: COMPLETED | OUTPATIENT
Start: 2022-10-04 | End: 2022-10-04

## 2022-10-04 RX ORDER — LIDOCAINE HYDROCHLORIDE 10 MG/ML
1 INJECTION, SOLUTION EPIDURAL; INFILTRATION; INTRACAUDAL; PERINEURAL ONCE
Status: DISCONTINUED | OUTPATIENT
Start: 2022-10-04 | End: 2022-10-04 | Stop reason: HOSPADM

## 2022-10-04 RX ORDER — PROPOFOL 10 MG/ML
VIAL (ML) INTRAVENOUS
Status: DISCONTINUED | OUTPATIENT
Start: 2022-10-04 | End: 2022-10-04

## 2022-10-04 RX ADMIN — LIDOCAINE HYDROCHLORIDE 50 MG: 20 INJECTION, SOLUTION INTRAVENOUS at 08:10

## 2022-10-04 RX ADMIN — MIDAZOLAM 1 MG: 1 INJECTION INTRAMUSCULAR; INTRAVENOUS at 08:10

## 2022-10-04 RX ADMIN — PROPOFOL 150 MCG/KG/MIN: 10 INJECTION, EMULSION INTRAVENOUS at 08:10

## 2022-10-04 RX ADMIN — PROPOFOL 20 MG: 10 INJECTION, EMULSION INTRAVENOUS at 08:10

## 2022-10-04 RX ADMIN — SODIUM CHLORIDE, SODIUM LACTATE, POTASSIUM CHLORIDE, AND CALCIUM CHLORIDE: .6; .31; .03; .02 INJECTION, SOLUTION INTRAVENOUS at 08:10

## 2022-10-04 RX ADMIN — CEFAZOLIN SODIUM 2 G: 2 SOLUTION INTRAVENOUS at 08:10

## 2022-10-04 RX ADMIN — MIDAZOLAM 2 MG: 1 INJECTION INTRAMUSCULAR; INTRAVENOUS at 07:10

## 2022-10-04 RX ADMIN — KETOROLAC TROMETHAMINE 30 MG: 30 INJECTION, SOLUTION INTRAMUSCULAR at 10:10

## 2022-10-04 RX ADMIN — ROPIVACAINE HYDROCHLORIDE 25 ML: 5 INJECTION, SOLUTION EPIDURAL; INFILTRATION; PERINEURAL at 07:10

## 2022-10-04 RX ADMIN — SCOPALAMINE 1 PATCH: 1 PATCH, EXTENDED RELEASE TRANSDERMAL at 06:10

## 2022-10-04 NOTE — ANESTHESIA PROCEDURE NOTES
Peripheral Block    Patient location during procedure: pre-op    Reason for block: primary anesthetic    Diagnosis: L Finger   Start time: 10/4/2022 7:35 AM  Timeout: 10/4/2022 7:32 AM   End time: 10/4/2022 7:43 AM    Staffing  Authorizing Provider: Basim Rod MD  Performing Provider: Salomon Gardner MD    Preanesthetic Checklist  Completed: patient identified, IV checked, site marked, risks and benefits discussed, surgical consent, monitors and equipment checked, pre-op evaluation and timeout performed  Peripheral Block  Patient position: supine  Prep: ChloraPrep  Patient monitoring: heart rate, cardiac monitor, continuous pulse ox and frequent blood pressure checks  Block type: axillary  Laterality: left  Injection technique: single shot  Needle  Needle gauge: 22 G  Needle length: 4 in  Needle localization: ultrasound guidance     Assessment  Injection assessment: negative aspiration, negative parasthesia and local visualized surrounding nerve  Paresthesia pain: none  Heart rate change: no  Slow fractionated injection: yes  Pain Tolerance: comfortable throughout block

## 2022-10-04 NOTE — ANESTHESIA POSTPROCEDURE EVALUATION
Anesthesia Post Evaluation    Patient: Suly Jaime    Procedure(s) Performed: Procedure(s) (LRB):  RELEASE, CONTRACTURE, JOINT (Left)    Final Anesthesia Type: MAC      Patient location during evaluation: [facility]  Patient participation: Yes- Able to Participate  Level of consciousness: awake and alert and oriented  Post-procedure vital signs: reviewed and stable  Pain management: adequate  Airway patency: patent    PONV status at discharge: No PONV  Anesthetic complications: no      Cardiovascular status: blood pressure returned to baseline, hemodynamically stable and stable  Respiratory status: unassisted, spontaneous ventilation and room air  Hydration status: euvolemic  Follow-up not needed.          Vitals Value Taken Time   BP  10/04/22 1021   Temp  10/04/22 1021   Pulse  10/04/22 1021   Resp  10/04/22 1021   SpO2  10/04/22 1021         No case tracking events are documented in the log.      Pain/Zeke Score: Pain Rating Prior to Med Admin: 0 (10/4/2022 10:07 AM)  Zeke Score: 9 (10/4/2022  7:51 AM)

## 2022-10-04 NOTE — H&P
CC:  Contracture left index finger           HPI:  Suly Jaime is a very pleasant 62 y.o. female sustained injury to her left index finger about 3 months ago when she jammed it  Following this she tried the splints were to treat herself but the finger continued to have difficulty and now has significant stiffness  She has also noticed a small mass near the tip of the finger which she thinks may be related to the previous injury            PAST MEDICAL HISTORY:        Past Medical History:   Diagnosis Date    Allergy      Cardiomyopathy      Hypertension        PAST SURGICAL HISTORY:         Past Surgical History:   Procedure Laterality Date    CARDIAC DEFIBRILLATOR PLACEMENT Left      HYSTERECTOMY        full    KNEE ARTHROSCOPY        LARYNGOSCOPY N/A 2019     Procedure: DIRECT MICRO LARYNGOSCOPY WITH BIOPSY;  Surgeon: Deidre Calderon MD;  Location: [facility] OR;  Service: ENT;  Laterality: N/A;  video    OOPHORECTOMY        REVISION OF IMPLANTABLE CARDIOVERTER-DEFIBRILLATOR (ICD) ELECTRODE LEAD PLACEMENT N/A 2022     Procedure: REVISION, INSERTION, ELECTRODE LEAD, ICD;  Surgeon: Cruzito Tovar MD;  Location: [facility] EP LAB;  Service: Cardiology;  Laterality: N/A;  SINGLE LEAD REVISION, SJM, ANES, EH, 325      FAMILY HISTORY:         Family History   Problem Relation Age of Onset    Diabetes Mother      Diabetes Maternal Grandmother      Heart disease Maternal Grandmother      Allergies Daughter      Asthma Neg Hx      Allergic rhinitis Neg Hx      Angioedema Neg Hx      Atopy Neg Hx      Eczema Neg Hx      Immunodeficiency Neg Hx      Rhinitis Neg Hx      Urticaria Neg Hx        SOCIAL HISTORY:   Social History               Socioeconomic History    Marital status:    Tobacco Use    Smoking status: Former Smoker       Packs/day: 0.50       Quit date:        Years since quittin.4    Smokeless tobacco: Never Used   Substance and Sexual Activity    Alcohol use: No    Drug use: No     Sexual activity: Not Currently       Partners: Male            MEDICATIONS:   Current Outpatient Medications:     calcium carbonate (TUMS) 200 mg calcium (500 mg) chewable tablet, Take 1 tablet by mouth daily as needed for Heartburn., Disp: , Rfl:     fluticasone propionate (FLONASE) 50 mcg/actuation nasal spray, 1 spray by Each Nostril route daily as needed for Rhinitis or Allergies., Disp: , Rfl:     fluticasone propionate (FLOVENT HFA) 110 mcg/actuation inhaler, Inhale 2 puffs into the lungs 2 (two) times daily. Controller, Disp: 12 g, Rfl: 12    latanoprost 0.005 % ophthalmic solution, INSTILL 1 DROP INTO BOTH EYES AT BEDTIME AS DIRECTED, Disp: , Rfl:     losartan (COZAAR) 25 MG tablet, Take 1 tablet (25 mg total) by mouth once daily., Disp: 30 tablet, Rfl: 11    metoprolol succinate (TOPROL-XL) 25 MG 24 hr tablet, Take 1 tablet (25 mg total) by mouth once daily., Disp: 30 tablet, Rfl: 11    montelukast (SINGULAIR) 10 mg tablet, Take 10 mg by mouth every evening., Disp: , Rfl:     mv-mn/folic acid/vit [id number] (ALIVE ONCE DAILY WOMEN 50 PLUS ORAL), Take 1 tablet by mouth once daily., Disp: , Rfl:     tobramycin-dexamethasone 0.3-0.1% (TOBRADEX) 0.3-0.1 % DrpS, SHAKE LIQUID AND INSTILL 1 TO 2 DROPS IN BOTH EYES FOUR TIMES DAILY, Disp: , Rfl:     benzonatate (TESSALON) 100 MG capsule, Take 1 capsule (100 mg total) by mouth 3 (three) times daily as needed for Cough. (Patient not taking: Reported on 6/15/2022), Disp: 30 capsule, Rfl: 0     Current Facility-Administered Medications:     albuterol inhaler 2 puff, 2 puff, Inhalation, Q20 Min PRN, Oh Carrillo MD    EPINEPHrine (EPIPEN) 0.3 mg/0.3 mL pen injection 0.3 mg, 0.3 mg, Intramuscular, PRN, Oh Carrillo MD    methylPREDNISolone sodium succinate injection 40 mg, 40 mg, Intravenous, Once PRN, Oh Carrillo MD     Facility-Administered Medications Ordered in Other Visits:     lidocaine (PF) 10 mg/ml (1%) injection 10 mg, 1 mL, Intradermal, Once,  Deidre Calderon MD    sodium chloride 0.9% bolus 1,000 mL, 1,000 mL, Intravenous, Once, Juliana Cason NP    sodium chloride 0.9% flush 5 mL, 5 mL, Intravenous, PRN, Deidre Calderon MD  ALLERGIES: Review of patient's allergies indicates:  No Known Allergies     Review of Systems:  Constitutional: no fever or chills  ENT: no nasal congestion or sore throat  Respiratory: no cough or shortness of breath  Cardiovascular: no chest pain or palpitations  Gastrointestinal: no nausea or vomiting, PUD, GERD, NSAID intolerance  Genitourinary: no hematuria or dysuria  Integument/Breast: no rash or pruritis  Hematologic/Lymphatic: no easy bruising or lymphadenopathy  Musculoskeletal: see HPI  Neurological: no seizures or tremors  Behavioral/Psych: no auditory or visual hallucinations        Physical Exam       Vitals:     06/15/22 1443   PainSc:   4   PainLoc: Finger         Constitutional: Oriented to person, place, and time. Appears well-developed and well-nourished.   HENT:   Head: Normocephalic and atraumatic.   Nose: Nose normal.   Eyes: No scleral icterus.   Neck: Normal range of motion.   Cardiovascular: Normal rate and regular rhythm.    Pulses:       Radial pulses are 2+ on the right side, and 2+ on the left side.   Pulmonary/Chest: Effort normal and breath sounds normal.   Abdominal: Soft.   Neurological: Alert and oriented to person, place, and time.   Skin: Skin is warm.   Psychiatric: Normal mood and affect.      MUSCULOSKELETAL UPPER EXTREMITY:  Examination left index shows a flexed posture with a flexion contracture of about 45-50 degrees  No active extension or passive extension is possible in she has pain with this maneuver  Flexion is intact no instability  She has some stiffness of the D IP joint with flexion  She also has a palpable soft tissue mass over the pulp of the left index finger which is tender to touch  There is no evidence of infection                 Diagnostic Studies:  AP  "lateral x-ray of the left index finger demonstrates a soft tissue mass consistent with the soft tissue calcification over the distal phalanx              Assessment:  1. Soft tissue calcification left index finger symptomatic.      2. 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  The consent was signed in the office for surgery.        Clyde Banuelos MD (Jay)  [facility]  Orthopedic Upper Extremity Surgery     "

## 2022-10-04 NOTE — OP NOTE
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  PIP flexion contracture left index finger.      2. Soft tissue mass left index finger.      Postop diagnosis: Same.      Operative procedure: 1.  Volar plate release PIP joint left index finger with K-wire fixation.      2. Excisional biopsy soft tissue mass left index finger 1 cm.    Surgeon: Vin.      Anesthesia: Regional block.      EBL: Minimal.      Specimen: Soft tissue mass.      Complications:  None.      Operative procedure in detail as follows:   After operative consent was obtained the patient was brought to the operating room and placed supine on the OR table  Anesthesia by regional block performed by the anesthesia staff.  After the left arm was anesthetized a tourniquet applied to the left arm.  Left arm then prepped and draped out in the normal sterile fashion.  The Esmarch used exsanguinated and the tourniquet inflated 225 millimeters of mercury.  The index finger was then approached with a volar zigzag incision with a 15 blade from the base of the finger to the tip.  Full-thickness skin flaps were raised hemostasis achieved with Bovie.  In the distal aspect of the incision careful dissection was used to expose the soft tissue mass about 1 centimeter in size.  Was carefully exposed and removed sent for pathologic exam.  There was no evidence of infection.      In the proximal aspect of the  incision dissection continued down to the a 3 pulley.  Scar tissue was carefully excised and the A3 pulley opened and reflected.  Both flexor tendons were intact.  There was thickening of the check rein ligaments causing about a 60 degree flexion contracture at the PIP joint.  The check rein ligaments were then completely excised on either side of the volar plate with the [place]otte blade hemostasis achieved with Bovie.  After this had been done the finger was brought into full extension at the level of the PIP joint bringing the volar plate distally.  Good stability was achieved in the collateral ligaments were left intact.  In this position a K-wire was drilled from distal to proximal obliquely across the joint checked under C-arm control achieving good purchase.  It was bent and cut short above the skin.      The wound then irrigated with antibiotic saline solution and closed with running and interrupted 5 0 nylon horizontal mattress suture.  Sterile dressing applied followed by well-padded splint.  Tourniquet deflated patient brought to the recovery room stable condition all sponge needle counts reported as correct no complications            Significant Surgical Tasks Conducted by the Assistant(s), if Applicable: na    Estimated Blood Loss (EBL): * No values recorded between 10/4/2022  8:56 AM and 10/4/2022  9:46 AM *           Implants:   Implant Name Type Inv. Item Serial No.  Lot No. LRB No. 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 PRASHANT CARRILLO JR    Release to patient Immediate        10/04/22 0857                            Condition: Good    Disposition: PACU - hemodynamically stable.    Attestation: I was present and scrubbed for the entire procedure.    Discharge Note    OUTCOME: Patient tolerated treatment/procedure well without complication and is now ready for discharge.    DISPOSITION: Home or Self Care    FINAL DIAGNOSIS:  Contracture of joint of finger of left hand    FOLLOWUP: In clinic    DISCHARGE INSTRUCTIONS:  No discharge procedures on file.

## 2022-10-04 NOTE — TRANSFER OF CARE
"Anesthesia Transfer of Care Note    Patient: Suly Jaime    Procedure(s) Performed: Procedure(s) (LRB):  RELEASE, CONTRACTURE, JOINT (Left)    Patient location: [facility]    Anesthesia Type: MAC    Transport from OR: Transported from OR on room air with adequate spontaneous ventilation    Post pain: adequate analgesia    Post assessment: no apparent anesthetic complications and tolerated procedure well    Post vital signs: stable    Level of consciousness: awake, alert and oriented    Nausea/Vomiting: no nausea/vomiting    Complications: none    Transfer of care protocol was followed      Last vitals:   Visit Vitals  /85 (BP Location: Right arm, Patient Position: Lying)   Pulse 79   Temp 36.7 °C (98.1 °F) (Skin)   Resp 16   Ht 5' 2" (1.575 m)   Wt 72.6 kg (160 lb)   SpO2 97%   Breastfeeding No   BMI 29.26 kg/m²     "

## 2022-10-04 NOTE — PLAN OF CARE
VSS  NAD  Discharge instructions given with claimed understanding by pt and family. Patient has ride home with family or friend. Claims pain level is _____0_ at this time.  Has voided without difficulty if required by surgical type.

## 2022-10-17 NOTE — PROGRESS NOTES
Subjective:      Patient ID: Suly Jaime is a 62 y.o. female.    Chief Complaint: Post-op Evaluation of the Left Hand (Post-op visit for pointing finger left hand.)      CHRISTI  (French)    She is here for a postop visit.     She is s/p Volar plate release PIP joint left index finger with K-wire fixation and excisional biopsy soft tissue mass left index finger 1 cm by Dr. Banuelos on 10/4/22.     Final pathology on mass is pending.     She has mild pain in left finger. She is out of hydrocdone. She has some numbness in finger tip.       Past Medical History:   Diagnosis Date    Allergy     Cardiomyopathy     Hypertension          Current Outpatient Medications:     azelastine (ASTELIN) 137 mcg (0.1 %) nasal spray, 1 spray (137 mcg total) by Nasal route 2 (two) times daily., Disp: 30 mL, Rfl: 11    benzonatate (TESSALON) 100 MG capsule, Take 1 capsule (100 mg total) by mouth 3 (three) times daily as needed for Cough., Disp: 20 capsule, Rfl: 0    calcium carbonate (TUMS) 200 mg calcium (500 mg) chewable tablet, Take 1 tablet by mouth daily as needed for Heartburn., Disp: , Rfl:     esomeprazole (NEXIUM) 40 MG capsule, Take 1 capsule (40 mg total) by mouth before breakfast., Disp: 30 capsule, Rfl: 0    fluticasone propionate (FLONASE) 50 mcg/actuation nasal spray, 1 spray by Each Nostril route daily as needed for Rhinitis or Allergies., Disp: , Rfl:     fluticasone propionate (FLONASE) 50 mcg/actuation nasal spray, SHAKE LIQUID AND USE 1 SPRAY(50 MCG) IN EACH NOSTRIL EVERY DAY, Disp: 16 g, Rfl: 0    fluticasone propionate (FLOVENT HFA) 110 mcg/actuation inhaler, Inhale 2 puffs into the lungs 2 (two) times daily. Controller, Disp: 12 g, Rfl: 12    latanoprost 0.005 % ophthalmic solution, INSTILL 1 DROP INTO BOTH EYES AT BEDTIME AS DIRECTED, Disp: , Rfl:     lisinopriL (PRINIVIL,ZESTRIL) 2.5 MG tablet, Take 2.5 mg by mouth once daily., Disp: , Rfl:     losartan (COZAAR) 25 MG tablet, Take 1 tablet (25 mg total) by mouth once  "daily., Disp: 30 tablet, Rfl: 11    metoprolol succinate (TOPROL-XL) 25 MG 24 hr tablet, Take 1 tablet (25 mg total) by mouth once daily., Disp: 30 tablet, Rfl: 11    montelukast (SINGULAIR) 10 mg tablet, Take 10 mg by mouth every evening., Disp: , Rfl:     mv-mn/folic acid/vit [id number]/qkac150 (ALIVE ONCE DAILY WOMEN 50 PLUS ORAL), Take 1 tablet by mouth once daily., Disp: , Rfl:     tiotropium (SPIRIVA) 18 mcg inhalation capsule, Inhale 1 capsule (18 mcg total) into the lungs once daily. Controller, Disp: 90 capsule, Rfl: 0    tobramycin-dexamethasone 0.3-0.1% (TOBRADEX) 0.3-0.1 % DrpS, SHAKE LIQUID AND INSTILL 1 TO 2 DROPS IN BOTH EYES FOUR TIMES DAILY, Disp: , Rfl:     HYDROcodone-acetaminophen (NORCO) 5-325 mg per tablet, Take 1 tablet by mouth every 8 (eight) hours as needed for Pain., Disp: 15 tablet, Rfl: 0    Current Facility-Administered Medications:     albuterol inhaler 2 puff, 2 puff, Inhalation, Q20 Min PRN, Oh Carrillo MD    EPINEPHrine (EPIPEN) 0.3 mg/0.3 mL pen injection 0.3 mg, 0.3 mg, Intramuscular, PRN, Oh Carrillo MD    methylPREDNISolone sodium succinate injection 40 mg, 40 mg, Intravenous, Once PRN, Oh Carrillo MD    Facility-Administered Medications Ordered in Other Visits:     lidocaine (PF) 10 mg/ml (1%) injection 10 mg, 1 mL, Intradermal, Once, Deidre Calderon MD    sodium chloride 0.9% bolus 1,000 mL, 1,000 mL, Intravenous, Once, Juliana Cason NP    sodium chloride 0.9% flush 5 mL, 5 mL, Intravenous, PRN, Deidre Calderon MD    Review of patient's allergies indicates:  No Known Allergies    Review of Systems   Constitutional: Negative for chills, fever, night sweats and weight gain.   Gastrointestinal:  Negative for bowel incontinence, nausea and vomiting.   Genitourinary:  Negative for bladder incontinence.   Neurological:  Negative for disturbances in coordination and loss of balance.         Objective:        Ht 5' 2" (1.575 m)   Wt 73.1 kg (161 lb 3.2 " oz)   BMI 29.48 kg/m²     Ortho/SPM Exam    Left hand exam:   Incision is clean and dry with sutures intact.   Pin is intact and pin site looks good.   No signs of infection.     She is NVI distally with good capillary refill.         Assessment:       Encounter Diagnoses   Name Primary?    Contracture of joint of finger of left hand Yes    Elective surgery           Plan:       Suly was seen today for post-op evaluation.    Diagnoses and all orders for this visit:    Contracture of joint of finger of left hand  -     HYDROcodone-acetaminophen (NORCO) 5-325 mg per tablet; Take 1 tablet by mouth every 8 (eight) hours as needed for Pain.  -     Ambulatory referral/consult to Physical/Occupational Therapy; Future    Elective surgery  -     HYDROcodone-acetaminophen (NORCO) 5-325 mg per tablet; Take 1 tablet by mouth every 8 (eight) hours as needed for Pain.  -     Ambulatory referral/consult to Physical/Occupational Therapy; Future      She is doing well s/p above surgery. Following plan made:     - Sutures removed without complication. Reviewed wound care.   - Pin pulled without complication. Dry bulky dressing to finger. Can remove after 24 hours and then wash with soap and water.   - OT for left hand here. Will have them make night time splint with finger in extension. They will call her today to schedule.   - Refill of hydrocodone to pharmacy.  reviewed and is appropriate. Should not need additional refills.   - Staff to call with 4 week postop appointment with Dr. Banuelos.     Follow up in about 4 weeks (around 11/15/2022) for postop visit with Dr. Banuelos.

## 2022-10-18 ENCOUNTER — OFFICE VISIT (OUTPATIENT)
Dept: ORTHOPEDICS | Facility: CLINIC | Age: 62
End: 2022-10-18
Payer: MEDICAID

## 2022-10-18 ENCOUNTER — TELEPHONE (OUTPATIENT)
Dept: ORTHOPEDICS | Facility: CLINIC | Age: 62
End: 2022-10-18
Payer: COMMERCIAL

## 2022-10-18 VITALS — BODY MASS INDEX: 29.66 KG/M2 | HEIGHT: 62 IN | WEIGHT: 161.19 LBS

## 2022-10-18 DIAGNOSIS — Z41.9 ELECTIVE SURGERY: ICD-10-CM

## 2022-10-18 DIAGNOSIS — M24.542 CONTRACTURE OF JOINT OF FINGER OF LEFT HAND: Primary | ICD-10-CM

## 2022-10-18 PROCEDURE — 1159F PR MEDICATION LIST DOCUMENTED IN MEDICAL RECORD: ICD-10-PCS | Mod: CPTII,,, | Performed by: PHYSICIAN ASSISTANT

## 2022-10-18 PROCEDURE — 1159F MED LIST DOCD IN RCRD: CPT | Mod: CPTII,,, | Performed by: PHYSICIAN ASSISTANT

## 2022-10-18 PROCEDURE — 4010F PR ACE/ARB THEARPY RXD/TAKEN: ICD-10-PCS | Mod: CPTII,,, | Performed by: PHYSICIAN ASSISTANT

## 2022-10-18 PROCEDURE — 99999 PR PBB SHADOW E&M-EST. PATIENT-LVL V: ICD-10-PCS | Mod: PBBFAC,,, | Performed by: PHYSICIAN ASSISTANT

## 2022-10-18 PROCEDURE — 99024 PR POST-OP FOLLOW-UP VISIT: ICD-10-PCS | Mod: ,,, | Performed by: PHYSICIAN ASSISTANT

## 2022-10-18 PROCEDURE — 1160F RVW MEDS BY RX/DR IN RCRD: CPT | Mod: CPTII,,, | Performed by: PHYSICIAN ASSISTANT

## 2022-10-18 PROCEDURE — 99999 PR PBB SHADOW E&M-EST. PATIENT-LVL V: CPT | Mod: PBBFAC,,, | Performed by: PHYSICIAN ASSISTANT

## 2022-10-18 PROCEDURE — 1160F PR REVIEW ALL MEDS BY PRESCRIBER/CLIN PHARMACIST DOCUMENTED: ICD-10-PCS | Mod: CPTII,,, | Performed by: PHYSICIAN ASSISTANT

## 2022-10-18 PROCEDURE — 99024 POSTOP FOLLOW-UP VISIT: CPT | Mod: ,,, | Performed by: PHYSICIAN ASSISTANT

## 2022-10-18 PROCEDURE — 99215 OFFICE O/P EST HI 40 MIN: CPT | Mod: PBBFAC,PN | Performed by: PHYSICIAN ASSISTANT

## 2022-10-18 PROCEDURE — 4010F ACE/ARB THERAPY RXD/TAKEN: CPT | Mod: CPTII,,, | Performed by: PHYSICIAN ASSISTANT

## 2022-10-18 RX ORDER — HYDROCODONE BITARTRATE AND ACETAMINOPHEN 5; 325 MG/1; MG/1
1 TABLET ORAL EVERY 8 HOURS PRN
Qty: 15 TABLET | Refills: 0 | Status: SHIPPED | OUTPATIENT
Start: 2022-10-18 | End: 2022-12-12

## 2022-10-18 NOTE — PATIENT INSTRUCTIONS
It was nice to meet you today! I am sorry that getting the stitches and pin out hurt so much.     Keep dressing on finger until tomorrow and keep it dry. After this, you can wash with soap and water.     I sent a refill of hydrocodone to your pharmacy to help with severe pain. Take only as needed. Remember this can make you sleepy and/or constipated.    [facility] They should call you to set up, but if not you can call [phone].      Dr. Banuelos's staff will call you with a follow up with him in 4 weeks.     Please stay in touch and call me if you need anything. You can also send me a message in [facility].     Alexia   [phone]

## 2022-10-18 NOTE — TELEPHONE ENCOUNTER
----- Message from Lizabeth Rodríguez MA sent at 10/18/2022  2:16 PM CDT -----  Please help with assisting this patient with a post-op visit with Dr. Banuelos please.

## 2022-10-20 PROBLEM — Z74.09 IMPAIRED MOBILITY AND ACTIVITIES OF DAILY LIVING: Status: ACTIVE | Noted: 2022-10-20

## 2022-10-20 PROBLEM — Z78.9 IMPAIRED MOBILITY AND ACTIVITIES OF DAILY LIVING: Status: ACTIVE | Noted: 2022-10-20

## 2022-10-24 ENCOUNTER — TELEPHONE (OUTPATIENT)
Dept: ALLERGY | Facility: CLINIC | Age: 62
End: 2022-10-24
Payer: COMMERCIAL

## 2022-10-24 ENCOUNTER — PATIENT MESSAGE (OUTPATIENT)
Dept: ALLERGY | Facility: CLINIC | Age: 62
End: 2022-10-24
Payer: COMMERCIAL

## 2022-10-24 NOTE — TELEPHONE ENCOUNTER
Phone call to pt regarding rescheduling appt with Dr Valenzuela on 10/25/2022. Pt unsure of her future schedule, so advised to call office to reschedule.  Also sent portal message with call back info.

## 2022-10-26 LAB
FINAL PATHOLOGIC DIAGNOSIS: NORMAL
GROSS: NORMAL
Lab: NORMAL

## 2022-11-16 ENCOUNTER — E-VISIT (OUTPATIENT)
Dept: INTERNAL MEDICINE | Facility: CLINIC | Age: 62
End: 2022-11-16
Payer: MEDICAID

## 2022-11-16 ENCOUNTER — PATIENT MESSAGE (OUTPATIENT)
Dept: INTERNAL MEDICINE | Facility: CLINIC | Age: 62
End: 2022-11-16

## 2022-11-16 ENCOUNTER — TELEPHONE (OUTPATIENT)
Dept: INTERNAL MEDICINE | Facility: CLINIC | Age: 62
End: 2022-11-16
Payer: COMMERCIAL

## 2022-11-16 ENCOUNTER — TELEPHONE (OUTPATIENT)
Dept: ELECTROPHYSIOLOGY | Facility: CLINIC | Age: 62
End: 2022-11-16
Payer: COMMERCIAL

## 2022-11-16 DIAGNOSIS — B34.9 VIRAL ILLNESS: Primary | ICD-10-CM

## 2022-11-16 PROCEDURE — 99421 PR E&M, ONLINE DIGIT, EST, < 7 DAYS, 5-10 MINS: ICD-10-PCS | Mod: ,,, | Performed by: INTERNAL MEDICINE

## 2022-11-16 PROCEDURE — 99421 OL DIG E/M SVC 5-10 MIN: CPT | Mod: ,,, | Performed by: INTERNAL MEDICINE

## 2022-11-16 NOTE — TELEPHONE ENCOUNTER
Called Ms. Jaime to offer her the next available with Leni Ludwig which is 2/6/23. Left message and call back number.    ----- Message from Maribel Baldwin sent at 11/16/2022  8:39 AM CST -----  Regarding: reschedule appt  The pt is calling to reschedule her appt because she has the  flu. Please call her back  @ [phone]. Thanks, Maribel

## 2022-11-17 ENCOUNTER — NURSE TRIAGE (OUTPATIENT)
Dept: ADMINISTRATIVE | Facility: CLINIC | Age: 62
End: 2022-11-17
Payer: COMMERCIAL

## 2022-11-17 ENCOUNTER — TELEPHONE (OUTPATIENT)
Dept: FAMILY MEDICINE | Facility: CLINIC | Age: 62
End: 2022-11-17
Payer: COMMERCIAL

## 2022-11-17 ENCOUNTER — PATIENT MESSAGE (OUTPATIENT)
Dept: INTERNAL MEDICINE | Facility: CLINIC | Age: 62
End: 2022-11-17
Payer: COMMERCIAL

## 2022-11-17 NOTE — TELEPHONE ENCOUNTER
----- Message from Emanuel Boston sent at 11/17/2022  2:24 PM CST -----  .Type:  Patient Returning Call    Who Called:Pt  Who Left Message for Patient:Maddy  Would the patient rather a call back or a response via [facility]? Call  Best Call Back Number:[phone]

## 2022-11-17 NOTE — PROGRESS NOTES
Patient ID: Suly Jaime is a 62 y.o. female.    Chief Complaint: No chief complaint on file.    The patient initiated a request through [facility] on 11/16/2022 for evaluation and management with a chief complaint of flu symptoms    I evaluated the questionnaire submission on 11/16/22 and 11/17/22    [facility] Peq Evisit Upper Respitatory/Cough Questionnaire    11/16/2022 10:46 AM CST - Filed by Patient   Do you agree to participate in an E-Visit? Yes   If you have any of the following symptoms, please present to your local ER or call 911:  I acknowledge   What is the main issue that you would like for your doctor to address today? Flu systems.  Fatigue, caugh runny nose ans no appreciate   Are you able to take your vital signs? No   What symptoms do you currently have?  Chills;  Cough;  Fatigue;  Headache;  Nasal Congestion;  Nausea;  Runny nose   Have you had a fever? Yes   What has been the range of your fever? I have not checked my temperature with a thermometer   When did your symptoms first appear? 11/11/2022   In the last two weeks, have you been in close contact with someone who has COVID-19 or the Flu? No   In the last two weeks, have you worked or volunteered in a healthcare facility or as a ? Healthcare facilities include a hospital, medical or dental clinic, long-term care facility, or nursing home No   Do you live in a long-term care facility, nursing home, or homeless shelter? No   List what you have done or taken to help your symptoms. Over counter medicine   How severe are your symptoms? Severe   Have you taken an at home Covid test? No   Have you taken a Flu test? No   Have you been fully vaccinated for COVID? (2 Pfizer, 2 Moderna or 1 Yung & Yung vaccine injections) Yes   Have you received a booster? No   Have you recieved a Flu shot? No   Do you have transportation to get tested for COVID if it is indicated and ordered for you at an [facility] location? No   Provide any information you  "feel is important to your history not asked above    Please attach any relevant images or files         Active Problem List with Overview Notes    Diagnosis Date Noted    Impaired mobility and activities of daily living 10/20/2022    SALAZAR (dyspnea on exertion) 07/26/2022    Pure hypercholesterolemia 07/26/2022    Preoperative clearance 07/26/2022    Coronary artery disease involving native coronary artery of native heart without angina pectoris 07/26/2022    Contracture of joint of finger of left hand 06/15/2022    Chest pain 06/03/2022    Cardiomyopathy, nonischemic 03/24/2022    CHF (congestive heart failure), NYHA class II, chronic, systolic 03/24/2022    Post-nasal drip 05/29/2020    Restrictive lung disease 05/29/2020    Chronic cough     Oropharyngeal lesion 02/18/2019    Abdominal pain 08/24/2017      Recent Labs Obtained:  No visits with results within 7 Day(s) from this visit.   Latest known visit with results is:   Admission on 10/04/2022, Discharged on 10/04/2022   Component Date Value Ref Range Status    Final Pathologic Diagnosis 10/04/2022    Final                    Value:Submitted as "left hand soft tissue mass," excision:  - Benign ovoid body consisting of calcified/ ossified fibroconnective tissue  (see comment)  - No evidence of malignancy  Comment:  The histologic findings are nonspecific, but raise potential considerations  such as osteophyte formation or focus of dystrophic calcification, among  other causes of benign calcified nodules.  Clinical and radiological  correlation is required.  This case was reviewed in consultation with Dr. Alvarado, who concurs with the  above findings.      Interp By Bucky Peraza M.D., Signed on 10/26/2022 at 19:32    Gross 10/04/2022    Final                    Value:[id number]  Patient MRN as documented on container and clinic/AP label.  Received fresh, labeled soft tissue mass, is a 0.5 cm well-circumscribed mass  of tissue.  The external surface is smooth and " white.  Submitted as received.   CZA--1-A  Grossed by VALENTÍN  [facility]      Disclaimer 10/04/2022    Final                    Value:Unless the case is a 'gross only' or additional testing only, the final  diagnosis for each specimen is based on a microscopic examination of  appropriate tissue sections.         Encounter Diagnosis   Name Primary?    Viral illness Yes        Orders Placed This Encounter   Procedures    POCT COVID-19 Rapid Screening     Order Specific Question:   Is the patient symptomatic?     Answer:   Yes    POCT Influenza A/B Molecular            E-Visit Time Tracking:    Day 1 Time (in minutes): 10     Total Time (in minutes): 10         Assessment and plan:  You have a viral illness which is not covid or the flu.      I recommend treating with over the counter medications. For example, you can use tylenol or ibuprofen as needed for fever or body aches; mucinex and flonase as needed for nasal congestion; and coricidin HBP for congestion and cough. Please get lots of rest and stay well hydrated.     RTC PRN

## 2022-11-17 NOTE — TELEPHONE ENCOUNTER
O[facility] RN   Patient calling about covid and flu test.  Tested negative.  Sob, upper back mid, pain. Constant and worse when I breathe.   This is the 5th day  Denies fever.   Thinks had a light fever yesterday.     Sob now.  Other line ringing. I was placed on hold, the other call was missed, looks like '\s office.   I completed triage and Dispo is to call 911 now.  Patient Vu.    Reason for Disposition   SEVERE difficulty breathing (e.g., struggling for each breath, speaks in single words, pulse > 120)    Protocols used: Breathing Difficulty-A-OH

## 2022-11-17 NOTE — TELEPHONE ENCOUNTER
----- Message from Capo Burns sent at 2022  2:09 PM CST -----  .Type:  Needs Medical Advice    Who Called: pt  Symptoms (please be specific): flu like symptoms, fever  How long has patient had these symptoms:  two days    Would the patient rather a call back or a response via [facility]? call  Best Call Back Number: [phone]  Additional Information:     Caller stated that her brother  for pneumonia and she is concerned

## 2022-11-17 NOTE — TELEPHONE ENCOUNTER
.                            Refill request was sent to Dr Tesfaye moscoso to call back

## 2022-11-26 ENCOUNTER — CLINICAL SUPPORT (OUTPATIENT)
Dept: CARDIOLOGY | Facility: HOSPITAL | Age: 62
End: 2022-11-26
Payer: COMMERCIAL

## 2022-11-26 DIAGNOSIS — I42.9 CARDIOMYOPATHY, UNSPECIFIED: ICD-10-CM

## 2022-11-26 DIAGNOSIS — Z95.810 PRESENCE OF AUTOMATIC (IMPLANTABLE) CARDIAC DEFIBRILLATOR: ICD-10-CM

## 2022-11-26 PROCEDURE — 93296 REM INTERROG EVL PM/IDS: CPT | Performed by: INTERNAL MEDICINE

## 2022-12-02 ENCOUNTER — TELEPHONE (OUTPATIENT)
Dept: FAMILY MEDICINE | Facility: CLINIC | Age: 62
End: 2022-12-02
Payer: COMMERCIAL

## 2022-12-02 DIAGNOSIS — I50.22 CHF (CONGESTIVE HEART FAILURE), NYHA CLASS II, CHRONIC, SYSTOLIC: Primary | Chronic | ICD-10-CM

## 2022-12-02 DIAGNOSIS — I25.10 CORONARY ARTERY DISEASE INVOLVING NATIVE CORONARY ARTERY OF NATIVE HEART WITHOUT ANGINA PECTORIS: ICD-10-CM

## 2022-12-02 DIAGNOSIS — E78.00 PURE HYPERCHOLESTEROLEMIA: ICD-10-CM

## 2022-12-02 NOTE — TELEPHONE ENCOUNTER
Pt has new pt appt scheduled with Dr. Terry on 12/12/2022. Pt also has lab appointment scheduled. Please place lab orders for pt to get done before visit. Please advise.

## 2022-12-02 NOTE — TELEPHONE ENCOUNTER
----- Message from Marybeth Veloz sent at 12/2/2022 10:39 AM CST -----  .Type:  Sooner Apoointment Request    Caller is requesting a sooner appointment.  Caller declined first available appointment listed below.  Caller will not accept being placed on the waitlist and is requesting a message be sent to doctor.  Name of Caller:pt  Would the patient rather a call back or a response via [facility]? call  Best Call Back Number: [phone]  Additional Information:     Pt is trying to establish care with a new PCP  Books were closed

## 2022-12-05 ENCOUNTER — LAB VISIT (OUTPATIENT)
Dept: LAB | Facility: HOSPITAL | Age: 62
End: 2022-12-05
Attending: STUDENT IN AN ORGANIZED HEALTH CARE EDUCATION/TRAINING PROGRAM
Payer: COMMERCIAL

## 2022-12-05 DIAGNOSIS — I25.10 CORONARY ARTERY DISEASE INVOLVING NATIVE CORONARY ARTERY OF NATIVE HEART WITHOUT ANGINA PECTORIS: ICD-10-CM

## 2022-12-05 DIAGNOSIS — I50.22 CHF (CONGESTIVE HEART FAILURE), NYHA CLASS II, CHRONIC, SYSTOLIC: Chronic | ICD-10-CM

## 2022-12-05 DIAGNOSIS — E78.00 PURE HYPERCHOLESTEROLEMIA: ICD-10-CM

## 2022-12-05 LAB
ALBUMIN SERPL BCP-MCNC: 3.6 G/DL (ref 3.5–5.2)
ALP SERPL-CCNC: 93 U/L (ref 55–135)
ALT SERPL W/O P-5'-P-CCNC: 23 U/L (ref 10–44)
ANION GAP SERPL CALC-SCNC: 11 MMOL/L (ref 8–16)
AST SERPL-CCNC: 13 U/L (ref 10–40)
BILIRUB SERPL-MCNC: 0.4 MG/DL (ref 0.1–1)
BUN SERPL-MCNC: 13 MG/DL (ref 8–23)
CALCIUM SERPL-MCNC: 9.5 MG/DL (ref 8.7–10.5)
CHLORIDE SERPL-SCNC: 106 MMOL/L (ref 95–110)
CHOLEST SERPL-MCNC: 242 MG/DL (ref 120–199)
CHOLEST/HDLC SERPL: 4.9 {RATIO} (ref 2–5)
CO2 SERPL-SCNC: 24 MMOL/L (ref 23–29)
CREAT SERPL-MCNC: 0.8 MG/DL (ref 0.5–1.4)
ERYTHROCYTE [DISTWIDTH] IN BLOOD BY AUTOMATED COUNT: 13.4 % (ref 11.5–14.5)
EST. GFR  (NO RACE VARIABLE): >60 ML/MIN/1.73 M^2
ESTIMATED AVG GLUCOSE: 137 MG/DL (ref 68–131)
GLUCOSE SERPL-MCNC: 116 MG/DL (ref 70–110)
HBA1C MFR BLD: 6.4 % (ref 4–5.6)
HCT VFR BLD AUTO: 42.4 % (ref 37–48.5)
HDLC SERPL-MCNC: 49 MG/DL (ref 40–75)
HDLC SERPL: 20.2 % (ref 20–50)
HGB BLD-MCNC: 13.5 G/DL (ref 12–16)
LDLC SERPL CALC-MCNC: 148.2 MG/DL (ref 63–159)
MCH RBC QN AUTO: 28.4 PG (ref 27–31)
MCHC RBC AUTO-ENTMCNC: 31.8 G/DL (ref 32–36)
MCV RBC AUTO: 89 FL (ref 82–98)
NONHDLC SERPL-MCNC: 193 MG/DL
PLATELET # BLD AUTO: 324 K/UL (ref 150–450)
PMV BLD AUTO: 11.1 FL (ref 9.2–12.9)
POTASSIUM SERPL-SCNC: 4.5 MMOL/L (ref 3.5–5.1)
PROT SERPL-MCNC: 7.4 G/DL (ref 6–8.4)
RBC # BLD AUTO: 4.76 M/UL (ref 4–5.4)
SODIUM SERPL-SCNC: 141 MMOL/L (ref 136–145)
TRIGL SERPL-MCNC: 224 MG/DL (ref 30–150)
TSH SERPL DL<=0.005 MIU/L-ACNC: 1.7 UIU/ML (ref 0.4–4)
WBC # BLD AUTO: 7.81 K/UL (ref 3.9–12.7)

## 2022-12-05 PROCEDURE — 80061 LIPID PANEL: CPT | Performed by: STUDENT IN AN ORGANIZED HEALTH CARE EDUCATION/TRAINING PROGRAM

## 2022-12-05 PROCEDURE — 36415 COLL VENOUS BLD VENIPUNCTURE: CPT | Performed by: STUDENT IN AN ORGANIZED HEALTH CARE EDUCATION/TRAINING PROGRAM

## 2022-12-05 PROCEDURE — 85027 COMPLETE CBC AUTOMATED: CPT | Performed by: STUDENT IN AN ORGANIZED HEALTH CARE EDUCATION/TRAINING PROGRAM

## 2022-12-05 PROCEDURE — 83036 HEMOGLOBIN GLYCOSYLATED A1C: CPT | Performed by: STUDENT IN AN ORGANIZED HEALTH CARE EDUCATION/TRAINING PROGRAM

## 2022-12-05 PROCEDURE — 80053 COMPREHEN METABOLIC PANEL: CPT | Performed by: STUDENT IN AN ORGANIZED HEALTH CARE EDUCATION/TRAINING PROGRAM

## 2022-12-05 PROCEDURE — 84443 ASSAY THYROID STIM HORMONE: CPT | Performed by: STUDENT IN AN ORGANIZED HEALTH CARE EDUCATION/TRAINING PROGRAM

## 2022-12-12 ENCOUNTER — OFFICE VISIT (OUTPATIENT)
Dept: FAMILY MEDICINE | Facility: CLINIC | Age: 62
End: 2022-12-12
Payer: MEDICAID

## 2022-12-12 VITALS
DIASTOLIC BLOOD PRESSURE: 80 MMHG | SYSTOLIC BLOOD PRESSURE: 126 MMHG | BODY MASS INDEX: 28.32 KG/M2 | HEART RATE: 98 BPM | OXYGEN SATURATION: 98 % | WEIGHT: 153.88 LBS | HEIGHT: 62 IN | TEMPERATURE: 98 F

## 2022-12-12 DIAGNOSIS — J98.4 RESTRICTIVE LUNG DISEASE: Chronic | ICD-10-CM

## 2022-12-12 DIAGNOSIS — Z11.4 ENCOUNTER FOR SCREENING FOR HIV: ICD-10-CM

## 2022-12-12 DIAGNOSIS — I25.10 CORONARY ARTERY DISEASE INVOLVING NATIVE CORONARY ARTERY OF NATIVE HEART WITHOUT ANGINA PECTORIS: ICD-10-CM

## 2022-12-12 DIAGNOSIS — Z12.11 SCREENING FOR MALIGNANT NEOPLASM OF COLON: ICD-10-CM

## 2022-12-12 DIAGNOSIS — I42.8 CARDIOMYOPATHY, NONISCHEMIC: ICD-10-CM

## 2022-12-12 DIAGNOSIS — Z11.59 ENCOUNTER FOR HEPATITIS C SCREENING TEST FOR LOW RISK PATIENT: ICD-10-CM

## 2022-12-12 DIAGNOSIS — R05.3 CHRONIC COUGH: Chronic | ICD-10-CM

## 2022-12-12 DIAGNOSIS — E88.819 INSULIN RESISTANCE: ICD-10-CM

## 2022-12-12 DIAGNOSIS — E78.2 MIXED HYPERLIPIDEMIA: ICD-10-CM

## 2022-12-12 DIAGNOSIS — Z76.89 ENCOUNTER TO ESTABLISH CARE WITH NEW DOCTOR: Primary | ICD-10-CM

## 2022-12-12 PROBLEM — Z01.818 PREOPERATIVE CLEARANCE: Status: RESOLVED | Noted: 2022-07-26 | Resolved: 2022-12-12

## 2022-12-12 PROBLEM — R07.9 CHEST PAIN: Status: RESOLVED | Noted: 2022-06-03 | Resolved: 2022-12-12

## 2022-12-12 PROBLEM — R10.9 ABDOMINAL PAIN: Status: RESOLVED | Noted: 2017-08-24 | Resolved: 2022-12-12

## 2022-12-12 PROBLEM — M24.542 CONTRACTURE OF JOINT OF FINGER OF LEFT HAND: Status: RESOLVED | Noted: 2022-06-15 | Resolved: 2022-12-12

## 2022-12-12 PROBLEM — Z78.9 IMPAIRED MOBILITY AND ACTIVITIES OF DAILY LIVING: Status: RESOLVED | Noted: 2022-10-20 | Resolved: 2022-12-12

## 2022-12-12 PROBLEM — R06.09 DOE (DYSPNEA ON EXERTION): Status: RESOLVED | Noted: 2022-07-26 | Resolved: 2022-12-12

## 2022-12-12 PROBLEM — R09.82 POST-NASAL DRIP: Status: RESOLVED | Noted: 2020-05-29 | Resolved: 2022-12-12

## 2022-12-12 PROBLEM — Z74.09 IMPAIRED MOBILITY AND ACTIVITIES OF DAILY LIVING: Status: RESOLVED | Noted: 2022-10-20 | Resolved: 2022-12-12

## 2022-12-12 PROCEDURE — 99999 PR PBB SHADOW E&M-EST. PATIENT-LVL IV: ICD-10-PCS | Mod: PBBFAC,,, | Performed by: STUDENT IN AN ORGANIZED HEALTH CARE EDUCATION/TRAINING PROGRAM

## 2022-12-12 PROCEDURE — 3008F PR BODY MASS INDEX (BMI) DOCUMENTED: ICD-10-PCS | Mod: CPTII,,, | Performed by: STUDENT IN AN ORGANIZED HEALTH CARE EDUCATION/TRAINING PROGRAM

## 2022-12-12 PROCEDURE — 99999 PR PBB SHADOW E&M-EST. PATIENT-LVL IV: CPT | Mod: PBBFAC,,, | Performed by: STUDENT IN AN ORGANIZED HEALTH CARE EDUCATION/TRAINING PROGRAM

## 2022-12-12 PROCEDURE — 3044F HG A1C LEVEL LT 7.0%: CPT | Mod: CPTII,,, | Performed by: STUDENT IN AN ORGANIZED HEALTH CARE EDUCATION/TRAINING PROGRAM

## 2022-12-12 PROCEDURE — 3074F PR MOST RECENT SYSTOLIC BLOOD PRESSURE < 130 MM HG: ICD-10-PCS | Mod: CPTII,,, | Performed by: STUDENT IN AN ORGANIZED HEALTH CARE EDUCATION/TRAINING PROGRAM

## 2022-12-12 PROCEDURE — 1159F MED LIST DOCD IN RCRD: CPT | Mod: CPTII,,, | Performed by: STUDENT IN AN ORGANIZED HEALTH CARE EDUCATION/TRAINING PROGRAM

## 2022-12-12 PROCEDURE — 1160F RVW MEDS BY RX/DR IN RCRD: CPT | Mod: CPTII,,, | Performed by: STUDENT IN AN ORGANIZED HEALTH CARE EDUCATION/TRAINING PROGRAM

## 2022-12-12 PROCEDURE — 99214 OFFICE O/P EST MOD 30 MIN: CPT | Mod: PBBFAC,PN | Performed by: STUDENT IN AN ORGANIZED HEALTH CARE EDUCATION/TRAINING PROGRAM

## 2022-12-12 PROCEDURE — 1160F PR REVIEW ALL MEDS BY PRESCRIBER/CLIN PHARMACIST DOCUMENTED: ICD-10-PCS | Mod: CPTII,,, | Performed by: STUDENT IN AN ORGANIZED HEALTH CARE EDUCATION/TRAINING PROGRAM

## 2022-12-12 PROCEDURE — 4010F ACE/ARB THERAPY RXD/TAKEN: CPT | Mod: CPTII,,, | Performed by: STUDENT IN AN ORGANIZED HEALTH CARE EDUCATION/TRAINING PROGRAM

## 2022-12-12 PROCEDURE — 3079F DIAST BP 80-89 MM HG: CPT | Mod: CPTII,,, | Performed by: STUDENT IN AN ORGANIZED HEALTH CARE EDUCATION/TRAINING PROGRAM

## 2022-12-12 PROCEDURE — 3008F BODY MASS INDEX DOCD: CPT | Mod: CPTII,,, | Performed by: STUDENT IN AN ORGANIZED HEALTH CARE EDUCATION/TRAINING PROGRAM

## 2022-12-12 PROCEDURE — 4010F PR ACE/ARB THEARPY RXD/TAKEN: ICD-10-PCS | Mod: CPTII,,, | Performed by: STUDENT IN AN ORGANIZED HEALTH CARE EDUCATION/TRAINING PROGRAM

## 2022-12-12 PROCEDURE — 90686 IIV4 VACC NO PRSV 0.5 ML IM: CPT | Mod: PBBFAC,PN

## 2022-12-12 PROCEDURE — 3044F PR MOST RECENT HEMOGLOBIN A1C LEVEL <7.0%: ICD-10-PCS | Mod: CPTII,,, | Performed by: STUDENT IN AN ORGANIZED HEALTH CARE EDUCATION/TRAINING PROGRAM

## 2022-12-12 PROCEDURE — 99214 OFFICE O/P EST MOD 30 MIN: CPT | Mod: S$PBB,,, | Performed by: STUDENT IN AN ORGANIZED HEALTH CARE EDUCATION/TRAINING PROGRAM

## 2022-12-12 PROCEDURE — 99214 PR OFFICE/OUTPT VISIT, EST, LEVL IV, 30-39 MIN: ICD-10-PCS | Mod: S$PBB,,, | Performed by: STUDENT IN AN ORGANIZED HEALTH CARE EDUCATION/TRAINING PROGRAM

## 2022-12-12 PROCEDURE — 3079F PR MOST RECENT DIASTOLIC BLOOD PRESSURE 80-89 MM HG: ICD-10-PCS | Mod: CPTII,,, | Performed by: STUDENT IN AN ORGANIZED HEALTH CARE EDUCATION/TRAINING PROGRAM

## 2022-12-12 PROCEDURE — 3074F SYST BP LT 130 MM HG: CPT | Mod: CPTII,,, | Performed by: STUDENT IN AN ORGANIZED HEALTH CARE EDUCATION/TRAINING PROGRAM

## 2022-12-12 PROCEDURE — 1159F PR MEDICATION LIST DOCUMENTED IN MEDICAL RECORD: ICD-10-PCS | Mod: CPTII,,, | Performed by: STUDENT IN AN ORGANIZED HEALTH CARE EDUCATION/TRAINING PROGRAM

## 2022-12-12 RX ORDER — LOSARTAN POTASSIUM 25 MG/1
25 TABLET ORAL DAILY
Qty: 90 TABLET | Refills: 3 | Status: SHIPPED | OUTPATIENT
Start: 2022-12-12 | End: 2024-03-14

## 2022-12-12 RX ORDER — METFORMIN HYDROCHLORIDE 500 MG/1
500 TABLET, EXTENDED RELEASE ORAL
Qty: 90 TABLET | Refills: 3 | Status: SHIPPED | OUTPATIENT
Start: 2022-12-12 | End: 2024-03-14

## 2022-12-12 RX ORDER — PRAVASTATIN SODIUM 10 MG/1
10 TABLET ORAL DAILY
Qty: 90 TABLET | Refills: 3 | Status: SHIPPED | OUTPATIENT
Start: 2022-12-12 | End: 2023-03-13

## 2022-12-12 NOTE — PATIENT INSTRUCTIONS
"- CoQ10 for muscle aches dc associated with "statin" medication   - Start magnesium (glycinate) Pure encapsulations or similar brand on [employer]; start with 100mg at bedtime increase to 500mg as tolerated   - START fish oil OTC for triglycerides   - STOP lisinopril; START losartan   - START Metformin for diabetes prevention   - START Pravastatin for cholesterol   - Complete COLOGUARD once comes in mail   "

## 2022-12-12 NOTE — PROGRESS NOTES
Subjective:       Patient ID: Suly Jaime is a 62 y.o. female.    Chief Complaint: Establish Care (Pt here to est care) and Insomnia (Trouble sleeping/ noticed it about a month ago)    Patient Active Problem List    Diagnosis Date Noted    Insulin resistance 12/12/2022     A1C 6.4  Discussed diet and exercise in detail  Advised to consider starting metformin for prevention; she agrees to do this as well as limit sugars and carbs       Mixed hyperlipidemia 07/26/2022     LDL elevated  Not on statin  Given risk factors highly advised starting statin; she is agreeable   Triglycerides also elevated advised fish oil OTC       Coronary artery disease involving native coronary artery of native heart without angina pectoris 07/26/2022     Follows with cards   BB, ACE  No statin      Cardiomyopathy, nonischemic 03/24/2022     Follows with cards  Metoprolol 25   ACE  Asymptomatic       CHF (congestive heart failure), NYHA class II, chronic, systolic 03/24/2022    Restrictive lung disease 05/29/2020     Singulair   flovent PRN  Cough continues          Chronic cough      On lisinopril still   Will stop and switch to losartan   Dry cough, all the time, never stops       Oropharyngeal lesion 02/18/2019      C/O RLS and cramps legs as well as insomnia; will trial Mag first before medications    Review of Systems   Constitutional:  Negative for fever.   HENT: Negative.     Respiratory:  Negative for cough, shortness of breath and wheezing.    Cardiovascular:  Negative for chest pain and leg swelling.   Gastrointestinal:  Negative for abdominal pain, diarrhea, nausea and vomiting.   Genitourinary: Negative.  Negative for difficulty urinating, dysuria and frequency.   Musculoskeletal:  Positive for myalgias.   Neurological: Negative.  Negative for dizziness, numbness and headaches.   Psychiatric/Behavioral:  Positive for sleep disturbance. Negative for dysphoric mood. The patient is not nervous/anxious.       A1C:  Recent Labs    Lab 12/05/22  1030   Hemoglobin A1C 6.4 H     CBC:  Recent Labs   Lab 06/06/22  0528 06/07/22  0354 12/05/22  1030   WBC 9.04 10.75 7.81   RBC 5.01 4.73 4.76   Hemoglobin 14.0 13.4 13.5   Hematocrit 43.0 41.3 42.4   Platelets 232 241 324   MCV 86 87 89   MCH 27.9 28.3 28.4   MCHC 32.6 32.4 31.8 L     CMP:  Recent Labs   Lab 06/06/22  0528 06/07/22  0354 12/05/22  1030   Glucose 102 101 116 H   Calcium 9.5 9.2 9.5   Albumin 3.7 3.5 3.6   Total Protein 7.1 7.1 7.4   Sodium 139 136 141   Potassium 5.0 4.3 4.5   CO2 25 24 24   Chloride 104 102 106   BUN 15 16 13   Creatinine 0.8 0.8 0.8   Alkaline Phosphatase 102 92 93   ALT 20 18 23   AST 13 13 13   Total Bilirubin 0.4 0.4 0.4     LIPIDS:  Recent Labs   Lab 01/21/22  0815 12/05/22  1030   TSH  --  1.704   HDL 49 49   Cholesterol 213 H 242 H   Triglycerides 132 224 H   LDL Cholesterol 137.6 148.2   HDL/Cholesterol Ratio 23.0 20.2   Non-HDL Cholesterol 164 193   Total Cholesterol/HDL Ratio 4.3 4.9     TSH:  Recent Labs   Lab 12/05/22  1030   TSH 1.704        Objective:      Vitals:    12/12/22 0836   BP: 126/80   Pulse: 98   Temp: 97.7 °F (36.5 °C)      Physical Exam  Vitals reviewed.   Constitutional:       Appearance: Normal appearance. She is normal weight.   HENT:      Head: Normocephalic and atraumatic.   Eyes:      Conjunctiva/sclera: Conjunctivae normal.   Cardiovascular:      Rate and Rhythm: Normal rate and regular rhythm.      Heart sounds: Normal heart sounds.   Pulmonary:      Effort: Pulmonary effort is normal.      Breath sounds: Normal breath sounds.   Abdominal:      Palpations: Abdomen is soft.      Tenderness: There is no abdominal tenderness.   Musculoskeletal:         General: Normal range of motion.      Cervical back: Normal range of motion.      Right lower leg: No edema.      Left lower leg: No edema.   Neurological:      General: No focal deficit present.      Mental Status: She is alert and oriented to person, place, and time.   Psychiatric:          Mood and Affect: Mood normal.         Behavior: Behavior normal.        Assessment:       1. Encounter to establish care with new doctor    2. Insulin resistance    3. Mixed hyperlipidemia    4. Chronic cough    5. Restrictive lung disease    6. Cardiomyopathy, nonischemic    7. Coronary artery disease involving native coronary artery of native heart without angina pectoris    8. Screening for malignant neoplasm of colon    9. Encounter for hepatitis C screening test for low risk patient    10. Encounter for screening for HIV          Plan:   1. Encounter to establish care with new doctor    2. Insulin resistance  - metFORMIN (GLUCOPHAGE-XR) 500 MG ER 24hr tablet; Take 1 tablet (500 mg total) by mouth daily with breakfast.  Dispense: 90 tablet; Refill: 3  - Comprehensive Metabolic Panel; Standing  - Hemoglobin A1C; Standing    3. Mixed hyperlipidemia  - pravastatin (PRAVACHOL) 10 MG tablet; Take 1 tablet (10 mg total) by mouth once daily.  Dispense: 90 tablet; Refill: 3  - Comprehensive Metabolic Panel; Standing  - Lipid Panel; Standing    4. Chronic cough    5. Restrictive lung disease    6. Cardiomyopathy, nonischemic  - losartan (COZAAR) 25 MG tablet; Take 1 tablet (25 mg total) by mouth once daily.  Dispense: 90 tablet; Refill: 3    7. Coronary artery disease involving native coronary artery of native heart without angina pectoris  - losartan (COZAAR) 25 MG tablet; Take 1 tablet (25 mg total) by mouth once daily.  Dispense: 90 tablet; Refill: 3    8. Screening for malignant neoplasm of colon  - Cologuard Screening (Multitarget Stool DNA); Future  - Cologuard Screening (Multitarget Stool DNA)    9. Encounter for hepatitis C screening test for low risk patient  - Hepatitis C Antibody; Future    10. Encounter for screening for HIV  - HIV 1/2 Ag/Ab (4th Gen); Future     Establish care  Labs per orders  HM discussed: cologuard ordered  Continue healthy lifestyle efforts  Continue current meds as  prescribed  Start Mag at bedtime  STOP lisinopirl START losartan  Start metformin qd; Mediterranean diet advised   Keep routine specialist f/u   RTC in 3 months with labs prior         Sarah A. Champagne [facility]   12/12/22

## 2023-01-03 RX ORDER — DEXAMETHASONE 4 MG/1
TABLET ORAL
Qty: 12 G | Refills: 0 | Status: SHIPPED | OUTPATIENT
Start: 2023-01-03

## 2023-01-29 ENCOUNTER — PATIENT MESSAGE (OUTPATIENT)
Dept: CARDIOLOGY | Facility: CLINIC | Age: 63
End: 2023-01-29
Payer: MEDICAID

## 2023-01-30 PROBLEM — Z95.810 ICD (IMPLANTABLE CARDIOVERTER-DEFIBRILLATOR) IN PLACE: Status: ACTIVE | Noted: 2023-01-30

## 2023-01-30 NOTE — PROGRESS NOTES
Ms. Jaime is a patient of Dr. Tovar and was last seen in clinic 3/24/2022.      Subjective:   Patient ID:  Suly Jaime is a 63 y.o. female who presents for follow-up of ICD  .     HPI:    Ms. Jaime is a 63 y.o. female with nonobstructive CAD, IVCD here for follow up after ICD implantation and lead revision.     Background:    F retired   nonobstructive CAD and possible dissection on CTA  IVCD  chronic bronchitis     She c/o NYHA II sx: stops walking after exerts self a bit. No CP.  10/2021 35% LVEF.  1/2022 35%  CTA: nonobstructive RCA CAD and possible RCA dissection  ECG is NSR. IVCD with QRSd 114 ms.    This patient has a chronic nonischemic systolic cardiomyopathy with an LVEF of 35% with NYHA class 2 heart failure symptoms that has persisted despite over 3 months of maximally tolerated goal directed medical therapy.  PET stress to fully evaluate ? ischemia, given unusual cardiac CT.     Update (01/31/2023):    5/30/2022: Successful implantation of ICD Single placed for primary intervention.    6/3/2022: Admitted for chest pain. EP consulted, concerned for dislodged AICD lead. Continued to have significant chest wall pain with movement. EKG and trop remained unremarkable with each episode; episodes resolved with analgesia; thought that the episodes were related to the dislodged ICD lead.     6/6/2022: Successful device revision: removal of a dislodged/perforated ICD lead, and insertion of a new ICD lead. This is a single-lead ICD system.    Today she says she feels great. No cardiac complaints. Ms. Jaime reports no chest pain with exertion or at rest, palpitations, SOB, SALAZAR, dizziness, or syncope.    Device Interrogation (1/31/2023) reveals an intrinsic SR with stable lead and device function. NSVTx2, max 14 seconds. SVTx6, max 2 min 47 seconds. All events on 10/29/2022. She paces 0% in the RV. Estimated battery longevity 9.5 years.     I have personally reviewed the patient's EKG today, which shows  sinus rhythm with incomplete LBBB at 70bpm. NV interval is 182. QRS is 124. QTc is 462.    Relevant Cardiac Test Results:    2D Echo (6/4/2022):  The left ventricle is normal in size with low normal systolic function. The estimated ejection fraction is 50%.  There is abnormal septal wall motion.  Normal left ventricular diastolic function.  Normal right ventricular size with normal right ventricular systolic function.  Mild tricuspid regurgitation.  The estimated PA systolic pressure is 28 mmHg.  Normal central venous pressure (3 mmHg).    Current Outpatient Medications   Medication Sig    azelastine (ASTELIN) 137 mcg (0.1 %) nasal spray 1 spray (137 mcg total) by Nasal route 2 (two) times daily.    calcium carbonate (TUMS) 200 mg calcium (500 mg) chewable tablet Take 1 tablet by mouth daily as needed for Heartburn.    FLOVENT  mcg/actuation inhaler INHALE 2 PUFFS INTO THE LUNGS TWICE DAILY    fluticasone propionate (FLONASE) 50 mcg/actuation nasal spray 1 spray by Each Nostril route daily as needed for Rhinitis or Allergies.    latanoprost 0.005 % ophthalmic solution INSTILL 1 DROP INTO BOTH EYES AT BEDTIME AS DIRECTED    losartan (COZAAR) 25 MG tablet Take 1 tablet (25 mg total) by mouth once daily.    metFORMIN (GLUCOPHAGE-XR) 500 MG ER 24hr tablet Take 1 tablet (500 mg total) by mouth daily with breakfast.    metoprolol succinate (TOPROL-XL) 25 MG 24 hr tablet Take 1 tablet (25 mg total) by mouth once daily.    montelukast (SINGULAIR) 10 mg tablet Take 10 mg by mouth every evening.    mv-mn/folic acid/vit [id number] (ALIVE ONCE DAILY WOMEN 50 PLUS ORAL) Take 1 tablet by mouth once daily.    pravastatin (PRAVACHOL) 10 MG tablet Take 1 tablet (10 mg total) by mouth once daily.     No current facility-administered medications for this visit.       Review of Systems   Constitutional: Negative for malaise/fatigue.   Cardiovascular:  Negative for chest pain, dyspnea on exertion, irregular heartbeat, leg swelling  "and palpitations.   Respiratory:  Negative for shortness of breath.    Hematologic/Lymphatic: Negative for bleeding problem.   Skin:  Negative for rash.   Musculoskeletal:  Negative for myalgias.   Gastrointestinal:  Negative for hematemesis, hematochezia and nausea.   Genitourinary:  Negative for hematuria.   Neurological:  Negative for light-headedness.   Psychiatric/Behavioral:  Negative for altered mental status.    Allergic/Immunologic: Negative for persistent infections.     Objective:          BP (!) 142/84   Pulse 69   Ht 5' 2" (1.575 m)   Wt 71 kg (156 lb 8.4 oz)   BMI 28.63 kg/m²     Physical Exam  Vitals and nursing note reviewed.   Constitutional:       Appearance: Normal appearance. She is well-developed.   HENT:      Head: Normocephalic.      Nose: Nose normal.   Eyes:      Pupils: Pupils are equal, round, and reactive to light.   Cardiovascular:      Rate and Rhythm: Normal rate and regular rhythm.   Pulmonary:      Effort: No respiratory distress.      Breath sounds: Normal breath sounds.   Chest:      Comments: Device to LUCW. Incision and pocket in good repair.    Musculoskeletal:         General: Normal range of motion.   Skin:     General: Skin is warm and dry.      Findings: No erythema.   Neurological:      Mental Status: She is alert and oriented to person, place, and time.   Psychiatric:         Speech: Speech normal.         Behavior: Behavior normal.         Lab Results   Component Value Date     12/05/2022    K 4.5 12/05/2022    MG 2.0 06/07/2022    BUN 13 12/05/2022    CREATININE 0.8 12/05/2022    ALT 23 12/05/2022    AST 13 12/05/2022    HGB 13.5 12/05/2022    HCT 42.4 12/05/2022    TSH 1.704 12/05/2022    LDLCALC 148.2 12/05/2022       Recent Labs   Lab 05/23/22  0808   INR 1.0         Assessment:     1. Cardiomyopathy, nonischemic    2. ICD (implantable cardioverter-defibrillator) in place    3. CHF (congestive heart failure), NYHA class II, chronic, systolic      Plan: "     In summary, Ms. Jaime is a 63 y.o. female with nonobstructive CAD, IVCD here for follow up after ICD implantation and lead revision.   She is s/p ICD implant in May 30, 2022 with subsequent lead extraction and replacement  6/6/2022 after RV lead was dislodged.   Ms. Jaime is doing well from a device perspective with stable lead and device function. No recent ventricular arrhythmia. No treated episodes. No RV pacing. No CHF symptoms. She feels well.      Continue current medication regimen and device settings.   Follow up in device clinic as scheduled.   Follow up in EP clinic in 1 year, sooner as needed.     *A copy of this note has been sent to Dr. Tovar*    Follow up in about 1 year (around 1/31/2024).    ------------------------------------------------------------------    CORTNEY Barber, NP-C  Cardiac Electrophysiology

## 2023-01-31 ENCOUNTER — HOSPITAL ENCOUNTER (OUTPATIENT)
Dept: CARDIOLOGY | Facility: CLINIC | Age: 63
Discharge: HOME | End: 2023-01-31
Payer: MEDICAID

## 2023-01-31 ENCOUNTER — CLINICAL SUPPORT (OUTPATIENT)
Dept: CARDIOLOGY | Facility: HOSPITAL | Age: 63
End: 2023-01-31
Attending: INTERNAL MEDICINE
Payer: MEDICAID

## 2023-01-31 ENCOUNTER — OFFICE VISIT (OUTPATIENT)
Dept: ELECTROPHYSIOLOGY | Facility: CLINIC | Age: 63
End: 2023-01-31
Payer: MEDICAID

## 2023-01-31 VITALS
WEIGHT: 156.5 LBS | DIASTOLIC BLOOD PRESSURE: 84 MMHG | HEART RATE: 69 BPM | BODY MASS INDEX: 28.8 KG/M2 | SYSTOLIC BLOOD PRESSURE: 142 MMHG | HEIGHT: 62 IN

## 2023-01-31 DIAGNOSIS — I50.22 CHF (CONGESTIVE HEART FAILURE), NYHA CLASS II, CHRONIC, SYSTOLIC: Chronic | ICD-10-CM

## 2023-01-31 DIAGNOSIS — Z95.810 ICD (IMPLANTABLE CARDIOVERTER-DEFIBRILLATOR) IN PLACE: ICD-10-CM

## 2023-01-31 DIAGNOSIS — I42.8 CARDIOMYOPATHY, NONISCHEMIC: Primary | ICD-10-CM

## 2023-01-31 DIAGNOSIS — I42.9 CARDIOMYOPATHY, UNSPECIFIED TYPE: ICD-10-CM

## 2023-01-31 DIAGNOSIS — I42.8 CARDIOMYOPATHY, NONISCHEMIC: ICD-10-CM

## 2023-01-31 PROCEDURE — 1160F PR REVIEW ALL MEDS BY PRESCRIBER/CLIN PHARMACIST DOCUMENTED: ICD-10-PCS | Mod: CPTII,,, | Performed by: NURSE PRACTITIONER

## 2023-01-31 PROCEDURE — 99214 PR OFFICE/OUTPT VISIT, EST, LEVL IV, 30-39 MIN: ICD-10-PCS | Mod: S$PBB,,, | Performed by: NURSE PRACTITIONER

## 2023-01-31 PROCEDURE — 99999 PR PBB SHADOW E&M-EST. PATIENT-LVL III: CPT | Mod: PBBFAC,,, | Performed by: NURSE PRACTITIONER

## 2023-01-31 PROCEDURE — 3077F SYST BP >= 140 MM HG: CPT | Mod: CPTII,,, | Performed by: NURSE PRACTITIONER

## 2023-01-31 PROCEDURE — 93282 CARDIAC DEVICE CHECK - IN CLINIC & HOSPITAL: ICD-10-PCS | Mod: 26,,, | Performed by: INTERNAL MEDICINE

## 2023-01-31 PROCEDURE — 99999 PR PBB SHADOW E&M-EST. PATIENT-LVL III: ICD-10-PCS | Mod: PBBFAC,,, | Performed by: NURSE PRACTITIONER

## 2023-01-31 PROCEDURE — 93282 PRGRMG EVAL IMPLANTABLE DFB: CPT

## 2023-01-31 PROCEDURE — 93010 ELECTROCARDIOGRAM REPORT: CPT | Mod: S$PBB,,, | Performed by: INTERNAL MEDICINE

## 2023-01-31 PROCEDURE — 1159F PR MEDICATION LIST DOCUMENTED IN MEDICAL RECORD: ICD-10-PCS | Mod: CPTII,,, | Performed by: NURSE PRACTITIONER

## 2023-01-31 PROCEDURE — 93005 ELECTROCARDIOGRAM TRACING: CPT | Mod: PBBFAC,59 | Performed by: INTERNAL MEDICINE

## 2023-01-31 PROCEDURE — 3077F PR MOST RECENT SYSTOLIC BLOOD PRESSURE >= 140 MM HG: ICD-10-PCS | Mod: CPTII,,, | Performed by: NURSE PRACTITIONER

## 2023-01-31 PROCEDURE — 93282 PRGRMG EVAL IMPLANTABLE DFB: CPT | Mod: 26,,, | Performed by: INTERNAL MEDICINE

## 2023-01-31 PROCEDURE — 93010 RHYTHM STRIP: ICD-10-PCS | Mod: S$PBB,,, | Performed by: INTERNAL MEDICINE

## 2023-01-31 PROCEDURE — 3008F PR BODY MASS INDEX (BMI) DOCUMENTED: ICD-10-PCS | Mod: CPTII,,, | Performed by: NURSE PRACTITIONER

## 2023-01-31 PROCEDURE — 99214 OFFICE O/P EST MOD 30 MIN: CPT | Mod: S$PBB,,, | Performed by: NURSE PRACTITIONER

## 2023-01-31 PROCEDURE — 1159F MED LIST DOCD IN RCRD: CPT | Mod: CPTII,,, | Performed by: NURSE PRACTITIONER

## 2023-01-31 PROCEDURE — 99213 OFFICE O/P EST LOW 20 MIN: CPT | Mod: PBBFAC | Performed by: NURSE PRACTITIONER

## 2023-01-31 PROCEDURE — 3008F BODY MASS INDEX DOCD: CPT | Mod: CPTII,,, | Performed by: NURSE PRACTITIONER

## 2023-01-31 PROCEDURE — 3079F PR MOST RECENT DIASTOLIC BLOOD PRESSURE 80-89 MM HG: ICD-10-PCS | Mod: CPTII,,, | Performed by: NURSE PRACTITIONER

## 2023-01-31 PROCEDURE — 3079F DIAST BP 80-89 MM HG: CPT | Mod: CPTII,,, | Performed by: NURSE PRACTITIONER

## 2023-01-31 PROCEDURE — 1160F RVW MEDS BY RX/DR IN RCRD: CPT | Mod: CPTII,,, | Performed by: NURSE PRACTITIONER

## 2023-02-24 ENCOUNTER — CLINICAL SUPPORT (OUTPATIENT)
Dept: CARDIOLOGY | Facility: HOSPITAL | Age: 63
End: 2023-02-24
Payer: MEDICAID

## 2023-02-24 DIAGNOSIS — I42.0 DILATED CARDIOMYOPATHY: ICD-10-CM

## 2023-02-24 DIAGNOSIS — I50.42 CHRONIC COMBINED SYSTOLIC (CONGESTIVE) AND DIASTOLIC (CONGESTIVE) HEART FAILURE: ICD-10-CM

## 2023-02-24 DIAGNOSIS — Z95.810 PRESENCE OF AUTOMATIC (IMPLANTABLE) CARDIAC DEFIBRILLATOR: ICD-10-CM

## 2023-02-24 PROCEDURE — 93296 REM INTERROG EVL PM/IDS: CPT | Performed by: INTERNAL MEDICINE

## 2023-02-24 PROCEDURE — 93295 CARDIAC DEVICE CHECK - REMOTE: ICD-10-PCS | Mod: ,,, | Performed by: INTERNAL MEDICINE

## 2023-02-24 PROCEDURE — 93295 DEV INTERROG REMOTE 1/2/MLT: CPT | Mod: ,,, | Performed by: INTERNAL MEDICINE

## 2023-02-27 ENCOUNTER — PATIENT MESSAGE (OUTPATIENT)
Dept: ADMINISTRATIVE | Facility: HOSPITAL | Age: 63
End: 2023-02-27
Payer: MEDICAID

## 2023-02-27 ENCOUNTER — PATIENT OUTREACH (OUTPATIENT)
Dept: ADMINISTRATIVE | Facility: HOSPITAL | Age: 63
End: 2023-02-27
Payer: MEDICAID

## 2023-02-27 NOTE — PROGRESS NOTES
Care Everywhere updates requested and reviewed.  Immunizations reconciled. Media reports reviewed.  Duplicate HM overrides and  orders removed.  Overdue HM topic chart audit and/or requested.  Overdue lab testing linked to upcoming lab appointments if applies.      Health Maintenance Due   Topic Date Due    Hepatitis C Screening  Never done    HIV Screening  Never done    Colorectal Cancer Screening  Never done    Shingles Vaccine (1 of 2) Never done

## 2023-03-07 ENCOUNTER — LAB VISIT (OUTPATIENT)
Dept: LAB | Facility: HOSPITAL | Age: 63
End: 2023-03-07
Attending: STUDENT IN AN ORGANIZED HEALTH CARE EDUCATION/TRAINING PROGRAM
Payer: MEDICAID

## 2023-03-07 DIAGNOSIS — E78.2 MIXED HYPERLIPIDEMIA: ICD-10-CM

## 2023-03-07 DIAGNOSIS — Z11.4 ENCOUNTER FOR SCREENING FOR HIV: ICD-10-CM

## 2023-03-07 DIAGNOSIS — Z11.59 ENCOUNTER FOR HEPATITIS C SCREENING TEST FOR LOW RISK PATIENT: ICD-10-CM

## 2023-03-07 DIAGNOSIS — E88.819 INSULIN RESISTANCE: ICD-10-CM

## 2023-03-07 LAB
ALBUMIN SERPL BCP-MCNC: 3.9 G/DL (ref 3.5–5.2)
ALP SERPL-CCNC: 97 U/L (ref 55–135)
ALT SERPL W/O P-5'-P-CCNC: 31 U/L (ref 10–44)
ANION GAP SERPL CALC-SCNC: 7 MMOL/L (ref 8–16)
AST SERPL-CCNC: 17 U/L (ref 10–40)
BILIRUB SERPL-MCNC: 0.3 MG/DL (ref 0.1–1)
BUN SERPL-MCNC: 15 MG/DL (ref 8–23)
CALCIUM SERPL-MCNC: 9.4 MG/DL (ref 8.7–10.5)
CHLORIDE SERPL-SCNC: 107 MMOL/L (ref 95–110)
CHOLEST SERPL-MCNC: 241 MG/DL (ref 120–199)
CHOLEST/HDLC SERPL: 4.8 {RATIO} (ref 2–5)
CO2 SERPL-SCNC: 26 MMOL/L (ref 23–29)
CREAT SERPL-MCNC: 0.9 MG/DL (ref 0.5–1.4)
EST. GFR  (NO RACE VARIABLE): >60 ML/MIN/1.73 M^2
ESTIMATED AVG GLUCOSE: 131 MG/DL (ref 68–131)
GLUCOSE SERPL-MCNC: 122 MG/DL (ref 70–110)
HBA1C MFR BLD: 6.2 % (ref 4–5.6)
HDLC SERPL-MCNC: 50 MG/DL (ref 40–75)
HDLC SERPL: 20.7 % (ref 20–50)
LDLC SERPL CALC-MCNC: 137.4 MG/DL (ref 63–159)
NONHDLC SERPL-MCNC: 191 MG/DL
POTASSIUM SERPL-SCNC: 4.5 MMOL/L (ref 3.5–5.1)
PROT SERPL-MCNC: 7.6 G/DL (ref 6–8.4)
SODIUM SERPL-SCNC: 140 MMOL/L (ref 136–145)
TRIGL SERPL-MCNC: 268 MG/DL (ref 30–150)

## 2023-03-07 PROCEDURE — 80061 LIPID PANEL: CPT | Performed by: STUDENT IN AN ORGANIZED HEALTH CARE EDUCATION/TRAINING PROGRAM

## 2023-03-07 PROCEDURE — 87389 HIV-1 AG W/HIV-1&-2 AB AG IA: CPT | Performed by: STUDENT IN AN ORGANIZED HEALTH CARE EDUCATION/TRAINING PROGRAM

## 2023-03-07 PROCEDURE — 83036 HEMOGLOBIN GLYCOSYLATED A1C: CPT | Performed by: STUDENT IN AN ORGANIZED HEALTH CARE EDUCATION/TRAINING PROGRAM

## 2023-03-07 PROCEDURE — 86803 HEPATITIS C AB TEST: CPT | Performed by: STUDENT IN AN ORGANIZED HEALTH CARE EDUCATION/TRAINING PROGRAM

## 2023-03-07 PROCEDURE — 80053 COMPREHEN METABOLIC PANEL: CPT | Performed by: STUDENT IN AN ORGANIZED HEALTH CARE EDUCATION/TRAINING PROGRAM

## 2023-03-07 PROCEDURE — 36415 COLL VENOUS BLD VENIPUNCTURE: CPT | Performed by: STUDENT IN AN ORGANIZED HEALTH CARE EDUCATION/TRAINING PROGRAM

## 2023-03-08 LAB
HCV AB SERPL QL IA: NORMAL
HIV 1+2 AB+HIV1 P24 AG SERPL QL IA: NORMAL

## 2023-03-13 ENCOUNTER — OFFICE VISIT (OUTPATIENT)
Dept: FAMILY MEDICINE | Facility: CLINIC | Age: 63
End: 2023-03-13
Payer: MEDICAID

## 2023-03-13 VITALS
WEIGHT: 156.88 LBS | SYSTOLIC BLOOD PRESSURE: 130 MMHG | TEMPERATURE: 98 F | BODY MASS INDEX: 28.87 KG/M2 | HEIGHT: 62 IN | HEART RATE: 100 BPM | OXYGEN SATURATION: 98 % | DIASTOLIC BLOOD PRESSURE: 80 MMHG

## 2023-03-13 DIAGNOSIS — I42.8 CARDIOMYOPATHY, NONISCHEMIC: ICD-10-CM

## 2023-03-13 DIAGNOSIS — J98.4 RESTRICTIVE LUNG DISEASE: Chronic | ICD-10-CM

## 2023-03-13 DIAGNOSIS — I25.10 CORONARY ARTERY DISEASE INVOLVING NATIVE CORONARY ARTERY OF NATIVE HEART WITHOUT ANGINA PECTORIS: ICD-10-CM

## 2023-03-13 DIAGNOSIS — J39.2 OROPHARYNGEAL LESION: ICD-10-CM

## 2023-03-13 DIAGNOSIS — E88.819 INSULIN RESISTANCE: ICD-10-CM

## 2023-03-13 DIAGNOSIS — E78.2 MIXED HYPERLIPIDEMIA: Primary | ICD-10-CM

## 2023-03-13 DIAGNOSIS — I50.22 CHF (CONGESTIVE HEART FAILURE), NYHA CLASS II, CHRONIC, SYSTOLIC: Chronic | ICD-10-CM

## 2023-03-13 DIAGNOSIS — G62.9 NEUROPATHY: ICD-10-CM

## 2023-03-13 PROBLEM — Z95.810 ICD (IMPLANTABLE CARDIOVERTER-DEFIBRILLATOR) IN PLACE: Status: RESOLVED | Noted: 2023-01-30 | Resolved: 2023-03-13

## 2023-03-13 PROCEDURE — 1159F PR MEDICATION LIST DOCUMENTED IN MEDICAL RECORD: ICD-10-PCS | Mod: CPTII,,, | Performed by: STUDENT IN AN ORGANIZED HEALTH CARE EDUCATION/TRAINING PROGRAM

## 2023-03-13 PROCEDURE — 99214 OFFICE O/P EST MOD 30 MIN: CPT | Mod: PBBFAC,PN | Performed by: STUDENT IN AN ORGANIZED HEALTH CARE EDUCATION/TRAINING PROGRAM

## 2023-03-13 PROCEDURE — 99214 OFFICE O/P EST MOD 30 MIN: CPT | Mod: S$PBB,,, | Performed by: STUDENT IN AN ORGANIZED HEALTH CARE EDUCATION/TRAINING PROGRAM

## 2023-03-13 PROCEDURE — 1160F RVW MEDS BY RX/DR IN RCRD: CPT | Mod: CPTII,,, | Performed by: STUDENT IN AN ORGANIZED HEALTH CARE EDUCATION/TRAINING PROGRAM

## 2023-03-13 PROCEDURE — 1160F PR REVIEW ALL MEDS BY PRESCRIBER/CLIN PHARMACIST DOCUMENTED: ICD-10-PCS | Mod: CPTII,,, | Performed by: STUDENT IN AN ORGANIZED HEALTH CARE EDUCATION/TRAINING PROGRAM

## 2023-03-13 PROCEDURE — 1159F MED LIST DOCD IN RCRD: CPT | Mod: CPTII,,, | Performed by: STUDENT IN AN ORGANIZED HEALTH CARE EDUCATION/TRAINING PROGRAM

## 2023-03-13 PROCEDURE — 4010F PR ACE/ARB THEARPY RXD/TAKEN: ICD-10-PCS | Mod: CPTII,,, | Performed by: STUDENT IN AN ORGANIZED HEALTH CARE EDUCATION/TRAINING PROGRAM

## 2023-03-13 PROCEDURE — 99999 PR PBB SHADOW E&M-EST. PATIENT-LVL IV: ICD-10-PCS | Mod: PBBFAC,,, | Performed by: STUDENT IN AN ORGANIZED HEALTH CARE EDUCATION/TRAINING PROGRAM

## 2023-03-13 PROCEDURE — 3044F PR MOST RECENT HEMOGLOBIN A1C LEVEL <7.0%: ICD-10-PCS | Mod: CPTII,,, | Performed by: STUDENT IN AN ORGANIZED HEALTH CARE EDUCATION/TRAINING PROGRAM

## 2023-03-13 PROCEDURE — 3075F SYST BP GE 130 - 139MM HG: CPT | Mod: CPTII,,, | Performed by: STUDENT IN AN ORGANIZED HEALTH CARE EDUCATION/TRAINING PROGRAM

## 2023-03-13 PROCEDURE — 3044F HG A1C LEVEL LT 7.0%: CPT | Mod: CPTII,,, | Performed by: STUDENT IN AN ORGANIZED HEALTH CARE EDUCATION/TRAINING PROGRAM

## 2023-03-13 PROCEDURE — 3079F DIAST BP 80-89 MM HG: CPT | Mod: CPTII,,, | Performed by: STUDENT IN AN ORGANIZED HEALTH CARE EDUCATION/TRAINING PROGRAM

## 2023-03-13 PROCEDURE — 3079F PR MOST RECENT DIASTOLIC BLOOD PRESSURE 80-89 MM HG: ICD-10-PCS | Mod: CPTII,,, | Performed by: STUDENT IN AN ORGANIZED HEALTH CARE EDUCATION/TRAINING PROGRAM

## 2023-03-13 PROCEDURE — 99214 PR OFFICE/OUTPT VISIT, EST, LEVL IV, 30-39 MIN: ICD-10-PCS | Mod: S$PBB,,, | Performed by: STUDENT IN AN ORGANIZED HEALTH CARE EDUCATION/TRAINING PROGRAM

## 2023-03-13 PROCEDURE — 3008F BODY MASS INDEX DOCD: CPT | Mod: CPTII,,, | Performed by: STUDENT IN AN ORGANIZED HEALTH CARE EDUCATION/TRAINING PROGRAM

## 2023-03-13 PROCEDURE — 4010F ACE/ARB THERAPY RXD/TAKEN: CPT | Mod: CPTII,,, | Performed by: STUDENT IN AN ORGANIZED HEALTH CARE EDUCATION/TRAINING PROGRAM

## 2023-03-13 PROCEDURE — 3075F PR MOST RECENT SYSTOLIC BLOOD PRESS GE 130-139MM HG: ICD-10-PCS | Mod: CPTII,,, | Performed by: STUDENT IN AN ORGANIZED HEALTH CARE EDUCATION/TRAINING PROGRAM

## 2023-03-13 PROCEDURE — 99999 PR PBB SHADOW E&M-EST. PATIENT-LVL IV: CPT | Mod: PBBFAC,,, | Performed by: STUDENT IN AN ORGANIZED HEALTH CARE EDUCATION/TRAINING PROGRAM

## 2023-03-13 PROCEDURE — 3008F PR BODY MASS INDEX (BMI) DOCUMENTED: ICD-10-PCS | Mod: CPTII,,, | Performed by: STUDENT IN AN ORGANIZED HEALTH CARE EDUCATION/TRAINING PROGRAM

## 2023-03-13 RX ORDER — ROSUVASTATIN CALCIUM 5 MG/1
5 TABLET, COATED ORAL DAILY
Qty: 90 TABLET | Refills: 3 | Status: SHIPPED | OUTPATIENT
Start: 2023-03-13 | End: 2023-11-11

## 2023-03-13 RX ORDER — METOPROLOL SUCCINATE 50 MG/1
50 TABLET, EXTENDED RELEASE ORAL NIGHTLY
Qty: 90 TABLET | Refills: 3 | Status: SHIPPED | OUTPATIENT
Start: 2023-03-13 | End: 2024-01-24

## 2023-03-13 NOTE — PROGRESS NOTES
Subjective:       Patient ID: Suly Jaime is a 63 y.o. female.    Chief Complaint: Follow-up (3 month fu )      Active Problem List with Overview Notes    Diagnosis Date Noted    Insulin resistance 12/12/2022     A1C 6.2; down form 6.4  Discussed diet and exercise in detail  Metformin 500 daily   Well tolerated; no issues       Mixed hyperlipidemia 07/26/2022     Pravastatin 10; well tolerated   Lipids about the same; has not started fish oil   Will switch to Crestor 5 and again advised to start OTC fish oil   Also should start CoQ10         Coronary artery disease involving native coronary artery of native heart without angina pectoris 07/26/2022     Follows with cards   BB, ARB, statin         Cardiomyopathy, nonischemic 03/24/2022     Follows with cards  Metoprolol 25   ARB  Asymptomatic   HR elevated today to 100; on most recent device check had 6 episodes of SVT that self resolved all on same day she is not sure if symptomatic at that time; she is open to increasing metoprolol dose   ICD in place      CHF (congestive heart failure), NYHA class II, chronic, systolic 03/24/2022     medically managed  EF back to normal level  BB, statin, ARB      Restrictive lung disease 05/29/2020     Singulair   flovent PRN        Oropharyngeal lesion 02/18/2019     Hx laryngeal papilloma   Annual ENT f/u advised          Review of Systems   Neurological:  Positive for numbness (c/o nuerolpathy in b/l feet; ongoign for some time; open to checking vitamin labs).   Psychiatric/Behavioral:  Positive for sleep disturbance.    All other systems reviewed and are negative.     A1C:  Recent Labs   Lab 12/05/22  1030 03/07/23  0904   Hemoglobin A1C 6.4 H 6.2 H     CBC:  Recent Labs   Lab 06/06/22  0528 06/07/22  0354 12/05/22  1030   WBC 9.04 10.75 7.81   RBC 5.01 4.73 4.76   Hemoglobin 14.0 13.4 13.5   Hematocrit 43.0 41.3 42.4   Platelets 232 241 324   MCV 86 87 89   MCH 27.9 28.3 28.4   MCHC 32.6 32.4 31.8 L     CMP:  Recent Labs    Lab 06/07/22  0354 12/05/22  1030 03/07/23  0904   Glucose 101 116 H 122 H   Calcium 9.2 9.5 9.4   Albumin 3.5 3.6 3.9   Total Protein 7.1 7.4 7.6   Sodium 136 141 140   Potassium 4.3 4.5 4.5   CO2 24 24 26   Chloride 102 106 107   BUN 16 13 15   Creatinine 0.8 0.8 0.9   Alkaline Phosphatase 92 93 97   ALT 18 23 31   AST 13 13 17   Total Bilirubin 0.4 0.4 0.3     LIPIDS:  Recent Labs   Lab 01/21/22  0815 12/05/22  1030 03/07/23  0904   TSH  --  1.704  --    HDL 49 49 50   Cholesterol 213 H 242 H 241 H   Triglycerides 132 224 H 268 H   LDL Cholesterol 137.6 148.2 137.4   HDL/Cholesterol Ratio 23.0 20.2 20.7   Non-HDL Cholesterol 164 193 191   Total Cholesterol/HDL Ratio 4.3 4.9 4.8     TSH:  Recent Labs   Lab 12/05/22  1030   TSH 1.704        Objective:      Vitals:    03/13/23 0919   BP: 130/80   Pulse: 100   Temp: 98.4 °F (36.9 °C)      Physical Exam  Vitals reviewed.   Constitutional:       Appearance: Normal appearance. She is normal weight.   HENT:      Head: Normocephalic and atraumatic.   Eyes:      Conjunctiva/sclera: Conjunctivae normal.   Cardiovascular:      Rate and Rhythm: Normal rate and regular rhythm.      Heart sounds: Normal heart sounds.   Pulmonary:      Effort: Pulmonary effort is normal.      Breath sounds: Normal breath sounds.   Abdominal:      Palpations: Abdomen is soft.      Tenderness: There is no abdominal tenderness.   Musculoskeletal:         General: Normal range of motion.      Cervical back: Normal range of motion.      Right lower leg: No edema.      Left lower leg: No edema.   Neurological:      General: No focal deficit present.      Mental Status: She is alert and oriented to person, place, and time.   Psychiatric:         Mood and Affect: Mood normal.         Behavior: Behavior normal.        Assessment:       1. Mixed hyperlipidemia    2. Insulin resistance    3. CHF (congestive heart failure), NYHA class II, chronic, systolic    4. Cardiomyopathy, nonischemic    5.  Neuropathy    6. Oropharyngeal lesion    7. Restrictive lung disease    8. Coronary artery disease involving native coronary artery of native heart without angina pectoris          Plan:   1. Mixed hyperlipidemia  - rosuvastatin (CRESTOR) 5 MG tablet; Take 1 tablet (5 mg total) by mouth once daily.  Dispense: 90 tablet; Refill: 3    2. Insulin resistance    3. CHF (congestive heart failure), NYHA class II, chronic, systolic  - rosuvastatin (CRESTOR) 5 MG tablet; Take 1 tablet (5 mg total) by mouth once daily.  Dispense: 90 tablet; Refill: 3  - metoprolol succinate (TOPROL-XL) 50 MG 24 hr tablet; Take 1 tablet (50 mg total) by mouth every evening.  Dispense: 90 tablet; Refill: 3    4. Cardiomyopathy, nonischemic  - rosuvastatin (CRESTOR) 5 MG tablet; Take 1 tablet (5 mg total) by mouth once daily.  Dispense: 90 tablet; Refill: 3  - metoprolol succinate (TOPROL-XL) 50 MG 24 hr tablet; Take 1 tablet (50 mg total) by mouth every evening.  Dispense: 90 tablet; Refill: 3    5. Neuropathy  - VITAMIN B12; Future  - VITAMIN B1; Future  - Vitamin D 25-Hydroxy; Future    6. Oropharyngeal lesion    7. Restrictive lung disease    8. Coronary artery disease involving native coronary artery of native heart without angina pectoris  - rosuvastatin (CRESTOR) 5 MG tablet; Take 1 tablet (5 mg total) by mouth once daily.  Dispense: 90 tablet; Refill: 3     Labs reviewed in detail  Continue healthy lifestyle efforts  STOP pravastatin and START Crestor instead  Start OTC fish oil   Start Mag and CoQ10 OTC   Increase to metoprolol 50mg  Continue current meds as prescribed otherwise; refills per request  Plan to discuss insomnia and medication trial at follow up since we have changed a few things today already   Keep routine specialist f/u   RTC in 3 months  with labs prior and/or PRN          Stefania Terry   [facility]   3/13/23

## 2023-03-15 ENCOUNTER — OFFICE VISIT (OUTPATIENT)
Dept: ORTHOPEDICS | Facility: CLINIC | Age: 63
End: 2023-03-15
Payer: MEDICAID

## 2023-03-15 VITALS — HEIGHT: 62 IN | BODY MASS INDEX: 28.69 KG/M2

## 2023-03-15 DIAGNOSIS — M25.642 STIFFNESS OF FINGER JOINT OF LEFT HAND: ICD-10-CM

## 2023-03-15 PROCEDURE — 99999 PR PBB SHADOW E&M-EST. PATIENT-LVL III: ICD-10-PCS | Mod: PBBFAC,,, | Performed by: ORTHOPAEDIC SURGERY

## 2023-03-15 PROCEDURE — 3044F PR MOST RECENT HEMOGLOBIN A1C LEVEL <7.0%: ICD-10-PCS | Mod: CPTII,,, | Performed by: ORTHOPAEDIC SURGERY

## 2023-03-15 PROCEDURE — 99213 OFFICE O/P EST LOW 20 MIN: CPT | Mod: S$PBB,,, | Performed by: ORTHOPAEDIC SURGERY

## 2023-03-15 PROCEDURE — 99213 PR OFFICE/OUTPT VISIT, EST, LEVL III, 20-29 MIN: ICD-10-PCS | Mod: S$PBB,,, | Performed by: ORTHOPAEDIC SURGERY

## 2023-03-15 PROCEDURE — 3044F HG A1C LEVEL LT 7.0%: CPT | Mod: CPTII,,, | Performed by: ORTHOPAEDIC SURGERY

## 2023-03-15 PROCEDURE — 99999 PR PBB SHADOW E&M-EST. PATIENT-LVL III: CPT | Mod: PBBFAC,,, | Performed by: ORTHOPAEDIC SURGERY

## 2023-03-15 PROCEDURE — 4010F ACE/ARB THERAPY RXD/TAKEN: CPT | Mod: CPTII,,, | Performed by: ORTHOPAEDIC SURGERY

## 2023-03-15 PROCEDURE — 1159F MED LIST DOCD IN RCRD: CPT | Mod: CPTII,,, | Performed by: ORTHOPAEDIC SURGERY

## 2023-03-15 PROCEDURE — 3008F BODY MASS INDEX DOCD: CPT | Mod: CPTII,,, | Performed by: ORTHOPAEDIC SURGERY

## 2023-03-15 PROCEDURE — 99213 OFFICE O/P EST LOW 20 MIN: CPT | Mod: PBBFAC,PN | Performed by: ORTHOPAEDIC SURGERY

## 2023-03-15 PROCEDURE — 3008F PR BODY MASS INDEX (BMI) DOCUMENTED: ICD-10-PCS | Mod: CPTII,,, | Performed by: ORTHOPAEDIC SURGERY

## 2023-03-15 PROCEDURE — 1159F PR MEDICATION LIST DOCUMENTED IN MEDICAL RECORD: ICD-10-PCS | Mod: CPTII,,, | Performed by: ORTHOPAEDIC SURGERY

## 2023-03-15 PROCEDURE — 4010F PR ACE/ARB THEARPY RXD/TAKEN: ICD-10-PCS | Mod: CPTII,,, | Performed by: ORTHOPAEDIC SURGERY

## 2023-03-15 RX ORDER — DICLOFENAC SODIUM 10 MG/G
2 GEL TOPICAL 3 TIMES DAILY
Qty: 100 G | Refills: 1 | Status: SHIPPED | OUTPATIENT
Start: 2023-03-15

## 2023-03-15 RX ORDER — PHENTERMINE HYDROCHLORIDE 37.5 MG/1
18.75 TABLET ORAL 2 TIMES DAILY
COMMUNITY
Start: 2023-03-11 | End: 2024-01-24

## 2023-03-15 NOTE — PROGRESS NOTES
Subjective:      Patient ID: Suly Jaime is a 63 y.o. female.  Chief Complaint: Follow-up (Left index finger ( Sx 10/4))      HPI  Suly Jaime is a  63 y.o. female presenting today for follow up of contracture release of the left index finger.  She reports that she is now several months postop   She is doing well she is currently in therapy and getting better motion now but still lacks full flexion   She does have full extension   She also has previous excision of mass.    Review of patient's allergies indicates:  No Known Allergies      Current Outpatient Medications   Medication Sig Dispense Refill    azelastine (ASTELIN) 137 mcg (0.1 %) nasal spray 1 spray (137 mcg total) by Nasal route 2 (two) times daily. 30 mL 11    calcium carbonate (TUMS) 200 mg calcium (500 mg) chewable tablet Take 1 tablet by mouth daily as needed for Heartburn.      FLOVENT  mcg/actuation inhaler INHALE 2 PUFFS INTO THE LUNGS TWICE DAILY 12 g 0    fluticasone propionate (FLONASE) 50 mcg/actuation nasal spray 1 spray by Each Nostril route daily as needed for Rhinitis or Allergies.      latanoprost 0.005 % ophthalmic solution INSTILL 1 DROP INTO BOTH EYES AT BEDTIME AS DIRECTED      losartan (COZAAR) 25 MG tablet Take 1 tablet (25 mg total) by mouth once daily. 90 tablet 3    metFORMIN (GLUCOPHAGE-XR) 500 MG ER 24hr tablet Take 1 tablet (500 mg total) by mouth daily with breakfast. 90 tablet 3    metoprolol succinate (TOPROL-XL) 50 MG 24 hr tablet Take 1 tablet (50 mg total) by mouth every evening. 90 tablet 3    montelukast (SINGULAIR) 10 mg tablet Take 10 mg by mouth every evening.      mv-mn/folic acid/vit [id number] (ALIVE ONCE DAILY WOMEN 50 PLUS ORAL) Take 1 tablet by mouth once daily.      phentermine (ADIPEX-P) 37.5 mg tablet Take 18.75 mg by mouth 2 (two) times daily.      rosuvastatin (CRESTOR) 5 MG tablet Take 1 tablet (5 mg total) by mouth once daily. 90 tablet 3     No current facility-administered medications for this  "visit.       Past Medical History:   Diagnosis Date    Allergy     Cardiomyopathy     Hypertension     ICD (implantable cardioverter-defibrillator) in place 1/30/2023       Past Surgical History:   Procedure Laterality Date    CARDIAC DEFIBRILLATOR PLACEMENT Left     HYSTERECTOMY  2003    full    KNEE ARTHROSCOPY      LARYNGOSCOPY N/A 02/18/2019    Procedure: DIRECT MICRO LARYNGOSCOPY WITH BIOPSY;  Surgeon: Deidre Calderon MD;  Location: [facility];  Service: ENT;  Laterality: N/A;  video    OOPHORECTOMY      RELEASE OF CONTRACTURE  10/4/2022    Procedure: RELEASE, CONTRACTURE;  Surgeon: Clyde Banuelos Jr., MD;  Location: [facility] OR;  Service: Orthopedics;;    RELEASE OF CONTRACTURE OF JOINT Left 10/4/2022    Procedure: RELEASE, CONTRACTURE, JOINT;  Surgeon: Clyde Banuelos Jr., MD;  Location: [facility] OR;  Service: Orthopedics;  Laterality: Left;  need k wires and mini c arm    REVISION OF IMPLANTABLE CARDIOVERTER-DEFIBRILLATOR (ICD) ELECTRODE LEAD PLACEMENT N/A 6/6/2022    Procedure: REVISION, INSERTION, ELECTRODE LEAD, ICD;  Surgeon: Cruzito Tovar MD;  Location: [facility] EP LAB;  Service: Cardiology;  Laterality: N/A;  SINGLE LEAD REVISION, SJM, ANES, EH, 325    SOFT TISSUE BIOPSY  10/4/2022    Procedure: BIOPSY, SOFT TISSUE;  Surgeon: Clyde Banuelos Jr., MD;  Location: [facility];  Service: Orthopedics;;       OBJECTIVE:   PHYSICAL EXAM:  Height: 5' 2" (157.5 cm)    Vitals:    03/15/23 1337   Height: 5' 2" (1.575 m)   PainSc: 0-No pain   PainLoc: Finger     Ortho/SPM Exam  Examination left index finger the incisions are all well healed she has flexion to about 90° but some stiffness beyond this point   No significant swelling or tenderness no evidence of infection    RADIOGRAPHS:  None  Comments: I have personally reviewed the imaging and I agree with the above radiologist's report.    ASSESSMENT/PLAN:     IMPRESSION:  Stiffness left index finger after contracture release    PLAN:  I recommended she try some " warm water soaks and use of a squeeze ball  I have also started her on Voltaren gel topical and anti-inflammatory medication by mouth    FOLLOW UP:  4-6 weeks    Disclaimer: This note has been generated using voice-recognition software. There may be typographical errors that have been missed during proof-reading.

## 2023-04-11 ENCOUNTER — PATIENT MESSAGE (OUTPATIENT)
Dept: ADMINISTRATIVE | Facility: HOSPITAL | Age: 63
End: 2023-04-11
Payer: MEDICAID

## 2023-04-28 ENCOUNTER — TELEPHONE (OUTPATIENT)
Dept: FAMILY MEDICINE | Facility: CLINIC | Age: 63
End: 2023-04-28
Payer: MEDICAID

## 2023-04-28 NOTE — TELEPHONE ENCOUNTER
----- Message from Rhonda Case sent at 4/28/2023  2:05 PM CDT -----  Type:  Patient Returning Call    Who Called: Pt  Who Left Message for Patient: hCarlene  Does the patient know what this is regarding?: Back pain  Would the patient rather a call back or a response via [facility]?  call  Best Call Back Number: [phone]  Additional Information:

## 2023-04-28 NOTE — TELEPHONE ENCOUNTER
----- Message from Mega Pearce sent at 4/28/2023  1:36 PM CDT -----  Regarding: Patient advice  Contact: Pt  Pt called in regards to having lower back and leg pain       Pt can be reached at [phone]

## 2023-04-28 NOTE — TELEPHONE ENCOUNTER
Spoke with in regards of her symptoms advised patient she will need to be seen, appt schedule for 05/03.

## 2023-05-03 ENCOUNTER — OFFICE VISIT (OUTPATIENT)
Dept: FAMILY MEDICINE | Facility: CLINIC | Age: 63
End: 2023-05-03
Payer: MEDICAID

## 2023-05-03 ENCOUNTER — OFFICE VISIT (OUTPATIENT)
Dept: ORTHOPEDICS | Facility: CLINIC | Age: 63
End: 2023-05-03
Payer: MEDICAID

## 2023-05-03 VITALS — BODY MASS INDEX: 27.97 KG/M2 | WEIGHT: 152 LBS | HEIGHT: 62 IN

## 2023-05-03 VITALS
SYSTOLIC BLOOD PRESSURE: 126 MMHG | WEIGHT: 152 LBS | DIASTOLIC BLOOD PRESSURE: 82 MMHG | BODY MASS INDEX: 27.8 KG/M2 | OXYGEN SATURATION: 99 % | HEART RATE: 117 BPM

## 2023-05-03 DIAGNOSIS — M46.1 SACROILIITIS: Primary | ICD-10-CM

## 2023-05-03 DIAGNOSIS — G57.01 PIRIFORMIS SYNDROME OF RIGHT SIDE: ICD-10-CM

## 2023-05-03 DIAGNOSIS — M25.642 STIFFNESS OF FINGER JOINT OF LEFT HAND: Primary | ICD-10-CM

## 2023-05-03 PROCEDURE — 20600 DRAIN/INJ JOINT/BURSA W/O US: CPT | Mod: PBBFAC,PN,LT | Performed by: ORTHOPAEDIC SURGERY

## 2023-05-03 PROCEDURE — 99213 OFFICE O/P EST LOW 20 MIN: CPT | Mod: PBBFAC,PN | Performed by: ORTHOPAEDIC SURGERY

## 2023-05-03 PROCEDURE — 20600 PR DRAIN/INJECT SMALL JOINT/BURSA: ICD-10-PCS | Mod: S$PBB,LT,, | Performed by: ORTHOPAEDIC SURGERY

## 2023-05-03 PROCEDURE — 99214 OFFICE O/P EST MOD 30 MIN: CPT | Mod: S$PBB,,, | Performed by: STUDENT IN AN ORGANIZED HEALTH CARE EDUCATION/TRAINING PROGRAM

## 2023-05-03 PROCEDURE — 3044F HG A1C LEVEL LT 7.0%: CPT | Mod: CPTII,,, | Performed by: ORTHOPAEDIC SURGERY

## 2023-05-03 PROCEDURE — 1159F MED LIST DOCD IN RCRD: CPT | Mod: CPTII,,, | Performed by: ORTHOPAEDIC SURGERY

## 2023-05-03 PROCEDURE — 99999 PR PBB SHADOW E&M-EST. PATIENT-LVL III: ICD-10-PCS | Mod: PBBFAC,,, | Performed by: ORTHOPAEDIC SURGERY

## 2023-05-03 PROCEDURE — 99999 PR PBB SHADOW E&M-EST. PATIENT-LVL III: CPT | Mod: PBBFAC,,, | Performed by: ORTHOPAEDIC SURGERY

## 2023-05-03 PROCEDURE — 3008F PR BODY MASS INDEX (BMI) DOCUMENTED: ICD-10-PCS | Mod: CPTII,,, | Performed by: ORTHOPAEDIC SURGERY

## 2023-05-03 PROCEDURE — 3044F HG A1C LEVEL LT 7.0%: CPT | Mod: CPTII,,, | Performed by: STUDENT IN AN ORGANIZED HEALTH CARE EDUCATION/TRAINING PROGRAM

## 2023-05-03 PROCEDURE — 99999 PR PBB SHADOW E&M-EST. PATIENT-LVL IV: CPT | Mod: PBBFAC,,, | Performed by: STUDENT IN AN ORGANIZED HEALTH CARE EDUCATION/TRAINING PROGRAM

## 2023-05-03 PROCEDURE — 99214 PR OFFICE/OUTPT VISIT, EST, LEVL IV, 30-39 MIN: ICD-10-PCS | Mod: S$PBB,,, | Performed by: STUDENT IN AN ORGANIZED HEALTH CARE EDUCATION/TRAINING PROGRAM

## 2023-05-03 PROCEDURE — 4010F PR ACE/ARB THEARPY RXD/TAKEN: ICD-10-PCS | Mod: CPTII,,, | Performed by: STUDENT IN AN ORGANIZED HEALTH CARE EDUCATION/TRAINING PROGRAM

## 2023-05-03 PROCEDURE — 20600 DRAIN/INJ JOINT/BURSA W/O US: CPT | Mod: S$PBB,LT,, | Performed by: ORTHOPAEDIC SURGERY

## 2023-05-03 PROCEDURE — 99214 OFFICE O/P EST MOD 30 MIN: CPT | Mod: PBBFAC,25,27,PN | Performed by: STUDENT IN AN ORGANIZED HEALTH CARE EDUCATION/TRAINING PROGRAM

## 2023-05-03 PROCEDURE — 4010F PR ACE/ARB THEARPY RXD/TAKEN: ICD-10-PCS | Mod: CPTII,,, | Performed by: ORTHOPAEDIC SURGERY

## 2023-05-03 PROCEDURE — 3044F PR MOST RECENT HEMOGLOBIN A1C LEVEL <7.0%: ICD-10-PCS | Mod: CPTII,,, | Performed by: STUDENT IN AN ORGANIZED HEALTH CARE EDUCATION/TRAINING PROGRAM

## 2023-05-03 PROCEDURE — 3074F SYST BP LT 130 MM HG: CPT | Mod: CPTII,,, | Performed by: STUDENT IN AN ORGANIZED HEALTH CARE EDUCATION/TRAINING PROGRAM

## 2023-05-03 PROCEDURE — 99213 PR OFFICE/OUTPT VISIT, EST, LEVL III, 20-29 MIN: ICD-10-PCS | Mod: S$PBB,25,, | Performed by: ORTHOPAEDIC SURGERY

## 2023-05-03 PROCEDURE — 3008F PR BODY MASS INDEX (BMI) DOCUMENTED: ICD-10-PCS | Mod: CPTII,,, | Performed by: STUDENT IN AN ORGANIZED HEALTH CARE EDUCATION/TRAINING PROGRAM

## 2023-05-03 PROCEDURE — 1159F PR MEDICATION LIST DOCUMENTED IN MEDICAL RECORD: ICD-10-PCS | Mod: CPTII,,, | Performed by: ORTHOPAEDIC SURGERY

## 2023-05-03 PROCEDURE — 3079F PR MOST RECENT DIASTOLIC BLOOD PRESSURE 80-89 MM HG: ICD-10-PCS | Mod: CPTII,,, | Performed by: STUDENT IN AN ORGANIZED HEALTH CARE EDUCATION/TRAINING PROGRAM

## 2023-05-03 PROCEDURE — 4010F ACE/ARB THERAPY RXD/TAKEN: CPT | Mod: CPTII,,, | Performed by: STUDENT IN AN ORGANIZED HEALTH CARE EDUCATION/TRAINING PROGRAM

## 2023-05-03 PROCEDURE — 4010F ACE/ARB THERAPY RXD/TAKEN: CPT | Mod: CPTII,,, | Performed by: ORTHOPAEDIC SURGERY

## 2023-05-03 PROCEDURE — 3074F PR MOST RECENT SYSTOLIC BLOOD PRESSURE < 130 MM HG: ICD-10-PCS | Mod: CPTII,,, | Performed by: STUDENT IN AN ORGANIZED HEALTH CARE EDUCATION/TRAINING PROGRAM

## 2023-05-03 PROCEDURE — 99999 PR PBB SHADOW E&M-EST. PATIENT-LVL IV: ICD-10-PCS | Mod: PBBFAC,,, | Performed by: STUDENT IN AN ORGANIZED HEALTH CARE EDUCATION/TRAINING PROGRAM

## 2023-05-03 PROCEDURE — 3079F DIAST BP 80-89 MM HG: CPT | Mod: CPTII,,, | Performed by: STUDENT IN AN ORGANIZED HEALTH CARE EDUCATION/TRAINING PROGRAM

## 2023-05-03 PROCEDURE — 99213 OFFICE O/P EST LOW 20 MIN: CPT | Mod: S$PBB,25,, | Performed by: ORTHOPAEDIC SURGERY

## 2023-05-03 PROCEDURE — 3008F BODY MASS INDEX DOCD: CPT | Mod: CPTII,,, | Performed by: STUDENT IN AN ORGANIZED HEALTH CARE EDUCATION/TRAINING PROGRAM

## 2023-05-03 PROCEDURE — 3008F BODY MASS INDEX DOCD: CPT | Mod: CPTII,,, | Performed by: ORTHOPAEDIC SURGERY

## 2023-05-03 PROCEDURE — 3044F PR MOST RECENT HEMOGLOBIN A1C LEVEL <7.0%: ICD-10-PCS | Mod: CPTII,,, | Performed by: ORTHOPAEDIC SURGERY

## 2023-05-03 RX ORDER — GABAPENTIN 300 MG/1
300 CAPSULE ORAL 3 TIMES DAILY
Qty: 90 CAPSULE | Refills: 3 | Status: SHIPPED | OUTPATIENT
Start: 2023-05-03 | End: 2024-01-30

## 2023-05-03 RX ORDER — MELOXICAM 15 MG/1
15 TABLET ORAL DAILY
Qty: 30 TABLET | Refills: 1 | Status: SHIPPED | OUTPATIENT
Start: 2023-05-03 | End: 2023-10-11 | Stop reason: SDUPTHER

## 2023-05-03 RX ORDER — TIZANIDINE 4 MG/1
4 TABLET ORAL EVERY 6 HOURS PRN
Qty: 60 TABLET | Refills: 1 | Status: SHIPPED | OUTPATIENT
Start: 2023-05-03 | End: 2024-01-24 | Stop reason: SDUPTHER

## 2023-05-03 RX ORDER — TRIAMCINOLONE ACETONIDE 40 MG/ML
20 INJECTION, SUSPENSION INTRA-ARTICULAR; INTRAMUSCULAR
Status: COMPLETED | OUTPATIENT
Start: 2023-05-03 | End: 2023-05-03

## 2023-05-03 RX ADMIN — TRIAMCINOLONE ACETONIDE 20 MG: 40 INJECTION, SUSPENSION INTRA-ARTICULAR; INTRAMUSCULAR at 03:05

## 2023-05-03 NOTE — PROGRESS NOTES
Subjective:      Patient ID: Suly Jaime is a 63 y.o. female.  Chief Complaint: Follow-up (L Index Finger )      HPI  Suly Jaime is a  63 y.o. 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  She reports that she is now about 6 months postop interestingly enough she has full active extension of the index finger but now lacks full flexion beyond about 80 degrees   So I think she got stiff after the surgery and has not really regained flexion although extension is full.    Review of patient's allergies indicates:  No Known Allergies      Current Outpatient Medications   Medication Sig Dispense Refill    azelastine (ASTELIN) 137 mcg (0.1 %) nasal spray 1 spray (137 mcg total) by Nasal route 2 (two) times daily. 30 mL 11    calcium carbonate (TUMS) 200 mg calcium (500 mg) chewable tablet Take 1 tablet by mouth daily as needed for Heartburn.      diclofenac sodium (VOLTAREN) 1 % Gel Apply 2 g topically 3 (three) times daily. 100 g 1    FLOVENT  mcg/actuation inhaler INHALE 2 PUFFS INTO THE LUNGS TWICE DAILY 12 g 0    fluticasone propionate (FLONASE) 50 mcg/actuation nasal spray 1 spray by Each Nostril route daily as needed for Rhinitis or Allergies.      gabapentin (NEURONTIN) 300 MG capsule Take 1 capsule (300 mg total) by mouth 3 (three) times daily. 90 capsule 3    latanoprost 0.005 % ophthalmic solution INSTILL 1 DROP INTO BOTH EYES AT BEDTIME AS DIRECTED      losartan (COZAAR) 25 MG tablet Take 1 tablet (25 mg total) by mouth once daily. 90 tablet 3    meloxicam (MOBIC) 15 MG tablet Take 1 tablet (15 mg total) by mouth once daily. 30 tablet 1    metFORMIN (GLUCOPHAGE-XR) 500 MG ER 24hr tablet Take 1 tablet (500 mg total) by mouth daily with breakfast. 90 tablet 3    metoprolol succinate (TOPROL-XL) 50 MG 24 hr tablet Take 1 tablet (50 mg total) by mouth every evening. 90 tablet 3    montelukast (SINGULAIR) 10 mg tablet Take 10 mg by mouth every evening.       "mv-mn/folic acid/vit [id number] (ALIVE ONCE DAILY WOMEN 50 PLUS ORAL) Take 1 tablet by mouth once daily.      phentermine (ADIPEX-P) 37.5 mg tablet Take 18.75 mg by mouth 2 (two) times daily.      rosuvastatin (CRESTOR) 5 MG tablet Take 1 tablet (5 mg total) by mouth once daily. 90 tablet 3    tiZANidine (ZANAFLEX) 4 MG tablet Take 1 tablet (4 mg total) by mouth every 6 (six) hours as needed (muscle spasms). 60 tablet 1     No current facility-administered medications for this visit.       Past Medical History:   Diagnosis Date    Allergy     Cardiomyopathy     Hypertension     ICD (implantable cardioverter-defibrillator) in place 1/30/2023       Past Surgical History:   Procedure Laterality Date    CARDIAC DEFIBRILLATOR PLACEMENT Left     HYSTERECTOMY  2003    full    KNEE ARTHROSCOPY      LARYNGOSCOPY N/A 02/18/2019    Procedure: DIRECT MICRO LARYNGOSCOPY WITH BIOPSY;  Surgeon: Deidre Calderon MD;  Location: [facility] OR;  Service: ENT;  Laterality: N/A;  video    OOPHORECTOMY      RELEASE OF CONTRACTURE  10/4/2022    Procedure: RELEASE, CONTRACTURE;  Surgeon: Clyde Banuelos Jr., MD;  Location: [facility] OR;  Service: Orthopedics;;    RELEASE OF CONTRACTURE OF JOINT Left 10/4/2022    Procedure: RELEASE, CONTRACTURE, JOINT;  Surgeon: Clyde Banuelos Jr., MD;  Location: [facility] OR;  Service: Orthopedics;  Laterality: Left;  need k wires and mini c arm    REVISION OF IMPLANTABLE CARDIOVERTER-DEFIBRILLATOR (ICD) ELECTRODE LEAD PLACEMENT N/A 6/6/2022    Procedure: REVISION, INSERTION, ELECTRODE LEAD, ICD;  Surgeon: Cruzito Tovar MD;  Location: [facility] EP LAB;  Service: Cardiology;  Laterality: N/A;  SINGLE LEAD REVISION, SJM, ANES, EH, 325    SOFT TISSUE BIOPSY  10/4/2022    Procedure: BIOPSY, SOFT TISSUE;  Surgeon: Clyde Banuelos Jr., MD;  Location: [facility] OR;  Service: Orthopedics;;       OBJECTIVE:   PHYSICAL EXAM:  Height: 5' 2" (157.5 cm) Weight: 68.9 kg (152 lb)  Vitals:    05/03/23 1448   Weight: 68.9 kg (152 lb) " "  Height: 5' 2" (1.575 m)   PainSc: 0-No pain     Ortho/SPM Exam  Examination left hand previous incision well healed no swelling no tenderness full active and passive extension limited flexion to about 80 90 degrees at the PIP joint    RADIOGRAPHS:  None  Comments: I have personally reviewed the imaging and I agree with the above radiologist's report.    ASSESSMENT/PLAN:     IMPRESSION:  Stiffness left index finger after contracture release    PLAN:  I recommended we try an injection today  After pause for time-out identified the left index finger injected PIP joint with combination Kenalog 20 milligrams 0.5 cc xylocaine sterile technique  She tolerated the procedure well without complication   Also recommended a squeeze ball for strengthening and continued stretching exercises otherwise regular activities    FOLLOW UP:  6-8 weeks    Disclaimer: This note has been generated using voice-recognition software. There may be typographical errors that have been missed during proof-reading.     "

## 2023-05-06 NOTE — PROGRESS NOTES
Subjective:      Patient ID: Suly Jaime is a 63 y.o. female.    Chief Complaint: Leg Pain    -right SI joint/piriformis region; radiates down right leg and causes numbness  - pain comes and goes  - certain positions make worse  - moves from back to groin       Low-back Pain  This is a new problem. The current episode started 1 to 4 weeks ago. The problem occurs intermittently. The problem has been waxing and waning. Associated symptoms include arthralgias, myalgias, numbness and weakness. Pertinent negatives include no urinary symptoms. The symptoms are aggravated by twisting, walking, standing and bending. She has tried rest, position changes, NSAIDs, heat, ice, lying down and acetaminophen for the symptoms. The treatment provided mild relief.   Review of Systems   Musculoskeletal:  Positive for arthralgias, back pain, gait problem and myalgias.   Neurological:  Positive for weakness and numbness.   Psychiatric/Behavioral:  Positive for sleep disturbance.    All other systems reviewed and are negative.     Objective:     Vitals:    05/03/23 1341   BP: 126/82   Pulse: (!) 117      Physical Exam  Constitutional:       Appearance: Normal appearance.   HENT:      Head: Atraumatic.   Eyes:      Conjunctiva/sclera: Conjunctivae normal.   Pulmonary:      Effort: Pulmonary effort is normal.   Musculoskeletal:      Lumbar back: Spasms, tenderness and bony tenderness present. Decreased range of motion.   Neurological:      General: No focal deficit present.      Mental Status: She is alert and oriented to person, place, and time.   Psychiatric:         Mood and Affect: Mood normal.         Behavior: Behavior normal.      Assessment:         1. Sacroiliitis    2. Piriformis syndrome of right side          Plan:   1. Sacroiliitis  - meloxicam (MOBIC) 15 MG tablet; Take 1 tablet (15 mg total) by mouth once daily.  Dispense: 30 tablet; Refill: 1  - gabapentin (NEURONTIN) 300 MG capsule; Take 1 capsule (300 mg total) by mouth 3  (three) times daily.  Dispense: 90 capsule; Refill: 3  - tiZANidine (ZANAFLEX) 4 MG tablet; Take 1 tablet (4 mg total) by mouth every 6 (six) hours as needed (muscle spasms).  Dispense: 60 tablet; Refill: 1    2. Piriformis syndrome of right side  - meloxicam (MOBIC) 15 MG tablet; Take 1 tablet (15 mg total) by mouth once daily.  Dispense: 30 tablet; Refill: 1  - gabapentin (NEURONTIN) 300 MG capsule; Take 1 capsule (300 mg total) by mouth 3 (three) times daily.  Dispense: 90 capsule; Refill: 3  - tiZANidine (ZANAFLEX) 4 MG tablet; Take 1 tablet (4 mg total) by mouth every 6 (six) hours as needed (muscle spasms).  Dispense: 60 tablet; Refill: 1     Start mobic, gabapentin and zanaflex for pain and muscle spasms  Stretches provided  Heat/Ice PRN  Consider PT and Xray if continues   RTC in 2 weeks with NP for symptom recheck and consider Xray, PT and pain management referral        Stefania Terry   [facility]   5/3/23

## 2023-05-15 ENCOUNTER — PATIENT MESSAGE (OUTPATIENT)
Dept: ADMINISTRATIVE | Facility: HOSPITAL | Age: 63
End: 2023-05-15
Payer: MEDICAID

## 2023-05-25 ENCOUNTER — CLINICAL SUPPORT (OUTPATIENT)
Dept: CARDIOLOGY | Facility: HOSPITAL | Age: 63
End: 2023-05-25
Payer: MEDICAID

## 2023-05-25 DIAGNOSIS — Z95.810 PRESENCE OF AUTOMATIC (IMPLANTABLE) CARDIAC DEFIBRILLATOR: ICD-10-CM

## 2023-05-25 DIAGNOSIS — I50.9 HEART FAILURE, UNSPECIFIED: ICD-10-CM

## 2023-05-25 PROCEDURE — 93296 REM INTERROG EVL PM/IDS: CPT | Performed by: INTERNAL MEDICINE

## 2023-06-09 ENCOUNTER — PATIENT MESSAGE (OUTPATIENT)
Dept: ADMINISTRATIVE | Facility: HOSPITAL | Age: 63
End: 2023-06-09
Payer: MEDICAID

## 2023-06-09 ENCOUNTER — PATIENT OUTREACH (OUTPATIENT)
Dept: ADMINISTRATIVE | Facility: HOSPITAL | Age: 63
End: 2023-06-09
Payer: MEDICAID

## 2023-06-16 ENCOUNTER — PATIENT MESSAGE (OUTPATIENT)
Dept: PODIATRY | Facility: CLINIC | Age: 63
End: 2023-06-16
Payer: MEDICAID

## 2023-07-19 DIAGNOSIS — Z12.31 OTHER SCREENING MAMMOGRAM: ICD-10-CM

## 2023-07-24 ENCOUNTER — PATIENT MESSAGE (OUTPATIENT)
Dept: ADMINISTRATIVE | Facility: HOSPITAL | Age: 63
End: 2023-07-24
Payer: MEDICAID

## 2023-08-23 ENCOUNTER — CLINICAL SUPPORT (OUTPATIENT)
Dept: CARDIOLOGY | Facility: HOSPITAL | Age: 63
End: 2023-08-23
Payer: MEDICAID

## 2023-08-23 DIAGNOSIS — Z95.810 PRESENCE OF AUTOMATIC (IMPLANTABLE) CARDIAC DEFIBRILLATOR: ICD-10-CM

## 2023-08-23 PROCEDURE — 93295 CARDIAC DEVICE CHECK - REMOTE: ICD-10-PCS | Mod: ,,, | Performed by: INTERNAL MEDICINE

## 2023-08-23 PROCEDURE — 93296 REM INTERROG EVL PM/IDS: CPT | Performed by: INTERNAL MEDICINE

## 2023-08-23 PROCEDURE — 93295 DEV INTERROG REMOTE 1/2/MLT: CPT | Mod: ,,, | Performed by: INTERNAL MEDICINE

## 2023-09-11 ENCOUNTER — PATIENT MESSAGE (OUTPATIENT)
Dept: ADMINISTRATIVE | Facility: HOSPITAL | Age: 63
End: 2023-09-11
Payer: MEDICAID

## 2023-09-11 ENCOUNTER — PATIENT OUTREACH (OUTPATIENT)
Dept: ADMINISTRATIVE | Facility: HOSPITAL | Age: 63
End: 2023-09-11
Payer: MEDICAID

## 2023-09-11 NOTE — PROGRESS NOTES
Care Everywhere updates requested and reviewed.  Immunizations reconciled. Media reports reviewed.  Duplicate HM overrides and  orders removed.  Overdue HM topic chart audit and/or requested.  Overdue lab testing linked to upcoming lab appointments if applies.        Health Maintenance Due   Topic Date Due    Aspirin/Antiplatelet Therapy  Never done    Colorectal Cancer Screening  Never done    Shingles Vaccine (1 of 2) Never done    Influenza Vaccine (1) 2023

## 2023-10-10 ENCOUNTER — TELEPHONE (OUTPATIENT)
Dept: FAMILY MEDICINE | Facility: CLINIC | Age: 63
End: 2023-10-10
Payer: MEDICAID

## 2023-10-10 NOTE — TELEPHONE ENCOUNTER
----- Message from Mae Olea sent at 10/10/2023  4:35 PM CDT -----  Type:  Same Day Appointment Request    Caller is requesting a same day appointment.  Caller declined first available appointment listed below.    Name of Caller:pt   When is the first available appointment?12/21  Symptoms:pain in right hip and leg   Best Call Back Number:[phone]  Additional Information:

## 2023-10-11 ENCOUNTER — OFFICE VISIT (OUTPATIENT)
Dept: FAMILY MEDICINE | Facility: CLINIC | Age: 63
End: 2023-10-11
Payer: MEDICAID

## 2023-10-11 VITALS
TEMPERATURE: 98 F | HEART RATE: 95 BPM | BODY MASS INDEX: 28.63 KG/M2 | HEIGHT: 62 IN | OXYGEN SATURATION: 97 % | SYSTOLIC BLOOD PRESSURE: 122 MMHG | WEIGHT: 155.56 LBS | DIASTOLIC BLOOD PRESSURE: 84 MMHG

## 2023-10-11 DIAGNOSIS — M46.1 SACROILIITIS: Primary | ICD-10-CM

## 2023-10-11 DIAGNOSIS — G57.01 PIRIFORMIS SYNDROME OF RIGHT SIDE: ICD-10-CM

## 2023-10-11 PROCEDURE — 3079F DIAST BP 80-89 MM HG: CPT | Mod: CPTII,,, | Performed by: PEDIATRICS

## 2023-10-11 PROCEDURE — 99214 OFFICE O/P EST MOD 30 MIN: CPT | Mod: S$PBB,,, | Performed by: PEDIATRICS

## 2023-10-11 PROCEDURE — 99215 OFFICE O/P EST HI 40 MIN: CPT | Mod: PBBFAC,PN | Performed by: PEDIATRICS

## 2023-10-11 PROCEDURE — 3079F PR MOST RECENT DIASTOLIC BLOOD PRESSURE 80-89 MM HG: ICD-10-PCS | Mod: CPTII,,, | Performed by: PEDIATRICS

## 2023-10-11 PROCEDURE — 4010F PR ACE/ARB THEARPY RXD/TAKEN: ICD-10-PCS | Mod: CPTII,,, | Performed by: PEDIATRICS

## 2023-10-11 PROCEDURE — 3044F HG A1C LEVEL LT 7.0%: CPT | Mod: CPTII,,, | Performed by: PEDIATRICS

## 2023-10-11 PROCEDURE — 1160F RVW MEDS BY RX/DR IN RCRD: CPT | Mod: CPTII,,, | Performed by: PEDIATRICS

## 2023-10-11 PROCEDURE — 3044F PR MOST RECENT HEMOGLOBIN A1C LEVEL <7.0%: ICD-10-PCS | Mod: CPTII,,, | Performed by: PEDIATRICS

## 2023-10-11 PROCEDURE — 99999 PR PBB SHADOW E&M-EST. PATIENT-LVL V: CPT | Mod: PBBFAC,,, | Performed by: PEDIATRICS

## 2023-10-11 PROCEDURE — 3008F PR BODY MASS INDEX (BMI) DOCUMENTED: ICD-10-PCS | Mod: CPTII,,, | Performed by: PEDIATRICS

## 2023-10-11 PROCEDURE — 99214 PR OFFICE/OUTPT VISIT, EST, LEVL IV, 30-39 MIN: ICD-10-PCS | Mod: S$PBB,,, | Performed by: PEDIATRICS

## 2023-10-11 PROCEDURE — 3074F PR MOST RECENT SYSTOLIC BLOOD PRESSURE < 130 MM HG: ICD-10-PCS | Mod: CPTII,,, | Performed by: PEDIATRICS

## 2023-10-11 PROCEDURE — 1159F PR MEDICATION LIST DOCUMENTED IN MEDICAL RECORD: ICD-10-PCS | Mod: CPTII,,, | Performed by: PEDIATRICS

## 2023-10-11 PROCEDURE — 1160F PR REVIEW ALL MEDS BY PRESCRIBER/CLIN PHARMACIST DOCUMENTED: ICD-10-PCS | Mod: CPTII,,, | Performed by: PEDIATRICS

## 2023-10-11 PROCEDURE — 99999 PR PBB SHADOW E&M-EST. PATIENT-LVL V: ICD-10-PCS | Mod: PBBFAC,,, | Performed by: PEDIATRICS

## 2023-10-11 PROCEDURE — 1159F MED LIST DOCD IN RCRD: CPT | Mod: CPTII,,, | Performed by: PEDIATRICS

## 2023-10-11 PROCEDURE — 3074F SYST BP LT 130 MM HG: CPT | Mod: CPTII,,, | Performed by: PEDIATRICS

## 2023-10-11 PROCEDURE — 3008F BODY MASS INDEX DOCD: CPT | Mod: CPTII,,, | Performed by: PEDIATRICS

## 2023-10-11 PROCEDURE — 4010F ACE/ARB THERAPY RXD/TAKEN: CPT | Mod: CPTII,,, | Performed by: PEDIATRICS

## 2023-10-11 RX ORDER — PREDNISONE 20 MG/1
60 TABLET ORAL DAILY
Qty: 15 TABLET | Refills: 0 | Status: SHIPPED | OUTPATIENT
Start: 2023-10-11 | End: 2023-10-16

## 2023-10-11 RX ORDER — MELOXICAM 15 MG/1
15 TABLET ORAL DAILY
Qty: 30 TABLET | Refills: 1 | Status: SHIPPED | OUTPATIENT
Start: 2023-10-11 | End: 2024-01-02

## 2023-10-11 NOTE — PROGRESS NOTES
Subjective:      Patient ID: Suly Jaime is a 63 y.o. female.    Chief Complaint: Low-back Pain (Lower back pain, when driving having pain down right leg knee and ankle . Pain is worst at night.- past couple months . )    HPI  Review of Systems   Constitutional:  Negative for chills, fatigue and fever.   HENT:  Negative for congestion, ear pain, postnasal drip, rhinorrhea, sinus pressure, sinus pain, sneezing and sore throat.    Respiratory:  Negative for cough, chest tightness, shortness of breath and wheezing.    Cardiovascular:  Negative for chest pain and palpitations.   Gastrointestinal:  Negative for diarrhea, nausea and vomiting.   Genitourinary:  Negative for difficulty urinating.   Musculoskeletal:  Negative for arthralgias.   Skin:  Negative for rash.   Neurological:  Negative for dizziness and headaches.   Psychiatric/Behavioral:  Negative for confusion.         Current Outpatient Medications on File Prior to Visit   Medication Sig    calcium carbonate (TUMS) 200 mg calcium (500 mg) chewable tablet Take 1 tablet by mouth daily as needed for Heartburn.    diclofenac sodium (VOLTAREN) 1 % Gel Apply 2 g topically 3 (three) times daily.    FLOVENT  mcg/actuation inhaler INHALE 2 PUFFS INTO THE LUNGS TWICE DAILY    fluticasone propionate (FLONASE) 50 mcg/actuation nasal spray 1 spray by Each Nostril route daily as needed for Rhinitis or Allergies.    gabapentin (NEURONTIN) 300 MG capsule Take 1 capsule (300 mg total) by mouth 3 (three) times daily.    latanoprost 0.005 % ophthalmic solution INSTILL 1 DROP INTO BOTH EYES AT BEDTIME AS DIRECTED    losartan (COZAAR) 25 MG tablet Take 1 tablet (25 mg total) by mouth once daily.    metFORMIN (GLUCOPHAGE-XR) 500 MG ER 24hr tablet Take 1 tablet (500 mg total) by mouth daily with breakfast.    metoprolol succinate (TOPROL-XL) 50 MG 24 hr tablet Take 1 tablet (50 mg total) by mouth every evening.    montelukast (SINGULAIR) 10 mg tablet Take 10 mg by mouth every  evening.    mv-mn/folic acid/vit [id number] (ALIVE ONCE DAILY WOMEN 50 PLUS ORAL) Take 1 tablet by mouth once daily.    phentermine (ADIPEX-P) 37.5 mg tablet Take 18.75 mg by mouth 2 (two) times daily.    rosuvastatin (CRESTOR) 5 MG tablet Take 1 tablet (5 mg total) by mouth once daily.    tiZANidine (ZANAFLEX) 4 MG tablet Take 1 tablet (4 mg total) by mouth every 6 (six) hours as needed (muscle spasms).    [DISCONTINUED] meloxicam (MOBIC) 15 MG tablet Take 1 tablet (15 mg total) by mouth once daily.    azelastine (ASTELIN) 137 mcg (0.1 %) nasal spray 1 spray (137 mcg total) by Nasal route 2 (two) times daily.    [DISCONTINUED] beclomethasone (QVAR) 80 mcg/actuation Aero Inhale 2 puffs into the lungs 2 (two) times a day. Controller    [DISCONTINUED] levocetirizine (XYZAL) 5 MG tablet Take 1 tablet (5 mg total) by mouth every evening.     No current facility-administered medications on file prior to visit.        Objective:     Vitals:    10/11/23 1357   BP: 122/84   Pulse: 95   Temp: 97.7 °F (36.5 °C)      Physical Exam   Assessment:         1. Sacroiliitis    2. Piriformis syndrome of right side          Plan:   1. Sacroiliitis  - predniSONE (DELTASONE) 20 MG tablet; Take 3 tablets (60 mg total) by mouth once daily. for 5 days  Dispense: 15 tablet; Refill: 0  - meloxicam (MOBIC) 15 MG tablet; Take 1 tablet (15 mg total) by mouth once daily.  Dispense: 30 tablet; Refill: 1    2. Piriformis syndrome of right side  - meloxicam (MOBIC) 15 MG tablet; Take 1 tablet (15 mg total) by mouth once daily.  Dispense: 30 tablet; Refill: 1       -Apply ice/heat to painful area several times daily  -Do gentle lower back stretches as provided  -Use antiinflammatory as instructed  -Use muscle relaxer as instructed, do not drive when taking this medication  -Rest back, avoid heavy lifting or workouts x2 weeks  -Elevate feet when sitting  -Follow up in 3 weeks if no improvement, will place ortho referral         Venu Araiza NP    [facility]  10/11/23

## 2023-10-12 NOTE — PROGRESS NOTES
Subjective:      Patient ID: Suly Jaime is a 63 y.o. female.    Chief Complaint: Low-back Pain (Lower back pain, when driving having pain down right leg knee and ankle . Pain is worst at night.- past couple months . )    Presents to clinic complaining of sharp, shooting, tingling pain to right buttocks radiating down right leg. She states the pain travels down her leg to her knee and the ankle. She saw Dr. Terry for this complaint and was prescribed gabapentin and an anti-inflammatory. She did not take the medication, instead she started going to a massage therapist. States pain was mildly relieved. She states this pain started 6 months ago, but has worsened over the past month. Tylenol arthritis and heat have given mild relief. Pain is worse at night. Currently pain 7/10. She began taking the meloxicam and gabapentin 1 week ago, minor relief with meds.     Low-back Pain  Associated symptoms include fatigue and myalgias. Pertinent negatives include no chest pain.     Review of Systems   Constitutional:  Positive for fatigue.   HENT: Negative.     Eyes: Negative.    Respiratory:  Negative for shortness of breath.    Cardiovascular:  Negative for chest pain.   Gastrointestinal: Negative.    Musculoskeletal:  Positive for myalgias. Negative for gait problem.   Skin: Negative.    Psychiatric/Behavioral: Negative.          Current Outpatient Medications on File Prior to Visit   Medication Sig    calcium carbonate (TUMS) 200 mg calcium (500 mg) chewable tablet Take 1 tablet by mouth daily as needed for Heartburn.    diclofenac sodium (VOLTAREN) 1 % Gel Apply 2 g topically 3 (three) times daily.    FLOVENT  mcg/actuation inhaler INHALE 2 PUFFS INTO THE LUNGS TWICE DAILY    fluticasone propionate (FLONASE) 50 mcg/actuation nasal spray 1 spray by Each Nostril route daily as needed for Rhinitis or Allergies.    gabapentin (NEURONTIN) 300 MG capsule Take 1 capsule (300 mg total) by mouth 3 (three) times daily.     latanoprost 0.005 % ophthalmic solution INSTILL 1 DROP INTO BOTH EYES AT BEDTIME AS DIRECTED    losartan (COZAAR) 25 MG tablet Take 1 tablet (25 mg total) by mouth once daily.    metFORMIN (GLUCOPHAGE-XR) 500 MG ER 24hr tablet Take 1 tablet (500 mg total) by mouth daily with breakfast.    metoprolol succinate (TOPROL-XL) 50 MG 24 hr tablet Take 1 tablet (50 mg total) by mouth every evening.    montelukast (SINGULAIR) 10 mg tablet Take 10 mg by mouth every evening.    mv-mn/folic acid/vit [id number] (ALIVE ONCE DAILY WOMEN 50 PLUS ORAL) Take 1 tablet by mouth once daily.    phentermine (ADIPEX-P) 37.5 mg tablet Take 18.75 mg by mouth 2 (two) times daily.    rosuvastatin (CRESTOR) 5 MG tablet Take 1 tablet (5 mg total) by mouth once daily.    tiZANidine (ZANAFLEX) 4 MG tablet Take 1 tablet (4 mg total) by mouth every 6 (six) hours as needed (muscle spasms).    azelastine (ASTELIN) 137 mcg (0.1 %) nasal spray 1 spray (137 mcg total) by Nasal route 2 (two) times daily.    [DISCONTINUED] beclomethasone (QVAR) 80 mcg/actuation Aero Inhale 2 puffs into the lungs 2 (two) times a day. Controller    [DISCONTINUED] levocetirizine (XYZAL) 5 MG tablet Take 1 tablet (5 mg total) by mouth every evening.     No current facility-administered medications on file prior to visit.        Objective:     Vitals:    10/11/23 1357   BP: 122/84   Pulse: 95   Temp: 97.7 °F (36.5 °C)      Physical Exam  Vitals reviewed.   Constitutional:       Appearance: Normal appearance.   HENT:      Head: Normocephalic and atraumatic.      Right Ear: External ear normal.      Left Ear: External ear normal.   Eyes:      Conjunctiva/sclera: Conjunctivae normal.      Pupils: Pupils are equal, round, and reactive to light.   Cardiovascular:      Rate and Rhythm: Normal rate and regular rhythm.      Pulses: Normal pulses.   Pulmonary:      Effort: Pulmonary effort is normal.      Breath sounds: Normal breath sounds.   Abdominal:      Palpations: Abdomen is soft.    Musculoskeletal:         General: Normal range of motion.      Cervical back: Normal range of motion and neck supple.   Skin:     General: Skin is warm and dry.   Neurological:      General: No focal deficit present.      Mental Status: She is alert and oriented to person, place, and time.   Psychiatric:         Mood and Affect: Mood normal.         Behavior: Behavior normal.      Assessment:         1. Sacroiliitis    2. Piriformis syndrome of right side          Plan:   1. Sacroiliitis  - predniSONE (DELTASONE) 20 MG tablet; Take 3 tablets (60 mg total) by mouth once daily. for 5 days  Dispense: 15 tablet; Refill: 0  - meloxicam (MOBIC) 15 MG tablet; Take 1 tablet (15 mg total) by mouth once daily.  Dispense: 30 tablet; Refill: 1    2. Piriformis syndrome of right side  - meloxicam (MOBIC) 15 MG tablet; Take 1 tablet (15 mg total) by mouth once daily.  Dispense: 30 tablet; Refill: 1           -Apply ice/heat to painful area several times daily  -Do gentle lower back and leg stretches as provided  -Use antiinflammatory as instructed  -Use muscle relaxer as instructed, do not drive when taking this medication  -Rest back, avoid heavy lifting or workouts x2 weeks  -Elevate feet when sitting  -Follow up as needed    KEVIN Madrigal Student  [facility]      Venu Araiza NP   [facility]  10/11/23

## 2023-11-11 PROBLEM — R11.2 NAUSEA AND VOMITING: Status: ACTIVE | Noted: 2023-11-11

## 2023-11-11 PROBLEM — R11.2 NAUSEA AND VOMITING: Status: RESOLVED | Noted: 2023-11-11 | Resolved: 2023-11-11

## 2023-11-23 ENCOUNTER — CLINICAL SUPPORT (OUTPATIENT)
Dept: CARDIOLOGY | Facility: HOSPITAL | Age: 63
End: 2023-11-23
Payer: MEDICAID

## 2023-11-23 ENCOUNTER — CLINICAL SUPPORT (OUTPATIENT)
Dept: CARDIOLOGY | Facility: HOSPITAL | Age: 63
End: 2023-11-23
Attending: INTERNAL MEDICINE
Payer: MEDICAID

## 2023-11-23 DIAGNOSIS — Z95.810 PRESENCE OF AUTOMATIC (IMPLANTABLE) CARDIAC DEFIBRILLATOR: ICD-10-CM

## 2023-11-23 PROCEDURE — 93295 CARDIAC DEVICE CHECK - REMOTE: ICD-10-PCS | Mod: ,,, | Performed by: INTERNAL MEDICINE

## 2023-11-23 PROCEDURE — 93295 DEV INTERROG REMOTE 1/2/MLT: CPT | Mod: ,,, | Performed by: INTERNAL MEDICINE

## 2023-11-23 PROCEDURE — 93296 REM INTERROG EVL PM/IDS: CPT | Performed by: INTERNAL MEDICINE

## 2023-11-25 LAB
OHS CV DC REMOTE DEVICE TYPE: NORMAL
OHS CV RV PACING PERCENT: 1 %

## 2023-12-28 ENCOUNTER — PATIENT MESSAGE (OUTPATIENT)
Dept: ADMINISTRATIVE | Facility: HOSPITAL | Age: 63
End: 2023-12-28
Payer: MEDICAID

## 2023-12-29 DIAGNOSIS — Z12.11 SCREENING FOR COLON CANCER: ICD-10-CM

## 2024-01-01 DIAGNOSIS — G57.01 PIRIFORMIS SYNDROME OF RIGHT SIDE: ICD-10-CM

## 2024-01-01 DIAGNOSIS — M46.1 SACROILIITIS: ICD-10-CM

## 2024-01-02 RX ORDER — MELOXICAM 15 MG/1
15 TABLET ORAL
Qty: 30 TABLET | Refills: 1 | Status: SHIPPED | OUTPATIENT
Start: 2024-01-02 | End: 2024-02-01 | Stop reason: SDUPTHER

## 2024-01-02 NOTE — TELEPHONE ENCOUNTER
Refill Routing Note   Medication(s) are not appropriate for processing by [facility] for the following reason(s):        Outside of protocol    [facility] action(s):  Route             Appointments  past 12m or future 3m with PCP    Date Provider   Last Visit   5/3/2023 Stefania Terry, DO   Next Visit   1/24/2024 Stefania Terry, DO   ED visits in past 90 days: 0        Note composed:12:38 PM 01/02/2024

## 2024-01-10 ENCOUNTER — PATIENT OUTREACH (OUTPATIENT)
Dept: ADMINISTRATIVE | Facility: HOSPITAL | Age: 64
End: 2024-01-10
Payer: MEDICAID

## 2024-01-10 ENCOUNTER — PATIENT MESSAGE (OUTPATIENT)
Dept: ADMINISTRATIVE | Facility: HOSPITAL | Age: 64
End: 2024-01-10
Payer: MEDICAID

## 2024-01-10 NOTE — PROGRESS NOTES
Care Everywhere updates requested and reviewed.  Immunizations reconciled. Media reports reviewed.  Duplicate HM overrides and  orders removed.  Overdue HM topic chart audit and/or requested.  Overdue lab testing linked to upcoming lab appointments if applies.        Health Maintenance Due   Topic Date Due    Colorectal Cancer Screening  Never done    Shingles Vaccine (1 of 2) Never done    RSV Vaccine (Age 60+ and Pregnant patients) (1 - 1-dose 60+ series) Never done

## 2024-01-24 ENCOUNTER — OFFICE VISIT (OUTPATIENT)
Dept: FAMILY MEDICINE | Facility: CLINIC | Age: 64
End: 2024-01-24
Payer: MEDICAID

## 2024-01-24 VITALS
OXYGEN SATURATION: 97 % | WEIGHT: 150.81 LBS | TEMPERATURE: 98 F | SYSTOLIC BLOOD PRESSURE: 116 MMHG | DIASTOLIC BLOOD PRESSURE: 78 MMHG | HEART RATE: 106 BPM | BODY MASS INDEX: 27.75 KG/M2 | HEIGHT: 62 IN

## 2024-01-24 DIAGNOSIS — E88.819 INSULIN RESISTANCE: ICD-10-CM

## 2024-01-24 DIAGNOSIS — E78.2 MIXED HYPERLIPIDEMIA: ICD-10-CM

## 2024-01-24 DIAGNOSIS — I50.22 CHF (CONGESTIVE HEART FAILURE), NYHA CLASS II, CHRONIC, SYSTOLIC: Chronic | ICD-10-CM

## 2024-01-24 DIAGNOSIS — G57.01 PIRIFORMIS SYNDROME OF RIGHT SIDE: ICD-10-CM

## 2024-01-24 DIAGNOSIS — I42.8 CARDIOMYOPATHY, NONISCHEMIC: Primary | ICD-10-CM

## 2024-01-24 DIAGNOSIS — J39.2 OROPHARYNGEAL LESION: ICD-10-CM

## 2024-01-24 DIAGNOSIS — J98.4 RESTRICTIVE LUNG DISEASE: Chronic | ICD-10-CM

## 2024-01-24 DIAGNOSIS — Z00.00 WELLNESS EXAMINATION: Primary | ICD-10-CM

## 2024-01-24 DIAGNOSIS — I25.10 CORONARY ARTERY DISEASE INVOLVING NATIVE CORONARY ARTERY OF NATIVE HEART WITHOUT ANGINA PECTORIS: ICD-10-CM

## 2024-01-24 PROBLEM — M25.642 STIFFNESS OF FINGER JOINT OF LEFT HAND: Status: RESOLVED | Noted: 2023-03-15 | Resolved: 2024-01-24

## 2024-01-24 PROCEDURE — 1159F MED LIST DOCD IN RCRD: CPT | Mod: CPTII,,, | Performed by: STUDENT IN AN ORGANIZED HEALTH CARE EDUCATION/TRAINING PROGRAM

## 2024-01-24 PROCEDURE — 3078F DIAST BP <80 MM HG: CPT | Mod: CPTII,,, | Performed by: STUDENT IN AN ORGANIZED HEALTH CARE EDUCATION/TRAINING PROGRAM

## 2024-01-24 PROCEDURE — 99214 OFFICE O/P EST MOD 30 MIN: CPT | Mod: S$PBB,,, | Performed by: STUDENT IN AN ORGANIZED HEALTH CARE EDUCATION/TRAINING PROGRAM

## 2024-01-24 PROCEDURE — 99214 OFFICE O/P EST MOD 30 MIN: CPT | Mod: PBBFAC,PN | Performed by: STUDENT IN AN ORGANIZED HEALTH CARE EDUCATION/TRAINING PROGRAM

## 2024-01-24 PROCEDURE — 99999 PR PBB SHADOW E&M-EST. PATIENT-LVL IV: CPT | Mod: PBBFAC,,, | Performed by: STUDENT IN AN ORGANIZED HEALTH CARE EDUCATION/TRAINING PROGRAM

## 2024-01-24 PROCEDURE — 3008F BODY MASS INDEX DOCD: CPT | Mod: CPTII,,, | Performed by: STUDENT IN AN ORGANIZED HEALTH CARE EDUCATION/TRAINING PROGRAM

## 2024-01-24 PROCEDURE — 3074F SYST BP LT 130 MM HG: CPT | Mod: CPTII,,, | Performed by: STUDENT IN AN ORGANIZED HEALTH CARE EDUCATION/TRAINING PROGRAM

## 2024-01-24 PROCEDURE — 1160F RVW MEDS BY RX/DR IN RCRD: CPT | Mod: CPTII,,, | Performed by: STUDENT IN AN ORGANIZED HEALTH CARE EDUCATION/TRAINING PROGRAM

## 2024-01-24 RX ORDER — TIZANIDINE 4 MG/1
4 TABLET ORAL EVERY 6 HOURS PRN
Qty: 60 TABLET | Refills: 1 | Status: SHIPPED | OUTPATIENT
Start: 2024-01-24 | End: 2024-02-22 | Stop reason: SDUPTHER

## 2024-01-24 NOTE — PROGRESS NOTES
Subjective:       Patient ID: Suly Jaime is a 64 y.o. female.    Chief Complaint: Annual Exam  Continued right sided sharp, non-radiating hip pain. Movement is helpful but has trouble laying down to rest on the right side. Heat pack at night. Current pain is 7/10.  Taking gabapentin and meloxicam qhs with some relief.    Tried a month supply of Ozempic in November, which helped with her satiation.  Discussed diet. Encouraged protein and increased vegetable intake and increased exercise as tolerated. On metformin.    Active Problem List with Overview Notes    Diagnosis Date Noted    Piriformis syndrome of right side 01/24/2024     Pain near piriformis  Worse after sitting for extended time  Better with use  NSAIDs and gabapentin helps  No radiation, tingling numbness  Open to trying PT       Insulin resistance 12/12/2022     Diet controlled  Metformin 500 daily   Interested in Ozempic; discussed options being not diabetic yet; she will hold off for now  Well tolerated; no issues       Mixed hyperlipidemia 07/26/2022     Diety controlled  Labs due  Statin myalgias; tried pravastatin and crestor      Coronary artery disease involving native coronary artery of native heart without angina pectoris 07/26/2022     Follows with cards   BB, ARB, did not tolerate statin         Cardiomyopathy, nonischemic 03/24/2022     Follows with cards  Metoprolol 25   ARB  Asymptomatic   HR elevated today to 100; on most recent device check had 6 episodes of SVT that self resolved all on same day she is not sure if symptomatic at that time; she is open to increasing metoprolol dose   ICD in place      CHF (congestive heart failure), NYHA class II, chronic, systolic 03/24/2022     medically managed  EF back to normal level  BB, , ARB      Restrictive lung disease 05/29/2020     Singulair   flovent PRN        Oropharyngeal lesion 02/18/2019     Hx laryngeal papilloma   Annual ENT f/u advised          Review of Systems   Constitutional:   Negative for activity change, appetite change, fever and unexpected weight change.   HENT: Negative.     Respiratory:  Negative for cough and shortness of breath.    Cardiovascular:  Negative for chest pain, palpitations and leg swelling.   Gastrointestinal: Negative.    Endocrine: Negative.    Genitourinary: Negative.    Musculoskeletal:  Positive for arthralgias, gait problem and myalgias.        Sacroiliitis  Piriformis syndrome   Skin:  Negative for rash and wound.   Allergic/Immunologic: Negative.    Psychiatric/Behavioral:  Positive for sleep disturbance.    All other systems reviewed and are negative.       A1C:  Recent Labs   Lab 12/05/22  1030 03/07/23  0904 06/19/23  0708   Hemoglobin A1C 6.4 H 6.2 H 6.2 H     CBC:  Recent Labs   Lab 06/07/22  0354 12/05/22  1030 11/10/23  2208   WBC 10.75 7.81 9.74   RBC 4.73 4.76 5.11   Hemoglobin 13.4 13.5 15.3   Hematocrit 41.3 42.4 44.4   Platelets 241 324 290   MCV 87 89 87   MCH 28.3 28.4 29.9   MCHC 32.4 31.8 L 34.5     CMP:  Recent Labs   Lab 03/07/23  0904 06/19/23  0708 11/10/23  2208 11/11/23  0507   Glucose 122 H 111 H 99 79   Calcium 9.4 9.1 9.9 9.1   Albumin 3.9 4.2 4.7  --    Total Protein 7.6 7.4 8.2  --    Sodium 140 139 141 139   Potassium 4.5 4.8 3.8 3.7   CO2 26 28 20 L 24   Chloride 107 106 104 106   BUN 15 17 13 11   Creatinine 0.9 0.78 0.71 0.62   Alkaline Phosphatase 97 101 116  --    ALT 31 32 59 H  --    AST 17 21 29  --    Total Bilirubin 0.3 0.3 0.6  --      LIPIDS:  Recent Labs   Lab 12/05/22  1030 03/07/23  0904 06/19/23  0708   TSH 1.704  --   --    HDL 49 50 55   Cholesterol 242 H 241 H 196   Triglycerides 224 H 268 H 221 H   LDL Cholesterol 148.2 137.4 96.8   HDL/Cholesterol Ratio 20.2 20.7 28.1   Non-HDL Cholesterol 193 191 141   Total Cholesterol/HDL Ratio 4.9 4.8 3.6     TSH:  Recent Labs   Lab 12/05/22  1030   TSH 1.704        Objective:      Vitals:    01/24/24 0908   BP: 116/78   Pulse: 106   Temp: 98.1 °F (36.7 °C)      Physical  Exam  Vitals reviewed.   Constitutional:       Appearance: Normal appearance. She is overweight.   HENT:      Head: Normocephalic and atraumatic.   Eyes:      Conjunctiva/sclera: Conjunctivae normal.   Cardiovascular:      Rate and Rhythm: Normal rate and regular rhythm.      Pulses: Normal pulses.      Heart sounds: Normal heart sounds.      Comments: ICD  Pulmonary:      Effort: Pulmonary effort is normal.      Breath sounds: Normal breath sounds.   Abdominal:      General: Abdomen is flat.      Palpations: Abdomen is soft.      Tenderness: There is no abdominal tenderness.   Musculoskeletal:         General: Normal range of motion.      Cervical back: Normal range of motion.      Right hip: Tenderness (piriformis) present. No bony tenderness. Normal range of motion. Normal strength.      Right lower leg: No edema.      Left lower leg: No edema.      Comments: Tenderness on palpation of right buttock   Neurological:      Mental Status: She is alert. Mental status is at baseline.   Psychiatric:         Mood and Affect: Mood normal.         Behavior: Behavior normal.          Assessment:     Piriformis syndrome  Insulin resistance  Mixed hyperlipidemia  CHF NYHA class II, chronic, systolic w/ history of ICD  Neuropathy    Plan:   Piriformis syndrome  Continue stretching, heat pack, meloxicam in am, gabapentin and tizanidine in pm  Physical therapy in the future    Insulin resistance  Continue metformin    Mixed hyperlipidemia  Continue OTC fish oil    CHF NYHA class II, chronic, systolic w/ history of ICD  Followed by cardio  Continue losartan, metoprolol       Natalie Pulliam MS4   [employer], MS4  1/24/24      I hereby acknowledge that I am relying upon documentation authored by a Medical student working under my supervision and further I hereby attest that I have verified the student documentation or findings by personally re-performing the physical exam and medical decision making activities of the  Evaluation and Management service to be billed.    1. Wellness examination    2. Piriformis syndrome of right side  - tiZANidine (ZANAFLEX) 4 MG tablet; Take 1 tablet (4 mg total) by mouth every 6 (six) hours as needed (muscle spasms).  Dispense: 60 tablet; Refill: 1  - Ambulatory referral/consult to Physical/Occupational Therapy; Future    3. Oropharyngeal lesion    4. Restrictive lung disease    5. Mixed hyperlipidemia    6. Coronary artery disease involving native coronary artery of native heart without angina pectoris    7. CHF (congestive heart failure), NYHA class II, chronic, systolic    8. Insulin resistance    Well female  Labs per orders   HM discussed  UTD  Continue healthy lifestyle efforts  REFER to PT   Continue gabapentin PRN; start mobic in AM and zanaflex in PM; stretches provided; continue heat/ice; next step would be PM ref  Continue current meds as prescribed otherwise; refills per request  Keep routine specialist f/u   RTC in 4 weeks for symptom recheck with BP and 6 months  with labs prior and/or PRN        DO Jenny [facility]agaNov[facility]  1/24/24

## 2024-01-25 RX ORDER — METOPROLOL SUCCINATE 100 MG/1
100 TABLET, EXTENDED RELEASE ORAL NIGHTLY
Qty: 90 TABLET | Refills: 3 | Status: SHIPPED | OUTPATIENT
Start: 2024-01-25 | End: 2025-01-24

## 2024-01-29 PROBLEM — R53.1 WEAKNESS: Status: ACTIVE | Noted: 2024-01-29

## 2024-01-29 PROBLEM — R26.9 GAIT ABNORMALITY: Status: ACTIVE | Noted: 2024-01-29

## 2024-01-29 PROBLEM — M25.60 STIFFNESS IN JOINT: Status: ACTIVE | Noted: 2024-01-29

## 2024-01-30 DIAGNOSIS — M46.1 SACROILIITIS: ICD-10-CM

## 2024-01-30 DIAGNOSIS — G57.01 PIRIFORMIS SYNDROME OF RIGHT SIDE: ICD-10-CM

## 2024-01-30 RX ORDER — GABAPENTIN 300 MG/1
300 CAPSULE ORAL 3 TIMES DAILY
Qty: 90 CAPSULE | Refills: 3 | Status: SHIPPED | OUTPATIENT
Start: 2024-01-30 | End: 2024-06-11

## 2024-01-30 NOTE — TELEPHONE ENCOUNTER
No care due was identified.  [facility] Embedded Care Due Messages. Reference number: [id number].   1/30/2024 3:49:48 AM CST

## 2024-01-30 NOTE — TELEPHONE ENCOUNTER
Refill Routing Note   Medication(s) are not appropriate for processing by [facility] for the following reason(s):        Outside of protocol    [facility] action(s):  Route               Appointments  past 12m or future 3m with PCP    Date Provider   Last Visit   1/24/2024 Stefania Terry, DO   Next Visit   Visit date not found Stefania Terry, DO   ED visits in past 90 days: 0        Note composed:8:15 AM 01/30/2024

## 2024-02-01 DIAGNOSIS — M46.1 SACROILIITIS: ICD-10-CM

## 2024-02-01 DIAGNOSIS — G57.01 PIRIFORMIS SYNDROME OF RIGHT SIDE: ICD-10-CM

## 2024-02-02 RX ORDER — MELOXICAM 15 MG/1
15 TABLET ORAL DAILY
Qty: 90 TABLET | Refills: 3 | Status: SHIPPED | OUTPATIENT
Start: 2024-02-02

## 2024-02-12 ENCOUNTER — PATIENT MESSAGE (OUTPATIENT)
Dept: FAMILY MEDICINE | Facility: CLINIC | Age: 64
End: 2024-02-12
Payer: MEDICAID

## 2024-02-12 DIAGNOSIS — E78.2 MIXED HYPERLIPIDEMIA: Primary | ICD-10-CM

## 2024-02-15 RX ORDER — ROSUVASTATIN CALCIUM 5 MG/1
5 TABLET, COATED ORAL DAILY
Qty: 90 TABLET | Refills: 3 | Status: SHIPPED | OUTPATIENT
Start: 2024-02-15 | End: 2025-02-14

## 2024-02-20 NOTE — PROGRESS NOTES
Subjective     Patient ID: Suly Jaime is a 64 y.o. female.    Chief Complaint: Hip Pain    64yr old female with past medical history  Cardiomyopathy, Hypertension, ICD (implantable cardioverter-defibrillator) in place, and piriformis syndrome. She is a patient of Dr Terry and is not known to me. She is here today for follow up of right sided piriformis syndrome and BP check. She was seen originally on 10/11/23 for right sided sharp hip pain and was given steroids + meloxicam without significant relief. She was seen again for right sided hip pain on 1/24/24 by Dr Terry and given instructions to continue gabapentin PRN, take meloxicam in AM and xanaflex in PM. She was given stretches and instructed to continue heat/ice compresses. She was also referred to physical therapy and has been doing therapy since 1/29/24 with mild relief. She reports some pain after PT sessions, but overall a reduction in pain to 3/10 and sleeping better at night.      She complaints today of  LEFT sided constant foot burning sensation x 3 weeks, associated with similar sensation to calf and left lateral thigh rated 9/10. She has full ROM of both legs, no problems walking; pain with sitting on toilet or hard surfaces, no pain with bending forward; some pain with squatting. No trauma to buttocks, legs, feet; no excessive weight lifting; She has been taking meloxicam in AM, gabapentin in PM. She is doing physical therapy, but focus is on Right side at this time.     She is taking metoprolol 100mg daily, blood pressure is controlled; denies any syncope, chest pain, palpitation, dizziness; She does not check blood pressure at home. She denies any problems with increased dose metoprolol. HR decreased.     Leg Pain   The incident occurred more than 1 week ago. There was no injury mechanism. The pain is present in the left leg and left thigh. The pain is at a severity of 9/10. The pain is severe. The pain has been Constant since onset.  "Associated symptoms include tingling. Pertinent negatives include no inability to bear weight, loss of motion, loss of sensation, muscle weakness or numbness. She reports no foreign bodies present. Exacerbated by: sitting prolonged. She has tried NSAIDs and rest for the symptoms. The treatment provided mild relief.     Review of Systems   Musculoskeletal:  Positive for leg pain (Left buttocks pain ,radiating down left thigh, calf, and foot).   Neurological:  Positive for tingling. Negative for weakness and numbness.   All other systems reviewed and are negative.         Objective       Vitals:    02/22/24 0901   BP: 112/70   Pulse: 72   SpO2: 98%   Weight: 68.9 kg (152 lb 0.1 oz)   Height: 5' 2" (1.575 m)   PainSc:   3   PainLoc: Hip      Physical Exam  Vitals and nursing note reviewed.   Constitutional:       General: She is not in acute distress.     Appearance: Normal appearance. She is not ill-appearing.   HENT:      Head: Normocephalic and atraumatic.      Mouth/Throat:      Mouth: Mucous membranes are moist.   Eyes:      General: No scleral icterus.        Right eye: No discharge.         Left eye: No discharge.      Extraocular Movements: Extraocular movements intact.      Conjunctiva/sclera: Conjunctivae normal.   Cardiovascular:      Rate and Rhythm: Normal rate.      Pulses: Normal pulses.   Pulmonary:      Effort: Pulmonary effort is normal.   Musculoskeletal:      Cervical back: Normal range of motion.        Back:       Right lower leg: No edema.      Left lower leg: No edema.      Comments: + Tenderness to palpation to red location - most specifically to area marked with blue "X"; no bruising; no protrusions noted; no rash  Full ROM, pain to buttocks with leg lift   Skin:     General: Skin is warm and dry.      Findings: No rash.             Comments: Right thigh lump noted, soft, mobile, nontender to palpation - see diagram   Neurological:      Mental Status: She is alert and oriented to person, " place, and time.   Psychiatric:         Mood and Affect: Mood normal.         Behavior: Behavior normal. Behavior is cooperative.         Cognition and Memory: Cognition normal.            Assessment and Plan     1. Piriformis syndrome of left side  Overview:     Pain to piriformis muscle x 3 weeks -started after Right side piriformis improving  Worse with prolonged sitting, sitting on toilet; radiation thigh/calf/foot  Gabapentin PM, meloxicam AM  Doing PT for Right side -send message PT to include left side  - add on zanaflex - never started previously  - recommend hard ball pressure to piriformis region - lay on floor  - referred to sports med for possible adjustment  - trial prednisone taper     Orders:  -     Ambulatory referral/consult to Sports Medicine; Future; Expected date: 02/22/2024  -     predniSONE (DELTASONE) 20 MG tablet; Take 1 tablet (20 mg total) by mouth once daily. Take 3 pills daily for 3 days, then 2 pills daily for 3 days, then 1 pill daily for 3 days, then 1/2 pill daily for 4 days. for 10 days  Dispense: 20 tablet; Refill: 0    2. Piriformis syndrome of right side  Overview:  Pain near piriformis  Worse after sitting for extended time  NSAIDs and gabapentin helps  No radiation, tingling numbness  Doing PT - pain improving  Continue PT  - start zanaflex, continue gabapentin + NSAIDS    Orders:  -     tiZANidine (ZANAFLEX) 4 MG tablet; Take 1 tablet (4 mg total) by mouth every 6 (six) hours as needed (muscle spasms).  Dispense: 60 tablet; Refill: 1    3. Cardiomyopathy, nonischemic  Overview:  Follows with cards  Metoprolol 100 - increased 2/2  ( 6 episodes of SVT on device check), HR down 70s  ARB  Asymptomatic   ICD in place  Continue current meds      4. Lump of skin  -     US Soft Tissue, Lower Extremity, Right; Future; Expected date: 02/22/2024  - lump noted to anterior aspect right thigh - suspect lipoma  - check US soft tissue   - patient to monitor site  - follow up  PCP      Julee Corona, MSN, APRN, FNP-C  Family Medicine  Office [phone]           Follow up in about 3 weeks (around 3/14/2024) for hip pain follow up.    45 minutes of total time spent on the encounter, which includes face to face time and non-face to face time preparing to see the patient (eg, review of tests), Obtaining and/or reviewing separately obtained history, Documenting clinical information in the electronic or other health record, Independently interpreting results (not separately reported) and communicating results to the patient/family/caregiver, or Care coordination (not separately reported).

## 2024-02-21 ENCOUNTER — TELEPHONE (OUTPATIENT)
Dept: FAMILY MEDICINE | Facility: CLINIC | Age: 64
End: 2024-02-21
Payer: MEDICAID

## 2024-02-21 NOTE — TELEPHONE ENCOUNTER
----- Message from Rene Ortiz sent at 2/21/2024  9:56 AM CST -----  .Type:  Needs Medical Advice    Who Called: pt    Would the patient rather a call back or a response via [facility]? Call back  Best Call Back Number: [phone]  Additional Information:     Pt stated he missed a call from Lili and would like a call back please

## 2024-02-22 ENCOUNTER — CLINICAL SUPPORT (OUTPATIENT)
Dept: CARDIOLOGY | Facility: HOSPITAL | Age: 64
End: 2024-02-22
Attending: INTERNAL MEDICINE
Payer: MEDICAID

## 2024-02-22 ENCOUNTER — CLINICAL SUPPORT (OUTPATIENT)
Dept: CARDIOLOGY | Facility: HOSPITAL | Age: 64
End: 2024-02-22
Payer: MEDICAID

## 2024-02-22 ENCOUNTER — OFFICE VISIT (OUTPATIENT)
Dept: FAMILY MEDICINE | Facility: CLINIC | Age: 64
End: 2024-02-22
Payer: MEDICAID

## 2024-02-22 ENCOUNTER — TELEPHONE (OUTPATIENT)
Dept: FAMILY MEDICINE | Facility: CLINIC | Age: 64
End: 2024-02-22

## 2024-02-22 VITALS
SYSTOLIC BLOOD PRESSURE: 112 MMHG | DIASTOLIC BLOOD PRESSURE: 70 MMHG | BODY MASS INDEX: 27.97 KG/M2 | HEART RATE: 72 BPM | OXYGEN SATURATION: 98 % | HEIGHT: 62 IN | WEIGHT: 152 LBS

## 2024-02-22 DIAGNOSIS — Z95.810 PRESENCE OF AUTOMATIC (IMPLANTABLE) CARDIAC DEFIBRILLATOR: ICD-10-CM

## 2024-02-22 DIAGNOSIS — G57.02 PIRIFORMIS SYNDROME OF LEFT SIDE: Primary | ICD-10-CM

## 2024-02-22 DIAGNOSIS — I42.8 OTHER CARDIOMYOPATHIES: ICD-10-CM

## 2024-02-22 DIAGNOSIS — I42.8 CARDIOMYOPATHY, NONISCHEMIC: ICD-10-CM

## 2024-02-22 DIAGNOSIS — R22.9 LUMP OF SKIN: ICD-10-CM

## 2024-02-22 DIAGNOSIS — G57.01 PIRIFORMIS SYNDROME OF RIGHT SIDE: ICD-10-CM

## 2024-02-22 PROCEDURE — 93296 REM INTERROG EVL PM/IDS: CPT | Performed by: INTERNAL MEDICINE

## 2024-02-22 PROCEDURE — 99999 PR PBB SHADOW E&M-EST. PATIENT-LVL V: CPT | Mod: PBBFAC,,, | Performed by: NURSE PRACTITIONER

## 2024-02-22 PROCEDURE — 99215 OFFICE O/P EST HI 40 MIN: CPT | Mod: S$PBB,,, | Performed by: NURSE PRACTITIONER

## 2024-02-22 PROCEDURE — 3074F SYST BP LT 130 MM HG: CPT | Mod: CPTII,,, | Performed by: NURSE PRACTITIONER

## 2024-02-22 PROCEDURE — 1159F MED LIST DOCD IN RCRD: CPT | Mod: CPTII,,, | Performed by: NURSE PRACTITIONER

## 2024-02-22 PROCEDURE — 99215 OFFICE O/P EST HI 40 MIN: CPT | Mod: PBBFAC,PN | Performed by: NURSE PRACTITIONER

## 2024-02-22 PROCEDURE — 1160F RVW MEDS BY RX/DR IN RCRD: CPT | Mod: CPTII,,, | Performed by: NURSE PRACTITIONER

## 2024-02-22 PROCEDURE — 3044F HG A1C LEVEL LT 7.0%: CPT | Mod: CPTII,,, | Performed by: NURSE PRACTITIONER

## 2024-02-22 PROCEDURE — 3078F DIAST BP <80 MM HG: CPT | Mod: CPTII,,, | Performed by: NURSE PRACTITIONER

## 2024-02-22 PROCEDURE — 3008F BODY MASS INDEX DOCD: CPT | Mod: CPTII,,, | Performed by: NURSE PRACTITIONER

## 2024-02-22 PROCEDURE — 93295 DEV INTERROG REMOTE 1/2/MLT: CPT | Mod: ,,, | Performed by: INTERNAL MEDICINE

## 2024-02-22 RX ORDER — TIZANIDINE 4 MG/1
4 TABLET ORAL EVERY 6 HOURS PRN
Qty: 60 TABLET | Refills: 1 | Status: SHIPPED | OUTPATIENT
Start: 2024-02-22

## 2024-02-22 RX ORDER — PREDNISONE 20 MG/1
20 TABLET ORAL DAILY
Qty: 20 TABLET | Refills: 0 | Status: SHIPPED | OUTPATIENT
Start: 2024-02-22 | End: 2024-03-03

## 2024-02-22 NOTE — TELEPHONE ENCOUNTER
----- Message from Yoko Trujillo DO sent at 2/22/2024  3:22 PM CST -----  Yes, happy to take a look! I will have my assistant, Breanna, give the patient a call the schedule. Thank you for reaching out!     Yoko Ronquillo     ----- Message -----  From: Julee Corona FNP-C  Sent: 2/22/2024  11:03 AM CST  To: Yoko Trujillo DO    Good morning Dr Trujillo,   I am reaching out on behalf of a patient. I work in the [employer] office primary care. I have a patient with left sided piriformis syndrome. I spoke with Dr Horvath and he recommended  I refer the patient to you for possible adjustment/evaluation. The patient originally presented in October 2023 with right sided complaints - has been doing therapy, gabapentin, meloxicam - now right side a little better. I placed the referral for you specifically, but we were unable to find any appointments available. Can your office please assist or do I need to change referral? Thank you so much for any input.    Julee Corona, MSN, APRN, FNP-C  Family Medicine  Office [phone]

## 2024-02-23 ENCOUNTER — TELEPHONE (OUTPATIENT)
Dept: SPORTS MEDICINE | Facility: CLINIC | Age: 64
End: 2024-02-23
Payer: MEDICAID

## 2024-02-23 NOTE — TELEPHONE ENCOUNTER
----- Message from Yoko Trujillo DO sent at 2/22/2024  3:22 PM CST -----  Yes, happy to take a look! I will have my assistant, Breanna, give the patient a call the schedule. Thank you for reaching out!     Yoko Ronquillo     ----- Message -----  From: Julee Corona FNP-C  Sent: 2/22/2024  11:03 AM CST  To: Yoko Trujillo DO    Good morning Dr Trujillo,   I am reaching out on behalf of a patient. I work in the [employer] office primary care. I have a patient with left sided piriformis syndrome. I spoke with Dr Horvath and he recommended  I refer the patient to you for possible adjustment/evaluation. The patient originally presented in October 2023 with right sided complaints - has been doing therapy, gabapentin, meloxicam - now right side a little better. I placed the referral for you specifically, but we were unable to find any appointments available. Can your office please assist or do I need to change referral? Thank you so much for any input.    Julee Corona, MSN, APRN, FNP-C  Family Medicine  Office [phone]

## 2024-02-23 NOTE — TELEPHONE ENCOUNTER
Sched appt. Confirmed appt date, time and location and patient states understanding.     Breanna Brown MS, OTC  Clinical Assistant to Dr. Yoko Trujillo

## 2024-02-27 ENCOUNTER — TELEPHONE (OUTPATIENT)
Dept: SPORTS MEDICINE | Facility: CLINIC | Age: 64
End: 2024-02-27
Payer: MEDICAID

## 2024-02-27 NOTE — TELEPHONE ENCOUNTER
Returned pt call, she is already scheduled. Confirmed appt date, time and location and patient states understanding.     Breanna Brown MS, OTC  Clinical Assistant to Dr. Yoko Trujillo

## 2024-02-27 NOTE — TELEPHONE ENCOUNTER
----- Message from Reyna Herrmann sent at 2/27/2024 11:10 AM CST -----  Regarding: Pt Advice  Contact: Pt  [phone]  Missed Callback         Pt returning callback from missed call. Requesting to speak with somebody in   office regarding referral from Maddy Corona. Please call [phone]

## 2024-02-28 LAB
OHS CV DC REMOTE DEVICE TYPE: NORMAL
OHS CV RV PACING PERCENT: 1 %

## 2024-03-04 ENCOUNTER — TELEPHONE (OUTPATIENT)
Dept: FAMILY MEDICINE | Facility: CLINIC | Age: 64
End: 2024-03-04
Payer: MEDICAID

## 2024-03-04 NOTE — TELEPHONE ENCOUNTER
----- Message from Shannan Rojas sent at 3/4/2024  9:44 AM CST -----  Regarding: call back  Contact: [phone]  Who Called: PT     Patient is calling to reschedule her appointment on 3/14/24. Please advice

## 2024-03-04 NOTE — TELEPHONE ENCOUNTER
I spoke to the patient. She has upcoming appt with sports medicine on 3/12/24. Will cancel follow up with me for me. Patient instructed to follow up with sports medicine and call Dr Terry office if she feels she needs another appointment.   Julee Corona, MSN, APRN, FNP-C  Family Medicine  Office [phone]

## 2024-03-05 NOTE — PROGRESS NOTES
Subjective:     Suly Jaime     Chief Complaint   Patient presents with    Left Hip - Pain    Right Hip - Pain     HPI    Suly is a 64 y.o. female coming in today for bilateral hip pain that began about 1 year(s) ago, referred by self. Pt. describes the pain as a 8/10 achy pain that does not radiate. There was not a fall/injury/ or trauma associated with the onset of symptoms. The pain is better with rest, NSAIDs, tylenol arthritis and worse with activity. Pt is currently in PT at [facility], notes relief. Her pain is worse after doing a lot of walking this weekend. Pt. Denies any other musculoskeletal complaints at this time.     Joint instability? no  Mechanical locking/clicking? no  Affecting ADL's? yes  Affecting sleep? yes    Occupation: retired    Review of Systems   Constitutional:  Negative for chills and fever.   Musculoskeletal:  Positive for joint pain. Negative for back pain, falls, myalgias and neck pain.   Neurological:  Negative for dizziness, tingling, focal weakness, weakness and headaches.       PAST MEDICAL HISTORY:   Past Medical History:   Diagnosis Date    Allergy     Cardiomyopathy     Hypertension     ICD (implantable cardioverter-defibrillator) in place 1/30/2023     PAST SURGICAL HISTORY:   Past Surgical History:   Procedure Laterality Date    CARDIAC DEFIBRILLATOR PLACEMENT Left     HYSTERECTOMY  2003    full    KNEE ARTHROSCOPY      LARYNGOSCOPY N/A 02/18/2019    Procedure: DIRECT MICRO LARYNGOSCOPY WITH BIOPSY;  Surgeon: Deidre Calderon MD;  Location: [facility];  Service: ENT;  Laterality: N/A;  video    OOPHORECTOMY      RELEASE OF CONTRACTURE  10/4/2022    Procedure: RELEASE, CONTRACTURE;  Surgeon: Clyde Banuelos Jr., MD;  Location: [facility] OR;  Service: Orthopedics;;    RELEASE OF CONTRACTURE OF JOINT Left 10/4/2022    Procedure: RELEASE, CONTRACTURE, JOINT;  Surgeon: Clyde Banuelos Jr., MD;  Location: [facility] OR;  Service: Orthopedics;  Laterality: Left;  need k wires and mini c  arm    REVISION OF IMPLANTABLE CARDIOVERTER-DEFIBRILLATOR (ICD) ELECTRODE LEAD PLACEMENT N/A 2022    Procedure: REVISION, INSERTION, ELECTRODE LEAD, ICD;  Surgeon: Cruzito Tovar MD;  Location: [facility] EP LAB;  Service: Cardiology;  Laterality: N/A;  SINGLE LEAD REVISION, SJM, ANES, EH, 325    SOFT TISSUE BIOPSY  10/4/2022    Procedure: BIOPSY, SOFT TISSUE;  Surgeon: Clyde Banuelos Jr., MD;  Location: [facility] OR;  Service: Orthopedics;;     FAMILY HISTORY:   Family History   Problem Relation Age of Onset    Diabetes Mother     Diabetes Maternal Grandmother     Heart disease Maternal Grandmother     Allergies Daughter     Asthma Neg Hx     Allergic rhinitis Neg Hx     Angioedema Neg Hx     Atopy Neg Hx     Eczema Neg Hx     Immunodeficiency Neg Hx     Rhinitis Neg Hx     Urticaria Neg Hx      SOCIAL HISTORY:   Social History     Socioeconomic History    Marital status:    Tobacco Use    Smoking status: Former     Current packs/day: 0.00     Average packs/day: 0.3 packs/day for 40.0 years (12.0 ttl pk-yrs)     Types: Cigarettes     Start date:      Quit date:      Years since quittin.1    Smokeless tobacco: Never    Tobacco comments:     off and on since 20s ,   Substance and Sexual Activity    Alcohol use: No    Drug use: No    Sexual activity: Not Currently     Partners: Male     Social Determinants of Health     Financial Resource Strain: Low Risk  (2024)    Overall Financial Resource Strain (CARDIA)     Difficulty of Paying Living Expenses: Not very hard   Food Insecurity: Food Insecurity Present (2024)    Hunger Vital Sign     Worried About Running Out of Food in the Last Year: Sometimes true     Ran Out of Food in the Last Year: Never true   Transportation Needs: No Transportation Needs (2024)    PRAPARE - Transportation     Lack of Transportation (Medical): No     Lack of Transportation (Non-Medical): No   Physical Activity: Unknown (2024)    Exercise Vital Sign     Days  of Exercise per Week: 3 days   Stress: Stress Concern Present (2/21/2024)    [employer] - Occupational Stress Questionnaire     Feeling of Stress : To some extent   Social Connections: Unknown (2/21/2024)    Social Connection and Isolation Panel [NHANES]     Frequency of Communication with Friends and Family: More than three times a week     Frequency of Social Gatherings with Friends and Family: Once a week     Active Member of Clubs or Organizations: Patient declined     Attends Club or Organization Meetings: Patient declined     Marital Status:    Housing Stability: Low Risk  (2/21/2024)    Housing Stability Vital Sign     Unable to Pay for Housing in the Last Year: No     Number of Places Lived in the Last Year: 1     Unstable Housing in the Last Year: No       MEDICATIONS:   Current Outpatient Medications:     calcium carbonate (TUMS) 200 mg calcium (500 mg) chewable tablet, Take 1 tablet by mouth daily as needed for Heartburn., Disp: , Rfl:     diclofenac sodium (VOLTAREN) 1 % Gel, Apply 2 g topically 3 (three) times daily., Disp: 100 g, Rfl: 1    FLOVENT  mcg/actuation inhaler, INHALE 2 PUFFS INTO THE LUNGS TWICE DAILY, Disp: 12 g, Rfl: 0    fluticasone propionate (FLONASE) 50 mcg/actuation nasal spray, 1 spray by Each Nostril route daily as needed for Rhinitis or Allergies., Disp: , Rfl:     gabapentin (NEURONTIN) 300 MG capsule, TAKE 1 CAPSULE(300 MG) BY MOUTH THREE TIMES DAILY, Disp: 90 capsule, Rfl: 3    latanoprost 0.005 % ophthalmic solution, INSTILL 1 DROP INTO BOTH EYES AT BEDTIME AS DIRECTED, Disp: , Rfl:     meloxicam (MOBIC) 15 MG tablet, Take 1 tablet (15 mg total) by mouth once daily., Disp: 90 tablet, Rfl: 3    metoprolol succinate (TOPROL-XL) 100 MG 24 hr tablet, Take 1 tablet (100 mg total) by mouth every evening., Disp: 90 tablet, Rfl: 3    mv-mn/folic acid/vit [id number] (ALIVE ONCE DAILY WOMEN 50 PLUS ORAL), Take 1 tablet by mouth once daily.,  Disp: , Rfl:     rosuvastatin (CRESTOR) 5 MG tablet, Take 1 tablet (5 mg total) by mouth once daily., Disp: 90 tablet, Rfl: 3    tiZANidine (ZANAFLEX) 4 MG tablet, Take 1 tablet (4 mg total) by mouth every 6 (six) hours as needed (muscle spasms)., Disp: 60 tablet, Rfl: 1    azelastine (ASTELIN) 137 mcg (0.1 %) nasal spray, 1 spray (137 mcg total) by Nasal route 2 (two) times daily., Disp: 30 mL, Rfl: 11    losartan (COZAAR) 25 MG tablet, Take 1 tablet (25 mg total) by mouth once daily., Disp: 90 tablet, Rfl: 3    metFORMIN (GLUCOPHAGE-XR) 500 MG ER 24hr tablet, Take 1 tablet (500 mg total) by mouth daily with breakfast., Disp: 90 tablet, Rfl: 3  ALLERGIES: Review of patient's allergies indicates:  No Known Allergies    Objective:     VITAL SIGNS: /89   Pulse 93   Ht 5' (1.524 m)   Wt 69.5 kg (153 lb 3.5 oz)   BMI 29.92 kg/m²    General    Nursing note and vitals reviewed.  Constitutional: She is oriented to person, place, and time. She appears well-developed and well-nourished.   HENT:   Head: Normocephalic and atraumatic.   no nasal discharge, no external ear redness or discharge   Eyes:   EOM is full and smooth  No eye redness or discharge   Neck: Neck supple. No tracheal deviation present.   Cardiovascular:  Normal rate.            2+ Radial pulse bilaterally  2+ Dorsalis Pedis pulse bilaterally  No LE edema appreciated   Pulmonary/Chest: Effort normal. No respiratory distress.   Abdominal: She exhibits no distension.   No rigidity   Neurological: She is alert and oriented to person, place, and time. She exhibits normal muscle tone. Coordination normal.   See details below   Psychiatric: She has a normal mood and affect. Her behavior is normal.             MUSCULOSKELETAL EXAM  HIP: bilateral HIP  The affected hip is compared to the contralateral hip.    Observation:    There is no edema, erythema, or ecchymosis in the lumbosacral region.   There is no Trendelenburg sign on either side  No obvious  pelvic obliquity while standing.    No thoracolumbar scoliosis observed.    No midline skin abnormalities.  No atrophy noted in the lower limbs.  Gait: Non-antalgic with Over pronation ankle mechanics and Pes planus   Poor bilateral pelvic stability with bilateral hip hiking compensatory pattern noted with single leg raise    ROM (* = with pain):  Passive hip flexion to 120° on left and 120° on right  Passive hip internal rotation to 45° on left* and 45° on right* (lateral hip pain)  Passive hip external rotation to 45° on left and 45° on right   Passive hip abduction to 45° on left and 45° on right    Tenderness To Palpation:  No tenderness at the ASIS, AIIS, PSIS, PIIS, iliac crest, pubic bones, ischial tuberosity.  No tenderness throughout the lumbar spine, iliolumbar region, or posterior pelvis.  No tenderness throughout the sacrum or piriformis  + tenderness over the greater trochanteric bursae bilaterally  + tenderness at the glut attachments on the greater trochanters bilaterally  No tenderness over proximal IT band or hip flexor musculature.  + left TFL muscle tenderness    Strength Testing (* = with pain):  Hip flexion - 5/5 on left and 5/5 on right  Hip extension - 5/5 on left and 5/5 on right  Hip adduction - 5/5 on left and 5/5 on right  Hip abduction - 5/5 on left and 5/5 on right  Knee flexion - 5/5 on left and 5/5 on right  Knee extension - 5/5 on left and 5/5 on right  Glutaeus medius - 4+/5 on left and 4+/5 on right with compensatory firing patterns    Special Tests:  Standing Trendelenburg test - negative    Seated straight leg raise - negative  Supine straight leg raise - negative  Hamstring flexibility symmetric    Log roll - negative  SUSI - negative  FADIR - negative  Scour test - negative  No pain with posterior hip capsule compression    ASIS compression test - negative  SI drawer test - negative   Thigh thrust test - negative     Piriformis test (Bonnet's) - negative  Ely's test -  negative  Quadriceps flexibility symmetric.  Xiao test - negative  Jef compression test - negative    Fulcrum test - negative    Leg lengths asymmetric, + R short leg pattern.     Neurovascular Exam:  Normal gait without Trendelenburg or antalgia.  2+ femoral, DP, and PT pulses BL.  No skin changes, no abnormal hair distribution.  Sensation intact to light touch throughout the obturator and medial/lateral/posterior femoral cutaneous nerves.  Capillary refill intact to <2 seconds in all lower limb digits.    TART (Tissue texture abnormality, Asymmetry,  Restriction of motion and/or Tenderness) changes:     Thoracic Spine   T1 Neutral   T2 Neutral   T3 Neutral   T4 Neutral   T5 Neutral   T6 Neutral   T7 Neutral   T8 Neutral   T9 Neutral   T10 NS-left,R-right   T11 NS-left,R-right   T12 NS-left,R-right     Rib cage: R11-12 TTA on left     Lumbar Spine   L1 NS-left,R-right   L2 Neutral   L3 FRS RIGHT   L4 FRS RIGHT   L5 FRS RIGHT     Pelvis:  Innominate:Right anterior rotation  Pubic bone:Right inferior pubic shear    Sacrum:Right unilateral extension    Lower extremity: Right lateral hip Herniated trigger point (HTP) fascial distortion, left TFL Herniated trigger point (HTP) fascial distortion       Key   F= Flexed   E = Extended   R = Rotated   S = Sidebent   TTA = tissue texture abnormality     IMAGIN. X-ray ordered due to bilateral hip pain. (AP pelvis and frogleg lateral  bilateral views) taken today.   2. X-ray images were reviewed personally by me and then directly with patient.  3. FINDINGS: X-ray images obtained demonstrate no acute fractures. Some degenerative changes involving visualized lower lumbar spine in the form of some endplate osteophytes. Intact right and left SI joints. No definite narrowing of the right or left hip joint spaces. Mild right more so than left acetabular roof spurring. Preserved right and left femoral head contours.   4. IMPRESSION:  Mild lower lumbar DJD changes.  No  significant hip intra-articular joint space narrowing, but right more so than left acetabular roof spurring noted.     Assessment:      Encounter Diagnoses   Name Primary?    Lateral pain of hip Yes    Gluteal tendinitis of both buttocks     Greater trochanteric bursitis of both hips     Acquired short leg syndrome on right     Myalgia     Somatic dysfunction of lumbar region     Sacral region somatic dysfunction     Somatic dysfunction of pelvic region     Somatic dysfunction of thoracic region     Somatic dysfunction of rib cage region     Somatic dysfunction of lower extremity         Plan:   1. Bilateral lateral hip pain secondary to weak gluteal muscles and poor pelvic/core stability with compensatory muscle firing patterns, including over-firing of TFL musculature with associated greater trochanteric bursa and gluteal tendon irritation.  Underlying right short-leg also likely contributing to biomechanical restrictions of lower kinetic chain.  - Continue Mobic 15 mg p.o. q.day x 14 days, then as needed for pain control  - Recommend ice up to 20 minutes at a time prn for pain control  - OMT performed today to address biomechanical contributions to pain  - HEP started  - continue formal PT as planned  - recommend starting right 3 mm heel lift wear for underlying right short-leg  -  X-ray images of bilateral hip taken today (AP pelvis and frogleg lateral  bilateral views) showed mild lower lumbar DJD changes.  No significant hip intra-articular joint space narrowing, but right more so than left acetabular roof spurring noted.. Images were personally reviewed with patient.    2. OMT 5-6 regions. Oral consent obtained.  Reviewed benefits and potential side effects.   - OMT indicated today due to signs and symptoms as well as local and remote somatic dysfunction findings and their related neurokinetic, lymphatic, fascial and/or arteriovenous body connections.   - OMT techniques used: Myofascial Release, Muscle  Energy, Fascial Distortion Model, and Articulatory   - Treatment was tolerated well. Improvement noted in segmental mobility post-treatment in dysfunctional regions. There were no adverse events and no complications immediately following treatment.     3. Pt. Given the following HEP:  A)  Pelvic clock exercises given to do from the 6-12 o'clock positions:10-15 reps, twice daily. Hand out of exercise also given.   B) Clam shell exercises bilaterally: hold leg in abducted and externally rotated position for 5-10 seconds, repeat 5-10 times  C) Lower abdominal muscle isotonic exercises: Rotate pelvis into the 6 o'clock position and holding it to engage lower abdominal muscles. Then lift up leg, one at a time, alternating for 10-15 reps. Repeat exercises 1-2 times daily. Handout given.   D) Quadratus lumborum self-stretch on all fours: hold stretch for 30 seconds, repeating 2-3 times on each side. Do stretch twice daily. Hand-out also given.     [id number] HOME EXERCISE PROGRAM (HEP):  The patient was taught a homegoing physical therapy regimen as described above. The patient demonstrated understanding of the exercises and proper technique of their execution. This interaction took 15 minutes.     4. Follow-up in 4 weeks for reevaluation    5. Patient agreeable to today's plan and all questions were answered    This note is dictated using the M*Modal Fluency Direct word recognition program. There are word recognition mistakes that are occasionally missed on review.

## 2024-03-11 DIAGNOSIS — I42.8 CARDIOMYOPATHY, NONISCHEMIC: Primary | ICD-10-CM

## 2024-03-12 ENCOUNTER — HOSPITAL ENCOUNTER (OUTPATIENT)
Dept: RADIOLOGY | Facility: HOSPITAL | Age: 64
Discharge: HOME | End: 2024-03-12
Attending: NEUROMUSCULOSKELETAL MEDICINE & OMM
Payer: MEDICAID

## 2024-03-12 ENCOUNTER — OFFICE VISIT (OUTPATIENT)
Dept: SPORTS MEDICINE | Facility: CLINIC | Age: 64
End: 2024-03-12
Payer: MEDICAID

## 2024-03-12 VITALS
DIASTOLIC BLOOD PRESSURE: 89 MMHG | WEIGHT: 153.25 LBS | HEART RATE: 93 BPM | HEIGHT: 60 IN | SYSTOLIC BLOOD PRESSURE: 128 MMHG | BODY MASS INDEX: 30.09 KG/M2

## 2024-03-12 DIAGNOSIS — M79.10 MYALGIA: ICD-10-CM

## 2024-03-12 DIAGNOSIS — M99.06 SOMATIC DYSFUNCTION OF LOWER EXTREMITY: ICD-10-CM

## 2024-03-12 DIAGNOSIS — M99.08 SOMATIC DYSFUNCTION OF RIB CAGE REGION: ICD-10-CM

## 2024-03-12 DIAGNOSIS — M99.04 SACRAL REGION SOMATIC DYSFUNCTION: ICD-10-CM

## 2024-03-12 DIAGNOSIS — M99.03 SOMATIC DYSFUNCTION OF LUMBAR REGION: ICD-10-CM

## 2024-03-12 DIAGNOSIS — M76.01 GLUTEAL TENDINITIS OF BOTH BUTTOCKS: ICD-10-CM

## 2024-03-12 DIAGNOSIS — M76.02 GLUTEAL TENDINITIS OF BOTH BUTTOCKS: ICD-10-CM

## 2024-03-12 DIAGNOSIS — M70.61 GREATER TROCHANTERIC BURSITIS OF BOTH HIPS: ICD-10-CM

## 2024-03-12 DIAGNOSIS — M25.559 LATERAL PAIN OF HIP: Primary | ICD-10-CM

## 2024-03-12 DIAGNOSIS — M99.02 SOMATIC DYSFUNCTION OF THORACIC REGION: ICD-10-CM

## 2024-03-12 DIAGNOSIS — M99.05 SOMATIC DYSFUNCTION OF PELVIC REGION: ICD-10-CM

## 2024-03-12 DIAGNOSIS — M25.559 HIP PAIN, UNSPECIFIED LATERALITY: ICD-10-CM

## 2024-03-12 DIAGNOSIS — M70.62 GREATER TROCHANTERIC BURSITIS OF BOTH HIPS: ICD-10-CM

## 2024-03-12 DIAGNOSIS — M21.70 ACQUIRED SHORT LEG SYNDROME ON RIGHT: ICD-10-CM

## 2024-03-12 PROCEDURE — 99214 OFFICE O/P EST MOD 30 MIN: CPT | Mod: PBBFAC,25 | Performed by: NEUROMUSCULOSKELETAL MEDICINE & OMM

## 2024-03-12 PROCEDURE — 3079F DIAST BP 80-89 MM HG: CPT | Mod: CPTII,,, | Performed by: NEUROMUSCULOSKELETAL MEDICINE & OMM

## 2024-03-12 PROCEDURE — 98927 OSTEOPATH MANJ 5-6 REGIONS: CPT | Mod: S$PBB,,, | Performed by: NEUROMUSCULOSKELETAL MEDICINE & OMM

## 2024-03-12 PROCEDURE — 1160F RVW MEDS BY RX/DR IN RCRD: CPT | Mod: CPTII,,, | Performed by: NEUROMUSCULOSKELETAL MEDICINE & OMM

## 2024-03-12 PROCEDURE — 97110 THERAPEUTIC EXERCISES: CPT | Mod: GP,,, | Performed by: NEUROMUSCULOSKELETAL MEDICINE & OMM

## 2024-03-12 PROCEDURE — 98927 OSTEOPATH MANJ 5-6 REGIONS: CPT | Mod: PBBFAC | Performed by: NEUROMUSCULOSKELETAL MEDICINE & OMM

## 2024-03-12 PROCEDURE — 99204 OFFICE O/P NEW MOD 45 MIN: CPT | Mod: 25,S$PBB,, | Performed by: NEUROMUSCULOSKELETAL MEDICINE & OMM

## 2024-03-12 PROCEDURE — 1159F MED LIST DOCD IN RCRD: CPT | Mod: CPTII,,, | Performed by: NEUROMUSCULOSKELETAL MEDICINE & OMM

## 2024-03-12 PROCEDURE — 3008F BODY MASS INDEX DOCD: CPT | Mod: CPTII,,, | Performed by: NEUROMUSCULOSKELETAL MEDICINE & OMM

## 2024-03-12 PROCEDURE — 99999 PR PBB SHADOW E&M-EST. PATIENT-LVL IV: CPT | Mod: PBBFAC,,, | Performed by: NEUROMUSCULOSKELETAL MEDICINE & OMM

## 2024-03-12 PROCEDURE — 73521 X-RAY EXAM HIPS BI 2 VIEWS: CPT | Mod: 26,,, | Performed by: RADIOLOGY

## 2024-03-12 PROCEDURE — 73521 X-RAY EXAM HIPS BI 2 VIEWS: CPT | Mod: TC

## 2024-03-12 PROCEDURE — 3044F HG A1C LEVEL LT 7.0%: CPT | Mod: CPTII,,, | Performed by: NEUROMUSCULOSKELETAL MEDICINE & OMM

## 2024-03-12 PROCEDURE — 3074F SYST BP LT 130 MM HG: CPT | Mod: CPTII,,, | Performed by: NEUROMUSCULOSKELETAL MEDICINE & OMM

## 2024-03-13 ENCOUNTER — TELEPHONE (OUTPATIENT)
Dept: ELECTROPHYSIOLOGY | Facility: CLINIC | Age: 64
End: 2024-03-13
Payer: MEDICAID

## 2024-03-14 ENCOUNTER — OFFICE VISIT (OUTPATIENT)
Dept: CARDIOLOGY | Facility: CLINIC | Age: 64
End: 2024-03-14
Payer: MEDICAID

## 2024-03-14 VITALS
HEIGHT: 60 IN | HEART RATE: 99 BPM | WEIGHT: 153.25 LBS | SYSTOLIC BLOOD PRESSURE: 116 MMHG | BODY MASS INDEX: 30.09 KG/M2 | DIASTOLIC BLOOD PRESSURE: 81 MMHG

## 2024-03-14 DIAGNOSIS — I50.22 CHF (CONGESTIVE HEART FAILURE), NYHA CLASS II, CHRONIC, SYSTOLIC: Chronic | ICD-10-CM

## 2024-03-14 DIAGNOSIS — I25.10 CORONARY ARTERY DISEASE INVOLVING NATIVE CORONARY ARTERY OF NATIVE HEART WITHOUT ANGINA PECTORIS: ICD-10-CM

## 2024-03-14 DIAGNOSIS — E78.2 MIXED HYPERLIPIDEMIA: ICD-10-CM

## 2024-03-14 DIAGNOSIS — I42.8 CARDIOMYOPATHY, NONISCHEMIC: Primary | ICD-10-CM

## 2024-03-14 PROCEDURE — 1160F RVW MEDS BY RX/DR IN RCRD: CPT | Mod: CPTII,,, | Performed by: INTERNAL MEDICINE

## 2024-03-14 PROCEDURE — 99214 OFFICE O/P EST MOD 30 MIN: CPT | Mod: S$PBB,,, | Performed by: INTERNAL MEDICINE

## 2024-03-14 PROCEDURE — 99999 PR PBB SHADOW E&M-EST. PATIENT-LVL III: CPT | Mod: PBBFAC,,, | Performed by: INTERNAL MEDICINE

## 2024-03-14 PROCEDURE — 1159F MED LIST DOCD IN RCRD: CPT | Mod: CPTII,,, | Performed by: INTERNAL MEDICINE

## 2024-03-14 PROCEDURE — 3044F HG A1C LEVEL LT 7.0%: CPT | Mod: CPTII,,, | Performed by: INTERNAL MEDICINE

## 2024-03-14 PROCEDURE — 93005 ELECTROCARDIOGRAM TRACING: CPT | Mod: PBBFAC,PN | Performed by: INTERNAL MEDICINE

## 2024-03-14 PROCEDURE — 93010 ELECTROCARDIOGRAM REPORT: CPT | Mod: S$PBB,,, | Performed by: INTERNAL MEDICINE

## 2024-03-14 PROCEDURE — 3074F SYST BP LT 130 MM HG: CPT | Mod: CPTII,,, | Performed by: INTERNAL MEDICINE

## 2024-03-14 PROCEDURE — 99213 OFFICE O/P EST LOW 20 MIN: CPT | Mod: PBBFAC,PN | Performed by: INTERNAL MEDICINE

## 2024-03-14 PROCEDURE — 3008F BODY MASS INDEX DOCD: CPT | Mod: CPTII,,, | Performed by: INTERNAL MEDICINE

## 2024-03-14 PROCEDURE — 3079F DIAST BP 80-89 MM HG: CPT | Mod: CPTII,,, | Performed by: INTERNAL MEDICINE

## 2024-03-14 NOTE — PROGRESS NOTES
Subjective:    Patient ID:  Suly Jaime is a 64 y.o. female who presents for evaluation of NICM    HPI   64 y.o. F retired   nonobstructive CAD and possible dissection on CTA  IVCD  chronic bronchitis    She c/o NYHA II sx: stops walking after exerts self a bit. No CP.    10/2021 35% LVEF.  1/2022 35%  CTA: nonobstructive RCA CAD and possible RCA dissection    Update 3/24:  Since last seen, 7 VHR episodes. Most recent, on 3/11, lasted 37 seconds and correlated with her morning workout.   <1%    My interpretation of today's ECG is NSR. IVCD with QRSd 108 ms.    Review of Systems   Constitutional: Negative. Negative for malaise/fatigue.   HENT: Negative.  Negative for ear pain and tinnitus.    Eyes:  Negative for blurred vision.   Cardiovascular:  Positive for dyspnea on exertion. Negative for chest pain, near-syncope, palpitations and syncope.   Respiratory: Negative.  Negative for shortness of breath.    Endocrine: Negative.  Negative for polyuria.   Hematologic/Lymphatic: Does not bruise/bleed easily.   Skin: Negative.  Negative for rash.   Musculoskeletal: Negative.  Negative for joint pain and muscle weakness.   Gastrointestinal: Negative.  Negative for abdominal pain and change in bowel habit.   Genitourinary:  Negative for frequency.   Neurological: Negative.  Negative for dizziness and weakness.   Psychiatric/Behavioral: Negative.  Negative for depression. The patient is not nervous/anxious.    Allergic/Immunologic: Negative for environmental allergies.        Objective:    Physical Exam  Vitals and nursing note reviewed.   Constitutional:       Appearance: Normal appearance. She is well-developed.   HENT:      Head: Normocephalic and atraumatic.   Eyes:      Conjunctiva/sclera: Conjunctivae normal.   Neck:      Thyroid: No thyroid mass or thyromegaly.      Trachea: No tracheal deviation.   Cardiovascular:      Rate and Rhythm: Normal rate and regular rhythm.   Pulmonary:      Effort: Pulmonary  effort is normal. No respiratory distress.      Breath sounds: No wheezing.   Abdominal:      General: There is no distension.   Musculoskeletal:         General: Normal range of motion.      Cervical back: Normal range of motion. No edema.   Skin:     General: Skin is warm and dry.      Findings: No rash.   Neurological:      Mental Status: She is alert and oriented to person, place, and time.      Coordination: Coordination normal.   Psychiatric:         Speech: Speech normal.         Behavior: Behavior normal. Behavior is cooperative.         Thought Content: Thought content normal.           Assessment:       1. Cardiomyopathy, nonischemic    2. CHF (congestive heart failure), NYHA class II, chronic, systolic    3. Coronary artery disease involving native coronary artery of native heart without angina pectoris    4. Mixed hyperlipidemia         Plan:       Doing well re: ICD.  Return in 1 year with echo, or earlier prn.

## 2024-03-20 LAB
OHS QRS DURATION: 108 MS
OHS QTC CALCULATION: 495 MS

## 2024-04-08 NOTE — PROGRESS NOTES
Subjective:     Suly Jaime     Chief Complaint   Patient presents with    Left Hip - Pain    Right Hip - Pain     HPI    Suly is a 64 y.o. female coming in today for bilateral hip pain. Since last visit the pain has remained unchanged.  Pt is compliant with HEP. Pt states she did have some relief with the meloxicam. Pt is wearing 3mm right heel lift.The pain is better with rest, NSAIDs, tylenol arthritis and worse with activity. Pt. describes the pain as a 8/10 achy pain that does not radiate. There has not been any new a fall/injury/ or traumas since last visit.  Pt. denies any new musculoskeletal complaints at this time.  Of note, patient notes that her bilateral hip pain precede starting her Cozaar medication.    Office note from 3/12/24 reviewed    Joint instability? no  Mechanical locking/clicking? no  Affecting ADL's? yes  Affecting sleep? yes    Occupation: retired    PAST MEDICAL HISTORY:   Past Medical History:   Diagnosis Date    Allergy     Cardiomyopathy     Hypertension     ICD (implantable cardioverter-defibrillator) in place 1/30/2023     PAST SURGICAL HISTORY:   Past Surgical History:   Procedure Laterality Date    CARDIAC DEFIBRILLATOR PLACEMENT Left     HYSTERECTOMY  2003    full    KNEE ARTHROSCOPY      LARYNGOSCOPY N/A 02/18/2019    Procedure: DIRECT MICRO LARYNGOSCOPY WITH BIOPSY;  Surgeon: Deidre Calderon MD;  Location: [facility] OR;  Service: ENT;  Laterality: N/A;  video    OOPHORECTOMY      RELEASE OF CONTRACTURE  10/4/2022    Procedure: RELEASE, CONTRACTURE;  Surgeon: Clyde Banuelos Jr., MD;  Location: [facility] OR;  Service: Orthopedics;;    RELEASE OF CONTRACTURE OF JOINT Left 10/4/2022    Procedure: RELEASE, CONTRACTURE, JOINT;  Surgeon: Clyde Banuelos Jr., MD;  Location: [facility] OR;  Service: Orthopedics;  Laterality: Left;  need k wires and mini c arm    REVISION OF IMPLANTABLE CARDIOVERTER-DEFIBRILLATOR (ICD) ELECTRODE LEAD PLACEMENT N/A 6/6/2022    Procedure: REVISION, INSERTION,  ELECTRODE LEAD, ICD;  Surgeon: Cruzito Tovar MD;  Location: [facility] EP LAB;  Service: Cardiology;  Laterality: N/A;  SINGLE LEAD REVISION, SJM, ANES, EH, 325    SOFT TISSUE BIOPSY  10/4/2022    Procedure: BIOPSY, SOFT TISSUE;  Surgeon: lCyde Banuelos Jr., MD;  Location: [facility] OR;  Service: Orthopedics;;     FAMILY HISTORY:   Family History   Problem Relation Age of Onset    Diabetes Mother     Diabetes Maternal Grandmother     Heart disease Maternal Grandmother     Allergies Daughter     Asthma Neg Hx     Allergic rhinitis Neg Hx     Angioedema Neg Hx     Atopy Neg Hx     Eczema Neg Hx     Immunodeficiency Neg Hx     Rhinitis Neg Hx     Urticaria Neg Hx      SOCIAL HISTORY:   Social History     Socioeconomic History    Marital status:    Tobacco Use    Smoking status: Former     Current packs/day: 0.00     Average packs/day: 0.3 packs/day for 40.0 years (12.0 ttl pk-yrs)     Types: Cigarettes     Start date:      Quit date:      Years since quittin.2    Smokeless tobacco: Never    Tobacco comments:     off and on since 20s ,   Substance and Sexual Activity    Alcohol use: No    Drug use: No    Sexual activity: Not Currently     Partners: Male     Social Determinants of Health     Financial Resource Strain: Low Risk  (2024)    Overall Financial Resource Strain (CARDIA)     Difficulty of Paying Living Expenses: Not very hard   Food Insecurity: Food Insecurity Present (2024)    Hunger Vital Sign     Worried About Running Out of Food in the Last Year: Sometimes true     Ran Out of Food in the Last Year: Never true   Transportation Needs: No Transportation Needs (2024)    PRAPARE - Transportation     Lack of Transportation (Medical): No     Lack of Transportation (Non-Medical): No   Physical Activity: Unknown (2024)    Exercise Vital Sign     Days of Exercise per Week: 3 days   Stress: Stress Concern Present (2024)    [employer] [place] [employer] - Occupational  Stress Questionnaire     Feeling of Stress : To some extent   Social Connections: Unknown (2/21/2024)    Social Connection and Isolation Panel [NHANES]     Frequency of Communication with Friends and Family: More than three times a week     Frequency of Social Gatherings with Friends and Family: Once a week     Active Member of Clubs or Organizations: Patient declined     Attends Club or Organization Meetings: Patient declined     Marital Status:    Housing Stability: Low Risk  (2/21/2024)    Housing Stability Vital Sign     Unable to Pay for Housing in the Last Year: No     Number of Places Lived in the Last Year: 1     Unstable Housing in the Last Year: No     MEDICATIONS:   Current Outpatient Medications:     calcium carbonate (TUMS) 200 mg calcium (500 mg) chewable tablet, Take 1 tablet by mouth daily as needed for Heartburn., Disp: , Rfl:     diclofenac sodium (VOLTAREN) 1 % Gel, Apply 2 g topically 3 (three) times daily., Disp: 100 g, Rfl: 1    FLOVENT  mcg/actuation inhaler, INHALE 2 PUFFS INTO THE LUNGS TWICE DAILY, Disp: 12 g, Rfl: 0    fluticasone propionate (FLONASE) 50 mcg/actuation nasal spray, 1 spray by Each Nostril route daily as needed for Rhinitis or Allergies., Disp: , Rfl:     gabapentin (NEURONTIN) 300 MG capsule, TAKE 1 CAPSULE(300 MG) BY MOUTH THREE TIMES DAILY, Disp: 90 capsule, Rfl: 3    latanoprost 0.005 % ophthalmic solution, INSTILL 1 DROP INTO BOTH EYES AT BEDTIME AS DIRECTED, Disp: , Rfl:     meloxicam (MOBIC) 15 MG tablet, Take 1 tablet (15 mg total) by mouth once daily., Disp: 90 tablet, Rfl: 3    metoprolol succinate (TOPROL-XL) 100 MG 24 hr tablet, Take 1 tablet (100 mg total) by mouth every evening., Disp: 90 tablet, Rfl: 3    mv-mn/folic acid/vit [id number] (ALIVE ONCE DAILY WOMEN 50 PLUS ORAL), Take 1 tablet by mouth once daily., Disp: , Rfl:     rosuvastatin (CRESTOR) 5 MG tablet, Take 1 tablet (5 mg total) by mouth once daily., Disp: 90 tablet, Rfl: 3    tiZANidine  (ZANAFLEX) 4 MG tablet, Take 1 tablet (4 mg total) by mouth every 6 (six) hours as needed (muscle spasms)., Disp: 60 tablet, Rfl: 1    azelastine (ASTELIN) 137 mcg (0.1 %) nasal spray, 1 spray (137 mcg total) by Nasal route 2 (two) times daily., Disp: 30 mL, Rfl: 11    losartan (COZAAR) 25 MG tablet, Take 1 tablet (25 mg total) by mouth once daily., Disp: 90 tablet, Rfl: 3    metFORMIN (GLUCOPHAGE-XR) 500 MG ER 24hr tablet, Take 1 tablet (500 mg total) by mouth daily with breakfast., Disp: 90 tablet, Rfl: 3  No current facility-administered medications for this visit.  ALLERGIES: Review of patient's allergies indicates:  No Known Allergies    Objective:     VITAL SIGNS: /84   Pulse 99   Ht 5' (1.524 m)   Wt 68.2 kg (150 lb 7.4 oz)   BMI 29.39 kg/m²    General    Vitals reviewed.  Constitutional: She is oriented to person, place, and time. She appears well-developed and well-nourished.   Neurological: She is alert and oriented to person, place, and time.   Psychiatric: She has a normal mood and affect. Her behavior is normal.           MUSCULOSKELETAL EXAM  HIP: bilateral HIP  The affected hip is compared to the contralateral hip.    Observation:    There is no edema, erythema, or ecchymosis in the lumbosacral region.   There is no Trendelenburg sign on either side  No obvious pelvic obliquity while standing.    No thoracolumbar scoliosis observed.    No midline skin abnormalities.  No atrophy noted in the lower limbs.  Gait: Non-antalgic with Over pronation ankle mechanics and Pes planus   Poor bilateral pelvic stability with bilateral hip hiking compensatory pattern noted with single leg raise  Anterior pelvic tilt and knee hyperextension while standing    ROM (* = with pain):  Passive hip flexion to 120° on left and 120° on right  Passive hip internal rotation to 45° on left* and 45° on right* (lateral hip pain)  Passive hip external rotation to 45° on left and 45° on right   Passive hip abduction to 45°  on left and 45° on right    Tenderness To Palpation:  No tenderness at the ASIS, AIIS, PSIS, PIIS, iliac crest, pubic bones, ischial tuberosity.  No tenderness throughout the lumbar spine, iliolumbar region, or posterior pelvis.  + tenderness throughout the right SI joint  + tenderness over the greater trochanteric bursae bilaterally  + tenderness at the glut attachments on the greater trochanters bilaterally  No tenderness over proximal IT band or hip flexor musculature.  No TFL muscle tenderness  + right gluteal tenderness    Strength Testing (* = with pain):  Hip flexion - 5/5 on left and 5/5 on right  Hip extension - 5/5 on left and 5/5 on right  Hip adduction - 5/5 on left and 5/5 on right  Hip abduction - 5/5 on left and 5/5 on right  Knee flexion - 5/5 on left and 5/5 on right  Knee extension - 5/5 on left and 5/5 on right  Glutaeus medius - 5-/5 on left and 5-/5 on right with compensatory firing patterns    Special Tests:  Standing Trendelenburg test - negative    Seated straight leg raise - negative  Supine straight leg raise - negative  Hamstring flexibility symmetric    Log roll - negative  SUSI - negative  FADIR - negative  Scour test - negative  No pain with posterior hip capsule compression    ASIS compression test - negative  SI drawer test - negative   Thigh thrust test - negative     Piriformis test (Bonnet's) - negative  Ely's test - negative  Quadriceps flexibility symmetric.  Xiao test - negative  Jef compression test - negative    Fulcrum test - negative    Leg lengths symmetric following OMT to the pelvis    Neurovascular Exam:  Normal gait without Trendelenburg or antalgia.  2+ femoral, DP, and PT pulses BL.  No skin changes, no abnormal hair distribution.  Sensation intact to light touch throughout the obturator and medial/lateral/posterior femoral cutaneous nerves.  Capillary refill intact to <2 seconds in all lower limb digits.    TART (Tissue texture abnormality, Asymmetry,  Restriction  "of motion and/or Tenderness) changes:     Thoracic Spine   T1 Neutral   T2 Neutral   T3 Neutral   T4 Neutral   T5 Neutral   T6 Neutral   T7 FRS LEFT   T8 FRS LEFT   T9 FRS LEFT   T10 NS-right,R-left   T11 NS-right,R-left   T12 NS-right,R-left     Rib cage: R7 external torsion on left     Lumbar Spine   L1 NS-right,R-left   L2 Neutral   L3 Neutral   L4 FRS LEFT   L5 FRS LEFT     Pelvis:  Innominate:Right anterior rotation  Pubic bone:Right inferior pubic shear    Sacrum:Right on Left sacral torsion extension    Lower extremity: Right gluteal Herniated trigger point (HTP) fascial distortion, right anterior talus    Key   F= Flexed   E = Extended   R = Rotated   S = Sidebent   TTA = tissue texture abnormality     Large Joint Aspiration/Injection   Greater trochanteric bursa and gluteus medius  bilateral    Performed by: DEDRICK LILLY  Authorized by: DEDIRCK LILLY   Consent Done?: Yes (Verbal)  Indications: Pain  Site marked: The procedure site was marked   Timeout: Prior to procedure the correct patient, procedure, and site was verified     Location: Greater trochanteric bursa, bilateral  Prep: Patient was prepped with Chlorhexidine and alcohol.  Skin anesthetic: Ethyl Chloride spray was used prior to skin puncture.  Ultrasonic Guidance for needle placement: Yes  Procedure: After skin anesthetic was applied, the 22G, 3.5 needle was used to inject the greater trochanteric bursa with a 3 cc mixture of 1 cc of 40 mg/ml triamcinolone acetonide and 2 cc of 0.2% Naropin was injected into the bursa.  Needle size: 22 G, 3.5  Approach: Lateral  Medications: 40 mg triamcinolone acetonide 40 mg/mL  Patient tolerance: Patient tolerated the procedure well with no immediate complications    Ultrasound guidance was used for needle localization with [employer] Edge 2, 9-L MHz linear probe(s). Images were saved and stored for documentation. The structures of the lateral hip were visualized. Dynamic visualization of the 20g 3.5" " needle(s) was continuous throughout the procedure and maintained good position and correct needle placement.      Triamcinolone:  NDC: [address]-5  LOT: [id number]  EXP: 11/2025    Assessment:      Encounter Diagnoses   Name Primary?    Greater trochanteric bursitis of both hips Yes    Gluteal tendinitis of both buttocks     SI (sacroiliac) joint dysfunction     Myalgia     Somatic dysfunction of lumbar region     Sacral region somatic dysfunction     Somatic dysfunction of pelvic region     Somatic dysfunction of thoracic region     Somatic dysfunction of lower extremity     Somatic dysfunction of rib cage region         Plan:   1. Bilateral lateral hip pain secondary to weak gluteal muscles and poor pelvic/core stability with compensatory muscle firing patterns with associated greater trochanteric bursa and gluteal tendon irritation - unchanged, with associated right SI joint irritation as well.  - Patient received an ultrasound guided corticosteroid injection of the bilateral greater trochanteric bursas today (see details above).   - Referral to outpatient PT ([facility]) for increase pelvic stability, lower core strengthening, and postural retraining  - Continue Mobic 15 mg p.o. q.day as needed for pain control  - Recommend ice up to 20 minutes at a time prn for pain control  - OMT performed again today to address biomechanical contributions to pain  - HEP reviewed  - recommend d/cing right 3 mm heel lift wear   - recommend over-the-counter SI belt wear for increased support as well  - discussed proper posture while standing  - consider pelvic MRI for further evaluation if symptoms persist following completion of formal PT  -  X-ray images of bilateral hip taken 3/12/24 (AP pelvis and frogleg lateral  bilateral views) showed mild lower lumbar DJD changes.  No significant hip intra-articular joint space narrowing, but right more so than left acetabular roof spurring noted.. Images were personally reviewed  with patient.    2. OMT 5-6 regions. Oral consent obtained.  Reviewed benefits and potential side effects.   - OMT indicated today due to signs and symptoms as well as local and remote somatic dysfunction findings and their related neurokinetic, lymphatic, fascial and/or arteriovenous body connections.   - OMT techniques used: Myofascial Release, Muscle Energy, Fascial Distortion Model, and Articulatory   - Treatment was tolerated well. Improvement noted in segmental mobility post-treatment in dysfunctional regions. There were no adverse events and no complications immediately following treatment.     3. Reviewed with patient the following HEP:  Continue:  A)  Pelvic clock exercises given to do from the 6-12 o'clock positions:10-15 reps, twice daily. Hand out of exercise also given.   B) Clam shell exercises bilaterally: hold leg in abducted and externally rotated position for 5-10 seconds, repeat 5-10 times  C) Lower abdominal muscle isotonic exercises: Rotate pelvis into the 6 o'clock position and holding it to engage lower abdominal muscles. Then lift up leg, one at a time, alternating for 10-15 reps. Repeat exercises 1-2 times daily. Handout given.   D) Quadratus lumborum self-stretch on all fours: hold stretch for 30 seconds, repeating 2-3 times on each side. Do stretch twice daily. Hand-out also given.     [address]16 HOME EXERCISE PROGRAM (HEP):  The patient was taught a homegoing physical therapy regimen as described above. The patient demonstrated understanding of the exercises and proper technique of their execution. This interaction took 15 minutes.     4. Follow-up upon completion of PT if pain persist or deteriorates    5. Patient agreeable to today's plan and all questions were answered    This note is dictated using the M*Modal Fluency Direct word recognition program. There are word recognition mistakes that are occasionally missed on review.

## 2024-04-09 ENCOUNTER — OFFICE VISIT (OUTPATIENT)
Dept: SPORTS MEDICINE | Facility: CLINIC | Age: 64
End: 2024-04-09
Payer: MEDICAID

## 2024-04-09 VITALS
BODY MASS INDEX: 29.54 KG/M2 | HEART RATE: 99 BPM | HEIGHT: 60 IN | DIASTOLIC BLOOD PRESSURE: 84 MMHG | SYSTOLIC BLOOD PRESSURE: 120 MMHG | WEIGHT: 150.44 LBS

## 2024-04-09 DIAGNOSIS — M99.06 SOMATIC DYSFUNCTION OF LOWER EXTREMITY: ICD-10-CM

## 2024-04-09 DIAGNOSIS — M70.62 GREATER TROCHANTERIC BURSITIS OF BOTH HIPS: Primary | ICD-10-CM

## 2024-04-09 DIAGNOSIS — M76.02 GLUTEAL TENDINITIS OF BOTH BUTTOCKS: ICD-10-CM

## 2024-04-09 DIAGNOSIS — M79.10 MYALGIA: ICD-10-CM

## 2024-04-09 DIAGNOSIS — M70.61 GREATER TROCHANTERIC BURSITIS OF BOTH HIPS: Primary | ICD-10-CM

## 2024-04-09 DIAGNOSIS — M99.02 SOMATIC DYSFUNCTION OF THORACIC REGION: ICD-10-CM

## 2024-04-09 DIAGNOSIS — M99.04 SACRAL REGION SOMATIC DYSFUNCTION: ICD-10-CM

## 2024-04-09 DIAGNOSIS — M99.08 SOMATIC DYSFUNCTION OF RIB CAGE REGION: ICD-10-CM

## 2024-04-09 DIAGNOSIS — M76.01 GLUTEAL TENDINITIS OF BOTH BUTTOCKS: ICD-10-CM

## 2024-04-09 DIAGNOSIS — M99.05 SOMATIC DYSFUNCTION OF PELVIC REGION: ICD-10-CM

## 2024-04-09 DIAGNOSIS — M99.03 SOMATIC DYSFUNCTION OF LUMBAR REGION: ICD-10-CM

## 2024-04-09 DIAGNOSIS — M53.3 SI (SACROILIAC) JOINT DYSFUNCTION: ICD-10-CM

## 2024-04-09 PROCEDURE — 99999 PR PBB SHADOW E&M-EST. PATIENT-LVL IV: CPT | Mod: PBBFAC,,, | Performed by: NEUROMUSCULOSKELETAL MEDICINE & OMM

## 2024-04-09 PROCEDURE — 3074F SYST BP LT 130 MM HG: CPT | Mod: CPTII,,, | Performed by: NEUROMUSCULOSKELETAL MEDICINE & OMM

## 2024-04-09 PROCEDURE — 1160F RVW MEDS BY RX/DR IN RCRD: CPT | Mod: CPTII,,, | Performed by: NEUROMUSCULOSKELETAL MEDICINE & OMM

## 2024-04-09 PROCEDURE — 97110 THERAPEUTIC EXERCISES: CPT | Mod: GP,,, | Performed by: NEUROMUSCULOSKELETAL MEDICINE & OMM

## 2024-04-09 PROCEDURE — 3079F DIAST BP 80-89 MM HG: CPT | Mod: CPTII,,, | Performed by: NEUROMUSCULOSKELETAL MEDICINE & OMM

## 2024-04-09 PROCEDURE — 98927 OSTEOPATH MANJ 5-6 REGIONS: CPT | Mod: PBBFAC | Performed by: NEUROMUSCULOSKELETAL MEDICINE & OMM

## 2024-04-09 PROCEDURE — 20611 DRAIN/INJ JOINT/BURSA W/US: CPT | Mod: 50,S$PBB,, | Performed by: NEUROMUSCULOSKELETAL MEDICINE & OMM

## 2024-04-09 PROCEDURE — 1159F MED LIST DOCD IN RCRD: CPT | Mod: CPTII,,, | Performed by: NEUROMUSCULOSKELETAL MEDICINE & OMM

## 2024-04-09 PROCEDURE — 98927 OSTEOPATH MANJ 5-6 REGIONS: CPT | Mod: S$PBB,59,, | Performed by: NEUROMUSCULOSKELETAL MEDICINE & OMM

## 2024-04-09 PROCEDURE — 99999PBSHW PR PBB SHADOW TECHNICAL ONLY FILED TO HB: Mod: PBBFAC,,,

## 2024-04-09 PROCEDURE — 3008F BODY MASS INDEX DOCD: CPT | Mod: CPTII,,, | Performed by: NEUROMUSCULOSKELETAL MEDICINE & OMM

## 2024-04-09 PROCEDURE — 20611 DRAIN/INJ JOINT/BURSA W/US: CPT | Mod: 50,PBBFAC | Performed by: NEUROMUSCULOSKELETAL MEDICINE & OMM

## 2024-04-09 PROCEDURE — 3044F HG A1C LEVEL LT 7.0%: CPT | Mod: CPTII,,, | Performed by: NEUROMUSCULOSKELETAL MEDICINE & OMM

## 2024-04-09 PROCEDURE — 99214 OFFICE O/P EST MOD 30 MIN: CPT | Mod: PBBFAC,25 | Performed by: NEUROMUSCULOSKELETAL MEDICINE & OMM

## 2024-04-09 PROCEDURE — 99214 OFFICE O/P EST MOD 30 MIN: CPT | Mod: 25,S$PBB,, | Performed by: NEUROMUSCULOSKELETAL MEDICINE & OMM

## 2024-04-09 RX ORDER — TRIAMCINOLONE ACETONIDE 40 MG/ML
40 INJECTION, SUSPENSION INTRA-ARTICULAR; INTRAMUSCULAR
Status: COMPLETED | OUTPATIENT
Start: 2024-04-09 | End: 2024-04-09

## 2024-04-09 RX ADMIN — TRIAMCINOLONE ACETONIDE 40 MG: 40 INJECTION, SUSPENSION INTRA-ARTICULAR; INTRAMUSCULAR at 11:04

## 2024-04-26 ENCOUNTER — CLINICAL SUPPORT (OUTPATIENT)
Dept: REHABILITATION | Facility: HOSPITAL | Age: 64
End: 2024-04-26
Payer: MEDICAID

## 2024-04-26 DIAGNOSIS — M76.01 GLUTEAL TENDINITIS OF BOTH BUTTOCKS: ICD-10-CM

## 2024-04-26 DIAGNOSIS — M70.61 GREATER TROCHANTERIC BURSITIS OF BOTH HIPS: ICD-10-CM

## 2024-04-26 DIAGNOSIS — M70.62 GREATER TROCHANTERIC BURSITIS OF BOTH HIPS: ICD-10-CM

## 2024-04-26 DIAGNOSIS — M53.3 SI (SACROILIAC) JOINT DYSFUNCTION: ICD-10-CM

## 2024-04-26 DIAGNOSIS — I42.8 OTHER CARDIOMYOPATHIES: ICD-10-CM

## 2024-04-26 DIAGNOSIS — M76.02 GLUTEAL TENDINITIS OF BOTH BUTTOCKS: ICD-10-CM

## 2024-04-26 DIAGNOSIS — Z95.810 PRESENCE OF AUTOMATIC (IMPLANTABLE) CARDIAC DEFIBRILLATOR: ICD-10-CM

## 2024-04-26 PROCEDURE — 97161 PT EVAL LOW COMPLEX 20 MIN: CPT

## 2024-04-26 PROCEDURE — 97110 THERAPEUTIC EXERCISES: CPT

## 2024-04-26 NOTE — PROGRESS NOTES
"[facility]   Physical Therapy Initial Evaluation      Name: Suly Jaime  Clinic Number: [id number]    Therapy Diagnosis:   Encounter Diagnoses   Name Primary?    Greater trochanteric bursitis of both hips     Gluteal tendinitis of both buttocks     SI (sacroiliac) joint dysfunction         Physician: Yoko Trujillo DO    Physician Orders: PT Eval and Treat hip  Medical Diagnosis from Referral: M70.61,M70.62 (ICD-10-CM) - Greater trochanteric bursitis of both hips M76.01,M76.02 (ICD-10-CM) - Gluteal tendinitis of both buttocks M53.3 (ICD-10-CM) - SI (sacroiliac) joint dysfunction  Evaluation Date: 4/26/2024  Authorization Period Expiration: 12/31/2024  Plan of Care Expiration: 5/31/2024  Progress Note Due: 10th visit  Date of Surgery: NA  Visit # / Visits authorized: 1/ 1   FOTO: 1/ 3    Precautions: Standard     Time In: 11:00 AM  Time Out: 12:00 PM  Total Billable Time: 60 minutes    Subjective     Date of onset: 4-6 months     History of current condition - Suly reports to the clinic with history of pain in bilateral hips with right side worse than left. She reports that she had an injection from MD office about 3 weeks ago and she no longer has any more pain. She reports that she has returned to everything she wants to and is not having any problems. Patient reports that she only came to the appointment because she thought she needed to and to get some exercises just in case it returns.    Falls: No falls    Imaging: See imaging section     Prior Therapy: Yes  Social History: lives alone  Occupation: Reitred  Prior Level of Function: independent with activities of daily living/instrumental activities of daily living   Current Level of Function: No difficulty - returned to prior level of function     Pain:  Current 0/10, worst 0/10, best 0/10   Location: bilateral hip  Description: NA  Aggravating Factors: NA  Easing Factors: injection    Patients goals: "I am feeling good and maybe " "can get some exercises"     Medical History:   Past Medical History:   Diagnosis Date    Allergy     Cardiomyopathy     Hypertension     ICD (implantable cardioverter-defibrillator) in place 2023       Surgical History:   Suly Jaime  has a past surgical history that includes Knee arthroscopy; Laryngoscopy (N/A, 2019); Oophorectomy; Hysterectomy (); Cardiac defibrillator placement (Left); Revision of implantable cardioverter-defibrillator (ICD) electrode lead placement (N/A, 2022); Release of contracture of joint (Left, 10/4/2022); Release of contracture (10/4/2022); and Soft Tissue Biopsy (10/4/2022).    Medications:   Suly has a current medication list which includes the following prescription(s): azelastine, calcium carbonate, diclofenac sodium, flovent hfa, fluticasone propionate, gabapentin, latanoprost, losartan, meloxicam, metformin, metoprolol succinate, mv-mn/folic acid/vit [id number], rosuvastatin, tizanidine, [DISCONTINUED] beclomethasone, and [DISCONTINUED] levocetirizine.    Allergies:   Review of patient's allergies indicates:  No Known Allergies     Objective      Observation: Patient presents to the clinic independently without gait deficits.     Posture: Forward head and rounded shoulders    Hip Range of Motion: WNL    Lower Extremity Strength: Grossly 4+/5    Functional Tests:  Stairs: alternating gait pattern  SLS EO: No deficits   TU seconds  30SSTS: 13x without assistance     Special Tests:   FABERs:  Neg   KALPESH: Neg   Hip Scour: Neg  Slump: Neg    Joint Mobility: WNL    Palpation: No deficits/pain     Edema: None    Intake Outcome Measure for FOTO Hip Survey    Therapist reviewed FOTO scores for Suly Jaime on 2024.   FOTO report - see Media section or FOTO account episode details.    Intake Score: See media section          Treatment     *Per Medicaid guidelines all therapy billed as therex*  *PT one-on-one with patient for entire session with PT extender utilized " for remainder of therapy session*    Total Treatment time (time-based codes) separate from Evaluation: 30 minutes     Suly received the treatments listed below:      therapeutic exercises: to develop strength, endurance, ROM, flexibility, posture, and core stabilization for 30 minutes including:   Education on activity modification, plan of care, home exercise program, and functional anatomy  PPT with 5 sec hold x 20 reps  PPT + glut bridge x 20 reps  Supine and sidelying clamshells Blue TB 20 reps each  Short arc quad x 5 sec hold x 20 reps  Straight leg raise x 20 reps    manual therapy techniques: Joint mobilizations and Manual traction were applied to the: hip joint for 00 minutes, including:     neuromuscular re-education activities: to improve Balance, Coordination, Kinesthetic, Sense, Proprioception, Posture, and Motor Control for 00 minutes, including:     therapeutic activities: to improve functional performance for 00 minutes, including:       Patient Education and Home Exercises     Education provided:   - Activity modification  - Plan of care  - Home exercise program  - Functional Anatomy    Written Home Exercises Provided: yes. Exercises were reviewed and Suly was able to demonstrate them prior to the end of the session.  Suly demonstrated good  understanding of the education provided. See EMR under Patient Instructions for exercises provided during therapy sessions.    Assessment     Suly is a 64 y.o. female referred to outpatient Physical Therapy with a medical diagnosis of M70.61,M70.62 (ICD-10-CM) - Greater trochanteric bursitis of both hips M76.01,M76.02 (ICD-10-CM) - Gluteal tendinitis of both buttocks M53.3 (ICD-10-CM) - SI (sacroiliac) joint dysfunction. Patient presents and subjectively reports no deficits at this time. Patient was provided comprehensive home exercise program and given resistance band. Patient was educated to continue to focus on abdominal core and proximal hip motor control  through functional strengthening. Patient and therapist in agreement to leave plan of care open for one month and patient can reach out to PT if pain returns and can continue with PT at that time.     Patient prognosis is Good.   Patient will benefit from skilled outpatient Physical Therapy to address the deficits stated above and in the chart below, provide patient /family education, and to maximize patientt's level of independence.     Plan of care discussed with patient: Yes  Patient's spiritual, cultural and educational needs considered and patient is agreeable to the plan of care and goals as stated below:     Anticipated Barriers for therapy: None    Medical Necessity is demonstrated by the following  History  Co-morbidities and personal factors that may impact the plan of care [] LOW: no personal factors / co-morbidities  [x] MODERATE: 1-2 personal factors / co-morbidities  [] HIGH: 3+ personal factors / co-morbidities    Moderate / High Support Documentation:   Co-morbidities affecting plan of care: See PMH    Personal Factors:   age  lifestyle     Examination  Body Structures and Functions, activity limitations and participation restrictions that may impact the plan of care [x] LOW: addressing 1-2 elements  [] MODERATE: 3+ elements  [] HIGH: 4+ elements (please support below)    Moderate / High Support Documentation: strength, motor control     Clinical Presentation [x] LOW: stable  [] MODERATE: Evolving  [] HIGH: Unstable     Decision Making/ Complexity Score: low       Goals:  Long Term Goals (3 Weeks)  1. Patient will be independent with HEP to supplement physical therapy treatment in improving functional status.   2. Patient will improve gross lower extremity strength by 1/2 grade to promote functional mobility.     Plan     Plan of care Certification: 4/26/2024 to 5/31/2024.    Outpatient Physical Therapy 1 times weekly for 4 weeks to include the following interventions: Cervical/Lumbar Traction, Gait  Training, Manual Therapy, Moist Heat/ Ice, Neuromuscular Re-ed, Patient Education, Self Care, Therapeutic Activities, and Therapeutic Exercise.     Natalie Bishop PT, DPT        Physician's Signature: _________________________________________ Date: ________________

## 2024-05-02 DIAGNOSIS — I50.22 CHF (CONGESTIVE HEART FAILURE), NYHA CLASS II, CHRONIC, SYSTOLIC: Chronic | ICD-10-CM

## 2024-05-02 DIAGNOSIS — I42.8 CARDIOMYOPATHY, NONISCHEMIC: ICD-10-CM

## 2024-05-02 RX ORDER — METOPROLOL SUCCINATE 50 MG/1
50 TABLET, EXTENDED RELEASE ORAL NIGHTLY
Qty: 90 TABLET | Refills: 3 | OUTPATIENT
Start: 2024-05-02

## 2024-05-02 NOTE — TELEPHONE ENCOUNTER
Refill Decision Note   Suly Jaime  is requesting a refill authorization.    Brief Assessment and Rationale for Refill:   Quick Discontinue       Medication Therapy Plan:   FLOS; Toprol XL 50 mg discontinued on 1/25/2024 by Cruzito Tovar MD, dose increased to Toprol  mg      Comments:     Note composed:1:09 PM 05/02/2024

## 2024-05-02 NOTE — TELEPHONE ENCOUNTER
Care Due:                  Date            Visit Type   Department     Provider  --------------------------------------------------------------------------------                                MYCHART                              ANNUAL                              CHECKUP/PHY  DESC FAMILY  Last Visit: 01-      [facility]  Stefania Terry  Next Visit: None Scheduled  None         None Found                                                            Last  Test          Frequency    Reason                     Performed    Due Date  --------------------------------------------------------------------------------    HBA1C.......  6 months...  metFORMIN................  01- 07-    [facility] Embedded Care Due Messages. Reference number: [id number].   5/02/2024 3:52:17 AM CDT

## 2024-05-07 NOTE — TELEPHONE ENCOUNTER
Called patient and informed them that they needed to come in to get swabbed. Patient verbalized understanding but said that she could not come in until tomorrow. I set up the appointment for tomorrow for her to get swabbed for flu and covid   Elijah Jimenez  [facility]  [address] [address] [facility]  Scheduled Appointment: 05/21/2024    David Tipton  [facility] [address] [facility]  Scheduled Appointment: 07/24/2024

## 2024-05-23 ENCOUNTER — CLINICAL SUPPORT (OUTPATIENT)
Dept: CARDIOLOGY | Facility: HOSPITAL | Age: 64
End: 2024-05-23
Attending: INTERNAL MEDICINE
Payer: MEDICAID

## 2024-05-23 DIAGNOSIS — I42.8 OTHER CARDIOMYOPATHIES: ICD-10-CM

## 2024-05-23 DIAGNOSIS — Z95.810 PRESENCE OF AUTOMATIC (IMPLANTABLE) CARDIAC DEFIBRILLATOR: ICD-10-CM

## 2024-05-23 PROCEDURE — 93296 REM INTERROG EVL PM/IDS: CPT | Performed by: INTERNAL MEDICINE

## 2024-05-23 PROCEDURE — 93295 DEV INTERROG REMOTE 1/2/MLT: CPT | Mod: ,,, | Performed by: INTERNAL MEDICINE

## 2024-05-24 LAB
OHS CV DC REMOTE DEVICE TYPE: NORMAL
OHS CV RV PACING PERCENT: 1 %

## 2024-06-10 DIAGNOSIS — G57.01 PIRIFORMIS SYNDROME OF RIGHT SIDE: ICD-10-CM

## 2024-06-10 DIAGNOSIS — M46.1 SACROILIITIS: ICD-10-CM

## 2024-06-11 RX ORDER — GABAPENTIN 300 MG/1
300 CAPSULE ORAL 3 TIMES DAILY
Qty: 90 CAPSULE | Refills: 3 | Status: SHIPPED | OUTPATIENT
Start: 2024-06-11

## 2024-06-11 NOTE — TELEPHONE ENCOUNTER
No care due was identified.  [facility] Embedded Care Due Messages. Reference number: [id number].   6/10/2024 7:57:56 PM CDT

## 2024-07-29 NOTE — PROGRESS NOTES
Please ask patient who provides her Stephane sensors? To my knowledge, this is NOT through [facility].     She may need a refill of her sensors. In the meantime, she can call the  ([employer]) and ask if they can replace since It fell off prematurely.    Subjective:       Patient ID: Suly Jaime is a 62 y.o. female.    Chief Complaint: Cough    HPI:   Suly Jaime is a 62 y.o. female last seen in the office in May of 2020 for evaluation of cough.   She was started on flovent and dymista in addition to continuing her singulair and xyzal. Ct Chest was performed and noted bilateral 5mm nodules.     Patient reports that she had a cough with hoarseness for 9 years.  Work up revealed a papilloma.  Removed by ENT in 2019.  Cough got better for a while but then got worse.  Hasn't followed up with ENT in a while.     Usual dry but recently more productive.   Worse at night.   Cough can be worse with exertion.  Worse when eating.     Currently on:   Flonase  Singulair  Flovent (since 2020)  Recently prescribed Spiriva (but has not been using it)    Patient takes xzyal at night for control of allergies.   Blows her nose frequently during the day.   Seen by Dr. Valenzuela from allergy in 2021      Past smoker: quit in 2018.  1/2 ppd x 15 years   No family history of lung disease  Accounting in a clerical setting.   No history of asthma as a child.       Review of Systems   Constitutional:  Negative for fever, chills and activity change.   HENT:  Positive for postnasal drip, rhinorrhea, voice change and congestion. Negative for sinus pressure, trouble swallowing and hearing loss.    Respiratory:  Positive for cough. Negative for hemoptysis, shortness of breath, wheezing and dyspnea on extertion (states she can't walk too far; but walks a mile on most evenings.).    Cardiovascular:  Negative for chest pain and leg swelling.   Genitourinary:  Negative for difficulty urinating.   Endocrine:  Negative for cold intolerance and heat intolerance.    Musculoskeletal:  Negative for arthralgias, joint swelling and myalgias.   Gastrointestinal:  Negative for nausea, vomiting and abdominal pain.   Neurological:  Negative for headaches.   Hematological:  Negative for adenopathy. No excessive  bruising.   Psychiatric/Behavioral:  Negative for confusion and sleep disturbance.        Social History     Tobacco Use    Smoking status: Former     Packs/day: 0.30     Years: 40.00     Pack years: 12.00     Types: Cigarettes     Quit date:      Years since quittin.7    Smokeless tobacco: Never    Tobacco comments:     off and on since 20s ,   Substance Use Topics    Alcohol use: No       Review of patient's allergies indicates:  No Known Allergies  Past Medical History:   Diagnosis Date    Allergy     Cardiomyopathy     Hypertension      Past Surgical History:   Procedure Laterality Date    CARDIAC DEFIBRILLATOR PLACEMENT Left     HYSTERECTOMY      full    KNEE ARTHROSCOPY      LARYNGOSCOPY N/A 2019    Procedure: DIRECT MICRO LARYNGOSCOPY WITH BIOPSY;  Surgeon: Deidre Calderon MD;  Location: [facility] OR;  Service: ENT;  Laterality: N/A;  video    OOPHORECTOMY      REVISION OF IMPLANTABLE CARDIOVERTER-DEFIBRILLATOR (ICD) ELECTRODE LEAD PLACEMENT N/A 2022    Procedure: REVISION, INSERTION, ELECTRODE LEAD, ICD;  Surgeon: Cruzito Tovar MD;  Location: [facility] EP LAB;  Service: Cardiology;  Laterality: N/A;  SINGLE LEAD REVISION, SJM, ANES, EH, 325     Current Outpatient Medications on File Prior to Visit   Medication Sig    benzonatate (TESSALON) 100 MG capsule Take 1 capsule (100 mg total) by mouth 3 (three) times daily as needed for Cough.    calcium carbonate (TUMS) 200 mg calcium (500 mg) chewable tablet Take 1 tablet by mouth daily as needed for Heartburn.    esomeprazole (NEXIUM) 40 MG capsule Take 1 capsule (40 mg total) by mouth before breakfast.    fluticasone propionate (FLONASE) 50 mcg/actuation nasal spray 1 spray by Each Nostril route daily as needed for Rhinitis or Allergies.    fluticasone propionate (FLONASE) 50 mcg/actuation nasal spray SHAKE LIQUID AND USE 1 SPRAY(50 MCG) IN EACH NOSTRIL EVERY DAY    fluticasone propionate (FLOVENT HFA) 110 mcg/actuation inhaler Inhale 2  "puffs into the lungs 2 (two) times daily. Controller    latanoprost 0.005 % ophthalmic solution INSTILL 1 DROP INTO BOTH EYES AT BEDTIME AS DIRECTED    losartan (COZAAR) 25 MG tablet Take 1 tablet (25 mg total) by mouth once daily.    metoprolol succinate (TOPROL-XL) 25 MG 24 hr tablet Take 1 tablet (25 mg total) by mouth once daily.    montelukast (SINGULAIR) 10 mg tablet Take 10 mg by mouth every evening.    mv-mn/folic acid/vit K/aqrh888 (ALIVE ONCE DAILY WOMEN 50 PLUS ORAL) Take 1 tablet by mouth once daily.    tiotropium (SPIRIVA) 18 mcg inhalation capsule Inhale 1 capsule (18 mcg total) into the lungs once daily. Controller    tobramycin-dexamethasone 0.3-0.1% (TOBRADEX) 0.3-0.1 % DrpS SHAKE LIQUID AND INSTILL 1 TO 2 DROPS IN BOTH EYES FOUR TIMES DAILY    [DISCONTINUED] beclomethasone (QVAR) 80 mcg/actuation Aero Inhale 2 puffs into the lungs 2 (two) times a day. Controller    [DISCONTINUED] gabapentin (NEURONTIN) 300 MG capsule Take 1 capsule (300 mg total) by mouth 3 (three) times daily as needed (pain).    [DISCONTINUED] levocetirizine (XYZAL) 5 MG tablet Take 1 tablet (5 mg total) by mouth every evening.    [DISCONTINUED] lisinopriL (PRINIVIL,ZESTRIL) 5 MG tablet Take 1 tablet (5 mg total) by mouth once daily.     Current Facility-Administered Medications on File Prior to Visit   Medication    albuterol inhaler 2 puff    EPINEPHrine (EPIPEN) 0.3 mg/0.3 mL pen injection 0.3 mg    lidocaine (PF) 10 mg/ml (1%) injection 10 mg    methylPREDNISolone sodium succinate injection 40 mg    sodium chloride 0.9% bolus 1,000 mL    sodium chloride 0.9% flush 5 mL       Objective:      Vitals:    09/13/22 1139   BP: 130/74   Pulse: 87   SpO2: 97%   Weight: 70.8 kg (156 lb)   Height: 5' 2" (1.575 m)           Physical Exam   Constitutional: She is oriented to person, place, and time. She appears well-developed and well-nourished.   HENT:   Head: Normocephalic.   Neck: No tracheal deviation present.   Cardiovascular: " Normal rate and regular rhythm.   No murmur heard.  Pulmonary/Chest: Normal expansion, effort normal and breath sounds normal. She has no wheezes. She has no rales.   Abdominal: Soft. Bowel sounds are normal. She exhibits no distension. There is no abdominal tenderness.   Musculoskeletal:         General: No edema. Normal range of motion.      Cervical back: Normal range of motion and neck supple.   Neurological: She is alert and oriented to person, place, and time.   Skin: Skin is warm and dry.   Vitals reviewed.  Personal Diagnostic Review  Chest xray: 3/18/20: no evidence of pulmonary disease    PFTs: 5/29/20: no evidence of obstruction, no BDR, mild restriction, mildly reduced DLCO      Assessment:     No orders of the defined types were placed in this encounter.    1. Chronic cough          Plan:         Problem List Items Addressed This Visit          Pulmonary    Chronic cough (Chronic)    Current Assessment & Plan     Etiology unclear.  Based on description of symptoms, it seems most consistent with an upper airway cough syndrome. PFTs and CT Chest are reassuring.     Given her history of oropharyngeal papilloma, I urged patient to follow up with ENT.     She reports extensive allergy symptoms.  I urged her to follow up with her allergist, Dr. Valenzuela.  Suggested daily zyrtec and added Astelin.  If cough continues, could consider Nucala.  Ms. Jaime has had 200-300 eos persistently.  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    If the above is ineffective in isolating and treating the source of her cough, we can try neuromodulators like amitriptyline or gabapentin for cryptogenic cough.

## 2024-07-31 ENCOUNTER — OFFICE VISIT (OUTPATIENT)
Dept: FAMILY MEDICINE | Facility: CLINIC | Age: 64
End: 2024-07-31
Payer: MEDICAID

## 2024-07-31 VITALS
HEIGHT: 60 IN | OXYGEN SATURATION: 95 % | BODY MASS INDEX: 29.61 KG/M2 | WEIGHT: 150.81 LBS | TEMPERATURE: 98 F | HEART RATE: 97 BPM | SYSTOLIC BLOOD PRESSURE: 132 MMHG | DIASTOLIC BLOOD PRESSURE: 88 MMHG

## 2024-07-31 DIAGNOSIS — I50.22 CHF (CONGESTIVE HEART FAILURE), NYHA CLASS II, CHRONIC, SYSTOLIC: Chronic | ICD-10-CM

## 2024-07-31 DIAGNOSIS — I25.10 CORONARY ARTERY DISEASE INVOLVING NATIVE CORONARY ARTERY OF NATIVE HEART WITHOUT ANGINA PECTORIS: ICD-10-CM

## 2024-07-31 DIAGNOSIS — E88.819 INSULIN RESISTANCE: ICD-10-CM

## 2024-07-31 DIAGNOSIS — E78.2 MIXED HYPERLIPIDEMIA: ICD-10-CM

## 2024-07-31 DIAGNOSIS — I42.8 CARDIOMYOPATHY, NONISCHEMIC: ICD-10-CM

## 2024-07-31 DIAGNOSIS — J39.2 OROPHARYNGEAL LESION: Primary | ICD-10-CM

## 2024-07-31 DIAGNOSIS — J98.4 RESTRICTIVE LUNG DISEASE: Chronic | ICD-10-CM

## 2024-07-31 DIAGNOSIS — M46.1 SACROILIITIS: ICD-10-CM

## 2024-07-31 DIAGNOSIS — G57.01 PIRIFORMIS SYNDROME OF RIGHT SIDE: ICD-10-CM

## 2024-07-31 PROBLEM — M25.60 STIFFNESS IN JOINT: Status: RESOLVED | Noted: 2024-01-29 | Resolved: 2024-07-31

## 2024-07-31 PROBLEM — G57.02 PIRIFORMIS SYNDROME OF LEFT SIDE: Status: RESOLVED | Noted: 2024-02-22 | Resolved: 2024-07-31

## 2024-07-31 PROBLEM — R26.9 GAIT ABNORMALITY: Status: RESOLVED | Noted: 2024-01-29 | Resolved: 2024-07-31

## 2024-07-31 PROBLEM — R53.1 WEAKNESS: Status: RESOLVED | Noted: 2024-01-29 | Resolved: 2024-07-31

## 2024-07-31 PROCEDURE — 3079F DIAST BP 80-89 MM HG: CPT | Mod: CPTII,,, | Performed by: STUDENT IN AN ORGANIZED HEALTH CARE EDUCATION/TRAINING PROGRAM

## 2024-07-31 PROCEDURE — 1160F RVW MEDS BY RX/DR IN RCRD: CPT | Mod: CPTII,,, | Performed by: STUDENT IN AN ORGANIZED HEALTH CARE EDUCATION/TRAINING PROGRAM

## 2024-07-31 PROCEDURE — 1159F MED LIST DOCD IN RCRD: CPT | Mod: CPTII,,, | Performed by: STUDENT IN AN ORGANIZED HEALTH CARE EDUCATION/TRAINING PROGRAM

## 2024-07-31 PROCEDURE — 99215 OFFICE O/P EST HI 40 MIN: CPT | Mod: S$PBB,,, | Performed by: STUDENT IN AN ORGANIZED HEALTH CARE EDUCATION/TRAINING PROGRAM

## 2024-07-31 PROCEDURE — 3008F BODY MASS INDEX DOCD: CPT | Mod: CPTII,,, | Performed by: STUDENT IN AN ORGANIZED HEALTH CARE EDUCATION/TRAINING PROGRAM

## 2024-07-31 PROCEDURE — 99214 OFFICE O/P EST MOD 30 MIN: CPT | Mod: PBBFAC,PN | Performed by: STUDENT IN AN ORGANIZED HEALTH CARE EDUCATION/TRAINING PROGRAM

## 2024-07-31 PROCEDURE — 99999 PR PBB SHADOW E&M-EST. PATIENT-LVL IV: CPT | Mod: PBBFAC,,, | Performed by: STUDENT IN AN ORGANIZED HEALTH CARE EDUCATION/TRAINING PROGRAM

## 2024-07-31 PROCEDURE — 3044F HG A1C LEVEL LT 7.0%: CPT | Mod: CPTII,,, | Performed by: STUDENT IN AN ORGANIZED HEALTH CARE EDUCATION/TRAINING PROGRAM

## 2024-07-31 PROCEDURE — 3075F SYST BP GE 130 - 139MM HG: CPT | Mod: CPTII,,, | Performed by: STUDENT IN AN ORGANIZED HEALTH CARE EDUCATION/TRAINING PROGRAM

## 2024-07-31 RX ORDER — MELOXICAM 15 MG/1
15 TABLET ORAL DAILY
Qty: 90 TABLET | Refills: 3 | Status: SHIPPED | OUTPATIENT
Start: 2024-07-31

## 2024-07-31 RX ORDER — ROSUVASTATIN CALCIUM 10 MG/1
10 TABLET, COATED ORAL DAILY
Qty: 90 TABLET | Refills: 3 | Status: SHIPPED | OUTPATIENT
Start: 2024-07-31 | End: 2025-07-31

## 2024-07-31 RX ORDER — AZELASTINE 1 MG/ML
1 SPRAY, METERED NASAL NIGHTLY PRN
Qty: 30 ML | Refills: 2 | Status: SHIPPED | OUTPATIENT
Start: 2024-07-31 | End: 2025-07-31

## 2024-07-31 RX ORDER — METFORMIN HYDROCHLORIDE 500 MG/1
500 TABLET, EXTENDED RELEASE ORAL
Qty: 90 TABLET | Refills: 3 | Status: SHIPPED | OUTPATIENT
Start: 2024-07-31 | End: 2025-07-31

## 2024-07-31 NOTE — PROGRESS NOTES
Subjective:       Patient ID: Suly Jaime is a 64 y.o. female.    Chief Complaint: Follow-up    Pt is a pleasant 63yo lady wit hx of CAD, CHF, ICD placement, HLD who presents for a 6 month follow up.  She note that in the past 12 months, there has been 5 episodes of dizziness, nauseous no vomiting, sweating, and feeling like might lose balance so sits down. She says she can feel it come on, where she'll feel hot just prior to onset. She proceed to sit down, drink water. Returns to normal minutes after resting for about 5 minutes.  She otherwise feels fine without progression of her chronic conditions.    Review of Systems   Constitutional: Negative.    Respiratory:  Positive for shortness of breath.         Dyspnea on exertion   Cardiovascular:  Negative for chest pain and palpitations.   Gastrointestinal:  Positive for nausea.   Genitourinary: Negative.    Musculoskeletal: Negative.    Neurological:  Positive for dizziness and weakness.   All other systems reviewed and are negative.       A1C:  Recent Labs   Lab 06/19/23  0708 01/24/24  1019 07/24/24  0910   Hemoglobin A1C 6.2 H 5.5 5.4     CBC:  Recent Labs   Lab 06/07/22  0354 12/05/22  1030 11/10/23  2208   WBC 10.75 7.81 9.74   RBC 4.73 4.76 5.11   Hemoglobin 13.4 13.5 15.3   Hematocrit 41.3 42.4 44.4   Platelets 241 324 290   MCV 87 89 87   MCH 28.3 28.4 29.9   MCHC 32.4 31.8 L 34.5     CMP:  Recent Labs   Lab 11/10/23  2208 11/11/23  0507 01/24/24  1019 07/24/24  0910   Glucose 99   < > 89 90   Calcium 9.9   < > 9.3 9.4   Albumin 4.7  --  4.6 3.8   Total Protein 8.2  --  8.1 7.1   Sodium 141   < > 143 141   Potassium 3.8   < > 4.1 3.8   CO2 20 L   < > 24 21 L   Chloride 104   < > 106 109   BUN 13   < > 17 14   Creatinine 0.71   < > 0.85 0.9   Alkaline Phosphatase 116  --  97 75   ALT 59 H  --  32 16   AST 29  --  29 12   Total Bilirubin 0.6  --  0.6 0.5    < > = values in this interval not displayed.     LIPIDS:  Recent Labs   Lab 12/05/22  1039  03/07/23  0904 06/19/23  0708 01/24/24  1019 07/24/24  0910   TSH 1.704  --   --   --   --    HDL 49   < > 55 55 47   Cholesterol 242 H   < > 196 259 H 186   Triglycerides 224 H   < > 221 H 163 H 126   LDL Cholesterol 148.2   < > 96.8 171.4 H 113.8   HDL/Cholesterol Ratio 20.2   < > 28.1 21.2 25.3   Non-HDL Cholesterol 193   < > 141 204 139   Total Cholesterol/HDL Ratio 4.9   < > 3.6 4.7 4.0    < > = values in this interval not displayed.     TSH:  Recent Labs   Lab 12/05/22  1030   TSH 1.704        Objective:      Vitals:    07/31/24 0913   BP: 132/88   Pulse: 97   Temp: 97.9 °F (36.6 °C)      Physical Exam  Vitals reviewed.   Constitutional:       Appearance: Normal appearance. She is normal weight.   HENT:      Head: Normocephalic and atraumatic.   Eyes:      Conjunctiva/sclera: Conjunctivae normal.   Cardiovascular:      Rate and Rhythm: Normal rate and regular rhythm.      Pulses: Normal pulses.      Heart sounds: Normal heart sounds.   Pulmonary:      Effort: Pulmonary effort is normal.      Breath sounds: Normal breath sounds.   Abdominal:      General: Abdomen is flat. Bowel sounds are normal.      Palpations: Abdomen is soft.      Tenderness: There is no abdominal tenderness.   Musculoskeletal:         General: Normal range of motion.      Cervical back: Normal range of motion.      Right lower leg: No edema.      Left lower leg: No edema.   Skin:     General: Skin is warm and dry.   Neurological:      Mental Status: She is alert. Mental status is at baseline.   Psychiatric:         Mood and Affect: Mood normal.         Behavior: Behavior normal.          Assessment:       1. Oropharyngeal lesion    2. Mixed hyperlipidemia    3. Insulin resistance    4. Sacroiliitis    5. Piriformis syndrome of right side    6. Restrictive lung disease    7. Coronary artery disease involving native coronary artery of native heart without angina pectoris    8. CHF (congestive heart failure), NYHA class II, chronic, systolic     9. Cardiomyopathy, nonischemic          Pt's dizziness/nausea episodes due to cardiac conditions vs hypoglycemia vs other causes    Plan:     Problem List Items Addressed This Visit          Neuro    RESOLVED: Piriformis syndrome of right side    Overview     Pain near piriformis  Worse after sitting for extended time  NSAIDs and gabapentin helps  No radiation, tingling numbness  Doing PT - pain improving  Continue PT  - start zanaflex, continue gabapentin + NSAIDS         Relevant Medications    meloxicam (MOBIC) 15 MG tablet       ENT    Oropharyngeal lesion - Primary    Overview     Hx laryngeal papilloma   Annual ENT f/u advised            Pulmonary    Restrictive lung disease (Chronic)    Overview     Singulair   flovent PRN  stable            Cardiac/Vascular    CHF (congestive heart failure), NYHA class II, chronic, systolic (Chronic)    Overview     medically managed  EF back to normal level  BB, , ARB         Mixed hyperlipidemia    Overview     Diety controlled  Statin myalgias; tried pravastatin and crestor         Relevant Medications    rosuvastatin (CRESTOR) 10 MG tablet    Coronary artery disease involving native coronary artery of native heart without angina pectoris    Overview     Follows with cards   BB, ARB, did not tolerate statin            Cardiomyopathy, nonischemic    Overview     Follows with cards  Metoprolol 100 - increased 2/2  ( 6 episodes of SVT on device check), HR down 70s  ARB  Asymptomatic   ICD in place  Continue current meds            Endocrine    Insulin resistance    Overview     Diet controlled  Metformin 500 daily   Well tolerated; no issues          Relevant Medications    metFORMIN (GLUCOPHAGE-XR) 500 MG ER 24hr tablet     Other Visit Diagnoses       Sacroiliitis        Relevant Medications    meloxicam (MOBIC) 15 MG tablet          Dizziness/nausea Episodes  - monitor via keeping an incident log noting BP, food intake    Labs reviewed in detail  Problem list  reviewed in detail   Continue healthy lifestyle efforts  Continue current meds as prescribed otherwise; refills per request  Keep routine specialist f/u   RTC in 6 months  with labs prior and/or PRN        Cruzito PALACIOS  [facility]   7/31/24      I hereby acknowledge that I am relying upon documentation authored by a Medical student working under my supervision and further I hereby attest that I have verified the student documentation or findings by personally re-performing the physical exam and medical decision making activities of the Evaluation and Management service to be billed.        Stefania Terry DO   [facility]  7/31/24      I spent a total of 41 minutes on the day of the visit.This includes face to face time and non-face to face time preparing to see the patient (eg, review of tests), obtaining and/or reviewing separately obtained history, documenting clinical information in the electronic or other health record, independently interpreting results and communicating results to the patient/family/caregiver, or care coordinator.

## 2024-08-14 NOTE — PROGRESS NOTES
Subjective:     Suly Jaime     Chief Complaint   Patient presents with    Left Hip - Pain    Right Hip - Pain     HPI    Suly is a 64 y.o. female coming in today for bilateral hip pain. Since last visit the pain has Improved with CSI, OMT and HEP but deteriorated recently. Pt reports 4 months relief with bilat GTB CSL 4/9/24. Pt is compliant with HEP. Pt states she did have some relief with the meloxicam. Pt is wearing 3mm right heel lift.The pain is worse on the left than the right currently. The pain is better with rest, NSAIDs, tylenol arthritis and worse with activity. Pt. describes the pain as a 8/10 achy pain that does not radiate. There has not been any new a fall/injury/ or traumas since last visit.  Pt. denies any new musculoskeletal complaints at this time.  Of note, patient notes that her bilateral hip pain precede starting her Cozaar medication.    Office note from 4/9/24 reviewed    Joint instability? no  Mechanical locking/clicking? no  Affecting ADL's? yes  Affecting sleep? yes    Occupation: retired    Procedures reviewed:   4/9/24- B GTB CSI, 4 months relief    PAST MEDICAL HISTORY:   Past Medical History:   Diagnosis Date    Allergy     Cardiomyopathy     Hypertension     ICD (implantable cardioverter-defibrillator) in place 01/30/2023    Piriformis syndrome of left side 02/22/2024       Pain to piriformis muscle x 3 weeks -started after Right side piriformis improving  Worse with prolonged sitting, sitting on toilet; radiation thigh/calf/foot  Gabapentin PM, meloxicam AM  Doing PT for Right side -send message PT to include left side  - add on zanaflex - never started previously  - recommend hard ball pressure to piriformis region - lay on floor  - referred to sports med for po    Piriformis syndrome of right side 01/24/2024    Pain near piriformis  Worse after sitting for extended time  NSAIDs and gabapentin helps  No radiation, tingling numbness  Doing PT - pain improving  Continue PT  - start  zanaflex, continue gabapentin + NSAIDS       PAST SURGICAL HISTORY:   Past Surgical History:   Procedure Laterality Date    CARDIAC DEFIBRILLATOR PLACEMENT Left     HYSTERECTOMY      full    KNEE ARTHROSCOPY      LARYNGOSCOPY N/A 2019    Procedure: DIRECT MICRO LARYNGOSCOPY WITH BIOPSY;  Surgeon: Deidre Calderon MD;  Location: [facility] OR;  Service: ENT;  Laterality: N/A;  video    OOPHORECTOMY      RELEASE OF CONTRACTURE  10/4/2022    Procedure: RELEASE, CONTRACTURE;  Surgeon: Clyde Banuelos Jr., MD;  Location: [facility] OR;  Service: Orthopedics;;    RELEASE OF CONTRACTURE OF JOINT Left 10/4/2022    Procedure: RELEASE, CONTRACTURE, JOINT;  Surgeon: Clyde Banuelos Jr., MD;  Location: [facility] OR;  Service: Orthopedics;  Laterality: Left;  need k wires and mini c arm    REVISION OF IMPLANTABLE CARDIOVERTER-DEFIBRILLATOR (ICD) ELECTRODE LEAD PLACEMENT N/A 2022    Procedure: REVISION, INSERTION, ELECTRODE LEAD, ICD;  Surgeon: Cruzito Tovar MD;  Location: [facility] EP LAB;  Service: Cardiology;  Laterality: N/A;  SINGLE LEAD REVISION, SJM, ANES, EH, 325    SOFT TISSUE BIOPSY  10/4/2022    Procedure: BIOPSY, SOFT TISSUE;  Surgeon: Clyde Banuelos Jr., MD;  Location: [facility] OR;  Service: Orthopedics;;     FAMILY HISTORY:   Family History   Problem Relation Name Age of Onset    Diabetes Mother      Diabetes Maternal Grandmother      Heart disease Maternal Grandmother      Allergies Daughter      Asthma Neg Hx      Allergic rhinitis Neg Hx      Angioedema Neg Hx      Atopy Neg Hx      Eczema Neg Hx      Immunodeficiency Neg Hx      Rhinitis Neg Hx      Urticaria Neg Hx       SOCIAL HISTORY:   Social History     Socioeconomic History    Marital status:    Tobacco Use    Smoking status: Former     Current packs/day: 0.00     Average packs/day: 0.3 packs/day for 40.0 years (12.0 ttl pk-yrs)     Types: Cigarettes     Start date:      Quit date: 2018     Years since quittin.6    Smokeless tobacco:  Never    Tobacco comments:     off and on since 20s ,   Substance and Sexual Activity    Alcohol use: No    Drug use: No    Sexual activity: Not Currently     Partners: Male     Social Determinants of Health     Financial Resource Strain: Low Risk  (2/21/2024)    Overall Financial Resource Strain (CARDIA)     Difficulty of Paying Living Expenses: Not very hard   Food Insecurity: Food Insecurity Present (2/21/2024)    Hunger Vital Sign     Worried About Running Out of Food in the Last Year: Sometimes true     Ran Out of Food in the Last Year: Never true   Transportation Needs: No Transportation Needs (2/21/2024)    PRAPARE - Transportation     Lack of Transportation (Medical): No     Lack of Transportation (Non-Medical): No   Physical Activity: Unknown (2/21/2024)    Exercise Vital Sign     Days of Exercise per Week: 3 days   Stress: Stress Concern Present (2/21/2024)    [employer] - Occupational Stress Questionnaire     Feeling of Stress : To some extent   Housing Stability: Low Risk  (2/21/2024)    Housing Stability Vital Sign     Unable to Pay for Housing in the Last Year: No     Number of Places Lived in the Last Year: 1     Unstable Housing in the Last Year: No     MEDICATIONS:   Current Outpatient Medications:     azelastine (ASTELIN) 137 mcg (0.1 %) nasal spray, 1 spray (137 mcg total) by Nasal route nightly as needed for Rhinitis., Disp: 30 mL, Rfl: 2    calcium carbonate (TUMS) 200 mg calcium (500 mg) chewable tablet, Take 1 tablet by mouth daily as needed for Heartburn., Disp: , Rfl:     FLOVENT  mcg/actuation inhaler, INHALE 2 PUFFS INTO THE LUNGS TWICE DAILY, Disp: 12 g, Rfl: 0    fluticasone propionate (FLONASE) 50 mcg/actuation nasal spray, 1 spray by Each Nostril route daily as needed for Rhinitis or Allergies., Disp: , Rfl:     gabapentin (NEURONTIN) 300 MG capsule, TAKE 1 CAPSULE(300 MG) BY MOUTH THREE TIMES DAILY, Disp: 90 capsule, Rfl: 3    meloxicam (MOBIC) 15  MG tablet, Take 1 tablet (15 mg total) by mouth once daily., Disp: 90 tablet, Rfl: 3    metFORMIN (GLUCOPHAGE-XR) 500 MG ER 24hr tablet, Take 1 tablet (500 mg total) by mouth daily with breakfast., Disp: 90 tablet, Rfl: 3    metoprolol succinate (TOPROL-XL) 100 MG 24 hr tablet, Take 1 tablet (100 mg total) by mouth every evening., Disp: 90 tablet, Rfl: 3    mv-mn/folic acid/vit [id number] (ALIVE ONCE DAILY WOMEN 50 PLUS ORAL), Take 1 tablet by mouth once daily., Disp: , Rfl:     rosuvastatin (CRESTOR) 10 MG tablet, Take 1 tablet (10 mg total) by mouth once daily., Disp: 90 tablet, Rfl: 3    diclofenac sodium (VOLTAREN) 1 % Gel, Apply 2 g topically 3 (three) times daily., Disp: 100 g, Rfl: 1    latanoprost 0.005 % ophthalmic solution, INSTILL 1 DROP INTO BOTH EYES AT BEDTIME AS DIRECTED (Patient not taking: Reported on 8/15/2024), Disp: , Rfl:     losartan (COZAAR) 25 MG tablet, Take 1 tablet (25 mg total) by mouth once daily., Disp: 90 tablet, Rfl: 3    ALLERGIES: Review of patient's allergies indicates:  No Known Allergies    Objective:     VITAL SIGNS: BP (!) 141/93   Pulse 102    General    Vitals reviewed.  Constitutional: She is oriented to person, place, and time. She appears well-developed and well-nourished.   Neurological: She is alert and oriented to person, place, and time.   Psychiatric: She has a normal mood and affect. Her behavior is normal.           MUSCULOSKELETAL EXAM  HIP: bilateral HIP  The affected hip is compared to the contralateral hip.    Observation:    There is no edema, erythema, or ecchymosis in the lumbosacral region.   There is no Trendelenburg sign on either side  No obvious pelvic obliquity while standing.    No thoracolumbar scoliosis observed.    No midline skin abnormalities.  No atrophy noted in the lower limbs.  Gait: Non-antalgic with Over pronation ankle mechanics and Pes planus   Poor bilateral pelvic stability with bilateral hip hiking compensatory pattern noted with single  leg raise  Anterior pelvic tilt and knee hyperextension while standing    ROM (* = with pain):  Passive hip flexion to 120° on left and 120° on right  Passive hip internal rotation to 45° on left* and 45° on right* (lateral hip pain)  Passive hip external rotation to 45° on left and 45° on right   Passive hip abduction to 45° on left and 45° on right    Tenderness To Palpation:  No tenderness at the ASIS, AIIS, PSIS, PIIS, iliac crest, pubic bones, ischial tuberosity.  No tenderness throughout the lumbar spine, iliolumbar region, or posterior pelvis.  No tenderness throughout the right SI joint  + tenderness over the greater trochanteric bursae bilaterally (R>L)  + tenderness at the glut attachments on the greater trochanters bilaterally (R>L)  No tenderness over proximal IT band or hip flexor musculature.  + left  TFL muscle tenderness    Strength Testing (* = with pain):  Hip flexion - 5/5 on left and 5/5 on right  Hip extension - 5/5 on left and 5/5 on right  Hip adduction - 5/5 on left and 5/5 on right  Hip abduction - 5/5 on left and 5/5 on right  Knee flexion - 5/5 on left and 5/5 on right  Knee extension - 5/5 on left and 5/5 on right  Glutaeus medius - 5-/5 on left and 5-/5 on right with compensatory firing patterns    Special Tests:  Standing Trendelenburg test - negative    Seated straight leg raise - negative  Supine straight leg raise - negative  Hamstring flexibility symmetric    Log roll - negative  SUSI - negative  FADIR - negative  Scour test - negative  No pain with posterior hip capsule compression    ASIS compression test - negative  SI drawer test - negative   Thigh thrust test - negative     Piriformis test (Bonnet's) - negative  Ely's test - negative  Quadriceps flexibility symmetric.  Xiao test - negative  Jef compression test - negative    Fulcrum test - negative    Leg lengths symmetric following OMT to the pelvis    Neurovascular Exam:  Normal gait without Trendelenburg or antalgia.  2+  femoral, DP, and PT pulses BL.  No skin changes, no abnormal hair distribution.  Sensation intact to light touch throughout the obturator and medial/lateral/posterior femoral cutaneous nerves.  Capillary refill intact to <2 seconds in all lower limb digits.    TART (Tissue texture abnormality, Asymmetry,  Restriction of motion and/or Tenderness) changes:    Lower extremity: left TFL Herniated trigger point (HTP) fascial distortion       DIAGNOSTIC ULTRASOUND FOCUSED::   CLINICAL INDICATION:  bilateral lateral hip pain    TECHNIQUE:  Real time ultrasound examination of the bilateral lateral hips was performed with [employer] Edge 2, 9-L MHz linear probe(s).     FINDINGS: The images are of adequate diagnostic quality with identification of all echogenic structures made except for the vascular structures unless otherwise noted. There is no sonographic evidence of bilateral gluteus medius tendinopathy with mixed heterogenicity, ill-defined fibular pattern of the gluteus medius tendons, right worse than left.  Right-sided associated cortical irregularities at the gluteus medius facet attachment also noted.  No significant hyperemia appreciated on Doppler.  No significant hypoechoic defect or volume loss appreciated with static and dynamic testing.  Gluteus minimus tendons appear to be intact bilaterally at the anterior facet attachment with adequate fibular patterning.  The rest of the sonographic examination was unremarkable.    IMPRESSION:  Bilateral gluteus medius tendinopathy/tendinosis without any clear significant tearing or acute inflammatory changes.  Gluteus minimus tendons appear to be intact bilaterally.    Ultrasound images were directly reviewed with the patient and then a treatment plan was discussed. Images were saved and stored for documentation.      Assessment:      Encounter Diagnoses   Name Primary?    Gluteal tendinitis of both buttocks Yes    Greater trochanteric bursitis of both hips     SI  (sacroiliac) joint dysfunction     Tendinopathy of right gluteal region     Somatic dysfunction of lower extremity         Plan:   1. Bilateral lateral hip pain (R>L) secondary to weak gluteal muscles and poor pelvic/core stability with compensatory muscle firing patterns with associated greater trochanteric bursa and gluteal tendon irritation with significant, limited relief from previous bilateral diagnostic/therapeutic GTB CSI's on 4/9/24.  - Discussed with pt. option of an US guided minimally invasive tenotomy (tenjet) of the right glutaeus medius tendon. Pt. Would like to proceed with this. The procedure and rehab protocol was discussed in detail with patient and all questions were answered. Hand outs of rehab protocol and Tenjet procedure also given.   - Continue Mobic 15 mg p.o. q.day as needed for pain control  - Recommend ice up to 20 minutes at a time prn for pain control  - OMT performed again today to address fascial contributions to pain  - HEP reviewed  - recommend over-the-counter SI belt wear for increased support as well  - discussed proper posture while standing  - limited diagnostic musculoskeletal ultrasound performed today showed sonographic evidence of bilateral gluteus medius tendinopathy/tendinosis without any clear significant tearing or acute inflammatory changes. Gluteus minimus tendons appear to be intact bilaterally.   -  X-ray images of bilateral hip taken 3/12/24 (AP pelvis and frogleg lateral bilateral views) showed mild lower lumbar DJD changes.  No significant hip intra-articular joint space narrowing, but right more so than left acetabular roof spurring noted.     2. OMT 1-2 regions. Oral consent obtained.  Reviewed benefits and potential side effects.   - OMT indicated today due to signs and symptoms as well as local and remote somatic dysfunction findings and their related neurokinetic, lymphatic, fascial and/or arteriovenous body connections.   - OMT techniques used: Myofascial  Release, Muscle Energy, Fascial Distortion Model, and Articulatory   - Treatment was tolerated well. Improvement noted in segmental mobility post-treatment in dysfunctional regions. There were no adverse events and no complications immediately following treatment.     3. Reviewed with patient the following HEP:  Continue:  A)  Pelvic clock exercises given to do from the 6-12 o'clock positions:10-15 reps, twice daily. Hand out of exercise also given.   B) Clam shell exercises bilaterally: hold leg in abducted and externally rotated position for 5-10 seconds, repeat 5-10 times  C) Lower abdominal muscle isotonic exercises: Rotate pelvis into the 6 o'clock position and holding it to engage lower abdominal muscles. Then lift up leg, one at a time, alternating for 10-15 reps. Repeat exercises 1-2 times daily. Handout given.   D) Quadratus lumborum self-stretch on all fours: hold stretch for 30 seconds, repeating 2-3 times on each side. Do stretch twice daily. Hand-out also given.     The patient was taught a homegoing physical therapy regimen as described above. The patient demonstrated understanding of the exercises and proper technique of their execution.     4. Follow-up for right glutaeus medius tendon. Tenjet procedure, as discussed above.     5. Patient agreeable to today's plan and all questions were answered    This note is dictated using the M*Modal Fluency Direct word recognition program. There are word recognition mistakes that are occasionally missed on review.

## 2024-08-15 ENCOUNTER — OFFICE VISIT (OUTPATIENT)
Dept: SPORTS MEDICINE | Facility: CLINIC | Age: 64
End: 2024-08-15
Payer: MEDICAID

## 2024-08-15 VITALS — DIASTOLIC BLOOD PRESSURE: 93 MMHG | HEART RATE: 102 BPM | SYSTOLIC BLOOD PRESSURE: 141 MMHG

## 2024-08-15 DIAGNOSIS — M99.06 SOMATIC DYSFUNCTION OF LOWER EXTREMITY: ICD-10-CM

## 2024-08-15 DIAGNOSIS — M70.62 GREATER TROCHANTERIC BURSITIS OF BOTH HIPS: ICD-10-CM

## 2024-08-15 DIAGNOSIS — M67.951 TENDINOPATHY OF RIGHT GLUTEAL REGION: ICD-10-CM

## 2024-08-15 DIAGNOSIS — M76.01 GLUTEAL TENDINITIS OF BOTH BUTTOCKS: Primary | ICD-10-CM

## 2024-08-15 DIAGNOSIS — M53.3 SI (SACROILIAC) JOINT DYSFUNCTION: ICD-10-CM

## 2024-08-15 DIAGNOSIS — M70.61 GREATER TROCHANTERIC BURSITIS OF BOTH HIPS: ICD-10-CM

## 2024-08-15 DIAGNOSIS — M76.02 GLUTEAL TENDINITIS OF BOTH BUTTOCKS: Primary | ICD-10-CM

## 2024-08-15 PROCEDURE — 98925 OSTEOPATH MANJ 1-2 REGIONS: CPT | Mod: PBBFAC,PO | Performed by: NEUROMUSCULOSKELETAL MEDICINE & OMM

## 2024-08-15 PROCEDURE — 99999 PR PBB SHADOW E&M-EST. PATIENT-LVL III: CPT | Mod: PBBFAC,,, | Performed by: NEUROMUSCULOSKELETAL MEDICINE & OMM

## 2024-08-15 PROCEDURE — 99213 OFFICE O/P EST LOW 20 MIN: CPT | Mod: PBBFAC,PO,25 | Performed by: NEUROMUSCULOSKELETAL MEDICINE & OMM

## 2024-08-15 PROCEDURE — 76882 US LMTD JT/FCL EVL NVASC XTR: CPT | Mod: PBBFAC,PO | Performed by: NEUROMUSCULOSKELETAL MEDICINE & OMM

## 2024-08-15 NOTE — PROGRESS NOTES
ULTRASOUND EXAM REPORT    Patient: Suly Jaime, 64 y.o. female     FOCUSED::   1. Diagnostic Extremity - [facility]-Sports Ultrasound was recommended due to bilateral lateral hip pain.  2. Diagnostic Extremity - [facility]-Sports Ultrasound Performed: Breanna Brown Extremity Study:  bilateral lateral hip.    TECHNIQUE: Real time ultrasound examination of the bilateral lateral hip was performed with [employer] Edge 2, 9-L MHz linear probe(s). Images were saved and stored for documentation.     FINDINGS: The images are of adequate diagnostic quality with identification of all echogenic structures made except for the vascular structures unless otherwise noted. There is no sonographic evidence of bilateral gluteus medius tendinopathy with mixed heterogenicity, ill-defined fibular pattern of the gluteus medius tendons, right worse than left.  Right-sided associated cortical irregularities at the gluteus medius facet attachment also noted.  No significant hyperemia appreciated on Doppler.  No significant hypoechoic defect or volume loss appreciated with static and dynamic testing.  Gluteus minimus tendons appear to be intact bilaterally at the anterior facet attachment with adequate fibular patterning.  The rest of the sonographic examination was unremarkable.    IMPRESSION:  Bilateral gluteus medius tendinopathy/tendinosis without any clear significant tearing or acute inflammatory changes.  Gluteus minimus tendons appear to be intact bilaterally.

## 2024-08-22 ENCOUNTER — CLINICAL SUPPORT (OUTPATIENT)
Dept: CARDIOLOGY | Facility: HOSPITAL | Age: 64
End: 2024-08-22
Payer: MEDICAID

## 2024-08-22 ENCOUNTER — CLINICAL SUPPORT (OUTPATIENT)
Dept: CARDIOLOGY | Facility: HOSPITAL | Age: 64
End: 2024-08-22
Attending: INTERNAL MEDICINE
Payer: MEDICAID

## 2024-08-22 DIAGNOSIS — Z95.810 PRESENCE OF AUTOMATIC (IMPLANTABLE) CARDIAC DEFIBRILLATOR: ICD-10-CM

## 2024-08-22 DIAGNOSIS — I42.8 OTHER CARDIOMYOPATHIES: ICD-10-CM

## 2024-08-22 PROCEDURE — 93295 DEV INTERROG REMOTE 1/2/MLT: CPT | Mod: ,,, | Performed by: INTERNAL MEDICINE

## 2024-08-22 PROCEDURE — 93296 REM INTERROG EVL PM/IDS: CPT | Performed by: INTERNAL MEDICINE

## 2024-08-27 LAB
OHS CV DC REMOTE DEVICE TYPE: NORMAL
OHS CV RV PACING PERCENT: 1 %

## 2024-09-09 NOTE — PROGRESS NOTES
Subjective:     Suly Jaime     Chief Complaint   Patient presents with    Left Knee - Pain     HPI    Suly is a 64 y.o. female coming in today for bilateral left>right knee pain that began several year(s) ago, but exacerbated by recent increased activity with hiking. Pt. describes the pain as a 7/10 achy pain that does not radiate. Pt localizes her pain to the left medial joint line. There was not a fall/injury/ or trauma associated with the onset of symptoms. The pain is better with rest, ice, heat, aspercream, NSAIDs (ibuprofen at night) and worse with standing after sitting, activity. Pt. Denies any other musculoskeletal complaints at this time.     Joint instability? no  Mechanical locking/clicking? no  Affecting ADL's? yes  Affecting sleep? yes    Occupation: retired    PAST MEDICAL HISTORY:   Past Medical History:   Diagnosis Date    Allergy     Cardiomyopathy     Hypertension     ICD (implantable cardioverter-defibrillator) in place 01/30/2023    Piriformis syndrome of left side 02/22/2024       Pain to piriformis muscle x 3 weeks -started after Right side piriformis improving  Worse with prolonged sitting, sitting on toilet; radiation thigh/calf/foot  Gabapentin PM, meloxicam AM  Doing PT for Right side -send message PT to include left side  - add on zanaflex - never started previously  - recommend hard ball pressure to piriformis region - lay on floor  - referred to sports med for po    Piriformis syndrome of right side 01/24/2024    Pain near piriformis  Worse after sitting for extended time  NSAIDs and gabapentin helps  No radiation, tingling numbness  Doing PT - pain improving  Continue PT  - start zanaflex, continue gabapentin + NSAIDS       PAST SURGICAL HISTORY:   Past Surgical History:   Procedure Laterality Date    CARDIAC DEFIBRILLATOR PLACEMENT Left     HYSTERECTOMY  2003    full    KNEE ARTHROSCOPY      LARYNGOSCOPY N/A 02/18/2019    Procedure: DIRECT MICRO LARYNGOSCOPY WITH BIOPSY;  Surgeon:  Deidre Calderon MD;  Location: [facility] OR;  Service: ENT;  Laterality: N/A;  video    OOPHORECTOMY      RELEASE OF CONTRACTURE  10/4/2022    Procedure: RELEASE, CONTRACTURE;  Surgeon: Clyde Banuelos Jr., MD;  Location: [facility] OR;  Service: Orthopedics;;    RELEASE OF CONTRACTURE OF JOINT Left 10/4/2022    Procedure: RELEASE, CONTRACTURE, JOINT;  Surgeon: Clyde Banuelos Jr., MD;  Location: [facility] OR;  Service: Orthopedics;  Laterality: Left;  need k wires and mini c arm    REVISION OF IMPLANTABLE CARDIOVERTER-DEFIBRILLATOR (ICD) ELECTRODE LEAD PLACEMENT N/A 2022    Procedure: REVISION, INSERTION, ELECTRODE LEAD, ICD;  Surgeon: Cruzito Tovar MD;  Location: [facility] EP LAB;  Service: Cardiology;  Laterality: N/A;  SINGLE LEAD REVISION, SJM, ANES, EH, 325    SOFT TISSUE BIOPSY  10/4/2022    Procedure: BIOPSY, SOFT TISSUE;  Surgeon: Clyde Banuelos Jr., MD;  Location: [facility];  Service: Orthopedics;;     FAMILY HISTORY:   Family History   Problem Relation Name Age of Onset    Diabetes Mother      Diabetes Maternal Grandmother      Heart disease Maternal Grandmother      Allergies Daughter      Asthma Neg Hx      Allergic rhinitis Neg Hx      Angioedema Neg Hx      Atopy Neg Hx      Eczema Neg Hx      Immunodeficiency Neg Hx      Rhinitis Neg Hx      Urticaria Neg Hx       SOCIAL HISTORY:   Social History     Socioeconomic History    Marital status:    Tobacco Use    Smoking status: Former     Current packs/day: 0.00     Average packs/day: 0.3 packs/day for 40.0 years (12.0 ttl pk-yrs)     Types: Cigarettes     Start date:      Quit date: 2018     Years since quittin.6    Smokeless tobacco: Never    Tobacco comments:     off and on since 20s ,   Substance and Sexual Activity    Alcohol use: No    Drug use: No    Sexual activity: Not Currently     Partners: Male     Social Determinants of Health     Financial Resource Strain: Low Risk  (2024)    Overall Financial Resource Strain (CARDIA)      Difficulty of Paying Living Expenses: Not very hard   Food Insecurity: Food Insecurity Present (2/21/2024)    Hunger Vital Sign     Worried About Running Out of Food in the Last Year: Sometimes true     Ran Out of Food in the Last Year: Never true   Transportation Needs: No Transportation Needs (2/21/2024)    PRAPARE - Transportation     Lack of Transportation (Medical): No     Lack of Transportation (Non-Medical): No   Physical Activity: Unknown (2/21/2024)    Exercise Vital Sign     Days of Exercise per Week: 3 days   Stress: Stress Concern Present (2/21/2024)    [employer] - Occupational Stress Questionnaire     Feeling of Stress : To some extent   Housing Stability: Low Risk  (2/21/2024)    Housing Stability Vital Sign     Unable to Pay for Housing in the Last Year: No     Number of Places Lived in the Last Year: 1     Unstable Housing in the Last Year: No     MEDICATIONS:     Current Outpatient Medications:     azelastine (ASTELIN) 137 mcg (0.1 %) nasal spray, 1 spray (137 mcg total) by Nasal route nightly as needed for Rhinitis., Disp: 30 mL, Rfl: 2    calcium carbonate (TUMS) 200 mg calcium (500 mg) chewable tablet, Take 1 tablet by mouth daily as needed for Heartburn., Disp: , Rfl:     diclofenac sodium (VOLTAREN) 1 % Gel, Apply 2 g topically 3 (three) times daily., Disp: 100 g, Rfl: 1    FLOVENT  mcg/actuation inhaler, INHALE 2 PUFFS INTO THE LUNGS TWICE DAILY, Disp: 12 g, Rfl: 0    fluticasone propionate (FLONASE) 50 mcg/actuation nasal spray, 1 spray by Each Nostril route daily as needed for Rhinitis or Allergies., Disp: , Rfl:     gabapentin (NEURONTIN) 300 MG capsule, TAKE 1 CAPSULE(300 MG) BY MOUTH THREE TIMES DAILY, Disp: 90 capsule, Rfl: 3    latanoprost 0.005 % ophthalmic solution, , Disp: , Rfl:     meloxicam (MOBIC) 15 MG tablet, Take 1 tablet (15 mg total) by mouth once daily., Disp: 90 tablet, Rfl: 3    metFORMIN (GLUCOPHAGE-XR) 500 MG ER 24hr tablet, Take 1  tablet (500 mg total) by mouth daily with breakfast., Disp: 90 tablet, Rfl: 3    metoprolol succinate (TOPROL-XL) 100 MG 24 hr tablet, Take 1 tablet (100 mg total) by mouth every evening., Disp: 90 tablet, Rfl: 3    mv-mn/folic acid/vit [id number] (ALIVE ONCE DAILY WOMEN 50 PLUS ORAL), Take 1 tablet by mouth once daily., Disp: , Rfl:     rosuvastatin (CRESTOR) 10 MG tablet, Take 1 tablet (10 mg total) by mouth once daily., Disp: 90 tablet, Rfl: 3    losartan (COZAAR) 25 MG tablet, Take 1 tablet (25 mg total) by mouth once daily., Disp: 90 tablet, Rfl: 3  No current facility-administered medications for this visit.    ALLERGIES:   Review of patient's allergies indicates:  No Known Allergies    Objective:     VITAL SIGNS: BP (!) 140/84   Pulse 90   Ht 5' (1.524 m)   Wt 68.9 kg (151 lb 14.4 oz)   BMI 29.67 kg/m²    General    Vitals reviewed.  Constitutional: She is oriented to person, place, and time. She appears well-developed and well-nourished.   Neurological: She is alert and oriented to person, place, and time.   Psychiatric: She has a normal mood and affect. Her behavior is normal.               MUSCULOSKELETAL EXAM  left KNEE EXAMINATION   Affected side is compared to contralateral knee     Observation:  No edema, erythema, ecchymosis, or effusion noted.  No muscle atrophy of the thighs and calves noted.  No obvious bony deformities noted.   No Genu valgus/varum noted.  No recurvatum noted.    No tibial internal/external torsion.    Posture:  Upright  Gait: Left antalgic with Neutral ankle mechanics and Neutral medial arch    Tenderness:  Patella - none    Lateral joint line - none  Quad tendon - none   Medial joint line - +  Patellar tendon - none  Medial plica - none  Tibial tubercle - none   Lateral plica - none  Pes anserine - none   MCL prox - none  Distal ITB - none   MCL distal - none  MFC - none    LCL prox - none  LFC - none    LCL distal - none  Tibia - none    Fibula - none    No obvious bursae,  plicae, popliteal cysts, or tendon derangement palpated.          ROM (* = with pain):   Active extension to 0° on left without hyperextension, lag, crepitus, or patellar J sign.   Active extension to 0° on right without hyperextension, lag, crepitus, or patellar J sign.  Active flexion to 135° on left* and 135° on right    Strength(* = with pain):  Knee Flexion - 5/5 on left and 5/5 on right  Knee Extension - 5/5 on left and 5/5 on right  Hip Flexion - 5/5 on left and 5/5 on right  Hip Extension - 5/5 on left and 5/5 on right  Ankle dorsiflexion - 5/5 on left and 5/5 on right  Ankle Plantarflexion - 5/5 on left and 5/5 on right    Patellofemoral Exam:   Patellar ballottement - negative  Bulge sign - negative  Patellar grind - negative    No patellar laxity with medial and lateral translation   No apprehension with medial and lateral patellar translation.     Meniscus Testing:     + pain with terminal flexion at the medial joint line.  Zunildas test - positive   Bounce home test - negative    Ligament Testing:  Lachman's test - negative  No laxity with anterior drawer.  No laxity with posterior drawer.    Prone dial testing - negative  No laxity with varus testing at 0 and 30 degrees.  No laxity with valgus testing at 0* and 30 degrees.    IT band testing:  Noble Compression test - negative    Neurovascular Examination:   Normal gait without antalgia.  Sensation intact to light touch in the obturator, lateral/intermediate/medial/posterior femoral cutaneous, saphenous, and common peroneal nerves bilaterally.  Motor Function:    Fully intact motor function at hip, knee, foot and ankle.  DTRs: 2+/4 reflexes at L4 and S1 dermatomes. No clonus. Downgoing Babinski.   Negative seated straight leg raise bilaterally.    Pulses intact at the DP and PT arteries bilaterally.    Capillary refill intact <2 seconds in all toes bilaterally.    IMAGIN. X-ray ordered due to left knee pain. (AP bilateral standing, PA bilateral  standing in flexion, bilateral merchants, and bilateral lateral views) taken today.   2. X-ray images were reviewed personally by me and then directly with patient.  3. FINDINGS: X-ray images obtained demonstrate bilateral medial tibiofemoral joint space narrowing, right worse than left.   4. IMPRESSION: Kellgren-Jonathan grade 3 OA bilaterally, most notable at the medial tibiofemoral joint spaces, right worse than left.      Large Joint Aspiration/Injection  Knee joint, left    Performed by: DEDRICK LILLY  Authorized by: DEDRICK LILLY  Consent Done?: Yes (Verbal)  Indications: Pain  Site marked: The procedure site was marked   Timeout: Prior to procedure the correct patient, procedure, and site was verified     Location: Knee joint, left  Prep: Patient was prepped with Chlorhexidine and alcohol.  Skin anesthetic: Ethyl Chloride spray was used prior to skin puncture.  Ultrasound Guidance for needle placement: yes  Procedure: After local anesthetic was applied, the 21G, 2 needle was used to enter the left knee joint capsule under US guidance. A 3 cc mixture of 1 cc of 40 mg/ml triamcinolone acetonide and 2 cc of 0.2% Naropin was injected into the left knee joint.   Approach: superolateral  Medications: 40 mg triamcinolone acetonide 40 mg/mL  Patient tolerance: Patient tolerated the procedure well with no immediate complications    Ultrasound guidance was used for needle localization with SonoSite Edge 2, 9-L MHz linear probe(s). Images were saved and stored for documentation. The knee joint was visualized. Short and long axis images of the anterior left knee were taken prior to injection.Dynamic visualization of the needle(s) was continuous throughout the procedure and maintained good position and correct needle placement.      Triamcinolone:  NDC: [address]-3  LOT: [id number]  EXP: 05/2026    Assessment:      Encounter Diagnoses   Name Primary?    Acute pain of left knee Yes    Bilateral primary  osteoarthritis of knee     Elevated blood pressure reading in office with diagnosis of hypertension         Plan:   1. Acute Left knee pain secondary to flair of DJD changes as noted on x-ray.  Asymptomatic right knee OA.  - Discussed conservative therapy of OA with  injection therapy (corticosteroid, viscosupplementation, vs. PRP), Ice up to 20 minutes at a time, and NSAIDs for breakthrough pain vs. Referral to orthopedic surgery for discussion on TKA. Pt. Would like to continue with conservative treatment at this time with a CSI today.  - Patient received an ultrasound guided corticosteroid injection of the left knee today (see details above).   -  X-ray images of bilateral knee taken today (AP bilateral standing, PA bilateral standing in flexion, bilateral merchants, and  bilateral lateral views) showed Kellgren-Jonathan grade 3 OA bilaterally, most notable at the medial tibiofemoral joint spaces, right worse than left.. Images were personally reviewed with patient.    2.  Elevated BP reading at today's visit. Pt. Is  compliant with their BP medication, taken daily as directed.  Recommend follow-up with PCP, Dr. Terry or cardiology, for further management if elevated BP remains with monitoring at home over the next week.    3. Follow-up in as needed if pain deteriorates or new issue arises    4. Patient agreeable to today's plan and all questions were answered    This note is dictated using the M*Modal Fluency Direct word recognition program. There are word recognition mistakes that are occasionally missed on review.

## 2024-09-10 ENCOUNTER — OFFICE VISIT (OUTPATIENT)
Dept: SPORTS MEDICINE | Facility: CLINIC | Age: 64
End: 2024-09-10
Payer: MEDICAID

## 2024-09-10 ENCOUNTER — HOSPITAL ENCOUNTER (OUTPATIENT)
Dept: RADIOLOGY | Facility: HOSPITAL | Age: 64
Discharge: HOME | End: 2024-09-10
Attending: NEUROMUSCULOSKELETAL MEDICINE & OMM
Payer: MEDICAID

## 2024-09-10 VITALS
HEIGHT: 60 IN | WEIGHT: 151.88 LBS | DIASTOLIC BLOOD PRESSURE: 84 MMHG | BODY MASS INDEX: 29.82 KG/M2 | HEART RATE: 90 BPM | SYSTOLIC BLOOD PRESSURE: 140 MMHG

## 2024-09-10 DIAGNOSIS — M25.562 LEFT KNEE PAIN, UNSPECIFIED CHRONICITY: ICD-10-CM

## 2024-09-10 DIAGNOSIS — I10 ELEVATED BLOOD PRESSURE READING IN OFFICE WITH DIAGNOSIS OF HYPERTENSION: ICD-10-CM

## 2024-09-10 DIAGNOSIS — M25.562 ACUTE PAIN OF LEFT KNEE: Primary | ICD-10-CM

## 2024-09-10 DIAGNOSIS — M17.0 BILATERAL PRIMARY OSTEOARTHRITIS OF KNEE: ICD-10-CM

## 2024-09-10 PROCEDURE — 20611 DRAIN/INJ JOINT/BURSA W/US: CPT | Mod: PBBFAC | Performed by: NEUROMUSCULOSKELETAL MEDICINE & OMM

## 2024-09-10 PROCEDURE — 73564 X-RAY EXAM KNEE 4 OR MORE: CPT | Mod: 26,50,, | Performed by: RADIOLOGY

## 2024-09-10 PROCEDURE — 99999PBSHW PR PBB SHADOW TECHNICAL ONLY FILED TO HB: Mod: PBBFAC,,,

## 2024-09-10 PROCEDURE — 73564 X-RAY EXAM KNEE 4 OR MORE: CPT | Mod: TC,50

## 2024-09-10 PROCEDURE — 99213 OFFICE O/P EST LOW 20 MIN: CPT | Mod: PBBFAC,25 | Performed by: NEUROMUSCULOSKELETAL MEDICINE & OMM

## 2024-09-10 PROCEDURE — 99999 PR PBB SHADOW E&M-EST. PATIENT-LVL III: CPT | Mod: PBBFAC,,, | Performed by: NEUROMUSCULOSKELETAL MEDICINE & OMM

## 2024-09-10 RX ORDER — TRIAMCINOLONE ACETONIDE 40 MG/ML
40 INJECTION, SUSPENSION INTRA-ARTICULAR; INTRAMUSCULAR
Status: COMPLETED | OUTPATIENT
Start: 2024-09-10 | End: 2024-09-10

## 2024-09-10 RX ADMIN — TRIAMCINOLONE ACETONIDE 40 MG: 40 INJECTION, SUSPENSION INTRA-ARTICULAR; INTRAMUSCULAR at 11:09

## 2024-09-24 DIAGNOSIS — M76.01 GLUTEAL TENDINITIS OF RIGHT BUTTOCK: Primary | ICD-10-CM

## 2024-10-04 ENCOUNTER — OFFICE VISIT (OUTPATIENT)
Dept: SPORTS MEDICINE | Facility: CLINIC | Age: 64
End: 2024-10-04
Payer: MEDICAID

## 2024-10-04 VITALS
HEIGHT: 60 IN | DIASTOLIC BLOOD PRESSURE: 78 MMHG | SYSTOLIC BLOOD PRESSURE: 112 MMHG | BODY MASS INDEX: 29.82 KG/M2 | HEART RATE: 96 BPM | WEIGHT: 151.88 LBS

## 2024-10-04 DIAGNOSIS — M67.951 TENDINOPATHY OF RIGHT GLUTEAL REGION: Primary | ICD-10-CM

## 2024-10-04 PROCEDURE — 99999 PR PBB SHADOW E&M-EST. PATIENT-LVL III: CPT | Mod: PBBFAC,,, | Performed by: NEUROMUSCULOSKELETAL MEDICINE & OMM

## 2024-10-04 PROCEDURE — 99213 OFFICE O/P EST LOW 20 MIN: CPT | Mod: PBBFAC | Performed by: NEUROMUSCULOSKELETAL MEDICINE & OMM

## 2024-10-04 RX ORDER — TRAMADOL HYDROCHLORIDE 50 MG/1
50 TABLET ORAL EVERY 6 HOURS PRN
Qty: 15 TABLET | Refills: 0 | Status: SHIPPED | OUTPATIENT
Start: 2024-10-04 | End: 2024-10-04

## 2024-10-04 RX ORDER — TRAMADOL HYDROCHLORIDE 50 MG/1
50 TABLET ORAL EVERY 6 HOURS PRN
Qty: 15 TABLET | Refills: 0 | Status: SHIPPED | OUTPATIENT
Start: 2024-10-04

## 2024-10-04 NOTE — PROGRESS NOTES
"Suly Jaime     Chief Complaint   Patient presents with    Right Hip - Pain       Suly is a 64 y.o. female coming in today for a right gluteal tendon minimally invasive tenotomy, as discussed at prior visit. Written consent obtained prior to procedure.     Objective:       TENJET Operative Note:    PATIENT NAME: Suly Jaime    MEDICAL RECORD NUMBER: [id number]    AGE: 64 y.o.    INFORMED CONSENT: I verify that I personally obtained Suly Jaime's consent which was signed and uploaded into [employer].     TIMEOUT: Audible timeout was performed at 13:50.  At this time, the team confirmed the correct patient, correct procedure and correct site with laterality. The patient's allergies were reviewed. The procedure site was marked. The procedure team checked for proper functioning of devices and supplies to be used for the procedure. The procedure team reviewed the relevant diagnostic tests pertinent to the procedure.  Confirmed by SMA Breanna Brown    PHYSICIAN: Yoko Trujillo    LOCATION: [facility], [place].      PROCEDURE: TenJet Tenotomy procedure for right gluteal tendinopathy    ANESTHESIA: local    PREOPERATIVE DIAGNOSIS:  Gluteal tendinopathy    POSTOPERATIVE DIAGNOSIS:  Gluteal tendinopathy    PROCEDURE DESCRIPTION:    The treatment area was cleaned and draped in sterile fashion. Using sterile technique, a [employer] ultrasound unit with a 9-L MHz linear  15-4 MHz linear probe probe(s) was used to successfully localize the area of pathology to be treated today. A carlota with a sterile marker was placed, on the skin, at the area of maximum tenderness. Then the treatment area was cleaned and draped in sterile fashion. A 22 gauge 2.5" in needle was visualized under ultrasound administering 6 cc of anesthetic 1% lidocaine, to locally anesthetize the treatment area. A separate 25 gauge needle was then utilized to create a skin wheel using 1% lidocaine with epinephrine 1.5cm from the " treatment area. An 11 gauge scalpel was used to make a small puncture incision in the area of the skin wheel, and ultrasound was utilized to visualize the scalpel at the target area of the gluteus medius tendon. The 3 inch tenotomy needle was inserted into the pathologic tissue under direct ultrasound guidance, and tenotomy was performed with degenerative tendinotic tissue removed. Upon completion, gentle compression was applied to the site with sterile gauze. Adhesive strips, occlusive dressing, and compression bandage were then applied.    Patient left the procedure room in satisfactory condition having tolerated the procedure well.    CUTTING TIME: 5 min    ESTIMATED BLOOD LOSS: <5 ml    COMPLICATIONS: none    POSTOPERATIVE PLAN:  Post procedure hand-out and PT protocol given.      Patient voiced understanding of these instructions.      10/04/2024    Assessment:      Encounter Diagnosis   Name Primary?    Tendinopathy of right gluteal region Yes        Plan:   1. Ultrasound-guided Tenjet procedure was performed today at the gluteus medius tendon for patient has refractory right gluteal tendinopathy, as detailed above.  - recommend use of crutches for 7-14 days, nonweightbearing for 2-3 days, then weight-bearing as tolerated  - Recommend Extra-strength Tylenol (Acetaminophen 500mg) 2 tables every 4-6 hours, but not to exceed 3000mg (6 pills) in a day.  If pain is not controlled by exercise and Tylenol, patient instructed to take tramadol 50 mg Q 6 hr p.r.n. for pain control instead.   - Referral to outpatient PT ([facility]) placed for post-tenotomy rehab protocol to start in 2 weeks    2. Follow-up in 4 weeks for reevaluation    3. Patient agreeable to today's plan and all questions were answered    This note is dictated using the M*Modal Fluency Direct word recognition program. There are word recognition mistakes that are occasionally missed on review.

## 2024-10-17 NOTE — PLAN OF CARE
[facility]   Physical Therapy Initial Evaluation      Name: Suly Jaime  Clinic Number: [id number]    Therapy Diagnosis:   Encounter Diagnoses   Name Primary?    Gluteal tendinitis of right buttock     Right hip pain Yes    Difficulty walking         Physician: Yoko Trujillo DO    Physician Orders: PT Eval and Treat   Medical Diagnosis from Referral:  Gluteal tendinitis of right buttock [M76.01]   Evaluation Date: 10/18/2024  Authorization Period Expiration: 10/18/2024  Plan of Care Expiration: 11/15/2024  Progress Note Due: 10th visit  Date of Surgery: 10/4/2024  Visit # / Visits authorized: 1/ 1   FOTO: 1/ 3    Precautions: Standard     Time In: 10:00a  Time Out: 11:00a  Total Billable Time: 60 minutes    Subjective     Date of onset: Pain started 3 months ago     History of current condition - Suly reports: she had surgery 2 weeks ago to have her right gluteus medius tendon released. She reports long history of right hip pain which was causing her a lot of pain but she was able to perform all ADLs. Pt reports she has been walking 30 minutes every day for exercise.     Imaging: see imaging section    Prior Therapy: Yes for L hip    Social History: 2 story home with bedroom on first floor   Occupation: retired   Prior Level of Function: Able to perform all ADLs but with pain   Current Level of Function: Able to perform all ADLs including stair navigation without pain or difficulty    Pain:  Current 2/10, worst 2/10, best 2/10   Location: right hip   Description: Pressure   Aggravating Factors: denies  Easing Factors: pain medication    Patients goals: to learn HEP to strengthen independently      Medical History:   Past Medical History:   Diagnosis Date    Allergy     Cardiomyopathy     Hypertension     ICD (implantable cardioverter-defibrillator) in place 01/30/2023    Piriformis syndrome of left side 02/22/2024       Pain to piriformis muscle x 3 weeks -started after Right side  piriformis improving  Worse with prolonged sitting, sitting on toilet; radiation thigh/calf/foot  Gabapentin PM, meloxicam AM  Doing PT for Right side -send message PT to include left side  - add on zanaflex - never started previously  - recommend hard ball pressure to piriformis region - lay on floor  - referred to sports med for po    Piriformis syndrome of right side 01/24/2024    Pain near piriformis  Worse after sitting for extended time  NSAIDs and gabapentin helps  No radiation, tingling numbness  Doing PT - pain improving  Continue PT  - start zanaflex, continue gabapentin + NSAIDS     R knee meniscus repair 2016    Surgical History:   Suly Jaime  has a past surgical history that includes Knee arthroscopy; Laryngoscopy (N/A, 02/18/2019); Oophorectomy; Hysterectomy (2003); Cardiac defibrillator placement (Left); Revision of implantable cardioverter-defibrillator (ICD) electrode lead placement (N/A, 6/6/2022); Release of contracture of joint (Left, 10/4/2022); Release of contracture (10/4/2022); and Soft Tissue Biopsy (10/4/2022).    Medications:   Suly has a current medication list which includes the following prescription(s): azelastine, calcium carbonate, diclofenac sodium, flovent hfa, fluticasone propionate, gabapentin, latanoprost, losartan, meloxicam, metformin, metoprolol succinate, mv-mn/folic acid/vit [id number], rosuvastatin, tramadol, [DISCONTINUED] beclomethasone, and [DISCONTINUED] levocetirizine.    Allergies:   Review of patient's allergies indicates:  No Known Allergies     Objective      Observation: Pt ambulates with normal symmetrical step length       Postural examination: rounded shoulder and Forward Head    Palpation: TTP at R SI joint, glute max and piriformis       Strength: manual muscle test grades below      Lower Extremity Strength  Right LE   Left LE     Hip Ext 4/5 Hip Ext 4+/5    Hip Flexion: 4/5 Hip Flexion: 5/5   Hip ER:  4+/5 Hip ER: 4+/5   Hip IR: NT Hip IR: NT   Hip  Abduction: 4/5  Hip Abduction 4/5   Knee Extension: 4+/5 Knee Extension: 4+/5   Knee Flexion: 4/5 Knee Flexion: 4/5        Range of Motion:  R Hip Active(Passive) L Hip Active (Passive)   Flexion 120 (120)  115(120)   Extension WFL  WFL   Abduction 40   40        Balance Assessment:       Evaluation   Single Limb Stance R LE 8 seconds  (<10 sec = HIGH FALL RISK)   Single Limb Stance L LE 10 seconds  (<10 sec = HIGH FALL RISK)      Endurance Assessment:       Evaluation   Timed Up and Go 8 seconds   30 sec STS  16 reps          Table: Population Norms for TUG    Age  Average TUG    60 - 69 years  8.1 seconds    70 - 79 years  9.2 seconds    80 - 99 years  11.3 seconds       Intake Outcome Measure for FOTO Hip Survey    Therapist reviewed FOTO scores for Suly Jaime on 10/18/2024.   FOTO report - see Media section or FOTO account episode details.    Intake Score: See media section         Treatment     Total Treatment time (time-based codes) separate from Evaluation: 30 minutes     Suly received the treatments listed below:      therapeutic exercises to develop strength, endurance, ROM, and flexibility for 30 minutes including:  - Bridges with red TB around knees 2 x 10  - Adductor ball squeezes in hooklying 3 x 10  - Clamshells side lying 2 x 10  - Reverse Clamshells sidelying 2 x 10  - Straight leg raise flexion 1 x 10  - Piriformis stretch 3 x 30 seconds   - education on importance of hip strength for stability, function of gluteus medius, and exercise rationale       Patient Education and Home Exercises     Education provided:   - importance of hip strength for stability  - function of gluteus medius   - exercise rationale     Written Home Exercises Provided: Yes. Exercises were reviewed and Suly was able to demonstrate them prior to the end of the session.  Suly demonstrated good  understanding of the education provided. See EMR under Patient Instructions for exercises provided during therapy  sessions.    Assessment     Suly is a 64 y.o. female referred to outpatient Physical Therapy with a medical diagnosis of s/p gluteus medius tenotomy on 10/4/2024. Patient presents with R hip weakness and impaired flexibility but good functional ability. She is able to complete almost all of her ADLs without pain and little to no difficulty. She does have mildly impaired single leg stance ability consistent with post op status but gait function and transfer ability are normal. Her current functional limitations include: mild difficulty navigating stairs and mild difficulty walking on uneven surfaces.     Patient prognosis is Good.   Patient will benefit from skilled outpatient Physical Therapy to address the deficits stated above and in the chart below, provide patient /family education, and to maximize patientt's level of independence.     Plan of care discussed with patient: Yes  Patient's spiritual, cultural and educational needs considered and patient is agreeable to the plan of care and goals as stated below:     Medical Necessity is demonstrated by the following  History  Co-morbidities and personal factors that may impact the plan of care [] LOW: no personal factors / co-morbidities  [x] MODERATE: 1-2 personal factors / co-morbidities  [] HIGH: 3+ personal factors / co-morbidities    Moderate / High Support Documentation:   Co-morbidities affecting plan of care: Cardiovascular issues     Personal Factors:   no deficits     Examination  Body Structures and Functions, activity limitations and participation restrictions that may impact the plan of care [] LOW: addressing 1-2 elements  [x] MODERATE: 3+ elements  [] HIGH: 4+ elements (please support below)    Moderate / High Support Documentation: weakness, tenderness, difficulty navigating stairs,      Clinical Presentation [x] LOW: stable  [] MODERATE: Evolving  [] HIGH: Unstable     Decision Making/ Complexity Score: low       Goals:  Short Term Goals (2 Weeks):    1. Patient will be independent with home exercise program to supplement physical therapy treatment in improving functional status.  2.Pt will improve impaired lower extremity manual muscle tests to >/= 4/5 to improve dynamic lower extremity support for closed chain tasks.  3.Patient will perform 3 consecutive hip hinges with less than 3 VC to demonstrate improved lumbopelvic movement for safety during household ADL's and improved lifting techniques.      Long Term Goals (4 Weeks):   1.  Pt will improve impaired lower extremity manual muscle tests to >/= 4+/5 to improve dynamic right hip support for closed chain tasks.  2. Patient will improve the total FOTO hip Survey Score to </= 15% limited to demonstrate increased perceived functional mobility.  3.Patient will perform 3 consecutive hip hinges with less than 0VC to demonstrate improved lumbopelvic movement for safety during household ADL's and improved lifting techniques.   4. Patient will perform at least 15 sit to stands in 30 seconds without UE support to demonstrate increased functional strength.   5. Pt will be able to increase walking time to 60 minutes for her walking program in order to demonstrate improved functional endurance.     Plan     Plan of care Certification: 10/18/2024 to 11/15/2024.    Outpatient Physical Therapy 1 times weekly for 4 weeks to include the following interventions: Electrical Stimulation  , Gait Training, Manual Therapy, Moist Heat/ Ice, Neuromuscular Re-ed, Patient Education, Therapeutic Activities, and Therapeutic Exercise.     Kunal Drake PT, DPT        Physician's Signature: _________________________________________ Date: ________________

## 2024-10-18 ENCOUNTER — CLINICAL SUPPORT (OUTPATIENT)
Dept: REHABILITATION | Facility: HOSPITAL | Age: 64
End: 2024-10-18
Payer: MEDICAID

## 2024-10-18 DIAGNOSIS — M25.551 RIGHT HIP PAIN: Primary | ICD-10-CM

## 2024-10-18 DIAGNOSIS — M76.01 GLUTEAL TENDINITIS OF RIGHT BUTTOCK: ICD-10-CM

## 2024-10-18 DIAGNOSIS — R26.2 DIFFICULTY WALKING: ICD-10-CM

## 2024-10-18 PROCEDURE — 97110 THERAPEUTIC EXERCISES: CPT

## 2024-10-18 PROCEDURE — 97161 PT EVAL LOW COMPLEX 20 MIN: CPT

## 2024-10-29 ENCOUNTER — TELEPHONE (OUTPATIENT)
Dept: SPORTS MEDICINE | Facility: CLINIC | Age: 64
End: 2024-10-29
Payer: MEDICAID

## 2024-10-31 DIAGNOSIS — M46.1 SACROILIITIS: ICD-10-CM

## 2024-10-31 DIAGNOSIS — G57.01 PIRIFORMIS SYNDROME OF RIGHT SIDE: ICD-10-CM

## 2024-10-31 RX ORDER — GABAPENTIN 300 MG/1
300 CAPSULE ORAL 3 TIMES DAILY
Qty: 90 CAPSULE | Refills: 3 | Status: SHIPPED | OUTPATIENT
Start: 2024-10-31

## 2024-11-05 ENCOUNTER — CLINICAL SUPPORT (OUTPATIENT)
Dept: REHABILITATION | Facility: HOSPITAL | Age: 64
End: 2024-11-05
Payer: MEDICAID

## 2024-11-05 DIAGNOSIS — R26.2 DIFFICULTY WALKING: ICD-10-CM

## 2024-11-05 DIAGNOSIS — M25.551 RIGHT HIP PAIN: Primary | ICD-10-CM

## 2024-11-05 PROCEDURE — 97110 THERAPEUTIC EXERCISES: CPT

## 2024-11-05 NOTE — PROGRESS NOTES
[facility]   Physical Therapy Discharge Summary      Name: Suly Jaime  [facility] Number: [id number]    Therapy Diagnosis:   Encounter Diagnoses   Name Primary?    Right hip pain Yes    Difficulty walking      Physician: Yoko Trujillo DO    Visit Date: 11/5/2024    Physician Orders: PT Eval and Treat   Medical Diagnosis from Referral:  Gluteal tendinitis of right buttock [M76.01]   Evaluation Date: 10/18/2024  Authorization Period Expiration: 12/21/2024  Plan of Care Expiration: 11/15/2024  Progress Note Due: 10th visit  Date of Surgery: 10/4/2024  Visit # / Visits authorized: 1/ 8 + eval   FOTO: 3/ 3     Precautions: Standard      Time In: 10:00a  Time Out: 10:55am  Total Billable Time: 55 minutes    PTA Visit #: 0/5       Subjective     Patient reports: she is not having any R hip pain and denies all functional limitations. She says she has still been walking 30 minutes per day and able to navigate the stairs in her home freely and without difficulty.   She was compliant with home exercise program.  Response to previous treatment: good  Functional change: Able to perform all ADLs without pain or difficulty     Pain: 0/10, 0/10 at worst   Location: right hip      Objective      Observation: Pt ambulates with normal symmetrical step length       Postural examination: rounded shoulder and Forward Head     Palpation: Denies TTP throughout R hip       Strength: manual muscle test grades below      Lower Extremity Strength  Right LE   Left LE     Hip Ext 4/5 Hip Ext 4+/5    Hip Flexion: 5/5 Hip Flexion: 5/5   Hip ER:  5/5 Hip ER: 4+/5   Hip IR: NT Hip IR: NT   Hip Abduction: 4+/5  Hip Abduction 4/5   Knee Extension: 4+/5 Knee Extension: 4+/5   Knee Flexion: 5/5 Knee Flexion: 4/5         Range of Motion:  R Hip Active(Passive) L Hip Active (Passive)   Flexion 120 (125)   115(120)   Extension WFL   WFL   Abduction 30    40         Balance Assessment:       Evaluation   Single Limb Stance R LE  20 seconds  (<10 sec = HIGH FALL RISK)   Single Limb Stance L LE 26 seconds  (<10 sec = HIGH FALL RISK)      Endurance Assessment:       Evaluation   Timed Up and Go 8 seconds   30 sec STS  17 reps   no BUE support          Table: Population Norms for TUG    Age  Average TUG    60 - 69 years  8.1 seconds    70 - 79 years  9.2 seconds    80 - 99 years  11.3 seconds         Outcome Measure for FOTO Hip Survey     Therapist reviewed FOTO scores for Suly Jaime on 10/18/2024.   FOTO report - see Media section or FOTO account episode details.     Score: 100        Treatment     Suly received the treatments listed below:      therapeutic exercises to develop strength, endurance, ROM, and flexibility for 30 minutes including:  - Bridges with red TB around knees 3 x 10  - Adductor ball squeezes in hooklying 3 x 10  - Clamshells side lying 3 x 10  - Reverse Clamshells sidelying 3 x 10  - Straight leg raise flexion 3 x 10  - Piriformis stretch 3 x 30 seconds   - education on importance of hip strength for stability, function of gluteus medius, exercise rationale, continuation of HEP to maintain progress, return to gym    - ROM/MMT     Patient Education and Home Exercises       Education provided:   - importance of hip strength for stability  - function of gluteus medius   - exercise rationale   - Return to gym  - Continuation of HEP to maintain improvements    Written Home Exercises Provided: Pt instructed to continue prior HEP. Exercises were reviewed and Suly was able to demonstrate them prior to the end of the session.  Suly demonstrated good  understanding of the education provided. See Electronic Medical Record under Patient Instructions for exercises provided during therapy sessions    Assessment   Patient is 4+ weeks status post R gluteus medius tendon released . Patient has met all short term and long term physical therapy goals at this time. Patient demonstrates ROM and strength that are within normal limits.  Patient has returned to full independence with all household and personal ADL's at this time. Patient will be discharged with an HEP.       Date of Last visit: 10/18/2024  Total Visits Received: 2    Discharge FOTO Score: 100   Discharge reason: Patient has met all of his/her goals       Goals:   Short Term Goals (2 Weeks):   1. Patient will be independent with home exercise program to supplement physical therapy treatment in improving functional status. (Met)  2.Pt will improve impaired lower extremity manual muscle tests to >/= 4/5 to improve dynamic lower extremity support for closed chain tasks.(Met)  3.Patient will perform 3 consecutive hip hinges with less than 3 VC to demonstrate improved lumbopelvic movement for safety during household ADL's and improved lifting techniques. (Met)      Long Term Goals (4 Weeks):   1.  Pt will improve impaired lower extremity manual muscle tests to >/= 4+/5 to improve dynamic right hip support for closed chain tasks. (Met)  2. Patient will improve the total FOTO hip Survey Score to </= 15% limited to demonstrate increased perceived functional mobility.(Met)  3.Patient will perform 3 consecutive hip hinges with less than 0VC to demonstrate improved lumbopelvic movement for safety during household ADL's and improved lifting techniques. (Met)   4. Patient will perform at least 15 sit to stands in 30 seconds without UE support to demonstrate increased functional strength. (Met)  5. Pt will be able to increase walking time to 60 minutes for her walking program in order to demonstrate improved functional endurance. (Partially met)       Plan     Plan: This patient is discharged to [facility] to continue strengthening independently.     Kunal Drake, PT, DPT

## 2024-11-07 ENCOUNTER — OFFICE VISIT (OUTPATIENT)
Dept: SPORTS MEDICINE | Facility: CLINIC | Age: 64
End: 2024-11-07
Payer: MEDICAID

## 2024-11-07 VITALS — DIASTOLIC BLOOD PRESSURE: 90 MMHG | HEART RATE: 94 BPM | SYSTOLIC BLOOD PRESSURE: 147 MMHG

## 2024-11-07 DIAGNOSIS — M67.951 TENDINOPATHY OF RIGHT GLUTEAL REGION: Primary | ICD-10-CM

## 2024-11-07 PROCEDURE — 99999 PR PBB SHADOW E&M-EST. PATIENT-LVL III: CPT | Mod: PBBFAC,,, | Performed by: NEUROMUSCULOSKELETAL MEDICINE & OMM

## 2024-11-07 PROCEDURE — 1159F MED LIST DOCD IN RCRD: CPT | Mod: CPTII,,, | Performed by: NEUROMUSCULOSKELETAL MEDICINE & OMM

## 2024-11-07 PROCEDURE — 1160F RVW MEDS BY RX/DR IN RCRD: CPT | Mod: CPTII,,, | Performed by: NEUROMUSCULOSKELETAL MEDICINE & OMM

## 2024-11-07 PROCEDURE — 3080F DIAST BP >= 90 MM HG: CPT | Mod: CPTII,,, | Performed by: NEUROMUSCULOSKELETAL MEDICINE & OMM

## 2024-11-07 PROCEDURE — 99213 OFFICE O/P EST LOW 20 MIN: CPT | Mod: PBBFAC,PO | Performed by: NEUROMUSCULOSKELETAL MEDICINE & OMM

## 2024-11-07 PROCEDURE — 3077F SYST BP >= 140 MM HG: CPT | Mod: CPTII,,, | Performed by: NEUROMUSCULOSKELETAL MEDICINE & OMM

## 2024-11-07 PROCEDURE — 3044F HG A1C LEVEL LT 7.0%: CPT | Mod: CPTII,,, | Performed by: NEUROMUSCULOSKELETAL MEDICINE & OMM

## 2024-11-07 PROCEDURE — 99024 POSTOP FOLLOW-UP VISIT: CPT | Mod: ,,, | Performed by: NEUROMUSCULOSKELETAL MEDICINE & OMM

## 2024-11-07 NOTE — PROGRESS NOTES
Subjective:     Suly Jaime     Chief Complaint   Patient presents with    Follow-up     R glut tenjet      HPI    Suly is a 64 y.o. female coming in today for right hip pain, s/p Tenjet 10/4/24. Since last visit the pain has Improved. Pt reports some soreness for 1-2 weeks following the procedure but otherwise has been doing very well.  Pt is attending PT at [facility] and compliant with HEP. Pt is wearing 3mm right heel lift. The pain is better with recent Tenjet procedure and fPT and worse with nothing currently, walking without any issues.  Patient states that she is comfortable doing all of her home exercise at home for formal physical therapy, without any issues.  Pt. describes the pain as a 0/10 achy pain that does not radiate. There has not been any new a fall/injury/ or traumas since last visit. Pt. denies any new musculoskeletal complaints at this time.      Office note from 10/4/24 reviewed    Joint instability? no  Mechanical locking/clicking? no  Affecting ADL's? yes  Affecting sleep? yes    Occupation: retired    Procedures reviewed:   4/9/24- B GTB CSI, 4 months relief  10/4/24- R gluteus medius minimally invasive tenotomy (Tenjet)    PAST MEDICAL HISTORY:   Past Medical History:   Diagnosis Date    Allergy     Cardiomyopathy     Hypertension     ICD (implantable cardioverter-defibrillator) in place 01/30/2023    Piriformis syndrome of left side 02/22/2024       Pain to piriformis muscle x 3 weeks -started after Right side piriformis improving  Worse with prolonged sitting, sitting on toilet; radiation thigh/calf/foot  Gabapentin PM, meloxicam AM  Doing PT for Right side -send message PT to include left side  - add on zanaflex - never started previously  - recommend hard ball pressure to piriformis region - lay on floor  - referred to sports med for po    Piriformis syndrome of right side 01/24/2024    Pain near piriformis  Worse after sitting for extended time  NSAIDs and gabapentin helps  No  radiation, tingling numbness  Doing PT - pain improving  Continue PT  - start zanaflex, continue gabapentin + NSAIDS       PAST SURGICAL HISTORY:   Past Surgical History:   Procedure Laterality Date    CARDIAC DEFIBRILLATOR PLACEMENT Left     HYSTERECTOMY  2003    full    KNEE ARTHROSCOPY      LARYNGOSCOPY N/A 02/18/2019    Procedure: DIRECT MICRO LARYNGOSCOPY WITH BIOPSY;  Surgeon: Deidre Calderon MD;  Location: [facility];  Service: ENT;  Laterality: N/A;  video    OOPHORECTOMY      RELEASE OF CONTRACTURE  10/4/2022    Procedure: RELEASE, CONTRACTURE;  Surgeon: Clyde Banuelos Jr., MD;  Location: [facility] OR;  Service: Orthopedics;;    RELEASE OF CONTRACTURE OF JOINT Left 10/4/2022    Procedure: RELEASE, CONTRACTURE, JOINT;  Surgeon: Clyde Banuelos Jr., MD;  Location: [facility] OR;  Service: Orthopedics;  Laterality: Left;  need k wires and mini c arm    REVISION OF IMPLANTABLE CARDIOVERTER-DEFIBRILLATOR (ICD) ELECTRODE LEAD PLACEMENT N/A 6/6/2022    Procedure: REVISION, INSERTION, ELECTRODE LEAD, ICD;  Surgeon: Cruzito Tovar MD;  Location: [facility] EP LAB;  Service: Cardiology;  Laterality: N/A;  SINGLE LEAD REVISION, SJM, ANES, EH, 325    SOFT TISSUE BIOPSY  10/4/2022    Procedure: BIOPSY, SOFT TISSUE;  Surgeon: Clyde Banuelos Jr., MD;  Location: [facility];  Service: Orthopedics;;     FAMILY HISTORY:   Family History   Problem Relation Name Age of Onset    Diabetes Mother      Diabetes Maternal Grandmother      Heart disease Maternal Grandmother      Allergies Daughter      Asthma Neg Hx      Allergic rhinitis Neg Hx      Angioedema Neg Hx      Atopy Neg Hx      Eczema Neg Hx      Immunodeficiency Neg Hx      Rhinitis Neg Hx      Urticaria Neg Hx       SOCIAL HISTORY:   Social History     Socioeconomic History    Marital status:    Tobacco Use    Smoking status: Former     Current packs/day: 0.00     Average packs/day: 0.3 packs/day for 40.0 years (12.0 ttl pk-yrs)     Types: Cigarettes     Start date:       Quit date: 2018     Years since quittin.8    Smokeless tobacco: Never    Tobacco comments:     off and on since 20s ,   Substance and Sexual Activity    Alcohol use: No    Drug use: No    Sexual activity: Not Currently     Partners: Male     Social Drivers of Health     Financial Resource Strain: Low Risk  (2024)    Overall Financial Resource Strain (CARDIA)     Difficulty of Paying Living Expenses: Not very hard   Food Insecurity: Food Insecurity Present (2024)    Hunger Vital Sign     Worried About Running Out of Food in the Last Year: Sometimes true     Ran Out of Food in the Last Year: Never true   Transportation Needs: No Transportation Needs (2024)    PRAPARE - Transportation     Lack of Transportation (Medical): No     Lack of Transportation (Non-Medical): No   Physical Activity: Unknown (2024)    Exercise Vital Sign     Days of Exercise per Week: 3 days   Stress: Stress Concern Present (2024)    [employer] - Occupational Stress Questionnaire     Feeling of Stress : To some extent   Housing Stability: Low Risk  (2024)    Housing Stability Vital Sign     Unable to Pay for Housing in the Last Year: No     Number of Places Lived in the Last Year: 1     Unstable Housing in the Last Year: No     MEDICATIONS:   Current Outpatient Medications:     azelastine (ASTELIN) 137 mcg (0.1 %) nasal spray, 1 spray (137 mcg total) by Nasal route nightly as needed for Rhinitis., Disp: 30 mL, Rfl: 2    calcium carbonate (TUMS) 200 mg calcium (500 mg) chewable tablet, Take 1 tablet by mouth daily as needed for Heartburn., Disp: , Rfl:     FLOVENT  mcg/actuation inhaler, INHALE 2 PUFFS INTO THE LUNGS TWICE DAILY, Disp: 12 g, Rfl: 0    fluticasone propionate (FLONASE) 50 mcg/actuation nasal spray, 1 spray by Each Nostril route daily as needed for Rhinitis or Allergies., Disp: , Rfl:     gabapentin (NEURONTIN) 300 MG capsule, TAKE 1 CAPSULE(300 MG) BY  MOUTH THREE TIMES DAILY, Disp: 90 capsule, Rfl: 3    latanoprost 0.005 % ophthalmic solution, , Disp: , Rfl:     meloxicam (MOBIC) 15 MG tablet, Take 1 tablet (15 mg total) by mouth once daily., Disp: 90 tablet, Rfl: 3    metFORMIN (GLUCOPHAGE-XR) 500 MG ER 24hr tablet, Take 1 tablet (500 mg total) by mouth daily with breakfast., Disp: 90 tablet, Rfl: 3    metoprolol succinate (TOPROL-XL) 100 MG 24 hr tablet, Take 1 tablet (100 mg total) by mouth every evening., Disp: 90 tablet, Rfl: 3    mv-mn/folic acid/vit K/eldp716 (ALIVE ONCE DAILY WOMEN 50 PLUS ORAL), Take 1 tablet by mouth once daily., Disp: , Rfl:     rosuvastatin (CRESTOR) 10 MG tablet, Take 1 tablet (10 mg total) by mouth once daily., Disp: 90 tablet, Rfl: 3    traMADoL (ULTRAM) 50 mg tablet, Take 1 tablet (50 mg total) by mouth every 6 (six) hours as needed for Pain., Disp: 15 tablet, Rfl: 0    diclofenac sodium (VOLTAREN) 1 % Gel, Apply 2 g topically 3 (three) times daily. (Patient not taking: Reported on 11/7/2024), Disp: 100 g, Rfl: 1    losartan (COZAAR) 25 MG tablet, Take 1 tablet (25 mg total) by mouth once daily., Disp: 90 tablet, Rfl: 3    ALLERGIES: Review of patient's allergies indicates:  No Known Allergies    Objective:     VITAL SIGNS: BP (!) 147/90 (Patient Position: Sitting)   Pulse 94    General    Vitals reviewed.  Constitutional: She is oriented to person, place, and time. She appears well-developed and well-nourished.   Neurological: She is alert and oriented to person, place, and time.   Psychiatric: She has a normal mood and affect. Her behavior is normal.           MUSCULOSKELETAL EXAM  HIP: right HIP  The affected hip is compared to the contralateral hip.    Observation:    There is no edema, erythema, or ecchymosis in the lumbosacral region.   There is no Trendelenburg sign on either side  No obvious pelvic obliquity while standing.    No thoracolumbar scoliosis observed.    No midline skin abnormalities.  No atrophy noted in the  lower limbs.  Gait: Non-antalgic with Over pronation ankle mechanics and Pes planus   improved bilateral pelvic stability without any hip hiking compensatory pattern noted with single leg raise    ROM (* = with pain):  Passive hip flexion to 120° on left and 120° on right  Passive hip internal rotation to 45° on left and 45° on right  Passive hip external rotation to 45° on left and 45° on right   Passive hip abduction to 45° on left and 45° on right    Tenderness To Palpation:  No tenderness at the ASIS, AIIS, PSIS, PIIS, iliac crest, pubic bones, ischial tuberosity.  No tenderness throughout the lumbar spine, iliolumbar region, or posterior pelvis.  No tenderness throughout the right SI joint  No tenderness over the greater trochanteric bursa  No tenderness at the glut attachments on the greater trochanter  No tenderness over proximal IT band or hip flexor musculature.  No TFL muscle tenderness    Strength Testing (* = with pain):  Hip flexion - 5/5 on left and 5/5 on right  Hip extension - 5/5 on left and 5/5 on right  Hip adduction - 5/5 on left and 5/5 on right  Hip abduction - 5/5 on left and 5/5 on right  Knee flexion - 5/5 on left and 5/5 on right  Knee extension - 5/5 on left and 5/5 on right  Glutaeus medius - 5/5 on left and 5/5 on right without compensatory firing patterns    Special Tests:  Standing Trendelenburg test - negative    Seated straight leg raise - negative  Supine straight leg raise - negative  Hamstring flexibility symmetric    Log roll - negative  SUSI - negative  FADIR - negative  Scour test - negative  No pain with posterior hip capsule compression    ASIS compression test - negative  SI drawer test - negative   Thigh thrust test - negative     Piriformis test (Bonnet's) - negative  Ely's test - negative  Quadriceps flexibility symmetric.  Xiao test - negative  Jef compression test - negative    Fulcrum test - negative    Neurovascular Exam:  Normal gait without Trendelenburg or  antalgia.  2+ femoral, DP, and PT pulses BL.  No skin changes, no abnormal hair distribution.  Sensation intact to light touch throughout the obturator and medial/lateral/posterior femoral cutaneous nerves.  Capillary refill intact to <2 seconds in all lower limb digits.    Assessment:      Encounter Diagnosis   Name Primary?    Tendinopathy of right gluteal region Yes        Plan:   1. Right lateral hip pain secondary to gluteal tendinopathy s/p right gluteus medius minimally invasive tenotomy on 10/4/24- doing well.   - recommend continuing home exercises from formal physical therapy, limiting strengthening exercises to 3 times a week.  Patient can follow-up with formal  physical therapy, if needed.  - patient cleared to start slow progression back to gym works out, starting with low load aerobic exercise such as biking or elliptical and low weights on lifting machines  - limited diagnostic musculoskeletal ultrasound performed 8/15/24 showed sonographic evidence of bilateral gluteus medius tendinopathy/tendinosis without any clear significant tearing or acute inflammatory changes. Gluteus minimus tendons appear to be intact bilaterally.   -  X-ray images of bilateral hip taken 3/12/24 (AP pelvis and frogleg lateral bilateral views) showed mild lower lumbar DJD changes.  No significant hip intra-articular joint space narrowing, but right more so than left acetabular roof spurring noted.     2.  Follow-up in 2 months for reevaluation, sooner if needed    3. Patient agreeable to today's plan and all questions were answered    This note is dictated using the M*Modal Fluency Direct word recognition program. There are word recognition mistakes that are occasionally missed on review.

## 2024-11-21 ENCOUNTER — CLINICAL SUPPORT (OUTPATIENT)
Dept: CARDIOLOGY | Facility: HOSPITAL | Age: 64
End: 2024-11-21
Attending: INTERNAL MEDICINE
Payer: MEDICAID

## 2024-11-21 DIAGNOSIS — I42.8 OTHER CARDIOMYOPATHIES: ICD-10-CM

## 2024-11-21 DIAGNOSIS — Z95.810 PRESENCE OF AUTOMATIC (IMPLANTABLE) CARDIAC DEFIBRILLATOR: ICD-10-CM

## 2024-11-21 PROCEDURE — 93296 REM INTERROG EVL PM/IDS: CPT | Performed by: INTERNAL MEDICINE

## 2024-11-21 PROCEDURE — 93295 DEV INTERROG REMOTE 1/2/MLT: CPT | Mod: ,,, | Performed by: INTERNAL MEDICINE

## 2024-12-13 LAB
OHS CV DC REMOTE DEVICE TYPE: NORMAL
OHS CV RV PACING PERCENT: 1 %

## 2025-01-14 ENCOUNTER — TELEPHONE (OUTPATIENT)
Dept: SPORTS MEDICINE | Facility: CLINIC | Age: 65
End: 2025-01-14
Payer: MEDICARE

## 2025-01-14 NOTE — TELEPHONE ENCOUNTER
Spoke with patient and r/s appointment to 1/31 at 11:40am.     ----- Message from Erasmo De Paz sent at 1/14/2025  3:48 PM CST -----  Regarding: appt reschedule  Contact: pt [phone]  Type:  Need to reschedule    Who Called: zacarias Is calling to reschedule her appt due time  conflict stated she can come  on a  Monday, Wednesday and  Friday     Would the patient rather a call back or a response via [facility]? Call back   Best Call Back Number: pt [phone]   Additional Information:

## 2025-01-15 DIAGNOSIS — Z78.0 MENOPAUSE: ICD-10-CM

## 2025-01-30 DIAGNOSIS — I42.8 CARDIOMYOPATHY, NONISCHEMIC: Primary | ICD-10-CM

## 2025-01-31 ENCOUNTER — OFFICE VISIT (OUTPATIENT)
Dept: SPORTS MEDICINE | Facility: CLINIC | Age: 65
End: 2025-01-31
Payer: MEDICARE

## 2025-01-31 ENCOUNTER — OFFICE VISIT (OUTPATIENT)
Dept: FAMILY MEDICINE | Facility: CLINIC | Age: 65
End: 2025-01-31
Payer: MEDICARE

## 2025-01-31 VITALS
SYSTOLIC BLOOD PRESSURE: 130 MMHG | BODY MASS INDEX: 29.16 KG/M2 | HEIGHT: 60 IN | WEIGHT: 148.5 LBS | HEART RATE: 80 BPM | TEMPERATURE: 98 F | DIASTOLIC BLOOD PRESSURE: 86 MMHG | OXYGEN SATURATION: 97 %

## 2025-01-31 VITALS
HEART RATE: 84 BPM | HEIGHT: 60 IN | BODY MASS INDEX: 29.86 KG/M2 | WEIGHT: 152.13 LBS | SYSTOLIC BLOOD PRESSURE: 134 MMHG | DIASTOLIC BLOOD PRESSURE: 84 MMHG

## 2025-01-31 DIAGNOSIS — I42.8 CARDIOMYOPATHY, NONISCHEMIC: ICD-10-CM

## 2025-01-31 DIAGNOSIS — Z23 NEED FOR VACCINATION: ICD-10-CM

## 2025-01-31 DIAGNOSIS — Z12.11 SCREENING FOR MALIGNANT NEOPLASM OF COLON: ICD-10-CM

## 2025-01-31 DIAGNOSIS — E88.819 INSULIN RESISTANCE: ICD-10-CM

## 2025-01-31 DIAGNOSIS — M67.951 TENDINOPATHY OF RIGHT GLUTEAL REGION: Primary | ICD-10-CM

## 2025-01-31 DIAGNOSIS — I50.22 CHF (CONGESTIVE HEART FAILURE), NYHA CLASS II, CHRONIC, SYSTOLIC: Chronic | ICD-10-CM

## 2025-01-31 DIAGNOSIS — E78.2 MIXED HYPERLIPIDEMIA: ICD-10-CM

## 2025-01-31 DIAGNOSIS — M79.10 MYALGIA: ICD-10-CM

## 2025-01-31 DIAGNOSIS — J39.2 OROPHARYNGEAL LESION: ICD-10-CM

## 2025-01-31 DIAGNOSIS — M99.06 SOMATIC DYSFUNCTION OF LOWER EXTREMITY: ICD-10-CM

## 2025-01-31 DIAGNOSIS — Z23 NEED FOR INFLUENZA VACCINATION: Primary | ICD-10-CM

## 2025-01-31 DIAGNOSIS — J98.4 RESTRICTIVE LUNG DISEASE: Chronic | ICD-10-CM

## 2025-01-31 DIAGNOSIS — I25.10 CORONARY ARTERY DISEASE INVOLVING NATIVE CORONARY ARTERY OF NATIVE HEART WITHOUT ANGINA PECTORIS: ICD-10-CM

## 2025-01-31 DIAGNOSIS — M99.03 SOMATIC DYSFUNCTION OF LUMBAR REGION: ICD-10-CM

## 2025-01-31 PROBLEM — M25.551 RIGHT HIP PAIN: Status: RESOLVED | Noted: 2024-10-18 | Resolved: 2025-01-31

## 2025-01-31 PROBLEM — R26.2 DIFFICULTY WALKING: Status: RESOLVED | Noted: 2024-10-18 | Resolved: 2025-01-31

## 2025-01-31 PROCEDURE — 3008F BODY MASS INDEX DOCD: CPT | Mod: CPTII,S$GLB,, | Performed by: STUDENT IN AN ORGANIZED HEALTH CARE EDUCATION/TRAINING PROGRAM

## 2025-01-31 PROCEDURE — 3075F SYST BP GE 130 - 139MM HG: CPT | Mod: CPTII,S$GLB,, | Performed by: STUDENT IN AN ORGANIZED HEALTH CARE EDUCATION/TRAINING PROGRAM

## 2025-01-31 PROCEDURE — 1101F PT FALLS ASSESS-DOCD LE1/YR: CPT | Mod: CPTII,S$GLB,, | Performed by: STUDENT IN AN ORGANIZED HEALTH CARE EDUCATION/TRAINING PROGRAM

## 2025-01-31 PROCEDURE — 90653 IIV ADJUVANT VACCINE IM: CPT | Mod: S$GLB,,, | Performed by: STUDENT IN AN ORGANIZED HEALTH CARE EDUCATION/TRAINING PROGRAM

## 2025-01-31 PROCEDURE — 3288F FALL RISK ASSESSMENT DOCD: CPT | Mod: CPTII,S$GLB,, | Performed by: NEUROMUSCULOSKELETAL MEDICINE & OMM

## 2025-01-31 PROCEDURE — 99214 OFFICE O/P EST MOD 30 MIN: CPT | Mod: 25,S$GLB,, | Performed by: NEUROMUSCULOSKELETAL MEDICINE & OMM

## 2025-01-31 PROCEDURE — 3079F DIAST BP 80-89 MM HG: CPT | Mod: CPTII,S$GLB,, | Performed by: NEUROMUSCULOSKELETAL MEDICINE & OMM

## 2025-01-31 PROCEDURE — 3079F DIAST BP 80-89 MM HG: CPT | Mod: CPTII,S$GLB,, | Performed by: STUDENT IN AN ORGANIZED HEALTH CARE EDUCATION/TRAINING PROGRAM

## 2025-01-31 PROCEDURE — 3044F HG A1C LEVEL LT 7.0%: CPT | Mod: CPTII,S$GLB,, | Performed by: STUDENT IN AN ORGANIZED HEALTH CARE EDUCATION/TRAINING PROGRAM

## 2025-01-31 PROCEDURE — 90677 PCV20 VACCINE IM: CPT | Mod: S$GLB,,, | Performed by: STUDENT IN AN ORGANIZED HEALTH CARE EDUCATION/TRAINING PROGRAM

## 2025-01-31 PROCEDURE — 1160F RVW MEDS BY RX/DR IN RCRD: CPT | Mod: CPTII,S$GLB,, | Performed by: NEUROMUSCULOSKELETAL MEDICINE & OMM

## 2025-01-31 PROCEDURE — G0008 ADMIN INFLUENZA VIRUS VAC: HCPCS | Mod: S$GLB,,, | Performed by: STUDENT IN AN ORGANIZED HEALTH CARE EDUCATION/TRAINING PROGRAM

## 2025-01-31 PROCEDURE — 3044F HG A1C LEVEL LT 7.0%: CPT | Mod: CPTII,S$GLB,, | Performed by: NEUROMUSCULOSKELETAL MEDICINE & OMM

## 2025-01-31 PROCEDURE — 1159F MED LIST DOCD IN RCRD: CPT | Mod: CPTII,S$GLB,, | Performed by: NEUROMUSCULOSKELETAL MEDICINE & OMM

## 2025-01-31 PROCEDURE — 1101F PT FALLS ASSESS-DOCD LE1/YR: CPT | Mod: CPTII,S$GLB,, | Performed by: NEUROMUSCULOSKELETAL MEDICINE & OMM

## 2025-01-31 PROCEDURE — 3075F SYST BP GE 130 - 139MM HG: CPT | Mod: CPTII,S$GLB,, | Performed by: NEUROMUSCULOSKELETAL MEDICINE & OMM

## 2025-01-31 PROCEDURE — 99999 PR PBB SHADOW E&M-EST. PATIENT-LVL IV: CPT | Mod: PBBFAC,,, | Performed by: STUDENT IN AN ORGANIZED HEALTH CARE EDUCATION/TRAINING PROGRAM

## 2025-01-31 PROCEDURE — 98925 OSTEOPATH MANJ 1-2 REGIONS: CPT | Mod: S$GLB,,, | Performed by: NEUROMUSCULOSKELETAL MEDICINE & OMM

## 2025-01-31 PROCEDURE — 99215 OFFICE O/P EST HI 40 MIN: CPT | Mod: S$GLB,,, | Performed by: STUDENT IN AN ORGANIZED HEALTH CARE EDUCATION/TRAINING PROGRAM

## 2025-01-31 PROCEDURE — 99999 PR PBB SHADOW E&M-EST. PATIENT-LVL IV: CPT | Mod: PBBFAC,,, | Performed by: NEUROMUSCULOSKELETAL MEDICINE & OMM

## 2025-01-31 PROCEDURE — 3008F BODY MASS INDEX DOCD: CPT | Mod: CPTII,S$GLB,, | Performed by: NEUROMUSCULOSKELETAL MEDICINE & OMM

## 2025-01-31 PROCEDURE — G0009 ADMIN PNEUMOCOCCAL VACCINE: HCPCS | Mod: S$GLB,,, | Performed by: STUDENT IN AN ORGANIZED HEALTH CARE EDUCATION/TRAINING PROGRAM

## 2025-01-31 PROCEDURE — 3288F FALL RISK ASSESSMENT DOCD: CPT | Mod: CPTII,S$GLB,, | Performed by: STUDENT IN AN ORGANIZED HEALTH CARE EDUCATION/TRAINING PROGRAM

## 2025-01-31 RX ORDER — FLUTICASONE PROPIONATE 110 UG/1
2 AEROSOL, METERED RESPIRATORY (INHALATION) 2 TIMES DAILY
Qty: 12 G | Refills: 3 | Status: SHIPPED | OUTPATIENT
Start: 2025-01-31

## 2025-01-31 RX ORDER — ROSUVASTATIN CALCIUM 20 MG/1
20 TABLET, COATED ORAL NIGHTLY
Qty: 90 TABLET | Refills: 3 | Status: SHIPPED | OUTPATIENT
Start: 2025-01-31 | End: 2026-01-31

## 2025-01-31 RX ORDER — AZELASTINE 1 MG/ML
1 SPRAY, METERED NASAL NIGHTLY PRN
Qty: 30 ML | Refills: 2 | Status: SHIPPED | OUTPATIENT
Start: 2025-01-31 | End: 2026-01-31

## 2025-01-31 NOTE — PROGRESS NOTES
Subjective:       Patient ID: Suly Jaime is a 65 y.o. female.    Chief Complaint: Follow-up (Pt here for a 6 month follow up )      Review of Systems   All other systems reviewed and are negative.       A1C:  Recent Labs   Lab 01/24/24  1019 07/24/24  0910 01/28/25  0947   Hemoglobin A1C 5.5 5.4 5.7 H     CBC:  Recent Labs   Lab 06/07/22  0354 12/05/22  1030 11/10/23  2208   WBC 10.75 7.81 9.74   RBC 4.73 4.76 5.11   Hemoglobin 13.4 13.5 15.3   Hematocrit 41.3 42.4 44.4   Platelets 241 324 290   MCV 87 89 87   MCH 28.3 28.4 29.9   MCHC 32.4 31.8 L 34.5     CMP:  Recent Labs   Lab 01/24/24  1019 07/24/24  0910 01/28/25  0947   Glucose 89 90 110   Calcium 9.3 9.4 9.0   Albumin 4.6 3.8 3.7   Total Protein 8.1 7.1 7.1   Sodium 143 141 141   Potassium 4.1 3.8 4.2   CO2 24 21 L 23   Chloride 106 109 110   BUN 17 14 12   Creatinine 0.85 0.9 0.8   Alkaline Phosphatase 97 75 94   ALT 32 16 19   AST 29 12 11   Total Bilirubin 0.6 0.5 0.3     LIPIDS:  Recent Labs   Lab 12/05/22  1030 03/07/23  0904 01/24/24  1019 07/24/24  0910 01/28/25  0947   TSH 1.704  --   --   --   --    HDL 49   < > 55 47 52   Cholesterol 242 H   < > 259 H 186 241 H   Triglycerides 224 H   < > 163 H 126 168 H   LDL Cholesterol 148.2   < > 171.4 H 113.8 155.4   HDL/Cholesterol Ratio 20.2   < > 21.2 25.3 21.6   Non-HDL Cholesterol 193   < > 204 139 189   Total Cholesterol/HDL Ratio 4.9   < > 4.7 4.0 4.6    < > = values in this interval not displayed.     TSH:  Recent Labs   Lab 12/05/22  1030   TSH 1.704        Objective:      Vitals:    01/31/25 0914   BP: 130/86   Pulse: 80   Temp: 97.7 °F (36.5 °C)      Physical Exam  Vitals reviewed.   Constitutional:       Appearance: Normal appearance. She is overweight.   HENT:      Head: Normocephalic and atraumatic.   Eyes:      Conjunctiva/sclera: Conjunctivae normal.   Cardiovascular:      Rate and Rhythm: Normal rate and regular rhythm.      Heart sounds: Normal heart sounds.   Pulmonary:      Effort:  Pulmonary effort is normal.      Breath sounds: Normal breath sounds.   Abdominal:      Palpations: Abdomen is soft.      Tenderness: There is no abdominal tenderness.   Musculoskeletal:         General: Normal range of motion.      Cervical back: Normal range of motion.      Right lower leg: No edema.      Left lower leg: No edema.   Neurological:      General: No focal deficit present.      Mental Status: She is alert and oriented to person, place, and time.   Psychiatric:         Mood and Affect: Mood normal.         Behavior: Behavior normal.          Assessment:       1. Need for influenza vaccination    2. Mixed hyperlipidemia    3. Need for vaccination    4. Oropharyngeal lesion    5. Restrictive lung disease    6. Coronary artery disease involving native coronary artery of native heart without angina pectoris    7. CHF (congestive heart failure), NYHA class II, chronic, systolic    8. Cardiomyopathy, nonischemic    9. Insulin resistance        Plan:     Problem List Items Addressed This Visit          ENT    Oropharyngeal lesion    Overview     Hx laryngeal papilloma   Annual ENT f/u advised            Pulmonary    Restrictive lung disease (Chronic)    Overview     Singulair   flovent PRN  stable            Cardiac/Vascular    CHF (congestive heart failure), NYHA class II, chronic, systolic (Chronic)    Overview     medically managed  EF back to normal level  BB, statin, ARB         Mixed hyperlipidemia    Overview     Crestor 10   Continues to have elevated cholesterol   Increase to 20mg  Note in past has had some myalgias, hopeful this will not occur as was previously on higher doses         Relevant Medications    rosuvastatin (CRESTOR) 20 MG tablet    Coronary artery disease involving native coronary artery of native heart without angina pectoris    Overview     Follows with cards   BB, ARB,  statin            Cardiomyopathy, nonischemic    Overview     Follows with cards  Metoprolol 100    ARB  Asymptomatic   ICD in place  Continue current meds            Endocrine    Insulin resistance    Overview     Diet controlled  Metformin 500 daily   Well tolerated; no issues           Other Visit Diagnoses       Need for influenza vaccination    -  Primary    Relevant Medications    influenza (adjuvanted) (Fluad) 45 mcg/0.5 mL IM vaccine (> or = 66 yo) 0.5 mL (Completed)    Need for vaccination        Relevant Medications    pneumoc 20-derik conj-dip cr(PF) (PREVNAR-20 (PF)) injection Syrg 0.5 mL (Completed)          Labs reviewed in detail  Problem list reviewed in detail   Flu and pneumonia shot today   Colonoscopy ordered  Increase crestor to 20mg  Continue healthy lifestyle efforts  Continue current meds as prescribed otherwise; refills per request  Keep routine specialist f/u   RTC in 6 months  with labs prior and/or PRN          Stefania AlvaradoPro[facility]   1/31/25     I spent a total of 41 minutes on the day of the visit.This includes face to face time and non-face to face time preparing to see the patient (eg, review of tests), obtaining and/or reviewing separately obtained history, documenting clinical information in the electronic or other health record, independently interpreting results and communicating results to the patient/family/caregiver, or care coordinator.

## 2025-01-31 NOTE — PROGRESS NOTES
Subjective:     Suly Jaime     No chief complaint on file.    HPI    Suly is a 65 y.o. female coming in today for right hip pain, s/p Tenjet 10/4/24. Since last visit the pain has Improved. Pt reports some soreness for 1-2 weeks following the procedure but otherwise has been doing very well.  Pt is attending PT at [place] and compliant with HEP. Pt is wearing 3mm right heel lift. The pain is better with recent Tenjet procedure and fPT and worse with nothing currently, walking without any issues.  Patient states that she is comfortable doing all of her home exercise at home for formal physical therapy, without any issues.  Pt. describes the pain as a 0/10 achy pain that does not radiate. There has not been any new a fall/injury/ or traumas since last visit. Pt. denies any new musculoskeletal complaints at this time.      Office note from 11/7/24 reviewed    Joint instability? no  Mechanical locking/clicking? no  Affecting ADL's? yes  Affecting sleep? yes    Occupation: retired    Procedures reviewed:   4/9/24- B GTB CSI, 4 months relief  10/4/24- R gluteus medius minimally invasive tenotomy (Tenjet)    PAST MEDICAL HISTORY:   Past Medical History:   Diagnosis Date    Allergy     Cardiomyopathy     Hypertension     ICD (implantable cardioverter-defibrillator) in place 01/30/2023    Piriformis syndrome of left side 02/22/2024       Pain to piriformis muscle x 3 weeks -started after Right side piriformis improving  Worse with prolonged sitting, sitting on toilet; radiation thigh/calf/foot  Gabapentin PM, meloxicam AM  Doing PT for Right side -send message PT to include left side  - add on zanaflex - never started previously  - recommend hard ball pressure to piriformis region - lay on floor  - referred to sports med for po    Piriformis syndrome of right side 01/24/2024    Pain near piriformis  Worse after sitting for extended time  NSAIDs and gabapentin helps  No radiation, tingling numbness  Doing PT - pain  improving  Continue PT  - start zanaflex, continue gabapentin + NSAIDS       PAST SURGICAL HISTORY:   Past Surgical History:   Procedure Laterality Date    CARDIAC DEFIBRILLATOR PLACEMENT Left     HYSTERECTOMY  2003    full    KNEE ARTHROSCOPY      LARYNGOSCOPY N/A 02/18/2019    Procedure: DIRECT MICRO LARYNGOSCOPY WITH BIOPSY;  Surgeon: Deidre Calderon MD;  Location: [facility];  Service: ENT;  Laterality: N/A;  video    OOPHORECTOMY      RELEASE OF CONTRACTURE  10/4/2022    Procedure: RELEASE, CONTRACTURE;  Surgeon: Clyde Banuelos Jr., MD;  Location: [facility] OR;  Service: Orthopedics;;    RELEASE OF CONTRACTURE OF JOINT Left 10/4/2022    Procedure: RELEASE, CONTRACTURE, JOINT;  Surgeon: Clyde Banuelos Jr., MD;  Location: [facility] OR;  Service: Orthopedics;  Laterality: Left;  need k wires and mini c arm    REVISION OF IMPLANTABLE CARDIOVERTER-DEFIBRILLATOR (ICD) ELECTRODE LEAD PLACEMENT N/A 6/6/2022    Procedure: REVISION, INSERTION, ELECTRODE LEAD, ICD;  Surgeon: Cruzito Tovar MD;  Location: [facility] EP LAB;  Service: Cardiology;  Laterality: N/A;  SINGLE LEAD REVISION, SJM, ANES, EH, 325    SOFT TISSUE BIOPSY  10/4/2022    Procedure: BIOPSY, SOFT TISSUE;  Surgeon: Clyde Banuelos Jr., MD;  Location: [facility] OR;  Service: Orthopedics;;     FAMILY HISTORY:   Family History   Problem Relation Name Age of Onset    Diabetes Mother      Diabetes Maternal Grandmother      Heart disease Maternal Grandmother      Allergies Daughter      Asthma Neg Hx      Allergic rhinitis Neg Hx      Angioedema Neg Hx      Atopy Neg Hx      Eczema Neg Hx      Immunodeficiency Neg Hx      Rhinitis Neg Hx      Urticaria Neg Hx       SOCIAL HISTORY:   Social History     Socioeconomic History    Marital status:    Tobacco Use    Smoking status: Former     Current packs/day: 0.00     Average packs/day: 0.3 packs/day for 40.0 years (12.0 ttl pk-yrs)     Types: Cigarettes     Start date: 1978     Quit date: 2018     Years since  quittin.0    Smokeless tobacco: Never    Tobacco comments:     off and on since 20s ,   Substance and Sexual Activity    Alcohol use: No    Drug use: No    Sexual activity: Not Currently     Partners: Male     Social Drivers of Health     Financial Resource Strain: Low Risk  (2024)    Overall Financial Resource Strain (CARDIA)     Difficulty of Paying Living Expenses: Not very hard   Food Insecurity: Food Insecurity Present (2024)    Hunger Vital Sign     Worried About Running Out of Food in the Last Year: Sometimes true     Ran Out of Food in the Last Year: Never true   Transportation Needs: No Transportation Needs (2024)    PRAPARE - Transportation     Lack of Transportation (Medical): No     Lack of Transportation (Non-Medical): No   Physical Activity: Unknown (2024)    Exercise Vital Sign     Days of Exercise per Week: 3 days   Stress: Stress Concern Present (2024)    [employer] - Occupational Stress Questionnaire     Feeling of Stress : To some extent   Housing Stability: Low Risk  (2024)    Housing Stability Vital Sign     Unable to Pay for Housing in the Last Year: No     Number of Places Lived in the Last Year: 1     Unstable Housing in the Last Year: No     MEDICATIONS:   Current Outpatient Medications:     azelastine (ASTELIN) 137 mcg (0.1 %) nasal spray, 1 spray (137 mcg total) by Nasal route nightly as needed for Rhinitis., Disp: 30 mL, Rfl: 2    calcium carbonate (TUMS) 200 mg calcium (500 mg) chewable tablet, Take 1 tablet by mouth daily as needed for Heartburn., Disp: , Rfl:     diclofenac sodium (VOLTAREN) 1 % Gel, Apply 2 g topically 3 (three) times daily. (Patient not taking: Reported on 2024), Disp: 100 g, Rfl: 1    FLOVENT  mcg/actuation inhaler, INHALE 2 PUFFS INTO THE LUNGS TWICE DAILY, Disp: 12 g, Rfl: 0    fluticasone propionate (FLONASE) 50 mcg/actuation nasal spray, 1 spray by Each Nostril route daily as needed for  Rhinitis or Allergies., Disp: , Rfl:     gabapentin (NEURONTIN) 300 MG capsule, TAKE 1 CAPSULE(300 MG) BY MOUTH THREE TIMES DAILY, Disp: 90 capsule, Rfl: 3    latanoprost 0.005 % ophthalmic solution, , Disp: , Rfl:     losartan (COZAAR) 25 MG tablet, Take 1 tablet (25 mg total) by mouth once daily., Disp: 90 tablet, Rfl: 3    meloxicam (MOBIC) 15 MG tablet, Take 1 tablet (15 mg total) by mouth once daily., Disp: 90 tablet, Rfl: 3    metFORMIN (GLUCOPHAGE-XR) 500 MG ER 24hr tablet, Take 1 tablet (500 mg total) by mouth daily with breakfast., Disp: 90 tablet, Rfl: 3    metoprolol succinate (TOPROL-XL) 100 MG 24 hr tablet, Take 1 tablet (100 mg total) by mouth every evening., Disp: 90 tablet, Rfl: 3    mv-mn/folic acid/vit [id number] (ALIVE ONCE DAILY WOMEN 50 PLUS ORAL), Take 1 tablet by mouth once daily., Disp: , Rfl:     rosuvastatin (CRESTOR) 10 MG tablet, Take 1 tablet (10 mg total) by mouth once daily., Disp: 90 tablet, Rfl: 3    traMADoL (ULTRAM) 50 mg tablet, Take 1 tablet (50 mg total) by mouth every 6 (six) hours as needed for Pain., Disp: 15 tablet, Rfl: 0    ALLERGIES: Review of patient's allergies indicates:  No Known Allergies    Objective:   VITAL SIGNS: There were no vitals taken for this visit.   General    Vitals reviewed.  Constitutional: She is oriented to person, place, and time. She appears well-developed and well-nourished.   Neurological: She is alert and oriented to person, place, and time.   Psychiatric: She has a normal mood and affect. Her behavior is normal.           MUSCULOSKELETAL EXAM  HIP: right HIP  The affected hip is compared to the contralateral hip.    Observation:    There is no edema, erythema, or ecchymosis in the lumbosacral region.   There is no Trendelenburg sign on either side  No obvious pelvic obliquity while standing.    No thoracolumbar scoliosis observed.    No midline skin abnormalities.  No atrophy noted in the lower limbs.  Gait: Non-antalgic with Over pronation  ankle mechanics and Pes planus   improved bilateral pelvic stability without any hip hiking compensatory pattern noted with single leg raise    ROM (* = with pain):  Passive hip flexion to 120° on left and 120° on right  Passive hip internal rotation to 45° on left and 45° on right  Passive hip external rotation to 45° on left and 45° on right   Passive hip abduction to 45° on left and 45° on right    Tenderness To Palpation:  No tenderness at the ASIS, AIIS, PSIS, PIIS, iliac crest, pubic bones, ischial tuberosity.  No tenderness throughout the lumbar spine, iliolumbar region, or posterior pelvis.  No tenderness throughout the right SI joint  No tenderness over the greater trochanteric bursa  No tenderness at the glut attachments on the greater trochanter  No tenderness over proximal IT band or hip flexor musculature.  No TFL muscle tenderness    Strength Testing (* = with pain):  Hip flexion - 5/5 on left and 5/5 on right  Hip extension - 5/5 on left and 5/5 on right  Hip adduction - 5/5 on left and 5/5 on right  Hip abduction - 5/5 on left and 5/5 on right  Knee flexion - 5/5 on left and 5/5 on right  Knee extension - 5/5 on left and 5/5 on right  Glutaeus medius - 5/5 on left and 5/5 on right without compensatory firing patterns    Special Tests:  Standing Trendelenburg test - negative    Seated straight leg raise - negative  Supine straight leg raise - negative  Hamstring flexibility symmetric    Log roll - negative  SUSI - negative  FADIR - negative  Scour test - negative  No pain with posterior hip capsule compression    ASIS compression test - negative  SI drawer test - negative   Thigh thrust test - negative     Piriformis test (Bonnet's) - negative  Ely's test - negative  Quadriceps flexibility symmetric.  Xiao test - negative  Jef compression test - negative    Fulcrum test - negative    Neurovascular Exam:  Normal gait without Trendelenburg or antalgia.  2+ femoral, DP, and PT pulses BL.  No skin  changes, no abnormal hair distribution.  Sensation intact to light touch throughout the obturator and medial/lateral/posterior femoral cutaneous nerves.  Capillary refill intact to <2 seconds in all lower limb digits.    Assessment:      No diagnosis found.     Plan:   1. Right lateral hip pain secondary to gluteal tendinopathy s/p right gluteus medius minimally invasive tenotomy on 10/4/24- doing well.   - recommend continuing home exercises from formal physical therapy, limiting strengthening exercises to 3 times a week.  Patient can follow-up with formal  physical therapy, if needed.  - patient cleared to start slow progression back to gym works out, starting with low load aerobic exercise such as biking or elliptical and low weights on lifting machines  - limited diagnostic musculoskeletal ultrasound performed 8/15/24 showed sonographic evidence of bilateral gluteus medius tendinopathy/tendinosis without any clear significant tearing or acute inflammatory changes. Gluteus minimus tendons appear to be intact bilaterally.   -  X-ray images of bilateral hip taken 3/12/24 (AP pelvis and frogleg lateral bilateral views) showed mild lower lumbar DJD changes.  No significant hip intra-articular joint space narrowing, but right more so than left acetabular roof spurring noted.     2.  Follow-up in 2 months for reevaluation, sooner if needed    3. Patient agreeable to today's plan and all questions were answered    This note is dictated using the M*Modal Fluency Direct word recognition program. There are word recognition mistakes that are occasionally missed on review.

## 2025-01-31 NOTE — PROGRESS NOTES
Subjective:     Suly Jaime     Chief Complaint   Patient presents with    Follow-up     Right hip       Suly is a 65 y.o. female coming in today for a 12 week follow up of her R gluteal tendon Tenjet procedure.  She reports no pain but notes some tenderness of the lateral hip. Since last visit the pain has Improved.  Pt states that she completes phsical therapy in November. Pt  reports she completes her HEP occasionally.  There has not been any new a fall/injury/ or traumas since last visit.  Pt. denies any new musculoskeletal complaints at this time.      Office note from 11/7/24 reviewed    Joint instability? no  Mechanical locking/clicking? no  Affecting ADL's? No   Affecting sleep? No     Occupation: retired    Procedures reviewed:   4/9/24- B GTB CSI, 4 months relief  10/4/24- R gluteus medius minimally invasive tenotomy (Tenjet)    PAST MEDICAL HISTORY:   Past Medical History:   Diagnosis Date    Allergy     Cardiomyopathy     Hypertension     ICD (implantable cardioverter-defibrillator) in place 01/30/2023    Piriformis syndrome of left side 02/22/2024       Pain to piriformis muscle x 3 weeks -started after Right side piriformis improving  Worse with prolonged sitting, sitting on toilet; radiation thigh/calf/foot  Gabapentin PM, meloxicam AM  Doing PT for Right side -send message PT to include left side  - add on zanaflex - never started previously  - recommend hard ball pressure to piriformis region - lay on floor  - referred to sports med for po    Piriformis syndrome of right side 01/24/2024    Pain near piriformis  Worse after sitting for extended time  NSAIDs and gabapentin helps  No radiation, tingling numbness  Doing PT - pain improving  Continue PT  - start zanaflex, continue gabapentin + NSAIDS       PAST SURGICAL HISTORY:   Past Surgical History:   Procedure Laterality Date    CARDIAC DEFIBRILLATOR PLACEMENT Left     HYSTERECTOMY  2003    full    KNEE ARTHROSCOPY      LARYNGOSCOPY N/A 02/18/2019     Procedure: DIRECT MICRO LARYNGOSCOPY WITH BIOPSY;  Surgeon: Deidre Calderon MD;  Location: [facility] OR;  Service: ENT;  Laterality: N/A;  video    OOPHORECTOMY      RELEASE OF CONTRACTURE  10/4/2022    Procedure: RELEASE, CONTRACTURE;  Surgeon: Clyde Banuelos Jr., MD;  Location: [facility] OR;  Service: Orthopedics;;    RELEASE OF CONTRACTURE OF JOINT Left 10/4/2022    Procedure: RELEASE, CONTRACTURE, JOINT;  Surgeon: Clyde Banuelos Jr., MD;  Location: [facility] OR;  Service: Orthopedics;  Laterality: Left;  need k wires and mini c arm    REVISION OF IMPLANTABLE CARDIOVERTER-DEFIBRILLATOR (ICD) ELECTRODE LEAD PLACEMENT N/A 2022    Procedure: REVISION, INSERTION, ELECTRODE LEAD, ICD;  Surgeon: Cruzito Tovar MD;  Location: [facility] EP LAB;  Service: Cardiology;  Laterality: N/A;  SINGLE LEAD REVISION, SJM, ANES, EH, 325    SOFT TISSUE BIOPSY  10/4/2022    Procedure: BIOPSY, SOFT TISSUE;  Surgeon: Clyde Banuelos Jr., MD;  Location: [facility] OR;  Service: Orthopedics;;     FAMILY HISTORY:   Family History   Problem Relation Name Age of Onset    Diabetes Mother      Diabetes Maternal Grandmother      Heart disease Maternal Grandmother      Allergies Daughter      Asthma Neg Hx      Allergic rhinitis Neg Hx      Angioedema Neg Hx      Atopy Neg Hx      Eczema Neg Hx      Immunodeficiency Neg Hx      Rhinitis Neg Hx      Urticaria Neg Hx       SOCIAL HISTORY:   Social History     Socioeconomic History    Marital status:    Tobacco Use    Smoking status: Former     Current packs/day: 0.00     Average packs/day: 0.3 packs/day for 40.0 years (12.0 ttl pk-yrs)     Types: Cigarettes     Start date:      Quit date: 2018     Years since quittin.0    Smokeless tobacco: Never    Tobacco comments:     off and on since 20s ,   Substance and Sexual Activity    Alcohol use: No    Drug use: No    Sexual activity: Not Currently     Partners: Male     Social Drivers of Health     Financial Resource Strain: Low Risk   (2/21/2024)    Overall Financial Resource Strain (CARDIA)     Difficulty of Paying Living Expenses: Not very hard   Food Insecurity: Food Insecurity Present (2/21/2024)    Hunger Vital Sign     Worried About Running Out of Food in the Last Year: Sometimes true     Ran Out of Food in the Last Year: Never true   Transportation Needs: No Transportation Needs (2/21/2024)    PRAPARE - Transportation     Lack of Transportation (Medical): No     Lack of Transportation (Non-Medical): No   Physical Activity: Unknown (2/21/2024)    Exercise Vital Sign     Days of Exercise per Week: 3 days   Stress: Stress Concern Present (2/21/2024)    [employer] - Occupational Stress Questionnaire     Feeling of Stress : To some extent   Housing Stability: Low Risk  (2/21/2024)    Housing Stability Vital Sign     Unable to Pay for Housing in the Last Year: No     Number of Places Lived in the Last Year: 1     Unstable Housing in the Last Year: No     MEDICATIONS:   Current Outpatient Medications:     azelastine (ASTELIN) 137 mcg (0.1 %) nasal spray, 1 spray (137 mcg total) by Nasal route nightly as needed for Rhinitis., Disp: 30 mL, Rfl: 2    fluticasone propionate (FLONASE) 50 mcg/actuation nasal spray, 1 spray by Each Nostril route daily as needed for Rhinitis or Allergies., Disp: , Rfl:     fluticasone propionate (FLOVENT HFA) 110 mcg/actuation inhaler, Inhale 2 puffs into the lungs 2 (two) times daily. Controller, Disp: 12 g, Rfl: 3    gabapentin (NEURONTIN) 300 MG capsule, TAKE 1 CAPSULE(300 MG) BY MOUTH THREE TIMES DAILY, Disp: 90 capsule, Rfl: 3    losartan (COZAAR) 25 MG tablet, Take 1 tablet (25 mg total) by mouth once daily., Disp: 90 tablet, Rfl: 3    meloxicam (MOBIC) 15 MG tablet, Take 1 tablet (15 mg total) by mouth once daily., Disp: 90 tablet, Rfl: 3    metFORMIN (GLUCOPHAGE-XR) 500 MG ER 24hr tablet, Take 1 tablet (500 mg total) by mouth daily with breakfast., Disp: 90 tablet, Rfl: 3     metoprolol succinate (TOPROL-XL) 100 MG 24 hr tablet, Take 1 tablet (100 mg total) by mouth every evening., Disp: 90 tablet, Rfl: 3    mv-mn/folic acid/vit [id number] (ALIVE ONCE DAILY WOMEN 50 PLUS ORAL), Take 1 tablet by mouth once daily., Disp: , Rfl:     rosuvastatin (CRESTOR) 20 MG tablet, Take 1 tablet (20 mg total) by mouth every evening., Disp: 90 tablet, Rfl: 3    calcium carbonate (TUMS) 200 mg calcium (500 mg) chewable tablet, Take 1 tablet by mouth daily as needed for Heartburn., Disp: , Rfl:     traMADoL (ULTRAM) 50 mg tablet, Take 1 tablet (50 mg total) by mouth every 6 (six) hours as needed for Pain. (Patient not taking: Reported on 1/31/2025), Disp: 15 tablet, Rfl: 0  No current facility-administered medications for this visit.    ALLERGIES: Review of patient's allergies indicates:  No Known Allergies    Objective:   VITAL SIGNS: /84 (BP Location: Right arm, Patient Position: Sitting)   Pulse 84   Ht 5' (1.524 m)   Wt 69 kg (152 lb 1.9 oz)   BMI 29.71 kg/m²    General    Vitals reviewed.  Constitutional: She is oriented to person, place, and time. She appears well-developed and well-nourished.   Neurological: She is alert and oriented to person, place, and time.   Psychiatric: She has a normal mood and affect. Her behavior is normal.             Right Hip Exam   Right hip exam is normal.     MUSCULOSKELETAL EXAM  HIP: right HIP  The affected hip is compared to the contralateral hip.    Observation:    There is no edema, erythema, or ecchymosis in the lumbosacral region.   There is no Trendelenburg sign on either side  No obvious pelvic obliquity while standing.    No thoracolumbar scoliosis observed.    No midline skin abnormalities.  No atrophy noted in the lower limbs.  Gait: Non-antalgic with Over pronation ankle mechanics and Pes planus   improved bilateral pelvic stability without any hip hiking compensatory pattern noted with single leg raise  Leg length symmetric    ROM (* = with  pain):  Passive hip flexion to 120° on left and 120° on right  Passive hip internal rotation to 45° on left and 45° on right  Passive hip external rotation to 45° on left and 45° on right   Passive hip abduction to 45° on left and 45° on right    Tenderness To Palpation:  No tenderness at the ASIS, AIIS, PSIS, PIIS, iliac crest, pubic bones, ischial tuberosity.  No tenderness throughout the lumbar spine, iliolumbar region, or posterior pelvis.  No tenderness throughout the right SI joint  No tenderness over the greater trochanteric bursa  No tenderness at the glut attachments on the greater trochanter  No tenderness over proximal IT band or hip flexor musculature.  No TFL muscle tenderness    Strength Testing (* = with pain):  Hip flexion -  5/5 on left and 5/5 on right  Hip extension - 5/5 on left and 5/5 on right  Hip adduction - 5/5 on left and 5/5 on right  Hip abduction - 5/5 on left and 5/5 on right  Knee flexion - 5/5 on left and 5/5 on right  Knee extension - 5/5 on left and 5/5 on right  Glutaeus medius - 5/5 on left and 5-/5 on right without compensatory firing patterns    Special Tests:  Standing Trendelenburg test - negative    Seated straight leg raise - negative  Supine straight leg raise - negative  Hamstring flexibility symmetric    Log roll - negative  SUSI - negative  FADIR - negative  Scour test - negative  No pain with posterior hip capsule compression    ASIS compression test - negative  SI drawer test - negative   Thigh thrust test - negative     Piriformis test (Bonnet's) - negative  Ely's test - negative  Quadriceps flexibility symmetric.  Xiao test - negative  Jef compression test - negative    Fulcrum test - negative    TART (Tissue texture abnormality, Asymmetry,  Restriction of motion and/or Tenderness) changes:     Lumbar Spine   L1 Neutral   L2 Neutral   L3 Neutral   L4 FRS RIGHT   L5 FRS RIGHT       Pelvis:  Innominate:Neutral  Pubic bone:Neutral    Sacrum:Neutral    Lower  extremity: bilateral anterior bobby    Key   F= Flexed   E = Extended   R = Rotated   S = Sidebent   TTA = tissue texture abnormality     Neurovascular Exam:  Normal gait without Trendelenburg or antalgia.  2+ femoral, DP, and PT pulses BL.  No skin changes, no abnormal hair distribution.  Sensation intact to light touch throughout the obturator and medial/lateral/posterior femoral cutaneous nerves.  Capillary refill intact to <2 seconds in all lower limb digits.    Assessment:      Encounter Diagnoses   Name Primary?    Tendinopathy of right gluteal region Yes    Myalgia     Somatic dysfunction of lumbar region     Somatic dysfunction of lower extremity         Plan:   1. Right lateral hip pain secondary to gluteal tendinopathy s/p right gluteus medius minimally invasive tenotomy on 10/4/24- continued improvement  - OMT performed today to address associated biomechanical restrictions   - recommend continuing home exercises from formal physical therapy, limiting strengthening exercises to 3 times a week.    - patient cleared to start slow progression back to gym works out, starting with low load aerobic exercise such as biking or elliptical and low weights on lifting machines  - limited diagnostic musculoskeletal ultrasound performed 8/15/24 showed sonographic evidence of bilateral gluteus medius tendinopathy/tendinosis without any clear significant tearing or acute inflammatory changes. Gluteus minimus tendons appear to be intact bilaterally.   -  X-ray images of bilateral hip taken 3/12/24 (AP pelvis and frogleg lateral bilateral views) showed mild lower lumbar DJD changes.  No significant hip intra-articular joint space narrowing, but right more so than left acetabular roof spurring noted.     2. OMT 1-2 regions. Oral consent obtained.  Reviewed benefits and potential side effects.   - OMT indicated today due to signs and symptoms as well as local and remote somatic dysfunction findings and their related  neurokinetic, lymphatic, fascial and/or arteriovenous body connections.   - OMT techniques used: Muscle Energy and Articulatory   - Treatment was tolerated well. Improvement noted in segmental mobility post-treatment in dysfunctional regions. There were no adverse events and no complications immediately following treatment.     3.  Follow-up as needed if pain deteriorates or new issue arises    4. Patient agreeable to today's plan and all questions were answered    This note is dictated using the M*Modal Fluency Direct word recognition program. There are word recognition mistakes that are occasionally missed on review.

## 2025-02-10 ENCOUNTER — TELEPHONE (OUTPATIENT)
Dept: ENDOSCOPY | Facility: HOSPITAL | Age: 65
End: 2025-02-10

## 2025-02-10 NOTE — TELEPHONE ENCOUNTER
Patient was contacted to schedule procedure . Patient is requesting the [facility] location   Please call patient to schedule procedure

## 2025-02-17 ENCOUNTER — TELEPHONE (OUTPATIENT)
Dept: ELECTROPHYSIOLOGY | Facility: CLINIC | Age: 65
End: 2025-02-17
Payer: MEDICARE

## 2025-02-17 NOTE — TELEPHONE ENCOUNTER
----- Message from Dalia sent at 2/17/2025  1:42 PM CST -----  Regarding: Forms  Pt [phone] says she dropped some forms off at the [place] office and was told to [place] [facility] [place] and ask for Jany in his office about the forms. Please call her at the number listed.Thanks

## 2025-02-20 ENCOUNTER — CLINICAL SUPPORT (OUTPATIENT)
Dept: CARDIOLOGY | Facility: HOSPITAL | Age: 65
End: 2025-02-20
Attending: INTERNAL MEDICINE
Payer: MEDICARE

## 2025-02-20 ENCOUNTER — CLINICAL SUPPORT (OUTPATIENT)
Dept: CARDIOLOGY | Facility: HOSPITAL | Age: 65
End: 2025-02-20
Payer: MEDICARE

## 2025-02-20 DIAGNOSIS — Z95.810 PRESENCE OF AUTOMATIC (IMPLANTABLE) CARDIAC DEFIBRILLATOR: ICD-10-CM

## 2025-02-20 DIAGNOSIS — I42.8 OTHER CARDIOMYOPATHIES: ICD-10-CM

## 2025-02-20 PROCEDURE — 93296 REM INTERROG EVL PM/IDS: CPT | Performed by: INTERNAL MEDICINE

## 2025-02-20 PROCEDURE — 93295 DEV INTERROG REMOTE 1/2/MLT: CPT | Mod: ,,, | Performed by: INTERNAL MEDICINE

## 2025-02-24 DIAGNOSIS — Z00.00 ENCOUNTER FOR MEDICARE ANNUAL WELLNESS EXAM: ICD-10-CM

## 2025-02-24 LAB
OHS CV DC REMOTE DEVICE TYPE: NORMAL
OHS CV RV PACING PERCENT: 1 %

## 2025-03-14 ENCOUNTER — PATIENT OUTREACH (OUTPATIENT)
Dept: ADMINISTRATIVE | Facility: HOSPITAL | Age: 65
End: 2025-03-14
Payer: MEDICARE

## 2025-03-14 NOTE — PROGRESS NOTES
03/14/2025  VB chart audit performed. Care Everywhere updates requested and reviewed  Overdue HM topic chart audit and/or requested. LINKS triggered and reconciled. Media reviewed Lvm/portal sent regarding overdue health topics

## 2025-03-19 ENCOUNTER — OFFICE VISIT (OUTPATIENT)
Dept: FAMILY MEDICINE | Facility: CLINIC | Age: 65
End: 2025-03-19
Payer: MEDICARE

## 2025-03-19 VITALS
DIASTOLIC BLOOD PRESSURE: 70 MMHG | HEIGHT: 60 IN | TEMPERATURE: 98 F | BODY MASS INDEX: 30.15 KG/M2 | HEART RATE: 90 BPM | WEIGHT: 153.56 LBS | OXYGEN SATURATION: 97 % | SYSTOLIC BLOOD PRESSURE: 118 MMHG

## 2025-03-19 DIAGNOSIS — E88.819 INSULIN RESISTANCE: ICD-10-CM

## 2025-03-19 DIAGNOSIS — Z12.11 SCREENING FOR COLON CANCER: ICD-10-CM

## 2025-03-19 DIAGNOSIS — I42.8 CARDIOMYOPATHY, NONISCHEMIC: ICD-10-CM

## 2025-03-19 DIAGNOSIS — I25.10 CORONARY ARTERY DISEASE INVOLVING NATIVE CORONARY ARTERY OF NATIVE HEART WITHOUT ANGINA PECTORIS: ICD-10-CM

## 2025-03-19 DIAGNOSIS — M85.88 OSTEOPENIA OF LUMBAR SPINE: ICD-10-CM

## 2025-03-19 DIAGNOSIS — J98.4 RESTRICTIVE LUNG DISEASE: Chronic | ICD-10-CM

## 2025-03-19 DIAGNOSIS — I50.22 CHF (CONGESTIVE HEART FAILURE), NYHA CLASS II, CHRONIC, SYSTOLIC: Chronic | ICD-10-CM

## 2025-03-19 DIAGNOSIS — Z95.810 ICD (IMPLANTABLE CARDIOVERTER-DEFIBRILLATOR) IN PLACE: ICD-10-CM

## 2025-03-19 DIAGNOSIS — I70.0 THORACIC AORTA ATHEROSCLEROSIS: ICD-10-CM

## 2025-03-19 DIAGNOSIS — R35.1 NOCTURIA: ICD-10-CM

## 2025-03-19 DIAGNOSIS — Z00.00 ENCOUNTER FOR MEDICARE ANNUAL WELLNESS EXAM: Primary | ICD-10-CM

## 2025-03-19 DIAGNOSIS — E78.2 MIXED HYPERLIPIDEMIA: ICD-10-CM

## 2025-03-19 DIAGNOSIS — R05.3 CHRONIC COUGH: ICD-10-CM

## 2025-03-19 DIAGNOSIS — J41.0 SIMPLE CHRONIC BRONCHITIS: ICD-10-CM

## 2025-03-19 DIAGNOSIS — M85.852 OSTEOPENIA OF NECK OF LEFT FEMUR: ICD-10-CM

## 2025-03-19 PROBLEM — J39.2 OROPHARYNGEAL LESION: Status: RESOLVED | Noted: 2019-02-18 | Resolved: 2025-03-19

## 2025-03-19 PROCEDURE — 99999 PR PBB SHADOW E&M-EST. PATIENT-LVL V: CPT | Mod: PBBFAC,,,

## 2025-03-19 NOTE — PATIENT INSTRUCTIONS
Osteopenia treatment: daily Calcium/Vitamin D supplement and magnesium glycinate titrate to 500mg at bedtime and increase weight bearing exercise including walking and strength training as tolerated.     [facility]: [phone]      Counseling and Referral of Other Preventative  (Italic type indicates deductible and co-insurance are waived)    Patient Name: Suly Jaime  Today's Date: 3/19/2025    Health Maintenance         Date Due Completion Date    Aspirin/Antiplatelet Therapy Never done ---    Colorectal Cancer Screening Never done ---    RSV Vaccine (Age 60+ and Pregnant patients) (1 - Risk 60-74 years 1-dose series) Never done ---    Mammogram 09/05/2025 9/5/2024    Hemoglobin A1c (Diabetic Prevention Screening) 01/28/2028 1/28/2025    DEXA Scan 02/03/2028 2/3/2025    TETANUS VACCINE 07/25/2029 7/25/2019    Lipid Panel 01/28/2030 1/28/2025          Orders Placed This Encounter   Procedures    Cologuard Screening (Multitarget Stool DNA)    Ambulatory referral/consult to ENT    Ambulatory referral/consult to Allergy    Ambulatory referral/consult to Urology     The following information is provided to all patients.  This information is to help you find resources for any of the problems found today that may be affecting your health:                  Living healthy guide: www.[facility].[place].gov      Understanding Diabetes: www.diabetes.org      Eating healthy: www.cdc.gov/healthyweight      [facility] home safety checklist: www.cdc.gov/steadi/patient.html      Agency on Aging: www.goea.[place].[facility]      Alcoholics anonymous (AA): www.aa.org      Physical Activity: www.nallely.nih.gov/ij7weff      Tobacco use: www.quitwithusla.org

## 2025-03-19 NOTE — PROGRESS NOTES
Suly Jaiem presented for a  Medicare AWV and comprehensive Health Risk Assessment today. The following components were reviewed and updated:    Medical history  Family History  Social history  Allergies and Current Medications  Health Risk Assessment  Health Maintenance  Care Team         ** See Completed Assessments for Annual Wellness Visit within the encounter summary.**         The following assessments were completed:  Living Situation  CAGE  Depression Screening  Timed Get Up and Go  Whisper Test  Cognitive Function Screening    Nutrition Screening  ADL Screening  PAQ Screening      Opioid documentation for eAWV      Patient does not have a current opioid prescription.        Review for Substance Use Disorders: Patient does not use substance      Current Medications[1]       Vitals:    03/19/25 1258   BP: 118/70   Pulse: 90   Temp: 97.9 °F (36.6 °C)   TempSrc: Oral   SpO2: 97%   Weight: 69.7 kg (153 lb 8.8 oz)   Height: 5' (1.524 m)   PainSc: 0-No pain      Physical Exam  Vitals reviewed.   Constitutional:       General: She is awake. She is not in acute distress.     Appearance: She is not ill-appearing.   HENT:      Nose: Nose normal.      Mouth/Throat:      Mouth: Mucous membranes are moist.   Eyes:      General:         Right eye: No discharge.         Left eye: No discharge.   Cardiovascular:      Rate and Rhythm: Normal rate and regular rhythm.   Pulmonary:      Effort: Pulmonary effort is normal. No respiratory distress.      Breath sounds: Normal breath sounds.   Abdominal:      General: Abdomen is flat. There is no distension.   Musculoskeletal:         General: Normal range of motion.      Right lower leg: No edema.      Left lower leg: No edema.   Skin:     General: Skin is warm and dry.      Findings: No bruising or rash.   Neurological:      Mental Status: She is alert and oriented to person, place, and time.      Gait: Gait normal.   Psychiatric:         Mood and Affect: Mood normal.          Behavior: Behavior normal. Behavior is cooperative.         Cognition and Memory: Cognition and memory normal.               Diagnoses and health risks identified today and associated recommendations/orders:    1. Encounter for Medicare annual wellness exam  - Referral to Enhanced Annual Wellness Visit (eAWV) W+1  -Advised of overdue vaccines RSV, encouraged to get at local pharmacy as part of ins benefit, verbalizes understanding.    -Mammogram (7/2024): negative.    2. Cardiomyopathy, nonischemic  3. CHF (congestive heart failure), NYHA class II, chronic, systolic  4. ICD (implanted cardioverter-defibrillator) in place  -Chronic. Stable. EF 35% (2022). On losartan, metoprolol succinate. Followed by Cardiology and EP.     5. Thoracic aorta atherosclerosis  6. Mixed hyperlipidemia  7. Coronary artery disease involving native coronary artery of native heart without angina pectoris  -Chronic. Stable. Athero noted on CXR 2022. On rosuvastatin. Followed by PCP/Cardiology.     8. Insulin resistance  -Chronic. Stable. A1C 5.7 (1/2025). On metformin. Followed by PCP.    9. Osteopenia of neck of left femur  10. Osteopenia of lumbar spine  -Chronic. Stable. Noted on DEXA (2/2025). Not currently taking calcium or vit D. Reviewed PCP recommendations from DEXA scan results regarding calcium/vit D and placed in AVS. Followed by PCP.     11. Simple chronic bronchitis  12. Restrictive lung disease  13. Chronic cough  - Ambulatory referral/consult to ENT; Future  - Ambulatory referral/consult to Allergy; Future  -Chronic. Stable. On flovent inhaler. Followed by PCP.   -Last seen by ENT 9/2020.   -Last seen by Allergy 11/2021.   -Previously seen by Pulmonology 9/2022, recommended to follow up with ENT and Allergy but never did.     14. Nocturia  - Ambulatory referral/consult to Urology; Future  -Chronic. Reports nocturia for over the last 5 years. States she gets up on average 5 times per night. Denies other urinary symptoms.    -Denies ever mentioning to PCP.     15. Screening for colon cancer  - Cologuard Screening (Multitarget Stool DNA); Future  - Cologuard Screening (Multitarget Stool DNA)  -Discussed colon cancer screening options. Declined colonoscopy. Agreeable to cologuard.     Provided Suly with a 5-10 year written screening schedule and personal prevention plan. Recommendations were developed using the USPSTF age appropriate recommendations. Education, counseling, and referrals were provided as needed. After Visit Summary printed and given to patient which includes a list of additional screenings\tests needed.    Follow up in about 1 year (around 3/19/2026) for AWV.    Advance Care Planning     I offered to discuss advanced care planning, including how to pick a person who would make decisions for you if you were unable to make them for yourself, called a health care power of , and what kind of decisions you might make such as use of life sustaining treatments such as ventilators and tube feeding when faced with a life limiting illness recorded on a living will that they will need to know. (How you want to be cared for as you near the end of your natural life)     X  Patient has advanced directives written and agrees to provide copies to the institution.    Mae Weston, LEESAP-C  Family Medicine  [facility]        [1]   Current Outpatient Medications:     azelastine (ASTELIN) 137 mcg (0.1 %) nasal spray, 1 spray (137 mcg total) by Nasal route nightly as needed for Rhinitis., Disp: 30 mL, Rfl: 2    calcium carbonate (TUMS) 200 mg calcium (500 mg) chewable tablet, Take 1 tablet by mouth daily as needed for Heartburn., Disp: , Rfl:     fluticasone propionate (FLONASE) 50 mcg/actuation nasal spray, 1 spray by Each Nostril route daily as needed for Rhinitis or Allergies., Disp: , Rfl:     fluticasone propionate (FLOVENT HFA) 110 mcg/actuation inhaler, Inhale 2 puffs into the lungs 2 (two) times daily.  Controller, Disp: 12 g, Rfl: 3    gabapentin (NEURONTIN) 300 MG capsule, TAKE 1 CAPSULE(300 MG) BY MOUTH THREE TIMES DAILY, Disp: 90 capsule, Rfl: 3    losartan (COZAAR) 25 MG tablet, Take 1 tablet (25 mg total) by mouth once daily., Disp: 90 tablet, Rfl: 3    meloxicam (MOBIC) 15 MG tablet, Take 1 tablet (15 mg total) by mouth once daily., Disp: 90 tablet, Rfl: 3    metFORMIN (GLUCOPHAGE-XR) 500 MG ER 24hr tablet, Take 1 tablet (500 mg total) by mouth daily with breakfast., Disp: 90 tablet, Rfl: 3    metoprolol succinate (TOPROL-XL) 100 MG 24 hr tablet, Take 1 tablet (100 mg total) by mouth every evening., Disp: 90 tablet, Rfl: 3    mv-mn/folic acid/vit [id number] (ALIVE ONCE DAILY WOMEN 50 PLUS ORAL), Take 1 tablet by mouth once daily., Disp: , Rfl:     rosuvastatin (CRESTOR) 20 MG tablet, Take 1 tablet (20 mg total) by mouth every evening., Disp: 90 tablet, Rfl: 3

## 2025-04-09 ENCOUNTER — OFFICE VISIT (OUTPATIENT)
Dept: OTOLARYNGOLOGY | Facility: CLINIC | Age: 65
End: 2025-04-09
Payer: MEDICARE

## 2025-04-09 ENCOUNTER — TELEPHONE (OUTPATIENT)
Dept: PULMONOLOGY | Facility: CLINIC | Age: 65
End: 2025-04-09
Payer: MEDICARE

## 2025-04-09 VITALS
SYSTOLIC BLOOD PRESSURE: 140 MMHG | DIASTOLIC BLOOD PRESSURE: 96 MMHG | BODY MASS INDEX: 29.86 KG/M2 | HEART RATE: 103 BPM | HEIGHT: 60 IN | WEIGHT: 152.13 LBS

## 2025-04-09 DIAGNOSIS — J30.9 CHRONIC ALLERGIC RHINITIS: Chronic | ICD-10-CM

## 2025-04-09 DIAGNOSIS — R05.3 CHRONIC COUGH: Primary | Chronic | ICD-10-CM

## 2025-04-09 DIAGNOSIS — K21.9 LARYNGOPHARYNGEAL REFLUX (LPR): Chronic | ICD-10-CM

## 2025-04-09 DIAGNOSIS — R09.A2 GLOBUS SENSATION: ICD-10-CM

## 2025-04-09 PROCEDURE — 31575 DIAGNOSTIC LARYNGOSCOPY: CPT | Mod: S$GLB,,, | Performed by: OTOLARYNGOLOGY

## 2025-04-09 PROCEDURE — 3077F SYST BP >= 140 MM HG: CPT | Mod: CPTII,S$GLB,, | Performed by: OTOLARYNGOLOGY

## 2025-04-09 PROCEDURE — 99999 PR PBB SHADOW E&M-EST. PATIENT-LVL IV: CPT | Mod: PBBFAC,,, | Performed by: OTOLARYNGOLOGY

## 2025-04-09 PROCEDURE — 3044F HG A1C LEVEL LT 7.0%: CPT | Mod: CPTII,S$GLB,, | Performed by: OTOLARYNGOLOGY

## 2025-04-09 PROCEDURE — 1159F MED LIST DOCD IN RCRD: CPT | Mod: CPTII,S$GLB,, | Performed by: OTOLARYNGOLOGY

## 2025-04-09 PROCEDURE — 3288F FALL RISK ASSESSMENT DOCD: CPT | Mod: CPTII,S$GLB,, | Performed by: OTOLARYNGOLOGY

## 2025-04-09 PROCEDURE — 1101F PT FALLS ASSESS-DOCD LE1/YR: CPT | Mod: CPTII,S$GLB,, | Performed by: OTOLARYNGOLOGY

## 2025-04-09 PROCEDURE — 3008F BODY MASS INDEX DOCD: CPT | Mod: CPTII,S$GLB,, | Performed by: OTOLARYNGOLOGY

## 2025-04-09 PROCEDURE — 3080F DIAST BP >= 90 MM HG: CPT | Mod: CPTII,S$GLB,, | Performed by: OTOLARYNGOLOGY

## 2025-04-09 PROCEDURE — 99204 OFFICE O/P NEW MOD 45 MIN: CPT | Mod: 25,S$GLB,, | Performed by: OTOLARYNGOLOGY

## 2025-04-09 RX ORDER — IPRATROPIUM BROMIDE 21 UG/1
2 SPRAY, METERED NASAL 2 TIMES DAILY
Qty: 30 ML | Refills: 3 | Status: SHIPPED | OUTPATIENT
Start: 2025-04-09

## 2025-04-09 RX ORDER — OMEPRAZOLE 40 MG/1
40 CAPSULE, DELAYED RELEASE ORAL
Qty: 60 CAPSULE | Refills: 2 | Status: SHIPPED | OUTPATIENT
Start: 2025-04-09 | End: 2026-04-09

## 2025-04-09 RX ORDER — MONTELUKAST SODIUM 10 MG/1
10 TABLET ORAL NIGHTLY
Qty: 30 TABLET | Refills: 0 | Status: SHIPPED | OUTPATIENT
Start: 2025-04-09 | End: 2025-05-09

## 2025-04-09 NOTE — PROCEDURES
Laryngoscopy    Date/Time: 4/9/2025 9:20 AM    Performed by: Deidre Gaines MD  Authorized by: Deidre Gaines MD    Consent Done?:  Yes (Verbal)  Anesthesia:     Local anesthetic:  Lidocaine 2% and Jasper-Synephrine 1/2%  Laryngoscopy:     Areas examined:  Nasal cavities, nasopharynx, oropharynx, hypopharynx, larynx and vocal cords  Nose External:      No external nasal deformity  Nose Intranasal:      Mucosa no polyps     Mucosa ulcers not present     No mucosa lesions present     No septum gross deformity     Turbinates not enlarged  Nasopharynx:      No mucosa lesions     Adenoids not present     Posterior choanae patent     Eustachian tube patent  Larynx/hypopharynx:      No epiglottis lesions     No epiglottis edema     No AE folds lesions     No vocal cord polyps     Equal and normal bilateral     No hypopharynx lesions     No piriform sinus pooling     No piriform sinus lesions     Post cricoid edema (mild)     Post cricoid erythema (mild)

## 2025-04-09 NOTE — TELEPHONE ENCOUNTER
----- Message from Med Assistant Mulligan sent at 4/9/2025 10:43 AM CDT -----  Regarding: Referral  Good morning Dr Kendrick recommend that pt be seen for a visit with Dr Jaimes ,please contact pt to schedule an appointment.Thank Kathryn

## 2025-04-09 NOTE — TELEPHONE ENCOUNTER
Attempted to contact patient in regards to scheduling a follow up visit. No answer. LVM for patient to contact the office for scheduling.

## 2025-04-09 NOTE — PROGRESS NOTES
Chief Complaint   Patient presents with    Consult     Pt stated that she has a constant cough, nasal drippings   .     HPI:Suly Jaime is a 65 y.o. female who has been referred by Mae Weston NP for a several  history of chronic cough. She says it has been present for 20+ years. She notes it is mostly dry and occasionally productive. She has feeling of globus sensation and post-nasal drip. She denies hoarseness.   Her voice is not progressively worsening over this time. There are pitch breaks or cracks.  She denies dysphagia, odynophagia, throat pain, and otalgia.  There is no hemoptysis or hematemesis. She is breathing well.   She has been on flonase/flovent in the past.      She admits to heartburn and reflux. She feels has frequent episodes of regurgitation/belching.      She has seen pulmonology in the past- last in 2022 and allergist last in 2021. She has CHF and was diagnosed in 2021. She is on losartan.       Past Medical History:   Diagnosis Date    Allergy     Cardiomyopathy     Hypertension     ICD (implantable cardioverter-defibrillator) in place 01/30/2023    Oropharyngeal lesion 02/18/2019    Hx laryngeal papilloma   Annual ENT f/u advised      Piriformis syndrome of left side 02/22/2024       Pain to piriformis muscle x 3 weeks -started after Right side piriformis improving  Worse with prolonged sitting, sitting on toilet; radiation thigh/calf/foot  Gabapentin PM, meloxicam AM  Doing PT for Right side -send message PT to include left side  - add on zanaflex - never started previously  - recommend hard ball pressure to piriformis region - lay on floor  - referred to sports med for po    Piriformis syndrome of right side 01/24/2024    Pain near piriformis  Worse after sitting for extended time  NSAIDs and gabapentin helps  No radiation, tingling numbness  Doing PT - pain improving  Continue PT  - start zanaflex, continue gabapentin + NSAIDS       Social History[1]  Past Surgical History:    Procedure Laterality Date    CARDIAC DEFIBRILLATOR PLACEMENT Left     HYSTERECTOMY  2003    full    KNEE ARTHROSCOPY      LARYNGOSCOPY N/A 02/18/2019    Procedure: DIRECT MICRO LARYNGOSCOPY WITH BIOPSY;  Surgeon: Deidre Calderon MD;  Location: [facility] OR;  Service: ENT;  Laterality: N/A;  video    OOPHORECTOMY      RELEASE OF CONTRACTURE  10/4/2022    Procedure: RELEASE, CONTRACTURE;  Surgeon: Clyde Banuelos Jr., MD;  Location: [facility] OR;  Service: Orthopedics;;    RELEASE OF CONTRACTURE OF JOINT Left 10/4/2022    Procedure: RELEASE, CONTRACTURE, JOINT;  Surgeon: Clyde Banuelos Jr., MD;  Location: [facility] OR;  Service: Orthopedics;  Laterality: Left;  need k wires and mini c arm    REVISION OF IMPLANTABLE CARDIOVERTER-DEFIBRILLATOR (ICD) ELECTRODE LEAD PLACEMENT N/A 6/6/2022    Procedure: REVISION, INSERTION, ELECTRODE LEAD, ICD;  Surgeon: Cruzito Tovar MD;  Location: [facility] EP LAB;  Service: Cardiology;  Laterality: N/A;  SINGLE LEAD REVISION, SJM, ANES, EH, 325    SOFT TISSUE BIOPSY  10/4/2022    Procedure: BIOPSY, SOFT TISSUE;  Surgeon: Clyde Banuelos Jr., MD;  Location: [facility] OR;  Service: Orthopedics;;     Family History   Problem Relation Name Age of Onset    Diabetes Mother Yessy     Dementia Mother Yessy     Diabetes Father Waqar     Heart disease Sister Ni     Other (back problems) Sister Deirdre     Diabetes Sister Ansley     Other (Passed away with PNA) Brother Sixto     Diabetes Brother Waqar Jr     Heart disease Daughter Melody     No Known Problems Daughter Summer     Diabetes Maternal Grandmother      Heart disease Maternal Grandmother      Asthma Neg Hx      Allergic rhinitis Neg Hx      Angioedema Neg Hx      Atopy Neg Hx      Eczema Neg Hx      Immunodeficiency Neg Hx      Rhinitis Neg Hx      Urticaria Neg Hx             Review of Systems  General: negative for chills, fever or weight loss  Psychological: negative for mood changes or depression  Ophthalmic: negative for blurry  vision, photophobia or eye pain  ENT: see HPI  Respiratory: no cough, shortness of breath, or wheezing  Cardiovascular: no chest pain or dyspnea on exertion  Gastrointestinal: no abdominal pain, change in bowel habits, or black/ bloody stools  Musculoskeletal: negative for gait disturbance or muscular weakness  Neurological: no syncope or seizures; no ataxia  Dermatological: negative for puritis,  rash and jaundice  Hematologic/lymphatic: no easy bruising, no new lumps or bumps      Physical Exam:    Vitals:    04/09/25 0925   BP: (!) 140/96   Pulse: 103       Constitutional: Well appearing / communicating without difficutly.  NAD.  Eyes: EOM I Bilaterally  Head/Face: Normocephalic.  Negative paranasal sinus pressure/tenderness.  Salivary glands WNL.  House Brackmann I Bilaterally.    Right Ear: Auricle normal appearance. External Auditory Canal within normal limits no lesions or masses,TM w/o masses/lesions/perforations. TM mobility noted.   Left Ear: Auricle normal appearance. External Auditory Canal within normal limits no lesions or masses,TM w/o masses/lesions/perforations. TM mobility noted.  Nose: No gross nasal septal deviation. Inferior Turbinates 3+ bilaterally. No septal perforation. No masses/lesions. External nasal skin appears normal without masses/lesions.  Oral Cavity: Gingiva/lips within normal limits.  Dentition/gingiva healthy appearing. Mucus membranes moist. Floor of mouth soft, no masses palpated. Oral Tongue mobile. Hard Palate appears normal.    Oropharynx: Base of tongue appears normal. No masses/lesions noted. Tonsillar fossa/pharyngeal wall without lesions. Posterior oropharynx WNL.  Soft palate without masses. Midline uvula.   Neck/Lymphatic: No LAD I-VI bilaterally.  No thyromegaly.  No masses noted on exam.        See separate procedure note for FFL.    Diagnostic studies reviewed:    Latest Reference Range & Units 09/01/20 12:42   A. alternata IgE <0.10 kU/L <0.10   Altern. alternata  Class  CLASS 0   Aspergillus Fumigatus IgE <0.10 kU/L <0.10   A. fumigatus Class  CLASS 0   Bald Sunland Class  CLASS 0   Bermuda Grass IgE <0.10 kU/L <0.10   Bermuda Grass Class  CLASS 0   Cat Dander IgE <0.10 kU/L 0.24 (H)   Cat Epithelium Class  CLASS 0/1   Butternut IgE <0.10 kU/L <0.10   Butternut Class  CLASS 0   Cockroach, IgE -   <0.10 kU/L CLASS 0  <0.10   Sunland <0.10 kU/L <0.10   D. farinae <0.10 kU/L 4.01 (H)   D. farinae Class  CLASS 3   D. pteronyssinus Dust Mite IgE <0.10 kU/L 4.26 (H)   D. pteronyssinus Class  CLASS 3   Dog Dander IgE <0.10 kU/L <0.10   Dog Dander Class  CLASS 0   Elm Butternut, IgE <0.10 kU/L <0.10   Elm Butternut Class  CLASS 0   Yung Grass IgE <0.10 kU/L <0.10   Yung Grass Class  CLASS 0   Meadow Grass ([place] Blue), IgE <0.10 kU/L <0.10   Meadow Grass ([facility] Blue) Class  CLASS 0   Mucor racemosus, IgE <0.10 kU/L <0.10   Mucor racemosus Class  CLASS 0   [place]enton, Class  CLASS 0   Pecan [place] Tree IgE <0.10 kU/L <0.10   Pecan, Class  CLASS 0   Ragweed, Short, Class  CLASS 0   Ragweed, Short, IgE <0.10 kU/L <0.10   [place]ton Grass IgE <0.10 kU/L <0.10   [place] Grass Class  CLASS 0   Horatio Tree IgE <0.10 kU/L <0.10   (H): Data is abnormally high    Assessment:    ICD-10-CM ICD-9-CM    1. Chronic cough  R05.3 786.2 Ambulatory referral/consult to ENT      2. Chronic allergic rhinitis  J30.9 477.9       3. Laryngopharyngeal reflux (LPR)  K21.9 478.79         The primary encounter diagnosis was Chronic cough. Diagnoses of Chronic allergic rhinitis and Laryngopharyngeal reflux (LPR) were also pertinent to this visit.      Plan:  No orders of the defined types were placed in this encounter.    Start budesonide nasal saline rinses  Start ipratropium 2 sprays per nostril BID  Start montelukast 10 mg PO daily      FFL with mild findings suggestive of LPR. Pt reports regurgitation/reflux.  I recommended that the patient start taking Omeprazole 40mg twice daily and provided a prescription. I also  recommended that the patient refrain from eating within 3 hours of going to bed, to elevate the head of bed very subtly and optimize the impact of gravity on the potential reflux, and to avoid alcohol, caffeine, tobacco, tomato sauce, spicy foods, fried food, and chocolate. Referral to GI for further work up.     Recommend pulmonology follow up as well. Last visit in  where there was discussion of nucala versus neuromodulators.     Referral to speech therapy for focus on cough suppressive techniques/behavioral modifications.      Follow up in 2-3 months to reassess progress with treatment regimen.     Deidre Calderon MD                      [1]   Social History  Socioeconomic History    Marital status:    Tobacco Use    Smoking status: Former     Current packs/day: 0.00     Average packs/day: 0.3 packs/day for 40.0 years (12.0 ttl pk-yrs)     Types: Cigarettes     Start date:      Quit date:      Years since quittin.2    Smokeless tobacco: Never    Tobacco comments:     off and on since 20s ,   Substance and Sexual Activity    Alcohol use: No    Drug use: No    Sexual activity: Not Currently     Partners: Male     Social Drivers of Health     Financial Resource Strain: Low Risk  (3/19/2025)    Overall Financial Resource Strain (CARDIA)     Difficulty of Paying Living Expenses: Not very hard   Food Insecurity: No Food Insecurity (3/19/2025)    Hunger Vital Sign     Worried About Running Out of Food in the Last Year: Never true     Ran Out of Food in the Last Year: Never true   Transportation Needs: No Transportation Needs (3/19/2025)    PRAPARE - Transportation     Lack of Transportation (Medical): No     Lack of Transportation (Non-Medical): No   Physical Activity: Inactive (3/19/2025)    Exercise Vital Sign     Days of Exercise per Week: 0 days     Minutes of Exercise per Session: 0 min   Stress: Stress Concern Present (3/19/2025)    Citizen of [place] of Occupational Health -  Occupational Stress Questionnaire     Feeling of Stress : To some extent   Housing Stability: Low Risk  (3/19/2025)    Housing Stability Vital Sign     Unable to Pay for Housing in the Last Year: No     Homeless in the Last Year: No

## 2025-04-23 ENCOUNTER — OFFICE VISIT (OUTPATIENT)
Dept: UROLOGY | Facility: CLINIC | Age: 65
End: 2025-04-23
Payer: MEDICARE

## 2025-04-23 ENCOUNTER — OFFICE VISIT (OUTPATIENT)
Dept: ALLERGY | Facility: CLINIC | Age: 65
End: 2025-04-23
Payer: MEDICARE

## 2025-04-23 VITALS
BODY MASS INDEX: 29.78 KG/M2 | WEIGHT: 151.69 LBS | SYSTOLIC BLOOD PRESSURE: 134 MMHG | HEIGHT: 60 IN | HEART RATE: 102 BPM | DIASTOLIC BLOOD PRESSURE: 90 MMHG | OXYGEN SATURATION: 97 %

## 2025-04-23 VITALS
HEART RATE: 101 BPM | BODY MASS INDEX: 29.41 KG/M2 | WEIGHT: 150.56 LBS | DIASTOLIC BLOOD PRESSURE: 88 MMHG | SYSTOLIC BLOOD PRESSURE: 136 MMHG

## 2025-04-23 DIAGNOSIS — R35.1 NOCTURIA: ICD-10-CM

## 2025-04-23 DIAGNOSIS — R05.3 CHRONIC COUGH: Primary | ICD-10-CM

## 2025-04-23 DIAGNOSIS — J30.9 ALLERGIC RHINITIS, UNSPECIFIED SEASONALITY, UNSPECIFIED TRIGGER: ICD-10-CM

## 2025-04-23 PROCEDURE — 3075F SYST BP GE 130 - 139MM HG: CPT | Mod: CPTII,S$GLB,, | Performed by: UROLOGY

## 2025-04-23 PROCEDURE — 3080F DIAST BP >= 90 MM HG: CPT | Mod: CPTII,S$GLB,, | Performed by: STUDENT IN AN ORGANIZED HEALTH CARE EDUCATION/TRAINING PROGRAM

## 2025-04-23 PROCEDURE — 3044F HG A1C LEVEL LT 7.0%: CPT | Mod: CPTII,S$GLB,, | Performed by: UROLOGY

## 2025-04-23 PROCEDURE — 99204 OFFICE O/P NEW MOD 45 MIN: CPT | Mod: S$GLB,,, | Performed by: STUDENT IN AN ORGANIZED HEALTH CARE EDUCATION/TRAINING PROGRAM

## 2025-04-23 PROCEDURE — 99204 OFFICE O/P NEW MOD 45 MIN: CPT | Mod: S$GLB,,, | Performed by: UROLOGY

## 2025-04-23 PROCEDURE — 3079F DIAST BP 80-89 MM HG: CPT | Mod: CPTII,S$GLB,, | Performed by: UROLOGY

## 2025-04-23 PROCEDURE — 99999 PR PBB SHADOW E&M-EST. PATIENT-LVL III: CPT | Mod: PBBFAC,,, | Performed by: UROLOGY

## 2025-04-23 PROCEDURE — 3288F FALL RISK ASSESSMENT DOCD: CPT | Mod: CPTII,S$GLB,, | Performed by: UROLOGY

## 2025-04-23 PROCEDURE — 3044F HG A1C LEVEL LT 7.0%: CPT | Mod: CPTII,S$GLB,, | Performed by: STUDENT IN AN ORGANIZED HEALTH CARE EDUCATION/TRAINING PROGRAM

## 2025-04-23 PROCEDURE — G2211 COMPLEX E/M VISIT ADD ON: HCPCS | Mod: S$GLB,,, | Performed by: UROLOGY

## 2025-04-23 PROCEDURE — 99999 PR PBB SHADOW E&M-EST. PATIENT-LVL IV: CPT | Mod: PBBFAC,,, | Performed by: STUDENT IN AN ORGANIZED HEALTH CARE EDUCATION/TRAINING PROGRAM

## 2025-04-23 PROCEDURE — 1159F MED LIST DOCD IN RCRD: CPT | Mod: CPTII,S$GLB,, | Performed by: UROLOGY

## 2025-04-23 PROCEDURE — 3075F SYST BP GE 130 - 139MM HG: CPT | Mod: CPTII,S$GLB,, | Performed by: STUDENT IN AN ORGANIZED HEALTH CARE EDUCATION/TRAINING PROGRAM

## 2025-04-23 PROCEDURE — 3008F BODY MASS INDEX DOCD: CPT | Mod: CPTII,S$GLB,, | Performed by: UROLOGY

## 2025-04-23 PROCEDURE — 1101F PT FALLS ASSESS-DOCD LE1/YR: CPT | Mod: CPTII,S$GLB,, | Performed by: UROLOGY

## 2025-04-23 PROCEDURE — 1159F MED LIST DOCD IN RCRD: CPT | Mod: CPTII,S$GLB,, | Performed by: STUDENT IN AN ORGANIZED HEALTH CARE EDUCATION/TRAINING PROGRAM

## 2025-04-23 PROCEDURE — 1160F RVW MEDS BY RX/DR IN RCRD: CPT | Mod: CPTII,S$GLB,, | Performed by: STUDENT IN AN ORGANIZED HEALTH CARE EDUCATION/TRAINING PROGRAM

## 2025-04-23 PROCEDURE — 3008F BODY MASS INDEX DOCD: CPT | Mod: CPTII,S$GLB,, | Performed by: STUDENT IN AN ORGANIZED HEALTH CARE EDUCATION/TRAINING PROGRAM

## 2025-04-23 RX ORDER — SOLIFENACIN SUCCINATE 10 MG/1
10 TABLET, FILM COATED ORAL DAILY
Qty: 30 TABLET | Refills: 11 | Status: SHIPPED | OUTPATIENT
Start: 2025-04-23 | End: 2026-04-23

## 2025-04-23 NOTE — PROGRESS NOTES
ALLERGY & IMMUNOLOGY CLINIC - INITIAL CONSULTATION      HISTORY OF PRESENT ILLNESS     Patient ID: Suly Jaime is a 65 y.o. female    CC: chronic cough     HPI: Suly Jaime is a 65 y.o. female with CAD, CHF, cardiomyopathy, ICD in place, osteopenia, and allergic rhinitis, presenting for chronic cough.   Patient was referred by Mae Weston NP.    She was last seen in [facility] by Dr. Valenzuela in 11/2021.    Per chart review, she recently saw ENT, who suspects LPR as a contributing factor. They recommended she start taking omeprazole BID, budesonide rinses, ipratropium nasal spray, and montelukast. They referred to GI and speech.      Per patient, cough started about 15 years ago. She has the cough every day. Night time is worse, especially when she lays down. Sometimes, eating, drinking, laughing can cause the cough. She says her ENT did scope, thought maybe acid reflux. She says it makes sense because when she eats, she can feel it.   She says she doesn't have the omeprazole (says it wasn't filled with the nasal sprays).  She can get shortness of breath from the cough.   The cough is dry. Sometimes it can be a wet cough, but more typically dry.   She thinks it stems from the throat.   She says sometimes if she just breathes a certain way, it triggers the cough.   Cough drops help temporarily.     She is on flovent 2 puffs BID. She says it helps, but only briefly.  She tried an albuterol inhaler years ago. She thinks it might have helped, but hard to remember.     She uses azelastine nasal spray nasal spray in the morning when her nose is running, and it helps with that. She also uses it when she feels post-nasal drip. It seems to help the post-nasal drip, but stopping the post-nasal drip doesn't seem to help the cough.   She isn't sure if she started the ipratropium nasal spray (she has gordon confusion between this and the ipratropium).   She isn't on flonase currently     She has gabapentin for  neuropathy. But she only takes it when the neuropathy starts up. She hasn't noticed whether it helps the cough. She says it did make her a little drowsy. The cough bothers at night.     She can't remember if antihistamines helped.   She is on montelukast, she says it kind of helps.      MEDICAL HISTORY     Vaccines:   Immunization History   Administered Date(s) Administered    COVID-19, MRNA, LN-S, PF (Pfizer) (Purple Cap) 03/03/2021, 03/24/2021    Influenza - Quadrivalent - PF *Preferred* (6 months and older) 12/12/2022, 11/07/2023    Influenza - Trivalent - Fluad - Adjuvanted - PF (65 years and older 01/31/2025    Pneumococcal Conjugate - 20 Valent 01/31/2025    Td - PF (ADULT) 07/25/2019    Zoster Recombinant 12/28/2023, 04/24/2024     Medical Hx:   Problem List[1]    Surgical Hx:   Past Surgical History:   Procedure Laterality Date    CARDIAC DEFIBRILLATOR PLACEMENT Left     HYSTERECTOMY  2003    full    KNEE ARTHROSCOPY      LARYNGOSCOPY N/A 02/18/2019    Procedure: DIRECT MICRO LARYNGOSCOPY WITH BIOPSY;  Surgeon: Deidre Calderon MD;  Location: [facility] OR;  Service: ENT;  Laterality: N/A;  video    OOPHORECTOMY      RELEASE OF CONTRACTURE  10/4/2022    Procedure: RELEASE, CONTRACTURE;  Surgeon: Clyde Banuelos Jr., MD;  Location: [facility] OR;  Service: Orthopedics;;    RELEASE OF CONTRACTURE OF JOINT Left 10/4/2022    Procedure: RELEASE, CONTRACTURE, JOINT;  Surgeon: Clyde Banuelos Jr., MD;  Location: [facility] OR;  Service: Orthopedics;  Laterality: Left;  need k wires and mini c arm    REVISION OF IMPLANTABLE CARDIOVERTER-DEFIBRILLATOR (ICD) ELECTRODE LEAD PLACEMENT N/A 6/6/2022    Procedure: REVISION, INSERTION, ELECTRODE LEAD, ICD;  Surgeon: Cruzito Tovar MD;  Location: [facility] EP LAB;  Service: Cardiology;  Laterality: N/A;  SINGLE LEAD REVISION, SJM, ANES, EH, 325    SOFT TISSUE BIOPSY  10/4/2022    Procedure: BIOPSY, SOFT TISSUE;  Surgeon: Clyde Banuelos Jr., MD;  Location: [facility] OR;  Service:  Orthopedics;;     Medications:   Medications Ordered Prior to Encounter[2]    H/o Asthma: denies  H/o Rhinitis: endorses    Drug Allergies: Review of patient's allergies indicates:  No Known Allergies    Env/Occ:   Pets: 2 dogs, don't seem to trigger symptoms     Social Hx:   Social History[3]    Family Hx:   Family History   Problem Relation Name Age of Onset    Diabetes Mother Yessy     Dementia Mother Yessy     Diabetes Father Waqar     Heart disease Sister Ni     Other (back problems) Sister Deirdre     Diabetes Sister Ansley     Other (Passed away with PNA) Brother Sixto     Diabetes Brother Waqar [place]     Heart disease Daughter Melody     No Known Problems Daughter Summer     Diabetes Maternal Grandmother      Heart disease Maternal Grandmother      Asthma Neg Hx      Allergic rhinitis Neg Hx      Angioedema Neg Hx      Atopy Neg Hx      Eczema Neg Hx      Immunodeficiency Neg Hx      Rhinitis Neg Hx      Urticaria Neg Hx        PHYSICAL EXAM     VS: Ht 5' (1.524 m)   Wt 68.8 kg (151 lb 10.8 oz)   BMI 29.62 kg/m²   GENERAL: Alert, NAD, well-appearing  EYES: EOMI, no conjunctival injection, no discharge, no infraorbital shiners  NOSE: NT 2+ B/L, no stringing mucus, no polyps visualized  ORAL: MMM, no ulcers, no thrush  LUNGS: CTAB, no w/r/c, no increased WOB, + cough at times during visit (dry cough with high pitched sound to it)  HEART: RRR, normal S1/S2, no m/g/r  DERM: no rashes  NEURO: normal speech, normal gait, no facial asymmetry     LABORATORY STUDIES     Component      Latest Ref Rng 11/15/2023 10/23/2024   WBC      3.90 - 12.70 K/uL 7.84     RBC      4.00 - 5.40 M/uL 4.40     Hemoglobin      12.0 - 16.0 g/dL 13.1     Hematocrit      37.0 - 48.5 % 41.9     MCV      82 - 98 fL 95     MCH      27.0 - 31.0 pg 29.8     MCHC      32.0 - 36.0 g/dL 31.3 (L)     RDW      11.5 - 14.5 % 14.5     Platelet Count      150 - 450 K/uL 235     MPV      9.2 - 12.9 fL 11.5     Immature Granulocytes      0.0 - 0.5 %  0.3     Gran # (ANC)      1.8 - 7.7 K/uL 4.8     Immature Grans (Abs)      0.00 - 0.04 K/uL 0.02     Lymph #      1.0 - 4.8 K/uL 2.1     Mono #      0.3 - 1.0 K/uL 0.6     Eos #      0.0 - 0.5 K/uL 0.2     Baso #      0.00 - 0.20 K/uL 0.04     Differential Method Automated     Sodium      136 - 145 mmol/L 141  141    Potassium      3.5 - 5.1 mmol/L 4.5  4.1    Chloride      95 - 110 mmol/L 109  104    CO2      23 - 29 mmol/L 26  27    Glucose      70 - 110 mg/dL 87  72    BUN      8 - 23 mg/dL 14  20    Creatinine      0.5 - 1.4 mg/dL 0.9  0.9    Calcium      8.7 - 10.5 mg/dL 9.5  10.1    PROTEIN TOTAL      6.0 - 8.4 g/dL 7.0  6.7    Albumin      3.5 - 5.2 g/dL 4.1  4.0    BILIRUBIN TOTAL      0.1 - 1.0 mg/dL 1.1 (H)  1.2 (H)    ALP      40 - 150 U/L 55  65    AST      10 - 40 U/L 22  24    ALT      10 - 44 U/L 19  19    eGFR      >60 mL/min/1.73 m^2 >60.0  >60.0    Anion Gap      8 - 16 mmol/L 6 (L)  10    Hemoglobin A1C External      4.0 - 5.6 %  5.2    Estimated Avg Glucose      68 - 131 mg/dL  103    TSH      0.400 - 4.000 uIU/mL  1.326        ALLERGEN TESTING     Immunocaps:   Component      Latest Ref Rng 9/1/2020   Aspergillus Fumigatus IgE      <0.10 kU/L <0.10    A. fumigatus Class CLASS 0    [place] Grass IgE      <0.10 kU/L <0.10    [place] Grass Class CLASS 0    Cat Dander IgE      <0.10 kU/L 0.24 (H)    Cat Epithelium Class CLASS 0/1    Cockroach, IgE      <0.10 kU/L <0.10    Cockroach, IgE       CLASS 0    Glasco      <0.10 kU/L <0.10    Bald Glasco Class CLASS 0    D. farinae      <0.10 kU/L 4.01 (H)    D. farinae Class CLASS 3    D. pteronyssinus Dust Mite IgE      <0.10 kU/L 4.26 (H)    D. pteronyssinus Class CLASS 3    Dog Dander IgE      <0.10 kU/L <0.10    Dog Dander Class CLASS 0    Yung Grass IgE      <0.10 kU/L <0.10    Yung Grass Class CLASS 0    [place] Tree IgE      <0.10 kU/L <0.10    [place], Class CLASS 0    Ragweed, Short, IgE      <0.10 kU/L <0.10    Ragweed, Short, Class CLASS 0     [place] Grass IgE      <0.10 kU/L <0.10    [place] Grass Class CLASS 0    [place]m [place], IgE      <0.10 kU/L <0.10    Elm [place] Class CLASS 0    Meadow Grass ([place] Blue), IgE      <0.10 kU/L <0.10    Meadow Grass ([place] Blue) Class CLASS 0    Mucor racemosus, IgE      <0.10 kU/L <0.10    Mucor racemosus Class CLASS 0    Pecan [place] Tree IgE      <0.10 kU/L <0.10    Pecan, Class CLASS 0    A. alternata IgE      <0.10 kU/L <0.10    Altern. alternata Class CLASS 0    Craig IgE      <0.10 kU/L <0.10    Craig Class CLASS 0        PULMONARY FUNCTION TESTING     Date 5/29/20:  FVC:         79%ref -> + 2%  FEV1:         80%ref -> + 5%  FEV1/FVC: 100%  FEF 25-75: 85%ref  DLCO:        78%ref  Interpretation: Spirometry is normal. Spirometry remains unimproved following bronchodilator. Lung volumes show mild restriction. DLCO is normal.     IMAGING & OTHER DIAGNOSTICS     CXR 8/10/22:  CLINICAL HISTORY:  Cough, unspecified  FINDINGS:  Chest two views: There is a pacer.  Heart size is normal.  There is aortic plaque.  The lungs are clear.  There is DJD.  Impression:  Impression: No acute process seen.    CT chest 6/2/20:  Impression:   1. No significant intrathoracic CT abnormalities to account for the reported history of cough.  2. Bilateral solid pulmonary nodules measuring up to 0.5 cm.  For multiple solid nodules all <6 mm, Fleischner Society 2017 guidelines recommend no routine follow up for a low risk patient, or follow up with non-contrast chest CT at 12 months after discovery in a high risk patient.  3. CT findings consistent with hepatic steatosis.  Recommend correlation with liver function test.  4. Probable left renal cortical cyst, incompletely evaluated on this exam.     CHART REVIEW     Reviewed fam med note, ENT note, prior allergy note, pulm note, labs, imaging, PFT's.     ASSESSMENT & PLAN     Suly Jaime is a 65 y.o. female with     # Chronic cough: Started around 2010. Daily symptoms. Low suspicion  that allergy is main reason for the cough, as cough does not improve when her rhinitis (including post-nasal drip) is under control. Asthma is on differential (as she reports flovent helps temporarily), but prior PFT's were without obstruction. LPR is on differential, and she has an appointment coming up with GI. Neurogenic cough is also on differential (she takes gabapentin prn for neuropathy, but hasn't paid attention to whether it helps with the cough).   -advised patient that ENT recently prescribed omeprazole to take BID, recommend she ask her pharmacy to fill it if they haven't already.  -recommend she try taking her gabapentin more consistently to determine if it helps with the cough (would start with 300 mg nightly, and increase up to TID if tolerated). If it doesn't help cough, can discontinue.  -continue flovent. If above measures unhelpful, would consider stepping up to ICS-LABA.  -agree with GI and Speech referrals (recently placed by ENT).   -continue treatment for allergic rhinitis as below.     # Allergic rhinitis: Prior immunocaps positive to dust mite; equivocal to cat (which she doesn't have). Immunotherapy (including sublingual immunotherapy like odactra) would be a consideration; however, patient has cardiac disease and is on beta blocker (which is relative contraindication to immunotherapy). Even if she were to try immunotherapy, I would not have high hopes that it would help the cough (which is her main concern). She has 3 nasal sprays on her med list (flonase, azelastine, and ipratropium). But she thinks she is only using the azelastine (which she finds helpful for the rhinitis, not the cough). We went over the different nasal sprays today.  -recommend she continue azelastine nasal spray once to twice daily.  -recommend she start the ipratropium nasal spray (especially to help with rhinorrhea and post-nasal drip).   -recommend she consider restarting the flonase as well.   -continue  montelukast.      Follow up: 2 months    Marilou Bellamy MD  Allergy/Immunology         [1]   Patient Active Problem List  Diagnosis    Restrictive lung disease    Cardiomyopathy, nonischemic    CHF (congestive heart failure), NYHA class II, chronic, systolic    Mixed hyperlipidemia    Coronary artery disease involving native coronary artery of native heart without angina pectoris    Insulin resistance    Simple chronic bronchitis    Thoracic aorta atherosclerosis    Osteopenia of neck of left femur   [2]   Current Outpatient Medications on File Prior to Visit   Medication Sig Dispense Refill    azelastine (ASTELIN) 137 mcg (0.1 %) nasal spray 1 spray (137 mcg total) by Nasal route nightly as needed for Rhinitis. 30 mL 2    calcium carbonate (TUMS) 200 mg calcium (500 mg) chewable tablet Take 1 tablet by mouth daily as needed for Heartburn.      fluticasone propionate (FLONASE) 50 mcg/actuation nasal spray 1 spray by Each Nostril route daily as needed for Rhinitis or Allergies.      fluticasone propionate (FLOVENT HFA) 110 mcg/actuation inhaler Inhale 2 puffs into the lungs 2 (two) times daily. Controller 12 g 3    gabapentin (NEURONTIN) 300 MG capsule TAKE 1 CAPSULE(300 MG) BY MOUTH THREE TIMES DAILY 90 capsule 3    ipratropium (ATROVENT) 21 mcg (0.03 %) nasal spray 2 sprays by Each Nostril route 2 (two) times daily. 30 mL 3    meloxicam (MOBIC) 15 MG tablet Take 1 tablet (15 mg total) by mouth once daily. 90 tablet 3    metFORMIN (GLUCOPHAGE-XR) 500 MG ER 24hr tablet Take 1 tablet (500 mg total) by mouth daily with breakfast. 90 tablet 3    montelukast (SINGULAIR) 10 mg tablet Take 1 tablet (10 mg total) by mouth every evening. 30 tablet 0    mv-mn/folic acid/vit [id number] (ALIVE ONCE DAILY WOMEN 50 PLUS ORAL) Take 1 tablet by mouth once daily.      omeprazole (PRILOSEC) 40 MG capsule Take 1 capsule (40 mg total) by mouth 2 (two) times daily before meals. 60 capsule 2    rosuvastatin (CRESTOR) 20 MG tablet Take 1  tablet (20 mg total) by mouth every evening. 90 tablet 3    losartan (COZAAR) 25 MG tablet Take 1 tablet (25 mg total) by mouth once daily. 90 tablet 3    metoprolol succinate (TOPROL-XL) 100 MG 24 hr tablet Take 1 tablet (100 mg total) by mouth every evening. 90 tablet 3    [DISCONTINUED] beclomethasone (QVAR) 80 mcg/actuation Aero Inhale 2 puffs into the lungs 2 (two) times a day. Controller 1 g 11    [DISCONTINUED] levocetirizine (XYZAL) 5 MG tablet Take 1 tablet (5 mg total) by mouth every evening. 90 tablet 3     No current facility-administered medications on file prior to visit.   [3]   Social History  Tobacco Use    Smoking status: Former     Current packs/day: 0.00     Average packs/day: 0.3 packs/day for 40.0 years (12.0 ttl pk-yrs)     Types: Cigarettes     Start date:      Quit date: 2018     Years since quittin.3    Smokeless tobacco: Never    Tobacco comments:     off and on since 20s ,   Substance Use Topics    Alcohol use: No    Drug use: No

## 2025-04-23 NOTE — PROGRESS NOTES
[facility]      New Overactive Bladder (OAB) Note    4/23/2025    Referred by:  Mae Weston NP    The patient has not been previously seen by a specialist board-certified in [employer]RS in our system previously and is meeting with me today for specialty care.     CHIEF COMPLAINT:  Patient presents with urinary frequency, particularly bothersome at night, which has been ongoing for a few years.    HPI:  Patient, a 65-year-old woman, reports urinary frequency that has gradually worsened over the past couple of years. She gets up at least 5 times during the night to urinate, with episodes occurring approximately every 1.5 to 2 hours, voiding small amounts each time. The frequency is present during the day as well, though less bothersome when awake.    She has urgency, often feeling the need to rush to the bathroom. If she tries to delay urination by 5 minutes, she feels a strong, intense urge. Occasionally, she has urinary incontinence, though infrequently.    She denies drinking much caffeine and has not taken any overactive bladder medications or other treatments for this condition prior to this visit. The urinary frequency significantly impacts her sleep, which is her main concern.    She denies difficulty urinating, pushing, or straining. She denies frequent urinary tract infections, vaginal bulge, or needing to push anything back inside the vagina to urinate.    She denies a history of kidney stones, sleep apnea, or previous pelvic surgeries.    MEDICAL HISTORY:  Patient has a history of overactive bladder. This condition began a few years ago and has been gradually worsening over time.    TEST RESULTS:  Patient's urinalysis results show negative findings for estrogen and nitrate, as well as a negative proportion. A post-void residual test was performed, revealing a volume of 3 mL.          A review of 10+ systems was conducted with pertinent positive and negative findings documented in HPI  with all other systems reviewed and negative.    Past medical, family, surgical and social history reviewed as documented in chart with pertinent positive medical, family, surgical and social history detailed in HPI.    Previous OAB therapies:  Behavioral Therapies  : (fluid/dietary modification) -  provided no benefit  Medications  None  Other Therapies  No Other Therapies    Previous PAOLA therapies:  no previous therapy      Exam Findings:    Physical Exam    General: No acute distress. Nontoxic appearing.  HENT: Normocephalic. Atraumatic.  Respiratory: Normal respiratory effort. No conversational dyspnea. No audible wheezing.  Abdomen: No obvious distension.  Skin: No visible abnormalities.  Extremities: No edema upper extremities. No edema lower extremities.  Neurological: Alert and oriented x3. Normal speech.  Psychiatric: Normal mood. Normal affect. No evidence of SI.          Assessment/Plan:    Assessment & Plan    N32.81 Overactive bladder  N39.41 Urge incontinence  R35.1 Nocturia  R39.15 Urgency of urination    Diagnosed overactive bladder (OAB) based on reported symptoms of urinary frequency (>8 times in 24 hours, >5 times nightly) and urgency without significant incontinence.  OAB is likely a 24-hour problem, more bothersome at night.  Normal bladder emptying (post-void residual 3 mL), indicating issue is with bladder capacity rather than emptying.  Initiated first-line pharmacological treatment with an overactive bladder medication, to be taken at night before bed.  Will select medication based on insurance formulary coverage to ensure adherence.  Anticipate 4-6 weeks for initial response assessment, with full effect potentially taking 2-3 months.  Prepared to consider alternative medications or more invasive treatments if initial therapy is ineffective, including Botox injections and nerve stimulation techniques (sacral and tibial neuromodulation).    PLAN SUMMARY:  - Initiate first-line pharmacological  treatment for overactive bladder  - Medication to be taken nightly before bed  - Select medication based on insurance formulary coverage  - Follow-up in 4-6 weeks to assess medication effectiveness    N32.81 OVERACTIVE BLADDER:  - Explained that OAB symptoms tend to worsen with age.  - Explained treatment goal: increase bladder capacity and reduce urgency sensation.  - Initiated first-line pharmacological treatment with an overactive bladder medication, to be taken at night before bed.  - Will select medication based on insurance formulary coverage to ensure adherence.  - Follow up in 4-6 weeks to assess medication effectiveness.    N39.41 URGE INCONTINENCE:  - Explained treatment goal: increase bladder capacity and reduce urgency sensation.  - Initiated first-line pharmacological treatment with an overactive bladder medication, to be taken at night before bed.  - Will select medication based on insurance formulary coverage to ensure adherence.  - Follow up in 4-6 weeks to assess medication effectiveness.    R35.1 NOCTURIA:  - Initiated first-line pharmacological treatment with an overactive bladder medication, to be taken at night before bed.  - Will select medication based on insurance formulary coverage to ensure adherence.  - Follow up in 4-6 weeks to assess medication effectiveness.    R39.15 URGENCY OF URINATION:  - Explained treatment goal: increase bladder capacity and reduce urgency sensation.  - Initiated first-line pharmacological treatment with an overactive bladder medication, to be taken at night before bed.  - Will select medication based on insurance formulary coverage to ensure adherence.  - Follow up in 4-6 weeks to assess medication effectiveness.              Visit complexity today is associated with medical care services that are part of the ongoing care related to the single serious and/or complex condition of Overactive bladder (OAB). A longitudinal relationship exists or is being developed  between the patient and this practitioner for the care of this condition.

## 2025-04-25 ENCOUNTER — OFFICE VISIT (OUTPATIENT)
Dept: PULMONOLOGY | Facility: CLINIC | Age: 65
End: 2025-04-25
Payer: MEDICARE

## 2025-04-25 VITALS
WEIGHT: 155.19 LBS | HEART RATE: 93 BPM | BODY MASS INDEX: 30.31 KG/M2 | SYSTOLIC BLOOD PRESSURE: 133 MMHG | DIASTOLIC BLOOD PRESSURE: 84 MMHG | OXYGEN SATURATION: 97 %

## 2025-04-25 DIAGNOSIS — R05.3 CHRONIC COUGH: Chronic | ICD-10-CM

## 2025-04-25 DIAGNOSIS — J98.4 RESTRICTIVE LUNG DISEASE: Primary | Chronic | ICD-10-CM

## 2025-04-25 PROCEDURE — 99999 PR PBB SHADOW E&M-EST. PATIENT-LVL III: CPT | Mod: PBBFAC,,, | Performed by: INTERNAL MEDICINE

## 2025-04-25 RX ORDER — FAMOTIDINE 40 MG/1
40 TABLET, FILM COATED ORAL 2 TIMES DAILY
Qty: 60 TABLET | Refills: 11 | Status: SHIPPED | OUTPATIENT
Start: 2025-04-25 | End: 2026-04-25

## 2025-04-25 NOTE — ASSESSMENT & PLAN NOTE
Mild restriction on PFTs. With current coughing would not be able to repeat.  -CT Cardiac in 2022 without ILD. CT Chest in 2020 without ILD.

## 2025-04-25 NOTE — PROGRESS NOTES
Subjective:       Patient ID: Suly Jaime is a 65 y.o. female.  The patient's last visit with me was on Visit date not found.     Last seen in Pulm clinic Sept 2022.    Cough x 15 years, worse over last 3 years.    LPR evident on scope.    Recently saw ENT and allergy with medication adjustements.  Review of Systems    Objective:      Vitals:    04/25/25 0947   BP: 133/84   Pulse: 93     Wt Readings from Last 3 Encounters:   04/25/25 70.4 kg (155 lb 3.3 oz)   04/23/25 68.3 kg (150 lb 9.2 oz)   04/23/25 68.8 kg (151 lb 10.8 oz)     Temp Readings from Last 3 Encounters:   03/19/25 97.9 °F (36.6 °C) (Oral)   01/31/25 97.7 °F (36.5 °C) (Temporal)   07/31/24 97.9 °F (36.6 °C) (Temporal)     BP Readings from Last 3 Encounters:   04/25/25 133/84   04/23/25 136/88   04/23/25 (!) 134/90     Pulse Readings from Last 3 Encounters:   04/25/25 93   04/23/25 101   04/23/25 102       Physical Exam   Constitutional: She appears well-developed and well-nourished.   Pulmonary/Chest: Effort normal.   Frequent coughing with talking, breathing quickly   Vitals reviewed.    CBC  Lab Results   Component Value Date    WBC 9.74 11/10/2023    HGB 15.3 11/10/2023    HCT 44.4 11/10/2023    MCV 87 11/10/2023     11/10/2023         CMP  Sodium   Date Value Ref Range Status   01/28/2025 141 136 - 145 mmol/L Final     Potassium   Date Value Ref Range Status   01/28/2025 4.2 3.5 - 5.1 mmol/L Final     Chloride   Date Value Ref Range Status   01/28/2025 110 95 - 110 mmol/L Final     CO2   Date Value Ref Range Status   01/28/2025 23 23 - 29 mmol/L Final     Glucose   Date Value Ref Range Status   01/28/2025 110 70 - 110 mg/dL Final     BUN   Date Value Ref Range Status   01/28/2025 12 8 - 23 mg/dL Final     Creatinine   Date Value Ref Range Status   01/28/2025 0.8 0.5 - 1.4 mg/dL Final     Calcium   Date Value Ref Range Status   01/28/2025 9.0 8.7 - 10.5 mg/dL Final     Total Protein   Date Value Ref Range Status   01/28/2025 7.1 6.0 - 8.4  "g/dL Final     Albumin   Date Value Ref Range Status   01/28/2025 3.7 3.5 - 5.2 g/dL Final     Total Bilirubin   Date Value Ref Range Status   01/28/2025 0.3 0.1 - 1.0 mg/dL Final     Comment:     For infants and newborns, interpretation of results should be based  on gestational age, weight and in agreement with clinical  observations.    Premature Infant recommended reference ranges:  Up to 24 hours.............<8.0 mg/dL  Up to 48 hours............<12.0 mg/dL  3-5 days..................<15.0 mg/dL  6-29 days.................<15.0 mg/dL       Alkaline Phosphatase   Date Value Ref Range Status   01/28/2025 94 40 - 150 U/L Final     AST   Date Value Ref Range Status   01/28/2025 11 10 - 40 U/L Final     ALT   Date Value Ref Range Status   01/28/2025 19 10 - 44 U/L Final     Anion Gap   Date Value Ref Range Status   01/28/2025 8 8 - 16 mmol/L Final     eGFR   Date Value Ref Range Status   01/28/2025 >60.0 >60 mL/min/1.73 m^2 Final       ABG  No results found for: "PH", "PO2", "PCO2"        Personal Diagnostic Review  I have personally reviewed the following data and added my own interpretation as below:  CT 1/3/22 lung windows personally reviewed and shows no ILD  PFTs 2020- mild restriction  Allergy note reivwed  ENT note reviewed      4/25/2025     9:47 AM 4/23/2025    10:38 AM 4/23/2025     9:16 AM 4/9/2025     9:25 AM 3/19/2025    12:58 PM 1/31/2025    11:22 AM 1/31/2025     9:14 AM   Pulmonary Function Tests   SpO2 97 %  97 %  97 %  97 %   Height   5' (1.524 m) 5' (1.524 m) 5' (1.524 m) 5' (1.524 m) 5' (1.524 m)   Weight 70.4 kg (155 lb 3.3 oz) 68.3 kg (150 lb 9.2 oz) 68.8 kg (151 lb 10.8 oz) 69 kg (152 lb 1.9 oz) 69.7 kg (153 lb 8.8 oz) 69 kg (152 lb 1.9 oz) 67.4 kg (148 lb 7.7 oz)   BMI (Calculated)  29.4 29.6 29.7 30 29.7 29         Assessment:       1. Restrictive lung disease    2. Chronic cough        Encounter Medications[1]  1. Chronic cough  - Ambulatory referral/consult to Pulmonology    2. Restrictive " lung disease    Plan:     Problem List Items Addressed This Visit          Pulmonary    Restrictive lung disease - Primary (Chronic)    Overview   Singulair   flovent PRN  stable         Current Assessment & Plan   Mild restriction on PFTs. With current coughing would not be able to repeat.  -CT Cardiac in 2022 without ILD. CT Chest in 2020 without ILD.           Chronic cough    Current Assessment & Plan   Symptoms and ENT exam consistent with upper airway cough syndrome.  Given severity and occurrence over 15 years, needs maximal therapies.  -continue nasal steroid, azelastine, PRN ipratropium.   Cont PPI BID and add famotidine BID and raise head of bed.  -Flovent delivery likely irritating upper airway. Would stop if no clear benefit. If she feels benefit, discussed 2 finger spacing.  -Would stop Meloxicam as NSAIDs common to cause or exacerbate cough and consider Celebrex as alternate.  -Discussed allergen control in the home.            Please note Overview Notes are historic documentation. Please review A/P for current updates.  No follow-ups on file.    Future Appointments   Date Time Provider Department Center   5/14/2025  9:30 AM Keyanna Hammond FNP [facility] [place]   5/21/2025  9:40 AM Alen Kerns MD [facility] URO [place]   5/30/2025 12:30 PM CARDIOLOGY JT, [facility] CARDIO [facility]   5/30/2025  1:20 PM Basim Akins MD [facility] ARRHYT [facility]   7/9/2025 10:00 AM Deidre Gaines MD [facility] RAFFAELE [facility]   7/21/2025 11:30 AM Marilou Bellamy MD [facility] ALLERG [place]   7/28/2025  7:10 AM LAB, [facility] [place]   7/31/2025  8:20 AM Stefania Terry DO [facility] FAMCTR [place]   3/19/2026  2:00 PM Mae Weston NP [facility] FAMMED [place] [facility] MD             [1]  Outpatient Encounter Medications as of 4/25/2025   Medication Sig Dispense Refill    azelastine (ASTELIN) 137 mcg (0.1 %) nasal spray 1 spray (137 mcg total) by Nasal  route nightly as needed for Rhinitis. 30 mL 2    calcium carbonate (TUMS) 200 mg calcium (500 mg) chewable tablet Take 1 tablet by mouth daily as needed for Heartburn.      fluticasone propionate (FLONASE) 50 mcg/actuation nasal spray 1 spray by Each Nostril route daily as needed for Rhinitis or Allergies.      fluticasone propionate (FLOVENT HFA) 110 mcg/actuation inhaler Inhale 2 puffs into the lungs 2 (two) times daily. Controller 12 g 3    gabapentin (NEURONTIN) 300 MG capsule TAKE 1 CAPSULE(300 MG) BY MOUTH THREE TIMES DAILY 90 capsule 3    ipratropium (ATROVENT) 21 mcg (0.03 %) nasal spray 2 sprays by Each Nostril route 2 (two) times daily. 30 mL 3    meloxicam (MOBIC) 15 MG tablet Take 1 tablet (15 mg total) by mouth once daily. 90 tablet 3    metFORMIN (GLUCOPHAGE-XR) 500 MG ER 24hr tablet Take 1 tablet (500 mg total) by mouth daily with breakfast. 90 tablet 3    montelukast (SINGULAIR) 10 mg tablet Take 1 tablet (10 mg total) by mouth every evening. 30 tablet 0    mv-mn/folic acid/vit [id number] (ALIVE ONCE DAILY WOMEN 50 PLUS ORAL) Take 1 tablet by mouth once daily.      omeprazole (PRILOSEC) 40 MG capsule Take 1 capsule (40 mg total) by mouth 2 (two) times daily before meals. 60 capsule 2    rosuvastatin (CRESTOR) 20 MG tablet Take 1 tablet (20 mg total) by mouth every evening. 90 tablet 3    solifenacin (VESICARE) 10 MG tablet Take 1 tablet (10 mg total) by mouth once daily. 30 tablet 11    famotidine (PEPCID) 40 MG tablet Take 1 tablet (40 mg total) by mouth 2 (two) times daily. 60 tablet 11    losartan (COZAAR) 25 MG tablet Take 1 tablet (25 mg total) by mouth once daily. 90 tablet 3    metoprolol succinate (TOPROL-XL) 100 MG 24 hr tablet Take 1 tablet (100 mg total) by mouth every evening. 90 tablet 3    [DISCONTINUED] beclomethasone (QVAR) 80 mcg/actuation Aero Inhale 2 puffs into the lungs 2 (two) times a day. Controller 1 g 11    [DISCONTINUED] levocetirizine (XYZAL) 5 MG tablet  Take 1 tablet (5 mg total) by mouth every evening. 90 tablet 3     No facility-administered encounter medications on file as of 4/25/2025.

## 2025-04-25 NOTE — ASSESSMENT & PLAN NOTE
Symptoms and ENT exam consistent with upper airway cough syndrome.  Given severity and occurrence over 15 years, needs maximal therapies.  -continue nasal steroid, azelastine, PRN ipratropium.   Cont PPI BID and add famotidine BID and raise head of bed.  -Flovent delivery likely irritating upper airway. Would stop if no clear benefit. If she feels benefit, discussed 2 finger spacing.  -Would stop Meloxicam as NSAIDs common to cause or exacerbate cough and consider Celebrex as alternate.  -Discussed allergen control in the home.

## 2025-05-14 ENCOUNTER — TELEPHONE (OUTPATIENT)
Dept: GASTROENTEROLOGY | Facility: CLINIC | Age: 65
End: 2025-05-14

## 2025-05-14 ENCOUNTER — OFFICE VISIT (OUTPATIENT)
Dept: GASTROENTEROLOGY | Facility: CLINIC | Age: 65
End: 2025-05-14
Payer: MEDICARE

## 2025-05-14 VITALS
DIASTOLIC BLOOD PRESSURE: 86 MMHG | WEIGHT: 155.63 LBS | BODY MASS INDEX: 30.55 KG/M2 | SYSTOLIC BLOOD PRESSURE: 143 MMHG | HEART RATE: 72 BPM | HEIGHT: 60 IN

## 2025-05-14 DIAGNOSIS — R05.3 CHRONIC COUGH: Chronic | ICD-10-CM

## 2025-05-14 DIAGNOSIS — K21.9 GASTROESOPHAGEAL REFLUX DISEASE, UNSPECIFIED WHETHER ESOPHAGITIS PRESENT: Primary | ICD-10-CM

## 2025-05-14 DIAGNOSIS — J30.9 ALLERGIC RHINITIS, UNSPECIFIED SEASONALITY, UNSPECIFIED TRIGGER: ICD-10-CM

## 2025-05-14 PROCEDURE — 3079F DIAST BP 80-89 MM HG: CPT | Mod: CPTII,S$GLB,, | Performed by: NURSE PRACTITIONER

## 2025-05-14 PROCEDURE — 3077F SYST BP >= 140 MM HG: CPT | Mod: CPTII,S$GLB,, | Performed by: NURSE PRACTITIONER

## 2025-05-14 PROCEDURE — 99204 OFFICE O/P NEW MOD 45 MIN: CPT | Mod: S$GLB,,, | Performed by: NURSE PRACTITIONER

## 2025-05-14 PROCEDURE — 3008F BODY MASS INDEX DOCD: CPT | Mod: CPTII,S$GLB,, | Performed by: NURSE PRACTITIONER

## 2025-05-14 PROCEDURE — 1160F RVW MEDS BY RX/DR IN RCRD: CPT | Mod: CPTII,S$GLB,, | Performed by: NURSE PRACTITIONER

## 2025-05-14 PROCEDURE — 3288F FALL RISK ASSESSMENT DOCD: CPT | Mod: CPTII,S$GLB,, | Performed by: NURSE PRACTITIONER

## 2025-05-14 PROCEDURE — 1101F PT FALLS ASSESS-DOCD LE1/YR: CPT | Mod: CPTII,S$GLB,, | Performed by: NURSE PRACTITIONER

## 2025-05-14 PROCEDURE — 1159F MED LIST DOCD IN RCRD: CPT | Mod: CPTII,S$GLB,, | Performed by: NURSE PRACTITIONER

## 2025-05-14 PROCEDURE — 99999 PR PBB SHADOW E&M-EST. PATIENT-LVL V: CPT | Mod: PBBFAC,,, | Performed by: NURSE PRACTITIONER

## 2025-05-14 PROCEDURE — 3044F HG A1C LEVEL LT 7.0%: CPT | Mod: CPTII,S$GLB,, | Performed by: NURSE PRACTITIONER

## 2025-05-14 NOTE — PATIENT INSTRUCTIONS
Dear Mrs. Jaime    Attached are your instructions for your upcoming procedure. Please take some time to carefully review them and write down any questions you may have. A nurse will call you 1-2 weeks prior to your procedure to go over the instructions and answer any questions you may have.       EGD Instructions    Date of procedure: Monday, 5/19/2025    Arrival Time: We will call you 1 day prior to your procedure to provide you with an arrival time.    Location of Department:   [facility] [address] [place] [address]   1st Floor Same Day Surgery    The day before your procedure: Sunday, 5/18     You may have a light evening meal.   No solid food after 7:00 pm.   Continue drinking clear liquids.     The day of the procedure: Monday, 5/19    You may have water/clear liquids until 2 hours before your procedure or as directed by the scheduling nurse    Clear Liquids That Are OK to Drink:   Water  Sports drinks (Gatorade, Power-Aid)  Crystal Light, Lemonade, Limeaid  Coffee or tea (no cream or nondairy creamer)  Clear juices without pulp (apple, white grape)  Clear soda (sprite, coke, ginger ale)      What You CANNOT do:   Do not drink milk or any dairy products.  Do not drink alcohol.  No gum chewing or candy morning of procedure.        Comments:         Medications Instructions below:    If you take HEART, BLOOD PRESSURE, SEIZURE, PAIN, LUNG (including inhalers/nebulizers), ANTI-REJECTION (transplant patients), or PSYCHIATRIC medications, please take at your regular times with a sip of water or as directed by the scheduling nurse.    If you begin taking any blood thinning medications, injectable weight loss/diabetes medications (other than insulin), or Adipex(Phentermine) prior to your procedure please contact the endoscopy scheduling department listed below AS SOON AS POSSIBLE. If these medications are not held for the appropriate amount of days prior to procedure, your  procedure will be canceled.      If you have any health changes prior to your procedure, please contact the endoscopy department to report changes.    On occasion, unforeseen circumstances may cause a delay in your procedure start time. We respect your time and appreciate your patience during these circumstances.      Important contact information:    Counts include 234 beds at the [facility] Endoscopy Department - [phone].  Hours of operation are Monday-Friday 8:00-4:30pm.    If you have questions regarding the prep or you need to reschedule, please call [phone].    After hours questions requiring immediate assistance, contact [facility] nurse line at [phone] or [phone].     Questions about insurance or financial obligations call [phone] or [phone].      Comments:                           IMPORTANT INFORMATION TO KNOW BEFORE YOUR PROCEDURE     [facility]      An adult friend/ family member MUST come with you to drive you home. You can not drive, take a taxi, Uber, Lyft, bus, or any form of public transportation without being accompanied by a  responsible adult. The responsible adult CAN NOT be the  of the service.     person must be available to return to pick you up within 15 minutes of being notified of discharge.    Please bring a picture ID, insurance card, & copayment    LEAVE ALL VALUABLES AND JEWELRY AT HOME. [facility] WILL NOT HOLD OR BE RESPONSIBLE FOR ANY LOST VALUABLES, JEWELRY, OR PERSONAL BELONGINGS. YOUR PERSONAL BELONGINGS MUST BE HELD BY YOUR ACCOMPANYING FRIEND/FAMILY MEMBER.    Plan to be at the hospital for 2-4 hours.           Please follow the instructions below:  1. You will see an American flag flying in the front of the hospital from [address] as close as you can in that lot.  2. Behind the flag, you will see a covered [address], enter through those automatic double doors.  3. Immediately when you enter, you  should be greeted by a team member who can assist you.  4. If not, follow the hallway immediately in front of you to the right towards the cafeteria.  5. Once you reach the cafeteria, there are only two options. You can either enter the cafeteria or continue down the hallway to your left. You will want to continue down the hallway all the way down to your left until you absolutely cannot go anymore. You will then find yourself in our Same Day Surgery lobby where you will check in behind the glass window.      [facility] Map for Endoscopy Procedures:

## 2025-05-14 NOTE — TELEPHONE ENCOUNTER
Dear Mrs. Jaime     Attached are your instructions for your upcoming procedure. Please take some time to carefully review them and write down any questions you may have. A nurse will call you 1-2 weeks prior to your procedure to go over the instructions and answer any questions you may have.         EGD Instructions     Date of procedure: Monday, 5/19/2025     Arrival Time: We will call you 1 day prior to your procedure to provide you with an arrival time.     Location of Department:   [facility] [address] [place] [address]   1st Floor Same Day Surgery     The day before your procedure: Sunday, 5/18      You may have a light evening meal.   No solid food after 7:00 pm.   Continue drinking clear liquids.      The day of the procedure: Monday, 5/19     You may have water/clear liquids until 2 hours before your procedure or as directed by the scheduling nurse     Clear Liquids That Are OK to Drink:   Water  Sports drinks (Gatorade, Power-Aid)  Crystal Light, Lemonade, Limeaid  Coffee or tea (no cream or nondairy creamer)  Clear juices without pulp (apple, white grape)  Clear soda (sprite, coke, ginger ale)        What You CANNOT do:   Do not drink milk or any dairy products.  Do not drink alcohol.  No gum chewing or candy morning of procedure.           Comments:            Medications Instructions below:     If you take HEART, BLOOD PRESSURE, SEIZURE, PAIN, LUNG (including inhalers/nebulizers), ANTI-REJECTION (transplant patients), or PSYCHIATRIC medications, please take at your regular times with a sip of water or as directed by the scheduling nurse.     If you begin taking any blood thinning medications, injectable weight loss/diabetes medications (other than insulin), or Adipex(Phentermine) prior to your procedure please contact the endoscopy scheduling department listed below AS SOON AS POSSIBLE. If these medications are not held for the appropriate amount of  days prior to procedure, your procedure will be canceled.        If you have any health changes prior to your procedure, please contact the endoscopy department to report changes.     On occasion, unforeseen circumstances may cause a delay in your procedure start time. We respect your time and appreciate your patience during these circumstances.        Important contact information:     [facility] Endoscopy Department - [phone].  Hours of operation are Monday-Friday 8:00-4:30pm.     If you have questions regarding the prep or you need to reschedule, please call [phone].     After hours questions requiring immediate assistance, contact [facility] nurse line at [phone] or [phone].      Questions about insurance or financial obligations call [phone] or [phone].        Comments:                              IMPORTANT INFORMATION TO KNOW BEFORE YOUR PROCEDURE     [facility]        An adult friend/ family member MUST come with you to drive you home. You can not drive, take a taxi, Uber, Lyft, bus, or any form of public transportation without being accompanied by a  responsible adult. The responsible adult CAN NOT be the  of the service.      person must be available to return to pick you up within 15 minutes of being notified of discharge.     Please bring a picture ID, insurance card, & copayment     LEAVE ALL VALUABLES AND JEWELRY AT HOME. [facility] WILL NOT HOLD OR BE RESPONSIBLE FOR ANY LOST VALUABLES, JEWELRY, OR PERSONAL BELONGINGS. YOUR PERSONAL BELONGINGS MUST BE HELD BY YOUR ACCOMPANYING FRIEND/FAMILY MEMBER.     Plan to be at the hospital for 2-4 hours.              Please follow the instructions below:  1. You will see an American flag flying in the front of the hospital from [address] as close as you can in that lot.  2. Behind the flag, you will see a covered [address], enter through those  automatic double doors.  3. Immediately when you enter, you should be greeted by a team member who can assist you.  4. If not, follow the hallway immediately in front of you to the right towards the cafeteria.  5. Once you reach the cafeteria, there are only two options. You can either enter the cafeteria or continue down the hallway to your left. You will want to continue down the hallway all the way down to your left until you absolutely cannot go anymore. You will then find yourself in our Same Day Surgery lobby where you will check in behind the glass window.        [facility] Map for Endoscopy Procedures:

## 2025-05-14 NOTE — PROGRESS NOTES
Subjective:       Patient ID: Suly Jaime is a 65 y.o. female.    Chief Complaint: Gastroesophageal Reflux and Follow-up (Chronic Cough)    64 y/o female with multiple diagnoses as listed in problem list and medical history referred by ENT for chronic cough and reflux. Patient reports chronic cough for over 15 years. She has recently seen pulmonary, ENT and an allergist and prescribed multiple antihistamines and nasal steroids with minimal effectiveness. She reports chronic non-productive cough throughout the day. Has associated shortness of breath and wheezing. Intermittent heartburn and regurgitation a few times per week. No specific food triggers. Prescribed famotidine bid and omeprazole bid but unsure of which medication she is taking. Denies dysphagia but reports globus sensation and post nasal drip. She underwent laryngoscopy with findings consistent with reflux. Referred to speech; appt pending.           Past Medical History:   Diagnosis Date    Allergy     Asthma     Cardiomyopathy     Hypertension     ICD (implantable cardioverter-defibrillator) in place 01/30/2023    Oropharyngeal lesion 02/18/2019    Hx laryngeal papilloma   Annual ENT f/u advised      Piriformis syndrome of left side 02/22/2024       Pain to piriformis muscle x 3 weeks -started after Right side piriformis improving  Worse with prolonged sitting, sitting on toilet; radiation thigh/calf/foot  Gabapentin PM, meloxicam AM  Doing PT for Right side -send message PT to include left side  - add on zanaflex - never started previously  - recommend hard ball pressure to piriformis region - lay on floor  - referred to sports med for po    Piriformis syndrome of right side 01/24/2024    Pain near piriformis  Worse after sitting for extended time  NSAIDs and gabapentin helps  No radiation, tingling numbness  Doing PT - pain improving  Continue PT  - start zanaflex, continue gabapentin + NSAIDS         Past Surgical History:   Procedure Laterality  Date    CARDIAC DEFIBRILLATOR PLACEMENT Left     HYSTERECTOMY  2003    full    KNEE ARTHROSCOPY      LARYNGOSCOPY N/A 02/18/2019    Procedure: DIRECT MICRO LARYNGOSCOPY WITH BIOPSY;  Surgeon: Deidre Calderon MD;  Location: [facility] OR;  Service: ENT;  Laterality: N/A;  video    OOPHORECTOMY      RELEASE OF CONTRACTURE  10/4/2022    Procedure: RELEASE, CONTRACTURE;  Surgeon: Clyde Banuelos Jr., MD;  Location: [facility] OR;  Service: Orthopedics;;    RELEASE OF CONTRACTURE OF JOINT Left 10/4/2022    Procedure: RELEASE, CONTRACTURE, JOINT;  Surgeon: Clyde Banuelos Jr., MD;  Location: [facility] OR;  Service: Orthopedics;  Laterality: Left;  need k wires and mini c arm    REVISION OF IMPLANTABLE CARDIOVERTER-DEFIBRILLATOR (ICD) ELECTRODE LEAD PLACEMENT N/A 6/6/2022    Procedure: REVISION, INSERTION, ELECTRODE LEAD, ICD;  Surgeon: Cruzito Tovar MD;  Location: [facility] EP LAB;  Service: Cardiology;  Laterality: N/A;  SINGLE LEAD REVISION, SJM, ANES, EH, 325    SOFT TISSUE BIOPSY  10/4/2022    Procedure: BIOPSY, SOFT TISSUE;  Surgeon: Clyde Banuelos Jr., MD;  Location: [facility] OR;  Service: Orthopedics;;       Family History   Problem Relation Name Age of Onset    Diabetes Mother Yessy     Dementia Mother Yessy     Diabetes Father Waqar     Allergies Sister Ni     Heart disease Sister Ni     Other (back problems) Sister Deirdre     Diabetes Sister Ansley     Other (Passed away with PNA) Brother Sixto     Diabetes Brother Waqar Jr     Diabetes Maternal Grandmother      Heart disease Maternal Grandmother      Heart disease Daughter Melody     No Known Problems Daughter Summer     Asthma Neg Hx      Allergic rhinitis Neg Hx      Angioedema Neg Hx      Atopy Neg Hx      Eczema Neg Hx      Immunodeficiency Neg Hx      Rhinitis Neg Hx      Urticaria Neg Hx         Social History     Socioeconomic History    Marital status:    Tobacco Use    Smoking status: Former     Current packs/day: 0.00     Average packs/day:  0.3 packs/day for 40.0 years (12.0 ttl pk-yrs)     Types: Cigarettes     Start date:      Quit date: 2018     Years since quittin.3     Passive exposure: Never    Smokeless tobacco: Never    Tobacco comments:     off and on since 20s ,   Substance and Sexual Activity    Alcohol use: No    Drug use: No    Sexual activity: Not Currently     Partners: Male     Social Drivers of Health     Financial Resource Strain: Low Risk  (3/19/2025)    Overall Financial Resource Strain (CARDIA)     Difficulty of Paying Living Expenses: Not very hard   Food Insecurity: No Food Insecurity (3/19/2025)    Hunger Vital Sign     Worried About Running Out of Food in the Last Year: Never true     Ran Out of Food in the Last Year: Never true   Transportation Needs: No Transportation Needs (3/19/2025)    PRAPARE - Transportation     Lack of Transportation (Medical): No     Lack of Transportation (Non-Medical): No   Physical Activity: Inactive (3/19/2025)    Exercise Vital Sign     Days of Exercise per Week: 0 days     Minutes of Exercise per Session: 0 min   Stress: Stress Concern Present (3/19/2025)    [employer] [place] [employer] - Occupational Stress Questionnaire     Feeling of Stress : To some extent   Housing Stability: Low Risk  (3/19/2025)    Housing Stability Vital Sign     Unable to Pay for Housing in the Last Year: No     Homeless in the Last Year: No       Review of Systems   Constitutional:  Negative for appetite change and unexpected weight change.   HENT:  Negative for trouble swallowing.    Respiratory:  Positive for cough and shortness of breath. Negative for choking.    Cardiovascular:  Negative for chest pain.   Gastrointestinal:  Negative for abdominal pain, constipation, diarrhea and nausea.   Hematological:  Negative for adenopathy. Does not bruise/bleed easily.   Psychiatric/Behavioral:  Negative for dysphoric mood.          Objective:     Vitals:    25 0925   BP: (!) 143/86   BP Location:  Left arm   Patient Position: Sitting   Pulse: 72   Weight: 70.6 kg (155 lb 10.3 oz)   Height: 5' (1.524 m)          Physical Exam  Constitutional:       General: She is not in acute distress.     Appearance: Normal appearance.   HENT:      Head: Normocephalic.   Eyes:      Conjunctiva/sclera: Conjunctivae normal.   Pulmonary:      Effort: Pulmonary effort is normal. No respiratory distress.      Breath sounds: Wheezing present.      Comments: Frequent coughing with speech  Musculoskeletal:         General: Normal range of motion.      Cervical back: Normal range of motion.   Skin:     General: Skin is warm and dry.   Neurological:      Mental Status: She is alert and oriented to person, place, and time.   Psychiatric:         Mood and Affect: Mood normal.         Behavior: Behavior normal.               Assessment:         ICD-10-CM ICD-9-CM   1. Gastroesophageal reflux disease, unspecified whether esophagitis present  K21.9 530.81   2. Chronic cough  R05.3 786.2   3. Allergic rhinitis, unspecified seasonality, unspecified trigger  J30.9 477.9       Plan:       Gastroesophageal reflux disease, unspecified whether esophagitis present  -     Omeprazole 40 mg twice daily  -     Stop famotidine  -     Case Request Endoscopy: EGD (ESOPHAGOGASTRODUODENOSCOPY)    Chronic cough  -     Case Request Endoscopy: EGD (ESOPHAGOGASTRODUODENOSCOPY)  -     Patient confused about correct medication regimen. Advised to follow up with pulmonary and ENT to discuss which medications she should be taking    Allergic rhinitis, unspecified seasonality, unspecified trigger  -     Patient confused about correct medication regimen. Advised to follow up with pulmonary and ENT to discuss which medications she should be taking    Follow up if symptoms worsen or fail to improve.     Patient's Medications   New Prescriptions    No medications on file   Previous Medications    AZELASTINE (ASTELIN) 137 MCG (0.1 %) NASAL SPRAY    1 spray (137 mcg  total) by Nasal route nightly as needed for Rhinitis.    CALCIUM CARBONATE (TUMS) 200 MG CALCIUM (500 MG) CHEWABLE TABLET    Take 1 tablet by mouth daily as needed for Heartburn.    FAMOTIDINE (PEPCID) 40 MG TABLET    Take 1 tablet (40 mg total) by mouth 2 (two) times daily.    FLUTICASONE PROPIONATE (FLONASE) 50 MCG/ACTUATION NASAL SPRAY    1 spray by Each Nostril route daily as needed for Rhinitis or Allergies.    FLUTICASONE PROPIONATE (FLOVENT HFA) 110 MCG/ACTUATION INHALER    Inhale 2 puffs into the lungs 2 (two) times daily. Controller    GABAPENTIN (NEURONTIN) 300 MG CAPSULE    TAKE 1 CAPSULE(300 MG) BY MOUTH THREE TIMES DAILY    IPRATROPIUM (ATROVENT) 21 MCG (0.03 %) NASAL SPRAY    2 sprays by Each Nostril route 2 (two) times daily.    LOSARTAN (COZAAR) 25 MG TABLET    Take 1 tablet (25 mg total) by mouth once daily.    MELOXICAM (MOBIC) 15 MG TABLET    Take 1 tablet (15 mg total) by mouth once daily.    METFORMIN (GLUCOPHAGE-XR) 500 MG ER 24HR TABLET    Take 1 tablet (500 mg total) by mouth daily with breakfast.    METOPROLOL SUCCINATE (TOPROL-XL) 100 MG 24 HR TABLET    Take 1 tablet (100 mg total) by mouth every evening.    MV-MN/FOLIC ACID/VIT [id number] (ALIVE ONCE DAILY WOMEN 50 PLUS ORAL)    Take 1 tablet by mouth once daily.    OMEPRAZOLE (PRILOSEC) 40 MG CAPSULE    Take 1 capsule (40 mg total) by mouth 2 (two) times daily before meals.    ROSUVASTATIN (CRESTOR) 20 MG TABLET    Take 1 tablet (20 mg total) by mouth every evening.    SOLIFENACIN (VESICARE) 10 MG TABLET    Take 1 tablet (10 mg total) by mouth once daily.   Modified Medications    No medications on file   Discontinued Medications    No medications on file

## 2025-05-19 DIAGNOSIS — E78.2 MIXED HYPERLIPIDEMIA: ICD-10-CM

## 2025-05-19 NOTE — TELEPHONE ENCOUNTER
Care Due:                  Date            Visit Type   Department     Provider  --------------------------------------------------------------------------------                                EP -                              PRIMARY      DESC FAMILY  Last Visit: 01-      [facility]  Stefania MARTINEZ Terry                              EP -                              PRIMARY      DESC FAMILY  Next Visit: 07-      [facility]                                                            Last  Test          Frequency    Reason                     Performed    Due Date  --------------------------------------------------------------------------------    CBC.........  12 months..  meloxicam................  11- 11-    HBA1C.......  6 months...  metFORMIN................  01- 07-    [facility] Embedded Care Due Messages. Reference number: [id number].   5/19/2025 6:02:38 PM CDT

## 2025-05-20 RX ORDER — MONTELUKAST SODIUM 10 MG/1
10 TABLET ORAL NIGHTLY
Qty: 30 TABLET | Refills: 0 | Status: SHIPPED | OUTPATIENT
Start: 2025-05-20 | End: 2025-06-19

## 2025-05-21 RX ORDER — ROSUVASTATIN CALCIUM 20 MG/1
20 TABLET, COATED ORAL NIGHTLY
Qty: 90 TABLET | Refills: 3 | Status: SHIPPED | OUTPATIENT
Start: 2025-05-21 | End: 2026-05-21

## 2025-05-22 ENCOUNTER — CLINICAL SUPPORT (OUTPATIENT)
Dept: CARDIOLOGY | Facility: HOSPITAL | Age: 65
End: 2025-05-22
Attending: INTERNAL MEDICINE
Payer: MEDICARE

## 2025-05-22 ENCOUNTER — CLINICAL SUPPORT (OUTPATIENT)
Dept: CARDIOLOGY | Facility: HOSPITAL | Age: 65
End: 2025-05-22
Payer: MEDICARE

## 2025-05-22 DIAGNOSIS — Z95.810 PRESENCE OF AUTOMATIC (IMPLANTABLE) CARDIAC DEFIBRILLATOR: ICD-10-CM

## 2025-05-22 DIAGNOSIS — I42.8 OTHER CARDIOMYOPATHIES: ICD-10-CM

## 2025-05-22 PROCEDURE — 93295 DEV INTERROG REMOTE 1/2/MLT: CPT | Mod: ,,, | Performed by: INTERNAL MEDICINE

## 2025-05-22 PROCEDURE — 93296 REM INTERROG EVL PM/IDS: CPT | Performed by: INTERNAL MEDICINE

## 2025-05-29 LAB
OHS CV DC REMOTE DEVICE TYPE: NORMAL
OHS CV RV PACING PERCENT: 1 %

## 2025-05-30 ENCOUNTER — OFFICE VISIT (OUTPATIENT)
Dept: CARDIOLOGY | Facility: CLINIC | Age: 65
End: 2025-05-30
Payer: MEDICARE

## 2025-05-30 ENCOUNTER — CLINICAL SUPPORT (OUTPATIENT)
Dept: CARDIOLOGY | Facility: CLINIC | Age: 65
End: 2025-05-30
Attending: INTERNAL MEDICINE
Payer: MEDICARE

## 2025-05-30 VITALS
SYSTOLIC BLOOD PRESSURE: 155 MMHG | HEART RATE: 70 BPM | BODY MASS INDEX: 27.97 KG/M2 | WEIGHT: 152 LBS | DIASTOLIC BLOOD PRESSURE: 83 MMHG | HEIGHT: 62 IN

## 2025-05-30 DIAGNOSIS — I70.0 THORACIC AORTA ATHEROSCLEROSIS: ICD-10-CM

## 2025-05-30 DIAGNOSIS — I50.22 CHF (CONGESTIVE HEART FAILURE), NYHA CLASS II, CHRONIC, SYSTOLIC: Primary | Chronic | ICD-10-CM

## 2025-05-30 DIAGNOSIS — J98.4 RESTRICTIVE LUNG DISEASE: Chronic | ICD-10-CM

## 2025-05-30 DIAGNOSIS — I42.8 CARDIOMYOPATHY, NONISCHEMIC: ICD-10-CM

## 2025-05-30 PROCEDURE — 99999 PR PBB SHADOW E&M-EST. PATIENT-LVL III: CPT | Mod: PBBFAC,,, | Performed by: INTERNAL MEDICINE

## 2025-05-30 NOTE — PROGRESS NOTES
"Subjective   Patient ID:  Suly Jaime is a 65 y.o. female who presents for follow-up of ICD check      HPI  I had the pleasure of seeing Mrs Jaime today in our electrophysiology clinic in follow-up for her ICD. As you are aware she is a 65 year-old woman, former patient of Dr Tovar, with chronic nonischemic cardiomyopathy (EF 35%) with nonsustained VT. Originally diagnosed in 2021. CTA noted nonobstructive disease with possible RCA dissection. She underwent a single chamber ICD implantation in May of 2022 that was revised 1 week later for perforation. She returns for yearly follow-up.    My interpretation of today's in-clinic ICD check is stable lead parameters with no RV pacing, 4 episodes of nsVT up to 8 seconds in duration and 6 episodes of "SVT" (suspect sinus tach while active). Estimated battery longevity 7.6 yrs.    I reviewed ECGs in [facility] and my personal interpretation summary is sinus rhythm with IVCD (110-120ms)      Review of Systems   Constitutional: Negative for fever and malaise/fatigue.   HENT:  Negative for congestion and sore throat.    Eyes:  Negative for blurred vision and visual disturbance.   Cardiovascular:  Negative for chest pain, dyspnea on exertion, irregular heartbeat, near-syncope, palpitations and syncope.   Respiratory:  Negative for cough and shortness of breath.    Hematologic/Lymphatic: Negative for bleeding problem. Does not bruise/bleed easily.   Skin: Negative.    Musculoskeletal: Negative.    Gastrointestinal:  Negative for bloating, abdominal pain, hematochezia and melena.   Neurological:  Negative for focal weakness and weakness.   Psychiatric/Behavioral: Negative.            Objective     Physical Exam  Vitals reviewed.   Constitutional:       General: She is not in acute distress.     Appearance: She is well-developed. She is not diaphoretic.   HENT:      Head: Normocephalic and atraumatic.   Eyes:      General:         Right eye: No discharge.         Left eye: No " discharge.      Conjunctiva/sclera: Conjunctivae normal.   Cardiovascular:      Rate and Rhythm: Normal rate and regular rhythm.      Heart sounds: No murmur heard.     No friction rub. No gallop.   Pulmonary:      Effort: Pulmonary effort is normal. No respiratory distress.      Breath sounds: Normal breath sounds. No wheezing or rales.   Abdominal:      General: Bowel sounds are normal. There is no distension.      Palpations: Abdomen is soft.      Tenderness: There is no abdominal tenderness.   Musculoskeletal:      Cervical back: Neck supple.   Skin:     General: Skin is warm and dry.   Neurological:      Mental Status: She is alert and oriented to person, place, and time.   Psychiatric:         Behavior: Behavior normal.         Thought Content: Thought content normal.         Judgment: Judgment normal.            Assessment and Plan     1. CHF (congestive heart failure), NYHA class II, chronic, systolic    2. Thoracic aorta atherosclerosis    3. Restrictive lung disease        Plan:  In summary, Mrs Jaime is a 65 year-old woman, former patient of Dr Tovar, with chronic nonischemic cardiomyopathy (EF 35%) with nonsustained VT s/p ICD in 2022. ICD is operating normally. Continue remote checks. RTC in 1 year.    Thank you for allowing me to participate in the care of this patient. Please do not hesitate to call me with any questions or concerns.    Basim Akins MD, PhD  Cardiac Electrophysiology

## 2025-06-02 DIAGNOSIS — I42.8 CARDIOMYOPATHY, NONISCHEMIC: Primary | ICD-10-CM

## 2025-06-04 ENCOUNTER — PATIENT OUTREACH (OUTPATIENT)
Dept: ADMINISTRATIVE | Facility: HOSPITAL | Age: 65
End: 2025-06-04
Payer: MEDICARE

## 2025-06-04 ENCOUNTER — OFFICE VISIT (OUTPATIENT)
Dept: URGENT CARE | Facility: CLINIC | Age: 65
End: 2025-06-04
Payer: MEDICARE

## 2025-06-04 VITALS
SYSTOLIC BLOOD PRESSURE: 123 MMHG | HEART RATE: 82 BPM | HEIGHT: 62 IN | BODY MASS INDEX: 27.97 KG/M2 | WEIGHT: 152 LBS | RESPIRATION RATE: 16 BRPM | OXYGEN SATURATION: 95 % | TEMPERATURE: 98 F | DIASTOLIC BLOOD PRESSURE: 78 MMHG

## 2025-06-04 DIAGNOSIS — T63.481A ALLERGIC REACTION TO INSECT STING, ACCIDENTAL OR UNINTENTIONAL, INITIAL ENCOUNTER: Primary | ICD-10-CM

## 2025-06-04 PROCEDURE — 96372 THER/PROPH/DIAG INJ SC/IM: CPT | Mod: S$GLB,,, | Performed by: PHYSICIAN ASSISTANT

## 2025-06-04 PROCEDURE — 99213 OFFICE O/P EST LOW 20 MIN: CPT | Mod: 25,S$GLB,, | Performed by: PHYSICIAN ASSISTANT

## 2025-06-04 RX ORDER — TRIAMCINOLONE ACETONIDE 1 MG/G
CREAM TOPICAL 2 TIMES DAILY
Qty: 80 G | Refills: 0 | Status: SHIPPED | OUTPATIENT
Start: 2025-06-04 | End: 2025-06-18

## 2025-06-04 RX ORDER — DEXAMETHASONE SODIUM PHOSPHATE 10 MG/ML
10 INJECTION INTRAMUSCULAR; INTRAVENOUS
Status: COMPLETED | OUTPATIENT
Start: 2025-06-04 | End: 2025-06-04

## 2025-06-04 RX ADMIN — DEXAMETHASONE SODIUM PHOSPHATE 10 MG: 10 INJECTION INTRAMUSCULAR; INTRAVENOUS at 03:06

## 2025-06-06 ENCOUNTER — PATIENT MESSAGE (OUTPATIENT)
Dept: ADMINISTRATIVE | Facility: HOSPITAL | Age: 65
End: 2025-06-06
Payer: MEDICARE

## 2025-06-18 ENCOUNTER — PATIENT MESSAGE (OUTPATIENT)
Dept: ADMINISTRATIVE | Facility: HOSPITAL | Age: 65
End: 2025-06-18
Payer: MEDICARE

## 2025-06-30 RX ORDER — MONTELUKAST SODIUM 10 MG/1
10 TABLET ORAL NIGHTLY
Qty: 30 TABLET | Refills: 0 | Status: SHIPPED | OUTPATIENT
Start: 2025-06-30 | End: 2025-07-30

## 2025-07-09 ENCOUNTER — OFFICE VISIT (OUTPATIENT)
Dept: OTOLARYNGOLOGY | Facility: CLINIC | Age: 65
End: 2025-07-09
Payer: MEDICARE

## 2025-07-09 VITALS
BODY MASS INDEX: 28.33 KG/M2 | HEART RATE: 70 BPM | WEIGHT: 154.88 LBS | SYSTOLIC BLOOD PRESSURE: 128 MMHG | DIASTOLIC BLOOD PRESSURE: 83 MMHG

## 2025-07-09 DIAGNOSIS — K21.9 LARYNGOPHARYNGEAL REFLUX (LPR): Chronic | ICD-10-CM

## 2025-07-09 DIAGNOSIS — J30.9 CHRONIC ALLERGIC RHINITIS: Chronic | ICD-10-CM

## 2025-07-09 DIAGNOSIS — R05.3 CHRONIC COUGH: Primary | Chronic | ICD-10-CM

## 2025-07-09 DIAGNOSIS — R09.A2 GLOBUS SENSATION: ICD-10-CM

## 2025-07-09 PROCEDURE — 3079F DIAST BP 80-89 MM HG: CPT | Mod: CPTII,S$GLB,, | Performed by: OTOLARYNGOLOGY

## 2025-07-09 PROCEDURE — 3044F HG A1C LEVEL LT 7.0%: CPT | Mod: CPTII,S$GLB,, | Performed by: OTOLARYNGOLOGY

## 2025-07-09 PROCEDURE — 3008F BODY MASS INDEX DOCD: CPT | Mod: CPTII,S$GLB,, | Performed by: OTOLARYNGOLOGY

## 2025-07-09 PROCEDURE — 1159F MED LIST DOCD IN RCRD: CPT | Mod: CPTII,S$GLB,, | Performed by: OTOLARYNGOLOGY

## 2025-07-09 PROCEDURE — 99214 OFFICE O/P EST MOD 30 MIN: CPT | Mod: 25,S$GLB,, | Performed by: OTOLARYNGOLOGY

## 2025-07-09 PROCEDURE — 3074F SYST BP LT 130 MM HG: CPT | Mod: CPTII,S$GLB,, | Performed by: OTOLARYNGOLOGY

## 2025-07-09 PROCEDURE — 3288F FALL RISK ASSESSMENT DOCD: CPT | Mod: CPTII,S$GLB,, | Performed by: OTOLARYNGOLOGY

## 2025-07-09 PROCEDURE — 31575 DIAGNOSTIC LARYNGOSCOPY: CPT | Mod: S$GLB,,, | Performed by: OTOLARYNGOLOGY

## 2025-07-09 PROCEDURE — 99999 PR PBB SHADOW E&M-EST. PATIENT-LVL III: CPT | Mod: PBBFAC,,, | Performed by: OTOLARYNGOLOGY

## 2025-07-09 PROCEDURE — 1160F RVW MEDS BY RX/DR IN RCRD: CPT | Mod: CPTII,S$GLB,, | Performed by: OTOLARYNGOLOGY

## 2025-07-09 PROCEDURE — 1101F PT FALLS ASSESS-DOCD LE1/YR: CPT | Mod: CPTII,S$GLB,, | Performed by: OTOLARYNGOLOGY

## 2025-07-09 RX ORDER — FLUTICASONE PROPIONATE 50 MCG
2 SPRAY, SUSPENSION (ML) NASAL DAILY
Qty: 9.9 ML | Refills: 11 | Status: SHIPPED | OUTPATIENT
Start: 2025-07-09

## 2025-07-09 RX ORDER — IPRATROPIUM BROMIDE 21 UG/1
2 SPRAY, METERED NASAL 2 TIMES DAILY
Qty: 30 ML | Refills: 3 | Status: SHIPPED | OUTPATIENT
Start: 2025-07-09

## 2025-07-09 RX ORDER — OMEPRAZOLE 40 MG/1
40 CAPSULE, DELAYED RELEASE ORAL EVERY MORNING
Qty: 30 CAPSULE | Refills: 3 | Status: SHIPPED | OUTPATIENT
Start: 2025-07-09 | End: 2026-07-09

## 2025-07-09 NOTE — PROGRESS NOTES
HPI:Suly Jaime is a 65 y.o. female who has been referred by Mae Weston NP for a several  history of chronic cough. She says it has been present for 20+ years. She notes it is mostly dry and occasionally productive. She has feeling of globus sensation and post-nasal drip. She denies hoarseness.   Her voice is not progressively worsening over this time. There are pitch breaks or cracks.  She denies dysphagia, odynophagia, throat pain, and otalgia.  There is no hemoptysis or hematemesis. She is breathing well.   She has been on flonase/flovent in the past.      She admits to heartburn and reflux. She feels has frequent episodes of regurgitation/belching.      She has seen pulmonology in the past- last in 2022 and allergist last in 2021. She has CHF and was diagnosed in 2021. She is on losartan.       History of Present Illness  7/9/2025:  CHIEF COMPLAINT:  - Patient presents for a follow-up visit to discuss ongoing cough and acid reflux symptoms.    HPI:  Patient reports an improvement in her condition since the last visit. She has been using Flonase and saline rinses as prescribed. The Ipratropium nasal spray has been particularly effective, noting its significant efficacy. She has been using it at night but is now completely out of the medication. Patient continues to have a cough, which fluctuates in severity, ranging from no cough to severe episodes. She has been taking omeprazole twice daily for acid reflux, which she believes is helping. Patient also continues to use the Flovent inhaler. She recently underwent an EGD (esophagogastroduodenoscopy) test with a GI group, which did not reveal any blockages. Patient denies any swallowing difficulties, throat pain, or feeling of food getting stuck.         Past Medical History:   Diagnosis Date    Allergy     Asthma     Cardiomyopathy     Hypertension     ICD (implantable cardioverter-defibrillator) in place 01/30/2023    Oropharyngeal lesion 02/18/2019     Hx laryngeal papilloma   Annual ENT f/u advised      Piriformis syndrome of left side 02/22/2024       Pain to piriformis muscle x 3 weeks -started after Right side piriformis improving  Worse with prolonged sitting, sitting on toilet; radiation thigh/calf/foot  Gabapentin PM, meloxicam AM  Doing PT for Right side -send message PT to include left side  - add on zanaflex - never started previously  - recommend hard ball pressure to piriformis region - lay on floor  - referred to sports med for po    Piriformis syndrome of right side 01/24/2024    Pain near piriformis  Worse after sitting for extended time  NSAIDs and gabapentin helps  No radiation, tingling numbness  Doing PT - pain improving  Continue PT  - start zanaflex, continue gabapentin + NSAIDS       Social History[1]  Past Surgical History:   Procedure Laterality Date    CARDIAC DEFIBRILLATOR PLACEMENT Left     ESOPHAGOGASTRODUODENOSCOPY N/A 5/19/2025    Procedure: EGD (ESOPHAGOGASTRODUODENOSCOPY);  Surgeon: Donald Herrera MD;  Location: [facility] ENDO;  Service: Gastroenterology;  Laterality: N/A;    HYSTERECTOMY  2003    full    KNEE ARTHROSCOPY      LARYNGOSCOPY N/A 02/18/2019    Procedure: DIRECT MICRO LARYNGOSCOPY WITH BIOPSY;  Surgeon: Deidre Calderon MD;  Location: [facility] OR;  Service: ENT;  Laterality: N/A;  video    OOPHORECTOMY      RELEASE OF CONTRACTURE  10/4/2022    Procedure: RELEASE, CONTRACTURE;  Surgeon: Clyde Banuelos Jr., MD;  Location: [facility] OR;  Service: Orthopedics;;    RELEASE OF CONTRACTURE OF JOINT Left 10/4/2022    Procedure: RELEASE, CONTRACTURE, JOINT;  Surgeon: Clyde Banuelos Jr., MD;  Location: [facility] OR;  Service: Orthopedics;  Laterality: Left;  need k wires and mini c arm    REVISION OF IMPLANTABLE CARDIOVERTER-DEFIBRILLATOR (ICD) ELECTRODE LEAD PLACEMENT N/A 6/6/2022    Procedure: REVISION, INSERTION, ELECTRODE LEAD, ICD;  Surgeon: Cruzito Tovar MD;  Location: [facility] EP LAB;  Service: Cardiology;  Laterality: N/A;   SINGLE LEAD REVISION, SJM, ANES, EH, 325    SOFT TISSUE BIOPSY  10/4/2022    Procedure: BIOPSY, SOFT TISSUE;  Surgeon: Clyde Banuelos Jr., MD;  Location: [facility] OR;  Service: Orthopedics;;     Family History   Problem Relation Name Age of Onset    Diabetes Mother Yessy     Dementia Mother Yessy     Diabetes Father Waqar     Allergies Sister Ni     Heart disease Sister Ni     Other (back problems) Sister Deirdre     Diabetes Sister Ansley     Other (Passed away with PNA) Brother Sixto     Diabetes Brother Waqar [place]     Diabetes Maternal Grandmother      Heart disease Maternal Grandmother      Heart disease Daughter Melody     No Known Problems Daughter Summer     Asthma Neg Hx      Allergic rhinitis Neg Hx      Angioedema Neg Hx      Atopy Neg Hx      Eczema Neg Hx      Immunodeficiency Neg Hx      Rhinitis Neg Hx      Urticaria Neg Hx             Review of Systems  General: negative for chills, fever or weight loss  Psychological: negative for mood changes or depression  Ophthalmic: negative for blurry vision, photophobia or eye pain  ENT: see HPI  Respiratory: no cough, shortness of breath, or wheezing  Cardiovascular: no chest pain or dyspnea on exertion  Gastrointestinal: no abdominal pain, change in bowel habits, or black/ bloody stools  Musculoskeletal: negative for gait disturbance or muscular weakness  Neurological: no syncope or seizures; no ataxia  Dermatological: negative for puritis,  rash and jaundice  Hematologic/lymphatic: no easy bruising, no new lumps or bumps      Physical Exam:    Vitals:    07/09/25 0951   BP: 128/83   Pulse: 70         Constitutional: Well appearing / communicating without difficutly.  NAD.  Eyes: EOM I Bilaterally  Head/Face: Normocephalic.  Negative paranasal sinus pressure/tenderness.  Salivary glands WNL.  House Brackmann I Bilaterally.    Right Ear: Auricle normal appearance. External Auditory Canal within normal limits no lesions or masses,TM w/o  masses/lesions/perforations. TM mobility noted.   Left Ear: Auricle normal appearance. External Auditory Canal within normal limits no lesions or masses,TM w/o masses/lesions/perforations. TM mobility noted.  Nose: No gross nasal septal deviation. Inferior Turbinates 3+ bilaterally. No septal perforation. No masses/lesions. External nasal skin appears normal without masses/lesions.  Oral Cavity: Gingiva/lips within normal limits.  Dentition/gingiva healthy appearing. Mucus membranes moist. Floor of mouth soft, no masses palpated. Oral Tongue mobile. Hard Palate appears normal.    Oropharynx: Base of tongue appears normal. No masses/lesions noted. Tonsillar fossa/pharyngeal wall without lesions. Posterior oropharynx WNL.  Soft palate without masses. Midline uvula.   Neck/Lymphatic: No LAD I-VI bilaterally.  No thyromegaly.  No masses noted on exam.        See separate procedure note for FFL.    Diagnostic studies reviewed:    Latest Reference Range & Units 09/01/20 12:42   A. alternata IgE <0.10 kU/L <0.10   Altern. alternata Class  CLASS 0   Aspergillus Fumigatus IgE <0.10 kU/L <0.10   A. fumigatus Class  CLASS 0   Bald [place] Class  CLASS 0   [place] Grass IgE <0.10 kU/L <0.10   [place] Grass Class  CLASS 0   Cat Dander IgE <0.10 kU/L 0.24 (H)   Cat Epithelium Class  CLASS 0/1   [place] IgE <0.10 kU/L <0.10   [place] Class  CLASS 0   Cockroach, IgE -   <0.10 kU/L CLASS 0  <0.10   [place] <0.10 kU/L <0.10   D. farinae <0.10 kU/L 4.01 (H)   D. farinae Class  CLASS 3   D. pteronyssinus Dust Mite IgE <0.10 kU/L 4.26 (H)   D. pteronyssinus Class  CLASS 3   Dog Dander IgE <0.10 kU/L <0.10   Dog Dander Class  CLASS 0   Elm [place], IgE <0.10 kU/L <0.10   Elm [place] Class  CLASS 0   Yung Grass IgE <0.10 kU/L <0.10   Yung Grass Class  CLASS 0   Meadow Grass ([place] Blue), IgE <0.10 kU/L <0.10   Meadow Grass ([place] Blue) Class  CLASS 0   Mucor racemosus, IgE <0.10 kU/L <0.10   Mucor racemosus Class  CLASS 0   Oak, Class   CLASS 0   Pecan [place] Tree IgE <0.10 kU/L <0.10   Pecan, Class  CLASS 0   Ragweed, Short, Class  CLASS 0   Ragweed, Short, IgE <0.10 kU/L <0.10   [place] Grass IgE <0.10 kU/L <0.10   [place] Grass Class  CLASS 0   [place] Tree IgE <0.10 kU/L <0.10   (H): Data is abnormally high    EGD 5/19/2025:   Impression:            - Small hiatal hernia.                          - Normal stomach. Biopsied.                          - Normal examined duodenum.                          - Z-line regular, 38 cm from the incisors.                          Biopsied.     Assessment:    ICD-10-CM ICD-9-CM    1. Chronic cough  R05.3 786.2       2. Chronic allergic rhinitis  J30.9 477.9       3. Laryngopharyngeal reflux (LPR)  K21.9 478.79         The primary encounter diagnosis was Chronic cough. Diagnoses of Chronic allergic rhinitis and Laryngopharyngeal reflux (LPR) were also pertinent to this visit.      Plan:  No orders of the defined types were placed in this encounter.    Continue fluticasone 2 sprays per nostril daily  Continue ipratropium 2 sprays per nostril BID  Continue montelukast 10 mg PO daily      FFL with mild findings suggestive of LPR. Pt reports regurgitation/reflux.  I recommended that the patient decrease Omeprazole 40mg twice daily to 40mg once daily.  and provided a prescription. Continue to  refrain from eating within 3 hours of going to bed, to elevate the head of bed very subtly and optimize the impact of gravity on the potential reflux, and to avoid alcohol, caffeine, tobacco, tomato sauce, spicy foods, fried food, and chocolate.   Keep GI follow up. EGD report suggested esophagram/ph study if symptoms persist.     Keep  pulmonology follow up as planned.        Follow up in 4-6 months to reassess progress with treatment regimen.     Deidre Calderon MD                      [1]   Social History  Socioeconomic History    Marital status:    Tobacco Use    Smoking status: Former     Current  packs/day: 0.00     Average packs/day: 0.3 packs/day for 40.0 years (12.0 ttl pk-yrs)     Types: Cigarettes     Start date:      Quit date: 2018     Years since quittin.5     Passive exposure: Never    Smokeless tobacco: Never    Tobacco comments:     off and on since 20s ,   Substance and Sexual Activity    Alcohol use: Never    Drug use: Never    Sexual activity: Yes     Partners: Male     Social Drivers of Health     Financial Resource Strain: Low Risk  (3/19/2025)    Overall Financial Resource Strain (CARDIA)     Difficulty of Paying Living Expenses: Not very hard   Food Insecurity: No Food Insecurity (3/19/2025)    Hunger Vital Sign     Worried About Running Out of Food in the Last Year: Never true     Ran Out of Food in the Last Year: Never true   Transportation Needs: No Transportation Needs (3/19/2025)    PRAPARE - Transportation     Lack of Transportation (Medical): No     Lack of Transportation (Non-Medical): No   Physical Activity: Inactive (3/19/2025)    Exercise Vital Sign     Days of Exercise per Week: 0 days     Minutes of Exercise per Session: 0 min   Stress: Stress Concern Present (3/19/2025)    [employer] [place] [employer] - Occupational Stress Questionnaire     Feeling of Stress : To some extent   Housing Stability: Low Risk  (3/19/2025)    Housing Stability Vital Sign     Unable to Pay for Housing in the Last Year: No     Homeless in the Last Year: No

## 2025-07-09 NOTE — PROCEDURES
Laryngoscopy    Date/Time: 7/9/2025 10:00 AM    Performed by: Deidre Gaines MD  Authorized by: Deidre Gaines MD    Consent Done?:  Yes (Verbal)  Anesthesia:     Local anesthetic:  Lidocaine 2% and [place]-Synephrine 1/2%  Laryngoscopy:     Areas examined:  Nasal cavities, nasopharynx, oropharynx, hypopharynx, larynx and vocal cords  Nose External:      No external nasal deformity  Nose Intranasal:      Mucosa no polyps     Mucosa ulcers not present     No mucosa lesions present     No septum gross deformity     Turbinates not enlarged  Nasopharynx:      No mucosa lesions     Adenoids not present     Posterior choanae patent     Eustachian tube patent  Larynx/hypopharynx:      No epiglottis lesions     No epiglottis edema     No AE folds lesions     No vocal cord polyps     Equal and normal bilateral     No hypopharynx lesions     No piriform sinus pooling     No piriform sinus lesions     Post cricoid edema (mild)     Post cricoid erythema (mild)

## 2025-07-21 ENCOUNTER — OFFICE VISIT (OUTPATIENT)
Dept: ALLERGY | Facility: CLINIC | Age: 65
End: 2025-07-21
Payer: MEDICARE

## 2025-07-21 VITALS
HEIGHT: 62 IN | OXYGEN SATURATION: 97 % | BODY MASS INDEX: 28.76 KG/M2 | WEIGHT: 156.31 LBS | HEART RATE: 87 BPM | DIASTOLIC BLOOD PRESSURE: 82 MMHG | SYSTOLIC BLOOD PRESSURE: 135 MMHG

## 2025-07-21 DIAGNOSIS — J30.9 ALLERGIC RHINITIS, UNSPECIFIED SEASONALITY, UNSPECIFIED TRIGGER: ICD-10-CM

## 2025-07-21 DIAGNOSIS — R05.3 CHRONIC COUGH: Primary | ICD-10-CM

## 2025-07-21 PROCEDURE — 1159F MED LIST DOCD IN RCRD: CPT | Mod: CPTII,S$GLB,, | Performed by: STUDENT IN AN ORGANIZED HEALTH CARE EDUCATION/TRAINING PROGRAM

## 2025-07-21 PROCEDURE — 3044F HG A1C LEVEL LT 7.0%: CPT | Mod: CPTII,S$GLB,, | Performed by: STUDENT IN AN ORGANIZED HEALTH CARE EDUCATION/TRAINING PROGRAM

## 2025-07-21 PROCEDURE — 1160F RVW MEDS BY RX/DR IN RCRD: CPT | Mod: CPTII,S$GLB,, | Performed by: STUDENT IN AN ORGANIZED HEALTH CARE EDUCATION/TRAINING PROGRAM

## 2025-07-21 PROCEDURE — G2211 COMPLEX E/M VISIT ADD ON: HCPCS | Mod: S$GLB,,, | Performed by: STUDENT IN AN ORGANIZED HEALTH CARE EDUCATION/TRAINING PROGRAM

## 2025-07-21 PROCEDURE — 3075F SYST BP GE 130 - 139MM HG: CPT | Mod: CPTII,S$GLB,, | Performed by: STUDENT IN AN ORGANIZED HEALTH CARE EDUCATION/TRAINING PROGRAM

## 2025-07-21 PROCEDURE — 3079F DIAST BP 80-89 MM HG: CPT | Mod: CPTII,S$GLB,, | Performed by: STUDENT IN AN ORGANIZED HEALTH CARE EDUCATION/TRAINING PROGRAM

## 2025-07-21 PROCEDURE — 99215 OFFICE O/P EST HI 40 MIN: CPT | Mod: S$GLB,,, | Performed by: STUDENT IN AN ORGANIZED HEALTH CARE EDUCATION/TRAINING PROGRAM

## 2025-07-21 PROCEDURE — 3008F BODY MASS INDEX DOCD: CPT | Mod: CPTII,S$GLB,, | Performed by: STUDENT IN AN ORGANIZED HEALTH CARE EDUCATION/TRAINING PROGRAM

## 2025-07-21 PROCEDURE — 99999 PR PBB SHADOW E&M-EST. PATIENT-LVL IV: CPT | Mod: PBBFAC,,, | Performed by: STUDENT IN AN ORGANIZED HEALTH CARE EDUCATION/TRAINING PROGRAM

## 2025-07-21 RX ORDER — ALBUTEROL SULFATE 90 UG/1
2 INHALANT RESPIRATORY (INHALATION) EVERY 6 HOURS PRN
Qty: 18 G | Refills: 2 | Status: SHIPPED | OUTPATIENT
Start: 2025-07-21

## 2025-07-21 NOTE — PROGRESS NOTES
ALLERGY & IMMUNOLOGY CLINIC - FOLLOW UP     HISTORY OF PRESENT ILLNESS     Patient ID: Suly Jaime is a 65 y.o. female    CC: chronic cough     HPI: Suly Jaime is a 65 y.o. female with CAD, CHF, cardiomyopathy, ICD in place, osteopenia, and allergic rhinitis, following up for chronic cough.     Cough about the same. Comes and goes.  She takes ipratropium nasal at night which seems to help.   She is using the flonase most days.   She started a ppi, might help a little.   But none of this seems to have made a huge difference.    She takes her gabapentin at night. It helps her sleep. She thinks it also helps with the cough at night. It does make her sleepy.     Coughing now, sounds upper airway. She feels it is from throat.     She doesn't take the montelukast.   She recalls albuterol helping.   She takes flovent prn.     MEDICAL HISTORY     Vaccines:   Immunization History   Administered Date(s) Administered    COVID-19, MRNA, LN-S, PF (Pfizer) (Purple Cap) 03/03/2021, 03/24/2021    Influenza - Quadrivalent - PF *Preferred* (6 months and older) 12/12/2022, 11/07/2023    Influenza - Trivalent - Fluad - Adjuvanted - PF (65 years and older 01/31/2025    Pneumococcal Conjugate - 20 Valent 01/31/2025    Td - PF (ADULT) 07/25/2019    Zoster Recombinant 12/28/2023, 04/24/2024     Medical Hx:   Problem List[1]    Surgical Hx:   Past Surgical History:   Procedure Laterality Date    CARDIAC DEFIBRILLATOR PLACEMENT Left     ESOPHAGOGASTRODUODENOSCOPY N/A 5/19/2025    Procedure: EGD (ESOPHAGOGASTRODUODENOSCOPY);  Surgeon: Donald Herrera MD;  Location: [facility] ENDO;  Service: Gastroenterology;  Laterality: N/A;    HYSTERECTOMY  2003    full    KNEE ARTHROSCOPY      LARYNGOSCOPY N/A 02/18/2019    Procedure: DIRECT MICRO LARYNGOSCOPY WITH BIOPSY;  Surgeon: Deidre Calderon MD;  Location: [facility] OR;  Service: ENT;  Laterality: N/A;  video    OOPHORECTOMY      RELEASE OF CONTRACTURE  10/4/2022    Procedure: RELEASE,  "CONTRACTURE;  Surgeon: Clyde Banuelos Jr., MD;  Location: [facility] OR;  Service: Orthopedics;;    RELEASE OF CONTRACTURE OF JOINT Left 10/4/2022    Procedure: RELEASE, CONTRACTURE, JOINT;  Surgeon: Clyde Banuelos Jr., MD;  Location: [facility] OR;  Service: Orthopedics;  Laterality: Left;  need k wires and mini c arm    REVISION OF IMPLANTABLE CARDIOVERTER-DEFIBRILLATOR (ICD) ELECTRODE LEAD PLACEMENT N/A 6/6/2022    Procedure: REVISION, INSERTION, ELECTRODE LEAD, ICD;  Surgeon: Cruzito Tovar MD;  Location: [facility] EP LAB;  Service: Cardiology;  Laterality: N/A;  SINGLE LEAD REVISION, SJM, ANES, EH, 325    SOFT TISSUE BIOPSY  10/4/2022    Procedure: BIOPSY, SOFT TISSUE;  Surgeon: Clyde Banuelos Jr., MD;  Location: [facility] OR;  Service: Orthopedics;;     Medications:   Medications Ordered Prior to Encounter[2]    Drug Allergies: Review of patient's allergies indicates:  No Known Allergies    Social Hx:   Social History[3]    Additional History Obtained at Initial Visit:  H/o Asthma: denies  H/o Rhinitis: endorses  Env/Occ:   Pets: 2 dogs, don't seem to trigger symptoms      PHYSICAL EXAM     VS: /82 (BP Location: Left arm, Patient Position: Sitting)   Pulse 87   Ht 5' 2" (1.575 m)   Wt 70.9 kg (156 lb 4.9 oz)   SpO2 97%   BMI 28.59 kg/m²   GENERAL: Alert, NAD, well-appearing  EYES: EOMI, no conjunctival injection, no discharge, no infraorbital shiners  NOSE: NT 2+ B/L, no stringing mucus, no polyps visualized  ORAL: MMM, no ulcers, no thrush  LUNGS: CTAB, no w/r/c, no increased WOB, + cough at times during visit (dry cough with high pitched sound to it)  HEART: RRR, normal S1/S2, no m/g/r  DERM: no rashes     LABORATORY STUDIES     Component      Latest Ref Rng 11/15/2023 10/23/2024   WBC      3.90 - 12.70 K/uL 7.84     RBC      4.00 - 5.40 M/uL 4.40     Hemoglobin      12.0 - 16.0 g/dL 13.1     Hematocrit      37.0 - 48.5 % 41.9     MCV      82 - 98 fL 95     MCH      27.0 - 31.0 pg 29.8     MCHC      32.0 - 36.0 " g/dL 31.3 (L)     RDW      11.5 - 14.5 % 14.5     Platelet Count      150 - 450 K/uL 235     MPV      9.2 - 12.9 fL 11.5     Immature Granulocytes      0.0 - 0.5 % 0.3     Gran # (ANC)      1.8 - 7.7 K/uL 4.8     Immature Grans (Abs)      0.00 - 0.04 K/uL 0.02     Lymph #      1.0 - 4.8 K/uL 2.1     Mono #      0.3 - 1.0 K/uL 0.6     Eos #      0.0 - 0.5 K/uL 0.2     Baso #      0.00 - 0.20 K/uL 0.04     Differential Method Automated     Sodium      136 - 145 mmol/L 141  141    Potassium      3.5 - 5.1 mmol/L 4.5  4.1    Chloride      95 - 110 mmol/L 109  104    CO2      23 - 29 mmol/L 26  27    Glucose      70 - 110 mg/dL 87  72    BUN      8 - 23 mg/dL 14  20    Creatinine      0.5 - 1.4 mg/dL 0.9  0.9    Calcium      8.7 - 10.5 mg/dL 9.5  10.1    PROTEIN TOTAL      6.0 - 8.4 g/dL 7.0  6.7    Albumin      3.5 - 5.2 g/dL 4.1  4.0    BILIRUBIN TOTAL      0.1 - 1.0 mg/dL 1.1 (H)  1.2 (H)    ALP      40 - 150 U/L 55  65    AST      10 - 40 U/L 22  24    ALT      10 - 44 U/L 19  19    eGFR      >60 mL/min/1.73 m^2 >60.0  >60.0    Anion Gap      8 - 16 mmol/L 6 (L)  10    Hemoglobin A1C External      4.0 - 5.6 %  5.2    Estimated Avg Glucose      68 - 131 mg/dL  103    TSH      0.400 - 4.000 uIU/mL  1.326        ALLERGEN TESTING     Immunocaps:   Component      Latest Ref Rng 9/1/2020   Aspergillus Fumigatus IgE      <0.10 kU/L <0.10    A. fumigatus Class CLASS 0    Berm[place] Grass IgE      <0.10 kU/L <0.10    Bermuda Grass Class CLASS 0    Cat Dander IgE      <0.10 kU/L 0.24 (H)    Cat Epithelium Class CLASS 0/1    Cockroach, IgE      <0.10 kU/L <0.10    Cockroach, IgE       CLASS 0    Goodman      <0.10 kU/L <0.10    Bald Goodman Class CLASS 0    D. farinae      <0.10 kU/L 4.01 (H)    D. farinae Class CLASS 3    D. pteronyssinus Dust Mite IgE      <0.10 kU/L 4.26 (H)    D. pteronyssinus Class CLASS 3    Dog Dander IgE      <0.10 kU/L <0.10    Dog Dander Class CLASS 0    Yung Grass IgE      <0.10 kU/L <0.10    Yung  Grass Class CLASS 0    [place] Tree IgE      <0.10 kU/L <0.10    [place], Class CLASS 0    Ragweed, Short, IgE      <0.10 kU/L <0.10    Ragweed, Short, Class CLASS 0    [place] Grass IgE      <0.10 kU/L <0.10    [place] Grass Class CLASS 0    [place], IgE      <0.10 kU/L <0.10    Elm Leake Class CLASS 0    Meadow Grass ([facility]), IgE      <0.10 kU/L <0.10    Meadow Grass ([facility] Blue) Class CLASS 0    Mucor racemosus, IgE      <0.10 kU/L <0.10    Mucor racemosus Class CLASS 0    Pecan [place] Tree IgE      <0.10 kU/L <0.10    Pecan, Class CLASS 0    A. alternata IgE      <0.10 kU/L <0.10    Altern. alternata Class CLASS 0    Leake IgE      <0.10 kU/L <0.10    Leake Class CLASS 0        PULMONARY FUNCTION TESTING     Date 5/29/20:  FVC:         79%ref -> + 2%  FEV1:         80%ref -> + 5%  FEV1/FVC: 100%  FEF 25-75: 85%ref  DLCO:        78%ref  Interpretation: Spirometry is normal. Spirometry remains unimproved following bronchodilator. Lung volumes show mild restriction. DLCO is normal.     IMAGING & OTHER DIAGNOSTICS     CXR 8/10/22:  CLINICAL HISTORY:  Cough, unspecified  FINDINGS:  Chest two views: There is a pacer.  Heart size is normal.  There is aortic plaque.  The lungs are clear.  There is DJD.  Impression:  Impression: No acute process seen.    CT chest 6/2/20:  Impression:   1. No significant intrathoracic CT abnormalities to account for the reported history of cough.  2. Bilateral solid pulmonary nodules measuring up to 0.5 cm.  For multiple solid nodules all <6 mm, Fleischner Society 2017 guidelines recommend no routine follow up for a low risk patient, or follow up with non-contrast chest CT at 12 months after discovery in a high risk patient.  3. CT findings consistent with hepatic steatosis.  Recommend correlation with liver function test.  4. Probable left renal cortical cyst, incompletely evaluated on this exam.     CHART REVIEW     Reviewed pulm note, ENT note.     ASSESSMENT & PLAN      Suly Jaime is a 65 y.o. female with     # Chronic cough: Started around 2010. Daily symptoms. Unclear how much her rhinitis is contributing to cough. Low suspicion for asthma (there is an upper airway sound to the cough). She recalls albuterol helping in the past; however, she has not had any lasting improvement with flovent (she recalls using it consistently in the past, now using prn). Prior PFT's without obstruction. LPR may be a contributing factor, but addition of omeprazole has not made a big difference with the cough. She has noted that gabapentin (prescribed for neuropathy) seems to help at night, raising suspicion for neurogenic cough. She also follows with pulm, GI, and ENT.  -trial increase of gabapentin. Currently taking 300 mg nightly, but likely causing drowsiness. Recommend she trial increase to TID on a day she doesn't have plans and will be staying at time.  -restart albuterol prn.   -as she isn't sure flovent is helpful and she is currently only using prn, consider discontinuation.   -she is no longer taking montelukast (and doesn't recall it helping), so I have removed it from her med list.   -continue treatment for allergic rhinitis as below.     # Allergic rhinitis: Prior immunocaps positive to dust mite; equivocal to cat (which she doesn't have). Immunotherapy (including sublingual immunotherapy like odactra) would be a consideration; however, patient has cardiac disease and is on beta blocker (which is relative contraindication to immunotherapy). Even if she were to try immunotherapy, I would not have high hopes that it would help the cough (which is her main concern). We again went over how the different nasal spray works (we actually went through all the medications on her med list related to cough).  -continue flonase, but recommend consistent use 2 SEN daily.  -continue ipratropium 0.03% nasal spray. Currently using nightly, but advised she can use it prn for post-nasal drip or  rhinorrhea.  -restart azelastine nasal spray 1-2 SEN BID prn.       Follow up: 1-2 months     I spent a total of 55 minutes on the day of the visit.  This includes face to face time and non-face to face time preparing to see the patient, obtaining and/or reviewing separately obtained history, documenting clinical information in the electronic or other health record.  Patient has chronic condition requiring long-term follow-up with me.    Marilou Bellamy MD  Allergy/Immunology         [1]   Patient Active Problem List  Diagnosis    Restrictive lung disease    Cardiomyopathy, nonischemic    CHF (congestive heart failure), NYHA class II, chronic, systolic    Mixed hyperlipidemia    Coronary artery disease involving native coronary artery of native heart without angina pectoris    Insulin resistance    Chronic cough    Thoracic aorta atherosclerosis    Osteopenia of neck of left femur    Gastroesophageal reflux disease   [2]   Current Outpatient Medications on File Prior to Visit   Medication Sig Dispense Refill    azelastine (ASTELIN) 137 mcg (0.1 %) nasal spray 1 spray (137 mcg total) by Nasal route nightly as needed for Rhinitis. 30 mL 2    calcium carbonate (TUMS) 200 mg calcium (500 mg) chewable tablet Take 1 tablet by mouth daily as needed for Heartburn.      fluticasone propionate (FLONASE) 50 mcg/actuation nasal spray 2 sprays (100 mcg total) by Each Nostril route once daily. 9.9 mL 11    fluticasone propionate (FLOVENT HFA) 110 mcg/actuation inhaler Inhale 2 puffs into the lungs 2 (two) times daily. Controller 12 g 3    gabapentin (NEURONTIN) 300 MG capsule TAKE 1 CAPSULE(300 MG) BY MOUTH THREE TIMES DAILY 90 capsule 3    ipratropium (ATROVENT) 21 mcg (0.03 %) nasal spray 2 sprays by Each Nostril route 2 (two) times daily. 30 mL 3    meloxicam (MOBIC) 15 MG tablet Take 1 tablet (15 mg total) by mouth once daily. 90 tablet 3    metFORMIN (GLUCOPHAGE-XR) 500 MG ER 24hr tablet Take 1 tablet (500 mg total) by mouth  daily with breakfast. 90 tablet 3    mv-mn/folic acid/vit [id number] (ALIVE ONCE DAILY WOMEN 50 PLUS ORAL) Take 1 tablet by mouth once daily.      omeprazole (PRILOSEC) 40 MG capsule Take 1 capsule (40 mg total) by mouth every morning. 30 capsule 3    rosuvastatin (CRESTOR) 20 MG tablet Take 1 tablet (20 mg total) by mouth every evening. 90 tablet 3    solifenacin (VESICARE) 10 MG tablet Take 1 tablet (10 mg total) by mouth once daily. 30 tablet 11    [DISCONTINUED] fluticasone propionate (FLONASE) 50 mcg/actuation nasal spray 1 spray by Each Nostril route daily as needed for Rhinitis or Allergies.      [DISCONTINUED] montelukast (SINGULAIR) 10 mg tablet Take 1 tablet (10 mg total) by mouth every evening. 30 tablet 0    losartan (COZAAR) 25 MG tablet Take 1 tablet (25 mg total) by mouth once daily. 90 tablet 3    metoprolol succinate (TOPROL-XL) 100 MG 24 hr tablet Take 1 tablet (100 mg total) by mouth every evening. 90 tablet 3    triamcinolone acetonide 0.1% (KENALOG) 0.1 % cream Apply topically 2 (two) times daily. for 14 days (Patient not taking: Reported on 2025) 80 g 0    [DISCONTINUED] beclomethasone (QVAR) 80 mcg/actuation Aero Inhale 2 puffs into the lungs 2 (two) times a day. Controller 1 g 11    [DISCONTINUED] levocetirizine (XYZAL) 5 MG tablet Take 1 tablet (5 mg total) by mouth every evening. 90 tablet 3     No current facility-administered medications on file prior to visit.   [3]   Social History  Tobacco Use    Smoking status: Former     Current packs/day: 0.00     Average packs/day: 0.3 packs/day for 40.0 years (12.0 ttl pk-yrs)     Types: Cigarettes     Start date:      Quit date: 2018     Years since quittin.5     Passive exposure: Never    Smokeless tobacco: Never    Tobacco comments:     off and on since 20s ,   Substance Use Topics    Alcohol use: Never    Drug use: Never

## 2025-07-31 ENCOUNTER — OFFICE VISIT (OUTPATIENT)
Dept: FAMILY MEDICINE | Facility: CLINIC | Age: 65
End: 2025-07-31
Payer: MEDICARE

## 2025-07-31 VITALS
SYSTOLIC BLOOD PRESSURE: 116 MMHG | HEIGHT: 62 IN | OXYGEN SATURATION: 95 % | TEMPERATURE: 98 F | DIASTOLIC BLOOD PRESSURE: 82 MMHG | BODY MASS INDEX: 28.21 KG/M2 | WEIGHT: 153.31 LBS | HEART RATE: 85 BPM

## 2025-07-31 DIAGNOSIS — I42.8 CARDIOMYOPATHY, NONISCHEMIC: ICD-10-CM

## 2025-07-31 DIAGNOSIS — K21.9 GASTROESOPHAGEAL REFLUX DISEASE, UNSPECIFIED WHETHER ESOPHAGITIS PRESENT: ICD-10-CM

## 2025-07-31 DIAGNOSIS — R05.3 CHRONIC COUGH: ICD-10-CM

## 2025-07-31 DIAGNOSIS — I25.10 CORONARY ARTERY DISEASE INVOLVING NATIVE CORONARY ARTERY OF NATIVE HEART WITHOUT ANGINA PECTORIS: ICD-10-CM

## 2025-07-31 DIAGNOSIS — J98.4 RESTRICTIVE LUNG DISEASE: Primary | Chronic | ICD-10-CM

## 2025-07-31 DIAGNOSIS — Z12.31 ENCOUNTER FOR SCREENING MAMMOGRAM FOR MALIGNANT NEOPLASM OF BREAST: ICD-10-CM

## 2025-07-31 DIAGNOSIS — E78.2 MIXED HYPERLIPIDEMIA: ICD-10-CM

## 2025-07-31 DIAGNOSIS — E88.819 INSULIN RESISTANCE: ICD-10-CM

## 2025-07-31 DIAGNOSIS — I50.22 CHF (CONGESTIVE HEART FAILURE), NYHA CLASS II, CHRONIC, SYSTOLIC: Chronic | ICD-10-CM

## 2025-07-31 PROCEDURE — 99215 OFFICE O/P EST HI 40 MIN: CPT | Mod: S$GLB,,, | Performed by: STUDENT IN AN ORGANIZED HEALTH CARE EDUCATION/TRAINING PROGRAM

## 2025-07-31 PROCEDURE — 1159F MED LIST DOCD IN RCRD: CPT | Mod: CPTII,S$GLB,, | Performed by: STUDENT IN AN ORGANIZED HEALTH CARE EDUCATION/TRAINING PROGRAM

## 2025-07-31 PROCEDURE — 3074F SYST BP LT 130 MM HG: CPT | Mod: CPTII,S$GLB,, | Performed by: STUDENT IN AN ORGANIZED HEALTH CARE EDUCATION/TRAINING PROGRAM

## 2025-07-31 PROCEDURE — 1160F RVW MEDS BY RX/DR IN RCRD: CPT | Mod: CPTII,S$GLB,, | Performed by: STUDENT IN AN ORGANIZED HEALTH CARE EDUCATION/TRAINING PROGRAM

## 2025-07-31 PROCEDURE — 3044F HG A1C LEVEL LT 7.0%: CPT | Mod: CPTII,S$GLB,, | Performed by: STUDENT IN AN ORGANIZED HEALTH CARE EDUCATION/TRAINING PROGRAM

## 2025-07-31 PROCEDURE — 99999 PR PBB SHADOW E&M-EST. PATIENT-LVL V: CPT | Mod: PBBFAC,,, | Performed by: STUDENT IN AN ORGANIZED HEALTH CARE EDUCATION/TRAINING PROGRAM

## 2025-07-31 PROCEDURE — 3079F DIAST BP 80-89 MM HG: CPT | Mod: CPTII,S$GLB,, | Performed by: STUDENT IN AN ORGANIZED HEALTH CARE EDUCATION/TRAINING PROGRAM

## 2025-07-31 PROCEDURE — 3288F FALL RISK ASSESSMENT DOCD: CPT | Mod: CPTII,S$GLB,, | Performed by: STUDENT IN AN ORGANIZED HEALTH CARE EDUCATION/TRAINING PROGRAM

## 2025-07-31 PROCEDURE — 1101F PT FALLS ASSESS-DOCD LE1/YR: CPT | Mod: CPTII,S$GLB,, | Performed by: STUDENT IN AN ORGANIZED HEALTH CARE EDUCATION/TRAINING PROGRAM

## 2025-07-31 PROCEDURE — 3008F BODY MASS INDEX DOCD: CPT | Mod: CPTII,S$GLB,, | Performed by: STUDENT IN AN ORGANIZED HEALTH CARE EDUCATION/TRAINING PROGRAM

## 2025-07-31 NOTE — PROGRESS NOTES
Subjective:      Patient ID: Suly Jaime is a 65 y.o. female.    Chief Complaint: Follow-up (Pt here for a 6 month follow up)    History of Present Illness    CHIEF COMPLAINT:  Patient presents today for six-month follow-up.    CHRONIC COUGH:  She reports persistent chronic cough despite multiple medical interventions, including evaluation by a pulmonary allergy specialist and various medications without resolution. The cough has been ongoing for several years.    MUSCULOSKELETAL:  She reports ongoing leg and knee joint pain, particularly severe at night causing significant sleep disturbance. The pain is consistent and minimally responsive to current pain management. She has reduced mobility and motivation due to knee discomfort. She has a history of knee surgery and prior interventional treatments.    CARDIOVASCULAR:  She has known cardiomyopathy with stable cardiology follow-up.    CURRENT MEDICATIONS:  She takes Gabapentin for sleep, Meloxicam and occasional Tylenol arthritis for pain management, Losartan 50mg, Metoprolol 25mg, and Crestor 10mg. She reports compliance with all prescribed cardiac medications.      ROS:  General: -fever, -chills, -fatigue, -weight gain, -weight loss, +exercise intolerance  Eyes: -vision changes, -redness, -discharge  ENT: -ear pain, -nasal congestion, -sore throat  Cardiovascular: -chest pain, -palpitations, -lower extremity edema  Respiratory: +cough, -shortness of breath  Gastrointestinal: -abdominal pain, -nausea, -vomiting, -diarrhea, -constipation, -blood in stool  Genitourinary: -dysuria, -hematuria, -frequency  Musculoskeletal: +joint pain, -muscle pain, +nightime pain, +limb pain  Skin: -rash, -lesion  Neurological: -headache, -dizziness, -numbness, -tingling  Psychiatric: -anxiety, -depression, -sleep difficulty          Objective:     Vitals:    07/31/25 1139   BP: 116/82   Pulse: 85   Temp: 98.1 °F (36.7 °C)      Physical Exam  Vitals reviewed.   Constitutional:        Appearance: Normal appearance. She is normal weight.   HENT:      Head: Normocephalic and atraumatic.   Eyes:      Conjunctiva/sclera: Conjunctivae normal.   Cardiovascular:      Rate and Rhythm: Normal rate and regular rhythm.      Heart sounds: Normal heart sounds.   Pulmonary:      Effort: Pulmonary effort is normal.      Breath sounds: Normal breath sounds.   Abdominal:      Palpations: Abdomen is soft.      Tenderness: There is no abdominal tenderness.   Musculoskeletal:         General: Normal range of motion.      Cervical back: Normal range of motion.      Right lower leg: No edema.      Left lower leg: No edema.   Neurological:      Mental Status: She is alert. Mental status is at baseline.   Psychiatric:         Mood and Affect: Mood normal.         Behavior: Behavior normal.        Assessment:         1. Restrictive lung disease    2. Cardiomyopathy, nonischemic    3. CHF (congestive heart failure), NYHA class II, chronic, systolic    4. Coronary artery disease involving native coronary artery of native heart without angina pectoris    5. Mixed hyperlipidemia    6. Chronic cough    7. Insulin resistance    8. Gastroesophageal reflux disease, unspecified whether esophagitis present    9. Encounter for screening mammogram for malignant neoplasm of breast          Plan:   Assessment & Plan      CARDIOMYOPATHY:  - Patient's cardiac status remains stable and satisfactory with ongoing management.  - Patient continues all prescribed medications including Losartan, metoprolol, and Crestor.  - Cardiology is satisfied with current heart condition.  - Ordered fasting lab work to check cholesterol levels due to lack of recent results.    CHRONIC COUGH:  - Chronic cough persists despite previous pulmonary/allergy specialist consultation and medication regimen.    RIGHT KNEE PAIN:  - Monitored right knee pain, which is aching and causing nocturnal discomfort.  - Assessment indicates likely arthritic nature.  - Current  pain management includes Gabapentin, meloxicam, and Tylenol arthritis.  - Continued Tylenol arthritis 3 times daily, including before bedtime.  - Recommend orthopedic consultation if pain worsens.  - Discussed option of intra-articular knee injections for pain relief, potentially providing up to 6 months of symptom improvement.  - Educated patient on benefits of movement and exercise, specifically walking, for pain management.      RIGHT LEG PAIN:  - Monitored right leg pain, which is aching and causing nocturnal discomfort.  - Assessed as likely arthritic in nature.  - Continuing current pain management regimen of Gabapentin, meloxicam, and Tylenol arthritis, with Tylenol taken 3 times daily including before bedtime.      FOLLOW-UP:  - Ordered mammogram.        Problem List Items Addressed This Visit          Pulmonary    Restrictive lung disease - Primary (Chronic)    Overview   Singulair   flovent PRN  stable         Chronic cough       Cardiac/Vascular    CHF (congestive heart failure), NYHA class II, chronic, systolic (Chronic)    Overview   medically managed  EF back to normal level  BB, statin, ARB         Mixed hyperlipidemia    Overview   Crestor 10   Continues to have elevated cholesterol   Increase to 20mg  Note in past has had some myalgias, hopeful this will not occur as was previously on higher doses         Coronary artery disease involving native coronary artery of native heart without angina pectoris    Overview   Follows with cards   BB, ARB,  statin            Cardiomyopathy, nonischemic    Overview   Follows with cards  Metoprolol 100   ARB  Asymptomatic   ICD in place  Continue current meds            Endocrine    Insulin resistance    Overview   Diet controlled  Metformin 500 daily   Well tolerated; no issues             GI    Gastroesophageal reflux disease     Other Visit Diagnoses         Encounter for screening mammogram for malignant neoplasm of breast        Relevant Orders    Mammo  Digital Screening Herminio bronson/ Bill (XPD)                Stefania Terry   [facility]   7/31/25       This note was generated with the assistance of ambient listening technology. Verbal consent was obtained by the patient and accompanying visitor(s) for the recording of patient appointment to facilitate this note. I attest to having reviewed and edited the generated note for accuracy, though some syntax or spelling errors may persist. Please contact the author of this note for any clarification.     I spent a total of 41 minutes on the day of the visit.This includes face to face time and non-face to face time preparing to see the patient (eg, review of tests), obtaining and/or reviewing separately obtained history, documenting clinical information in the electronic or other health record, independently interpreting results and communicating results to the patient/family/caregiver, or care coordinator.

## 2025-08-09 DIAGNOSIS — M46.1 SACROILIITIS: ICD-10-CM

## 2025-08-09 DIAGNOSIS — G57.01 PIRIFORMIS SYNDROME OF RIGHT SIDE: ICD-10-CM

## 2025-08-11 RX ORDER — MELOXICAM 15 MG/1
TABLET ORAL
Qty: 90 TABLET | Refills: 3 | Status: SHIPPED | OUTPATIENT
Start: 2025-08-11

## 2025-08-20 ENCOUNTER — OFFICE VISIT (OUTPATIENT)
Dept: UROLOGY | Facility: CLINIC | Age: 65
End: 2025-08-20
Payer: MEDICARE

## 2025-08-20 VITALS
HEART RATE: 75 BPM | DIASTOLIC BLOOD PRESSURE: 88 MMHG | WEIGHT: 155 LBS | SYSTOLIC BLOOD PRESSURE: 149 MMHG | BODY MASS INDEX: 28.35 KG/M2

## 2025-08-20 DIAGNOSIS — R35.1 NOCTURIA: Primary | ICD-10-CM

## 2025-08-20 DIAGNOSIS — R35.0 URINARY FREQUENCY: ICD-10-CM

## 2025-08-20 PROCEDURE — 3288F FALL RISK ASSESSMENT DOCD: CPT | Mod: CPTII,S$GLB,, | Performed by: UROLOGY

## 2025-08-20 PROCEDURE — 99214 OFFICE O/P EST MOD 30 MIN: CPT | Mod: S$GLB,,, | Performed by: UROLOGY

## 2025-08-20 PROCEDURE — 1159F MED LIST DOCD IN RCRD: CPT | Mod: CPTII,S$GLB,, | Performed by: UROLOGY

## 2025-08-20 PROCEDURE — 3008F BODY MASS INDEX DOCD: CPT | Mod: CPTII,S$GLB,, | Performed by: UROLOGY

## 2025-08-20 PROCEDURE — 99999 PR PBB SHADOW E&M-EST. PATIENT-LVL III: CPT | Mod: PBBFAC,,, | Performed by: UROLOGY

## 2025-08-20 PROCEDURE — 1101F PT FALLS ASSESS-DOCD LE1/YR: CPT | Mod: CPTII,S$GLB,, | Performed by: UROLOGY

## 2025-08-20 PROCEDURE — 3044F HG A1C LEVEL LT 7.0%: CPT | Mod: CPTII,S$GLB,, | Performed by: UROLOGY

## 2025-08-20 PROCEDURE — 3077F SYST BP >= 140 MM HG: CPT | Mod: CPTII,S$GLB,, | Performed by: UROLOGY

## 2025-08-20 PROCEDURE — 3079F DIAST BP 80-89 MM HG: CPT | Mod: CPTII,S$GLB,, | Performed by: UROLOGY

## 2025-08-21 ENCOUNTER — CLINICAL SUPPORT (OUTPATIENT)
Dept: CARDIOLOGY | Facility: HOSPITAL | Age: 65
End: 2025-08-21
Attending: INTERNAL MEDICINE
Payer: MEDICARE

## 2025-08-21 ENCOUNTER — CLINICAL SUPPORT (OUTPATIENT)
Dept: CARDIOLOGY | Facility: HOSPITAL | Age: 65
End: 2025-08-21
Payer: MEDICARE

## 2025-08-21 DIAGNOSIS — I42.8 OTHER CARDIOMYOPATHIES: ICD-10-CM

## 2025-08-21 DIAGNOSIS — Z95.810 PRESENCE OF AUTOMATIC (IMPLANTABLE) CARDIAC DEFIBRILLATOR: ICD-10-CM

## 2025-08-21 PROCEDURE — 93295 DEV INTERROG REMOTE 1/2/MLT: CPT | Mod: ,,, | Performed by: INTERNAL MEDICINE

## 2025-08-21 PROCEDURE — 93296 REM INTERROG EVL PM/IDS: CPT | Performed by: INTERNAL MEDICINE

## 2025-08-21 RX ORDER — MIRABEGRON 50 MG/1
50 TABLET, FILM COATED, EXTENDED RELEASE ORAL DAILY
Qty: 30 TABLET | Refills: 11 | Status: SHIPPED | OUTPATIENT
Start: 2025-08-21 | End: 2026-08-21

## 2025-08-28 LAB
OHS CV DC REMOTE DEVICE TYPE: NORMAL
OHS CV RV PACING PERCENT: 1 %

## (undated) DEVICE — DRESSING TELFA PAD N ADH 2X3

## (undated) DEVICE — GLOVE BIOGEL ULTRATOUCH 6.5

## (undated) DEVICE — SEE MEDLINE ITEM 157116

## (undated) DEVICE — SEE MEDLINE ITEM 156955

## (undated) DEVICE — BLADE PLASMA WIDE SPATULA TIP

## (undated) DEVICE — ELECTRODE REM PLYHSV RETURN 9

## (undated) DEVICE — SYR B-D DISP CONTROL 10CC100/C

## (undated) DEVICE — SEE L#120831

## (undated) DEVICE — SUPPORT ULNA NERVE PROTECTOR

## (undated) DEVICE — DRESSING AQUACEL AG ADV 3.5X6

## (undated) DEVICE — GLOVE SURG BIOGEL LATEX SZ 7.5

## (undated) DEVICE — SEE MEDLINE ITEM 152622

## (undated) DEVICE — GUARD MOUTH

## (undated) DEVICE — COVER INSTR ELASTIC BAND 40X20

## (undated) DEVICE — GOWN POLY REINF BRTH SLV LG

## (undated) DEVICE — PAD DEFIB CADENCE ADULT R2

## (undated) DEVICE — CONTAINER MULTIPURPOSE/SPECIME

## (undated) DEVICE — BANDAGE ELASTOPLAST 3INX5YDS

## (undated) DEVICE — NDL HYPO REG 25G X 1 1/2

## (undated) DEVICE — HANDPIECE T&A GOLD LASER FIBER

## (undated) DEVICE — MANIFOLD 4 PORT

## (undated) DEVICE — GOWN POLY REINF BRTH SLV XL

## (undated) DEVICE — GAUZE SPONGE 4X4 12PLY

## (undated) DEVICE — SEE MEDLINE ITEM 157117

## (undated) DEVICE — BLADE SCALP OPHTL BEVEL STR

## (undated) DEVICE — STYLET 58CM

## (undated) DEVICE — PAD PREP CUFFED NS 24X48IN

## (undated) DEVICE — COVER OVERHEAD SURG LT BLUE

## (undated) DEVICE — ALCOHOL 70% ISOP W/GREEN 16OZ

## (undated) DEVICE — ADHESIVE DERMABOND ADVANCED

## (undated) DEVICE — SEE L#152161

## (undated) DEVICE — DRAPE HAND STERILE

## (undated) DEVICE — KIT WRENCH

## (undated) DEVICE — APPLICATOR CHLORAPREP ORN 26ML

## (undated) DEVICE — PAD CAST 2 IN X 4YDS STERILE

## (undated) DEVICE — TOURNIQUET SB QC DP 18X4IN

## (undated) DEVICE — BLADE SURG #15 CARBON STEEL

## (undated) DEVICE — BANDAGE ESMARK ELASTIC ST 4X9

## (undated) DEVICE — SUT ETHILON 5-0 PS-2 18IN

## (undated) DEVICE — SAFESHEATH II 8FR 13CM

## (undated) DEVICE — DRAPE INCISE IOBAN 2 23X17IN

## (undated) DEVICE — SLING SWATHE UNIVERSAL FOAM

## (undated) DEVICE — DRESSING XEROFORM FOIL PK 1X8

## (undated) DEVICE — NDL 18GA X1 1/2 REG BEVEL

## (undated) DEVICE — STOCKINET 4INX48

## (undated) DEVICE — KIT PROBE COVER WITH GEL

## (undated) DEVICE — DRESSING SURGICAL 3/4X3/4

## (undated) DEVICE — SLING ARM COMFT NAVY BLU LG

## (undated) DEVICE — PACK HEAD & NECK

## (undated) DEVICE — KIT ANTIFOG

## (undated) DEVICE — PACK EP DRAPE

## (undated) DEVICE — BANDAGE MATRIX HK LOOP 2IN 5YD

## (undated) DEVICE — PACK PACER PERMANENT

## (undated) DEVICE — PACK SURGERY START

## (undated) DEVICE — PACK BASIC